# Patient Record
Sex: MALE | Race: WHITE | NOT HISPANIC OR LATINO | Employment: OTHER | URBAN - METROPOLITAN AREA
[De-identification: names, ages, dates, MRNs, and addresses within clinical notes are randomized per-mention and may not be internally consistent; named-entity substitution may affect disease eponyms.]

---

## 2017-02-13 ENCOUNTER — GENERIC CONVERSION - ENCOUNTER (OUTPATIENT)
Dept: OTHER | Facility: OTHER | Age: 62
End: 2017-02-13

## 2017-02-17 ENCOUNTER — ALLSCRIPTS OFFICE VISIT (OUTPATIENT)
Dept: OTHER | Facility: OTHER | Age: 62
End: 2017-02-17

## 2017-02-17 ENCOUNTER — HOSPITAL ENCOUNTER (OUTPATIENT)
Dept: RADIOLOGY | Facility: MEDICAL CENTER | Age: 62
Discharge: HOME | End: 2017-02-17
Payer: COMMERCIAL

## 2017-02-17 DIAGNOSIS — M70.71 OTHER BURSITIS OF HIP, RIGHT HIP: ICD-10-CM

## 2017-02-17 DIAGNOSIS — M25.551 PAIN IN RIGHT HIP: ICD-10-CM

## 2017-02-17 DIAGNOSIS — M70.72 OTHER BURSITIS OF HIP, LEFT HIP: ICD-10-CM

## 2017-02-17 DIAGNOSIS — M46.1 SACROILIITIS, NOT ELSEWHERE CLASSIFIED (HCC): ICD-10-CM

## 2017-02-17 PROCEDURE — 72170 X-RAY EXAM OF PELVIS: CPT

## 2017-02-27 ENCOUNTER — ALLSCRIPTS OFFICE VISIT (OUTPATIENT)
Dept: OTHER | Facility: OTHER | Age: 62
End: 2017-02-27

## 2017-03-07 ENCOUNTER — ALLSCRIPTS OFFICE VISIT (OUTPATIENT)
Dept: RADIOLOGY | Facility: CLINIC | Age: 62
End: 2017-03-07
Payer: COMMERCIAL

## 2017-03-13 ENCOUNTER — APPOINTMENT (OUTPATIENT)
Dept: PHYSICAL THERAPY | Age: 62
End: 2017-03-13
Payer: COMMERCIAL

## 2017-03-13 DIAGNOSIS — M70.71 OTHER BURSITIS OF HIP, RIGHT HIP: ICD-10-CM

## 2017-03-13 DIAGNOSIS — M70.72 OTHER BURSITIS OF HIP, LEFT HIP: ICD-10-CM

## 2017-03-13 DIAGNOSIS — M46.1 SACROILIITIS, NOT ELSEWHERE CLASSIFIED (HCC): ICD-10-CM

## 2017-03-13 PROCEDURE — 97162 PT EVAL MOD COMPLEX 30 MIN: CPT

## 2017-03-16 ENCOUNTER — GENERIC CONVERSION - ENCOUNTER (OUTPATIENT)
Dept: OTHER | Facility: OTHER | Age: 62
End: 2017-03-16

## 2017-03-16 ENCOUNTER — APPOINTMENT (OUTPATIENT)
Dept: PHYSICAL THERAPY | Age: 62
End: 2017-03-16
Payer: COMMERCIAL

## 2017-03-16 PROCEDURE — 97110 THERAPEUTIC EXERCISES: CPT

## 2017-03-18 ENCOUNTER — HOSPITAL ENCOUNTER (OUTPATIENT)
Dept: RADIOLOGY | Age: 62
Discharge: HOME | End: 2017-03-18
Payer: COMMERCIAL

## 2017-03-18 ENCOUNTER — TRANSCRIBE ORDERS (OUTPATIENT)
Dept: ADMINISTRATIVE | Age: 62
End: 2017-03-18

## 2017-03-18 DIAGNOSIS — N13.8 ENLARGED PROSTATE WITH URINARY OBSTRUCTION: ICD-10-CM

## 2017-03-18 DIAGNOSIS — N40.1 ENLARGED PROSTATE WITH URINARY OBSTRUCTION: ICD-10-CM

## 2017-03-18 DIAGNOSIS — N20.0 URIC ACID NEPHROLITHIASIS: ICD-10-CM

## 2017-03-18 DIAGNOSIS — N20.0 URIC ACID NEPHROLITHIASIS: Primary | ICD-10-CM

## 2017-03-18 PROCEDURE — 74000 HB X-RAY EXAM OF ABDOMEN (SINGLE ANTEROPOSTERIOR VIEW): CPT

## 2017-03-20 ENCOUNTER — APPOINTMENT (OUTPATIENT)
Dept: PHYSICAL THERAPY | Age: 62
End: 2017-03-20
Payer: COMMERCIAL

## 2017-03-20 ENCOUNTER — TRANSCRIBE ORDERS (OUTPATIENT)
Dept: ADMINISTRATIVE | Age: 62
End: 2017-03-20

## 2017-03-20 ENCOUNTER — APPOINTMENT (OUTPATIENT)
Dept: LAB | Age: 62
End: 2017-03-20
Payer: COMMERCIAL

## 2017-03-20 DIAGNOSIS — N13.8 ENLARGED PROSTATE WITH URINARY OBSTRUCTION: ICD-10-CM

## 2017-03-20 DIAGNOSIS — N40.1 ENLARGED PROSTATE WITH URINARY OBSTRUCTION: ICD-10-CM

## 2017-03-20 DIAGNOSIS — N20.0 URIC ACID NEPHROLITHIASIS: ICD-10-CM

## 2017-03-20 DIAGNOSIS — N40.1 ENLARGED PROSTATE WITH URINARY OBSTRUCTION: Primary | ICD-10-CM

## 2017-03-20 DIAGNOSIS — Z94.4 LIVER REPLACED BY TRANSPLANT (HCC): ICD-10-CM

## 2017-03-20 DIAGNOSIS — N13.8 ENLARGED PROSTATE WITH URINARY OBSTRUCTION: Primary | ICD-10-CM

## 2017-03-20 DIAGNOSIS — Z94.4 LIVER REPLACED BY TRANSPLANT (HCC): Primary | ICD-10-CM

## 2017-03-20 LAB
ALBUMIN SERPL BCP-MCNC: 3.9 G/DL (ref 3.5–5)
ALP SERPL-CCNC: 56 U/L (ref 46–116)
ALT SERPL W P-5'-P-CCNC: 21 U/L (ref 12–78)
ANION GAP SERPL CALCULATED.3IONS-SCNC: 7 MMOL/L (ref 4–13)
AST SERPL W P-5'-P-CCNC: 10 U/L (ref 5–45)
BASOPHILS # BLD AUTO: 0.03 THOUSANDS/ΜL (ref 0–0.1)
BASOPHILS NFR BLD AUTO: 0 % (ref 0–1)
BILIRUB SERPL-MCNC: 0.51 MG/DL (ref 0.2–1)
BUN SERPL-MCNC: 12 MG/DL (ref 5–25)
CALCIUM SERPL-MCNC: 9.5 MG/DL (ref 8.3–10.1)
CHLORIDE SERPL-SCNC: 104 MMOL/L (ref 100–108)
CO2 SERPL-SCNC: 30 MMOL/L (ref 21–32)
CREAT SERPL-MCNC: 0.98 MG/DL (ref 0.6–1.3)
EOSINOPHIL # BLD AUTO: 0.25 THOUSAND/ΜL (ref 0–0.61)
EOSINOPHIL NFR BLD AUTO: 2 % (ref 0–6)
ERYTHROCYTE [DISTWIDTH] IN BLOOD BY AUTOMATED COUNT: 13.4 % (ref 11.6–15.1)
GFR SERPL CREATININE-BSD FRML MDRD: >60 ML/MIN/1.73SQ M
GGT SERPL-CCNC: 19 U/L (ref 5–85)
GLUCOSE SERPL-MCNC: 97 MG/DL (ref 65–140)
HCT VFR BLD AUTO: 45.1 % (ref 36.5–49.3)
HGB BLD-MCNC: 15.6 G/DL (ref 12–17)
INR PPP: 1.24 (ref 0.86–1.16)
LYMPHOCYTES # BLD AUTO: 1.55 THOUSANDS/ΜL (ref 0.6–4.47)
LYMPHOCYTES NFR BLD AUTO: 12 % (ref 14–44)
MAGNESIUM SERPL-MCNC: 2 MG/DL (ref 1.6–2.6)
MCH RBC QN AUTO: 32.6 PG (ref 26.8–34.3)
MCHC RBC AUTO-ENTMCNC: 34.6 G/DL (ref 31.4–37.4)
MCV RBC AUTO: 94 FL (ref 82–98)
MONOCYTES # BLD AUTO: 0.57 THOUSAND/ΜL (ref 0.17–1.22)
MONOCYTES NFR BLD AUTO: 5 % (ref 4–12)
NEUTROPHILS # BLD AUTO: 10.25 THOUSANDS/ΜL (ref 1.85–7.62)
NEUTS SEG NFR BLD AUTO: 81 % (ref 43–75)
NRBC BLD AUTO-RTO: 0 /100 WBCS
PLATELET # BLD AUTO: 253 THOUSANDS/UL (ref 149–390)
PMV BLD AUTO: 12.3 FL (ref 8.9–12.7)
POTASSIUM SERPL-SCNC: 4.6 MMOL/L (ref 3.5–5.3)
PROT SERPL-MCNC: 7.2 G/DL (ref 6.4–8.2)
PROTHROMBIN TIME: 15.7 SECONDS (ref 12–14.3)
PSA SERPL-MCNC: 2.3 NG/ML (ref 0–4)
RBC # BLD AUTO: 4.79 MILLION/UL (ref 3.88–5.62)
SODIUM SERPL-SCNC: 141 MMOL/L (ref 136–145)
WBC # BLD AUTO: 12.69 THOUSAND/UL (ref 4.31–10.16)

## 2017-03-20 PROCEDURE — 82977 ASSAY OF GGT: CPT

## 2017-03-20 PROCEDURE — 36415 COLL VENOUS BLD VENIPUNCTURE: CPT

## 2017-03-20 PROCEDURE — 85610 PROTHROMBIN TIME: CPT

## 2017-03-20 PROCEDURE — 83735 ASSAY OF MAGNESIUM: CPT

## 2017-03-20 PROCEDURE — 97110 THERAPEUTIC EXERCISES: CPT

## 2017-03-20 PROCEDURE — 80197 ASSAY OF TACROLIMUS: CPT

## 2017-03-20 PROCEDURE — G0103 PSA SCREENING: HCPCS

## 2017-03-20 PROCEDURE — 80053 COMPREHEN METABOLIC PANEL: CPT

## 2017-03-20 PROCEDURE — 85025 COMPLETE CBC W/AUTO DIFF WBC: CPT

## 2017-03-21 ENCOUNTER — APPOINTMENT (OUTPATIENT)
Dept: PHYSICAL THERAPY | Age: 62
End: 2017-03-21
Payer: COMMERCIAL

## 2017-03-22 ENCOUNTER — APPOINTMENT (OUTPATIENT)
Dept: PHYSICAL THERAPY | Age: 62
End: 2017-03-22
Payer: COMMERCIAL

## 2017-03-22 LAB — TACROLIMUS BLD LC/MS/MS-MCNC: 4.6 NG/ML (ref 2–20)

## 2017-03-22 PROCEDURE — 97110 THERAPEUTIC EXERCISES: CPT

## 2017-04-17 ENCOUNTER — GENERIC CONVERSION - ENCOUNTER (OUTPATIENT)
Dept: OTHER | Facility: OTHER | Age: 62
End: 2017-04-17

## 2017-04-21 ENCOUNTER — TRANSCRIBE ORDERS (OUTPATIENT)
Dept: ADMINISTRATIVE | Age: 62
End: 2017-04-21

## 2017-04-21 ENCOUNTER — APPOINTMENT (OUTPATIENT)
Dept: LAB | Age: 62
End: 2017-04-21
Payer: COMMERCIAL

## 2017-04-21 DIAGNOSIS — C22.1 MALIGNANT NEOPLASM OF INTRAHEPATIC BILE DUCTS (HCC): ICD-10-CM

## 2017-04-21 DIAGNOSIS — D68.9 BLOOD CLOTTING DISORDER (HCC): ICD-10-CM

## 2017-04-21 DIAGNOSIS — R79.1 ABNORMAL COAGULATION PROFILE: ICD-10-CM

## 2017-04-21 DIAGNOSIS — Z94.4 LIVER REPLACED BY TRANSPLANT (HCC): ICD-10-CM

## 2017-04-21 DIAGNOSIS — Z94.4 LIVER REPLACED BY TRANSPLANT (HCC): Primary | ICD-10-CM

## 2017-04-21 LAB
ALBUMIN SERPL BCP-MCNC: 4 G/DL (ref 3.5–5)
ALP SERPL-CCNC: 49 U/L (ref 46–116)
ALT SERPL W P-5'-P-CCNC: 19 U/L (ref 12–78)
ANION GAP SERPL CALCULATED.3IONS-SCNC: 7 MMOL/L (ref 4–13)
AST SERPL W P-5'-P-CCNC: 9 U/L (ref 5–45)
BASOPHILS # BLD AUTO: 0.04 THOUSANDS/ΜL (ref 0–0.1)
BASOPHILS NFR BLD AUTO: 0 % (ref 0–1)
BILIRUB SERPL-MCNC: 0.47 MG/DL (ref 0.2–1)
BUN SERPL-MCNC: 10 MG/DL (ref 5–25)
CALCIUM SERPL-MCNC: 9.4 MG/DL (ref 8.3–10.1)
CHLORIDE SERPL-SCNC: 104 MMOL/L (ref 100–108)
CO2 SERPL-SCNC: 27 MMOL/L (ref 21–32)
CREAT SERPL-MCNC: 0.9 MG/DL (ref 0.6–1.3)
EOSINOPHIL # BLD AUTO: 0.15 THOUSAND/ΜL (ref 0–0.61)
EOSINOPHIL NFR BLD AUTO: 2 % (ref 0–6)
ERYTHROCYTE [DISTWIDTH] IN BLOOD BY AUTOMATED COUNT: 13.6 % (ref 11.6–15.1)
GFR SERPL CREATININE-BSD FRML MDRD: >60 ML/MIN/1.73SQ M
GGT SERPL-CCNC: 19 U/L (ref 5–85)
GLUCOSE SERPL-MCNC: 91 MG/DL (ref 65–140)
HCT VFR BLD AUTO: 44.1 % (ref 36.5–49.3)
HGB BLD-MCNC: 15.2 G/DL (ref 12–17)
INR PPP: 1.21 (ref 0.86–1.16)
LYMPHOCYTES # BLD AUTO: 1.53 THOUSANDS/ΜL (ref 0.6–4.47)
LYMPHOCYTES NFR BLD AUTO: 16 % (ref 14–44)
MAGNESIUM SERPL-MCNC: 1.8 MG/DL (ref 1.6–2.6)
MCH RBC QN AUTO: 33 PG (ref 26.8–34.3)
MCHC RBC AUTO-ENTMCNC: 34.5 G/DL (ref 31.4–37.4)
MCV RBC AUTO: 96 FL (ref 82–98)
MONOCYTES # BLD AUTO: 0.44 THOUSAND/ΜL (ref 0.17–1.22)
MONOCYTES NFR BLD AUTO: 5 % (ref 4–12)
NEUTROPHILS # BLD AUTO: 7.6 THOUSANDS/ΜL (ref 1.85–7.62)
NEUTS SEG NFR BLD AUTO: 77 % (ref 43–75)
NRBC BLD AUTO-RTO: 0 /100 WBCS
PLATELET # BLD AUTO: 242 THOUSANDS/UL (ref 149–390)
PMV BLD AUTO: 12.4 FL (ref 8.9–12.7)
POTASSIUM SERPL-SCNC: 4.5 MMOL/L (ref 3.5–5.3)
PROT SERPL-MCNC: 7.3 G/DL (ref 6.4–8.2)
PROTHROMBIN TIME: 15.4 SECONDS (ref 12–14.3)
RBC # BLD AUTO: 4.6 MILLION/UL (ref 3.88–5.62)
SODIUM SERPL-SCNC: 138 MMOL/L (ref 136–145)
WBC # BLD AUTO: 9.79 THOUSAND/UL (ref 4.31–10.16)

## 2017-04-21 PROCEDURE — 80053 COMPREHEN METABOLIC PANEL: CPT

## 2017-04-21 PROCEDURE — 36415 COLL VENOUS BLD VENIPUNCTURE: CPT

## 2017-04-21 PROCEDURE — 85610 PROTHROMBIN TIME: CPT

## 2017-04-21 PROCEDURE — 85025 COMPLETE CBC W/AUTO DIFF WBC: CPT

## 2017-04-21 PROCEDURE — 82977 ASSAY OF GGT: CPT

## 2017-04-21 PROCEDURE — 83735 ASSAY OF MAGNESIUM: CPT

## 2017-04-21 PROCEDURE — 80197 ASSAY OF TACROLIMUS: CPT

## 2017-04-24 ENCOUNTER — ALLSCRIPTS OFFICE VISIT (OUTPATIENT)
Dept: OTHER | Facility: OTHER | Age: 62
End: 2017-04-24

## 2017-04-24 DIAGNOSIS — M51.16 INTERVERTEBRAL DISC DISORDER WITH RADICULOPATHY OF LUMBAR REGION: ICD-10-CM

## 2017-04-24 LAB — TACROLIMUS BLD LC/MS/MS-MCNC: 2.7 NG/ML (ref 2–20)

## 2017-04-27 ENCOUNTER — GENERIC CONVERSION - ENCOUNTER (OUTPATIENT)
Dept: OTHER | Facility: OTHER | Age: 62
End: 2017-04-27

## 2017-05-08 ENCOUNTER — HOSPITAL ENCOUNTER (OUTPATIENT)
Dept: RADIOLOGY | Facility: HOSPITAL | Age: 62
Discharge: HOME | End: 2017-05-08
Attending: ANESTHESIOLOGY
Payer: COMMERCIAL

## 2017-05-08 DIAGNOSIS — M51.16 INTERVERTEBRAL DISC DISORDER WITH RADICULOPATHY OF LUMBAR REGION: ICD-10-CM

## 2017-05-08 PROCEDURE — 72148 MRI LUMBAR SPINE W/O DYE: CPT

## 2017-05-12 ENCOUNTER — GENERIC CONVERSION - ENCOUNTER (OUTPATIENT)
Dept: OTHER | Facility: OTHER | Age: 62
End: 2017-05-12

## 2017-05-30 ENCOUNTER — ALLSCRIPTS OFFICE VISIT (OUTPATIENT)
Dept: RADIOLOGY | Facility: CLINIC | Age: 62
End: 2017-05-30
Payer: COMMERCIAL

## 2017-06-15 ENCOUNTER — GENERIC CONVERSION - ENCOUNTER (OUTPATIENT)
Dept: OTHER | Facility: OTHER | Age: 62
End: 2017-06-15

## 2017-06-21 ENCOUNTER — GENERIC CONVERSION - ENCOUNTER (OUTPATIENT)
Dept: OTHER | Facility: OTHER | Age: 62
End: 2017-06-21

## 2017-07-07 ENCOUNTER — GENERIC CONVERSION - ENCOUNTER (OUTPATIENT)
Dept: OTHER | Facility: OTHER | Age: 62
End: 2017-07-07

## 2017-07-15 ENCOUNTER — APPOINTMENT (OUTPATIENT)
Dept: LAB | Age: 62
End: 2017-07-15
Payer: COMMERCIAL

## 2017-07-15 ENCOUNTER — TRANSCRIBE ORDERS (OUTPATIENT)
Dept: ADMINISTRATIVE | Age: 62
End: 2017-07-15

## 2017-07-15 DIAGNOSIS — R79.1 ABNORMAL COAGULATION PROFILE: ICD-10-CM

## 2017-07-15 DIAGNOSIS — D68.9 BLOOD CLOTTING DISORDER (HCC): ICD-10-CM

## 2017-07-15 DIAGNOSIS — C22.1 MALIGNANT NEOPLASM OF INTRAHEPATIC BILE DUCTS (HCC): ICD-10-CM

## 2017-07-15 DIAGNOSIS — Z94.4 LIVER REPLACED BY TRANSPLANT (HCC): ICD-10-CM

## 2017-07-15 DIAGNOSIS — Z94.4 LIVER REPLACED BY TRANSPLANT (HCC): Primary | ICD-10-CM

## 2017-07-15 LAB
ALBUMIN SERPL BCP-MCNC: 3.8 G/DL (ref 3.5–5)
ALP SERPL-CCNC: 57 U/L (ref 46–116)
ALT SERPL W P-5'-P-CCNC: 23 U/L (ref 12–78)
ANION GAP SERPL CALCULATED.3IONS-SCNC: 5 MMOL/L (ref 4–13)
AST SERPL W P-5'-P-CCNC: 15 U/L (ref 5–45)
BASOPHILS # BLD AUTO: 0.02 THOUSANDS/ΜL (ref 0–0.1)
BASOPHILS NFR BLD AUTO: 0 % (ref 0–1)
BILIRUB SERPL-MCNC: 0.24 MG/DL (ref 0.2–1)
BUN SERPL-MCNC: 9 MG/DL (ref 5–25)
CALCIUM SERPL-MCNC: 9.1 MG/DL (ref 8.3–10.1)
CHLORIDE SERPL-SCNC: 105 MMOL/L (ref 100–108)
CO2 SERPL-SCNC: 29 MMOL/L (ref 21–32)
CREAT SERPL-MCNC: 0.94 MG/DL (ref 0.6–1.3)
EOSINOPHIL # BLD AUTO: 0.18 THOUSAND/ΜL (ref 0–0.61)
EOSINOPHIL NFR BLD AUTO: 2 % (ref 0–6)
ERYTHROCYTE [DISTWIDTH] IN BLOOD BY AUTOMATED COUNT: 13 % (ref 11.6–15.1)
GFR SERPL CREATININE-BSD FRML MDRD: >60 ML/MIN/1.73SQ M
GGT SERPL-CCNC: 14 U/L (ref 5–85)
GLUCOSE SERPL-MCNC: 97 MG/DL (ref 65–140)
HCT VFR BLD AUTO: 41.9 % (ref 36.5–49.3)
HGB BLD-MCNC: 13.8 G/DL (ref 12–17)
INR PPP: 1.12 (ref 0.86–1.16)
LYMPHOCYTES # BLD AUTO: 1.04 THOUSANDS/ΜL (ref 0.6–4.47)
LYMPHOCYTES NFR BLD AUTO: 9 % (ref 14–44)
MAGNESIUM SERPL-MCNC: 1.9 MG/DL (ref 1.6–2.6)
MCH RBC QN AUTO: 31.6 PG (ref 26.8–34.3)
MCHC RBC AUTO-ENTMCNC: 32.9 G/DL (ref 31.4–37.4)
MCV RBC AUTO: 96 FL (ref 82–98)
MONOCYTES # BLD AUTO: 0.49 THOUSAND/ΜL (ref 0.17–1.22)
MONOCYTES NFR BLD AUTO: 4 % (ref 4–12)
NEUTROPHILS # BLD AUTO: 9.44 THOUSANDS/ΜL (ref 1.85–7.62)
NEUTS SEG NFR BLD AUTO: 85 % (ref 43–75)
NRBC BLD AUTO-RTO: 0 /100 WBCS
PLATELET # BLD AUTO: 269 THOUSANDS/UL (ref 149–390)
PMV BLD AUTO: 12.8 FL (ref 8.9–12.7)
POTASSIUM SERPL-SCNC: 4.6 MMOL/L (ref 3.5–5.3)
PROT SERPL-MCNC: 6.7 G/DL (ref 6.4–8.2)
PROTHROMBIN TIME: 14.4 SECONDS (ref 12.1–14.4)
RBC # BLD AUTO: 4.37 MILLION/UL (ref 3.88–5.62)
SODIUM SERPL-SCNC: 139 MMOL/L (ref 136–145)
WBC # BLD AUTO: 11.19 THOUSAND/UL (ref 4.31–10.16)

## 2017-07-15 PROCEDURE — 80053 COMPREHEN METABOLIC PANEL: CPT

## 2017-07-15 PROCEDURE — 85610 PROTHROMBIN TIME: CPT

## 2017-07-15 PROCEDURE — 83735 ASSAY OF MAGNESIUM: CPT

## 2017-07-15 PROCEDURE — 85025 COMPLETE CBC W/AUTO DIFF WBC: CPT

## 2017-07-15 PROCEDURE — 36415 COLL VENOUS BLD VENIPUNCTURE: CPT

## 2017-07-15 PROCEDURE — 82977 ASSAY OF GGT: CPT

## 2017-07-15 PROCEDURE — 80197 ASSAY OF TACROLIMUS: CPT

## 2017-07-18 LAB — TACROLIMUS BLD LC/MS/MS-MCNC: 3.6 NG/ML (ref 2–20)

## 2017-08-04 ENCOUNTER — TRANSCRIBE ORDERS (OUTPATIENT)
Dept: ADMINISTRATIVE | Age: 62
End: 2017-08-04

## 2017-08-04 ENCOUNTER — APPOINTMENT (OUTPATIENT)
Dept: LAB | Age: 62
End: 2017-08-04
Payer: COMMERCIAL

## 2017-08-04 DIAGNOSIS — Z00.8 HEALTH EXAMINATION IN POPULATION SURVEY: Primary | ICD-10-CM

## 2017-08-04 DIAGNOSIS — Z00.8 HEALTH EXAMINATION IN POPULATION SURVEY: ICD-10-CM

## 2017-08-04 LAB
CHOLEST SERPL-MCNC: 227 MG/DL (ref 50–200)
EST. AVERAGE GLUCOSE BLD GHB EST-MCNC: 123 MG/DL
HBA1C MFR BLD: 5.9 % (ref 4.2–6.3)
HDLC SERPL-MCNC: 57 MG/DL (ref 40–60)
LDLC SERPL CALC-MCNC: 150 MG/DL (ref 0–100)
TRIGL SERPL-MCNC: 100 MG/DL

## 2017-08-04 PROCEDURE — 83036 HEMOGLOBIN GLYCOSYLATED A1C: CPT

## 2017-08-04 PROCEDURE — 36415 COLL VENOUS BLD VENIPUNCTURE: CPT

## 2017-08-04 PROCEDURE — 80061 LIPID PANEL: CPT

## 2017-09-07 ENCOUNTER — GENERIC CONVERSION - ENCOUNTER (OUTPATIENT)
Dept: OTHER | Facility: OTHER | Age: 62
End: 2017-09-07

## 2017-09-07 PROCEDURE — 88305 TISSUE EXAM BY PATHOLOGIST: CPT | Performed by: DERMATOLOGY

## 2017-09-08 ENCOUNTER — LAB REQUISITION (OUTPATIENT)
Dept: LAB | Facility: HOSPITAL | Age: 62
End: 2017-09-08
Payer: COMMERCIAL

## 2017-09-08 DIAGNOSIS — D48.5 NEOPLASM OF UNCERTAIN BEHAVIOR OF SKIN: ICD-10-CM

## 2017-09-08 PROCEDURE — S9088 SERVICES PROVIDED IN URGENT: HCPCS | Performed by: FAMILY MEDICINE

## 2017-09-08 PROCEDURE — 99213 OFFICE O/P EST LOW 20 MIN: CPT | Performed by: FAMILY MEDICINE

## 2017-09-21 ENCOUNTER — ALLSCRIPTS OFFICE VISIT (OUTPATIENT)
Dept: OTHER | Facility: OTHER | Age: 62
End: 2017-09-21

## 2017-09-29 ENCOUNTER — GENERIC CONVERSION - ENCOUNTER (OUTPATIENT)
Dept: OTHER | Facility: OTHER | Age: 62
End: 2017-09-29

## 2017-10-06 ENCOUNTER — OFFICE VISIT (OUTPATIENT)
Dept: URGENT CARE | Age: 62
End: 2017-10-06
Payer: COMMERCIAL

## 2017-10-06 PROCEDURE — 90471 IMMUNIZATION ADMIN: CPT | Performed by: FAMILY MEDICINE

## 2017-10-06 PROCEDURE — 90715 TDAP VACCINE 7 YRS/> IM: CPT

## 2017-10-06 PROCEDURE — 12001 RPR S/N/AX/GEN/TRNK 2.5CM/<: CPT | Performed by: FAMILY MEDICINE

## 2017-10-06 PROCEDURE — 99213 OFFICE O/P EST LOW 20 MIN: CPT | Performed by: FAMILY MEDICINE

## 2017-10-06 PROCEDURE — S9088 SERVICES PROVIDED IN URGENT: HCPCS | Performed by: FAMILY MEDICINE

## 2017-10-10 NOTE — PROGRESS NOTES
Assessment  1  Laceration of left lower extremity, initial encounter (894 0) (A14 478C)    Plan  Laceration of left lower extremity, initial encounter    · Gently wash the area with soap and warm water  Dry completely ; Status:Complete;    Done: 73AZO5484   · Keep the affected body part above the level of your heart to avoid swelling ;  Status:Complete;   Done: 19PNZ4410   · Wound care as instructed ; Status:Complete;   Done: 78BFP8303   · You may slowly resume your normal level of activity once you feel better ;  Status:Complete;   Done: 40JWQ7920   · Seek Immediate Medical Attention if: There is redness, swelling, or pain in the area ;  Status:Complete;   Done: 68EPT4851    Discussion/Summary  Discussion Summary:   Suture removal in 12-14 days  Medication Side Effects Reviewed: Possible side effects of new medications were reviewed with the patient/guardian today  Understands and agrees with treatment plan: The treatment plan was reviewed with the patient/guardian  The patient/guardian understands and agrees with the treatment plan   Counseling Documentation With [facility]: The patient was counseled regarding instructions for management,-prognosis,-patient and family education,-impressions,-risks and benefits of treatment options,-importance of compliance with treatment  Follow Up Instructions: Follow Up with your Primary Care Provider in 12-14 days  If your symptoms worsen, go to the nearest [place] [address] Emergency Department  Chief Complaint  Chief Complaint Free Text Note Form: left leg laceration       History of Present Illness  HPI: Patient was coming up cardboard boxes at home and the reasonably was not cutting well, so he switched the blade and sliced into his left lower leg  He did have some tingling in his left little toe, but that is resolving   He does have some focal swelling around the laceration   Hospital Based Practices Required Assessment:   Pain Assessment   the patient states they have pain  (on a scale of 0 to 10, the patient rates the pain at 8 )   Abuse And Domestic Violence Screen    Yes, the patient is safe at home -The patient states no one is hurting them  Depression And Suicide Screen  No, the patient has not had thoughts of hurting themself  No, the patient has not felt depressed in the past 7 days  Prefered Language is  [place]  Primary Language is  English  Review of Systems  Focused-Male:   Constitutional: as noted in HPI  Musculoskeletal: as noted in HPI  Integumentary: as noted in HPI  Active Problems  1  Abdominal MRI Liver Mass Lesion (573 8)   2  Acute pain of right hip (719 45) (M25 551)   3  Anxiety (300 00) (F41 9)   4  Arthralgia Of The Right Pelvis/Hip/Femur (719 45)   5  Bursitis of both hips (726 5) (M70 71,M70 72)   6  Bursitis of right hip (726 5) (M70 71)   7  Cervical disc herniation (722 0) (M50 20)   8  Cervical radiculopathy (723 4) (M54 12)   9  Cervical spondylosis (721 0) (M47 812)   10  Chronic gastroesophageal reflux disease (530 81) (K21 9)   11  Chronic pain (338 29) (G89 29)   12  Cirrhosis (571 5) (K74 60)   13  History of Easy Bruising Tendency   14  Essential hypertension (401 9) (I10)   15  Facet arthropathy, cervical (721 0) (M12 88)   16  History of colon polyps (V12 72) (Z86 010)   17  Intervertebral disc disorder with radiculopathy of lumbar region (724 4) (M51 16)   18  Irritation of left eye (379 99) (H57 8)   19  Lung mass (786 6) (R91 8)   20  Neck pain (723 1) (M54 2)   21  Need for immunization against influenza (V04 81) (Z23)   22  Nonalcoholic liver disease, chronic (571 9) (K76 9)   23  Right arm pain (729 5) (M79 601)   24  Sacroiliitis (720 2) (M46 1)   25  Transplanted liver (V42 7) (Z94 4)   26  Trochanteric bursitis, unspecified laterality (726 5) (M70 60)   27  Upper respiratory infection, acute (465 9) (J06 9)    Past Medical History  1  History of Bacterial Peritonitis (876 29)   2   History of Depression (311) (F32 9)   3  History of Easy Bruising Tendency   4  History of alcoholism (V11 3) (F10 21)   5  History of hepatocellular carcinoma (V10 07) (Z85 05)   6  History of hypertension (V12 59) (Z86 79)   7  History of liver cancer (V10 07) (Z85 05)   8  History of Liver, Stomach, Or Bowel Disease (569 9)    Family History  Father    1  Family history of CABG   2  Family history of Carcinoma Of The Prostate Gland (V16 42)   3  Family history of cardiac disorder (V17 49) (Z82 49)   4  Family history of Hypertension, benign  Maternal Grandmother    5  Family history of diabetes mellitus (V18 0) (Z83 3)  Paternal Grandmother    10  Family history of epilepsy (V17 2) (Z82 0)  Maternal Grandfather    7  Family history of Embolism  Paternal Grandfather    6  Family history of Accident  Family History    9  Family history of Carcinoma Of Liver   10  Family history of cardiac disorder (V17 49) (Z82 49)   11  Family history of Hypertension, benign  Family History Reviewed: The family history was reviewed and updated today  Social History   · Denied: Alcohol Use (History)   · Current Every Day Smoker (305 1)   · Current some day smoker (305 1) (F17 200)   · Daily Coffee Consumption (2  Cups/Day)   · Daily Cola Consumption (1  Cans/Day)   · Disabled   · Drug Use (305 90)   ·    · No alcohol use   · Occupation  Social History Reviewed: The social history was reviewed and updated today  Surgical History  1  History of Abdominal Paracentesis (Therapeutic) With Imaging Guidance   2  History of Appendectomy   3  History of Exploratory Laparotomy   4  History of Eye Surgery   5  History of Liver Transplant   6  History of Rotator Cuff Repair  Surgical History Reviewed: The surgical history was reviewed and updated today  Current Meds   1  Acetaminophen 325 MG Oral Tablet; TAKE 2 TABLET Every 8 hours PRN; Therapy: (Recorded:77Qmf3625) to Recorded   2   ALPRAZolam 0 25 MG Oral Tablet; TAKE 1 TABLET 3 TIMES DAILY AS NEEDED; Therapy: 98NMC4661 to (Evaluate:08Jan2017); Last Rx:01Nov2016 Ordered   3  Azithromycin 250 MG Oral Tablet; TAKE 2 TABLETS ON DAY 1 THEN TAKE 1 TABLET A   DAY FOR 4 DAYS; Therapy: 05ODS2468 to (Last Coy Goon)  Requested for: 21Sep2017 Ordered   4  Calcium Plus Vitamin D3 CAPS; Therapy: (Recorded:06Mar2014) to Recorded   5  Colace 100 MG Oral Capsule; Therapy: (Recorded:06Mar2014) to Recorded   6  Daily Multiple Vitamins TABS; Therapy: (Recorded:06Mar2014) to Recorded   7  Gabapentin 300 MG Oral Capsule; TAKE 1 CAPSULE TWICE DAILY; Therapy: 24Apr2017 to (Evaluate:27Jan2018)  Requested for: 77GBE4711; Last   Rx:24Tdc9044 Ordered   8  Lisinopril 20 MG Oral Tablet; Therapy: (Shirley Elizondo) to Recorded   9  Magnesium Oxide 400 MG Oral Tablet; Therapy: (Recorded:06Mar2014) to Recorded   10  Metoprolol Succinate ER 50 MG Oral Tablet Extended Release 24 Hour; Take 1 tablet    daily; Therapy: 21Jun2017 to (Evaluate:16Jun2018)  Requested for: 21Jun2017; Last    TA:63RIN6019 Ordered   11  Multi-Vitamin TABS; TAKE 1 TABLET DAILY; Therapy: (Recorded:78Zkv8891) to Recorded   12  Omeprazole 20 MG Oral Capsule Delayed Release; TAKE 2 CAPSULES DAILY     Requested for: 52YOO0784; Last Rx:06Cez2137 Ordered   13  PriLOSEC 20 MG CPDR; Therapy: (Recorded:06Mar2014) to Recorded   14  Tacrolimus 1 MG Oral Capsule; TAKE 3 CAPSULE Twice daily  Requested for:    18Apr2017; Last Rx:18Apr2017 Ordered  Medication List Reviewed: The medication list was reviewed and updated today  Allergies  1  No Known Drug Allergies    Vitals  Signs   Recorded: 07IRR7184 10:22AM   Temperature: 98 F  Heart Rate: 71  Respiration: 18  Systolic: 664  Diastolic: 69  Height: 6 ft   Weight: 170 lb   BMI Calculated: 23 06  BSA Calculated: 1 99  O2 Saturation: 99    Physical Exam    Constitutional   General appearance: No acute distress, well appearing and well nourished  Skin 2 5 cm laceration of the left lower leg  Bleeding controlled with pressure  Neurologic   Sensation: No sensory loss  Psychiatric   Orientation to person, place and time: Normal     Mood and affect: Normal        Procedure    Procedure: wound repair  The wound was located on the and was 2 5 cm in length left shin  The wound was simple  The wound involved the subcutaneous tissue  The wound was linear-and-was clean, but-did not have a foreign body-and-did not have tissue loss  the neurovascular exam was normal  there was no tendon injury  The site was prepped with Betadine, Shur-Clens, cleansed and irrigated extensively  Anesthesia: Local anesthetic was administered  Lidocaine 4 ml, 1%, without epinephrine was injected  Closure: The cutaneous layer was closed with 7 sutures of 4-0 nylon-  simple interrupted sutures were used for the skin closure  -(4 simple interrupted 3 vertical mattress)  Dressing: a sterile dressing was placed-and-an antibiotic ointment was applied  Patient Status:  the patient tolerated the procedure well   There were no complications      Provider Comments  Provider Comments:   Tetanus vaccination updated      Future Appointments    Date/Time Provider Specialty Site   10/13/2017 01:15 PM Ambreen Schneider MD Pain Management [place] [address]   Electronically signed by : Shoaib Rocha, [facility]; Oct  6 2017 11:21AM EST                       (Author)    Electronically signed by : Patsy Gan DO; Oct  9 2017  8:43AM EST                       (Co-author)

## 2017-10-13 ENCOUNTER — ALLSCRIPTS OFFICE VISIT (OUTPATIENT)
Dept: OTHER | Facility: OTHER | Age: 62
End: 2017-10-13

## 2017-10-17 ENCOUNTER — GENERIC CONVERSION - ENCOUNTER (OUTPATIENT)
Dept: OTHER | Facility: OTHER | Age: 62
End: 2017-10-17

## 2017-10-19 ENCOUNTER — OFFICE VISIT (OUTPATIENT)
Dept: URGENT CARE | Age: 62
End: 2017-10-19
Payer: COMMERCIAL

## 2017-10-19 PROCEDURE — S9088 SERVICES PROVIDED IN URGENT: HCPCS | Performed by: FAMILY MEDICINE

## 2017-10-19 PROCEDURE — 99213 OFFICE O/P EST LOW 20 MIN: CPT | Performed by: FAMILY MEDICINE

## 2017-10-20 NOTE — PROGRESS NOTES
Assessment  1  Laceration of left lower extremity, subsequent encounter (V58 89,894 0) (W82 149E)   2  Encounter for removal of sutures (V58 32) (Z48 02)    Plan  Encounter for removal of sutures    · Gently wash the area with soap and warm water  Dry completely ; Status:Complete;    Done: 48WLC8934   · Resume activity to your tolerance ; Status:Complete;   Done: 50GJC5289   · Seek Immediate Medical Attention if: There is redness, swelling, or pain in the area ;  Status:Complete;   Done: 55YYM4668    Discussion/Summary  Understands and agrees with treatment plan: The treatment plan was reviewed with the patient/guardian  The patient/guardian understands and agrees with the treatment plan   Counseling Documentation With Imm: The patient was counseled regarding instructions for management,-- prognosis,-- patient and family education,-- impressions,-- risks and benefits of treatment options,-- importance of compliance with treatment  Follow Up Instructions: Follow Up with your Primary Care Provider in 3-4 days  If your symptoms worsen, go to the nearest [place] [address] Emergency Department  Chief Complaint  Chief Complaint Free Text Note Form: For suture removal - had 7 placed L shin on 10/7/17  Healing well - no drainage/redness  History of Present Illness  HPI: Patient is here for suture removal  He denies any redness, swelling, purulent discharge, pain, numbness, tingling   Hospital Based Practices Required Assessment:   Pain Assessment   the patient states they do not have pain  Abuse And Domestic Violence Screen    Yes, the patient is safe at home  -- The patient states no one is hurting them  Depression And Suicide Screen  No, the patient has not had thoughts of hurting themself  No, the patient has not felt depressed in the past 7 days  Prefered Language is  [place]  Primary Language is  English  Review of Systems  Focused-Male:   Constitutional: as noted in HPI     Integumentary: as noted in HPI  Neurological: as noted in HPI  Active Problems  1  Abdominal MRI Liver Mass Lesion (573 8)   2  Acute pain of right hip (719 45) (M25 551)   3  Anxiety (300 00) (F41 9)   4  Arthralgia Of The Right Pelvis/Hip/Femur (719 45)   5  Bursitis of both hips (726 5) (M70 71,M70 72)   6  Bursitis of right hip (726 5) (M70 71)   7  Cervical disc herniation (722 0) (M50 20)   8  Cervical radiculopathy (723 4) (M54 12)   9  Cervical spondylosis (721 0) (M47 812)   10  Chronic gastroesophageal reflux disease (530 81) (K21 9)   11  Chronic pain (338 29) (G89 29)   12  Cirrhosis (571 5) (K74 60)   13  History of Easy Bruising Tendency   14  Essential hypertension (401 9) (I10)   15  Facet arthropathy, cervical (721 0) (M12 88)   16  History of colon polyps (V12 72) (Z86 010)   17  Intervertebral disc disorder with radiculopathy of lumbar region (724 4) (M51 16)   18  Irritation of left eye (379 99) (H57 8)   19  Laceration of left lower extremity, initial encounter (894 0) (S81 812A)   20  Lung mass (786 6) (R91 8)   21  Neck pain (723 1) (M54 2)   22  Need for immunization against influenza (V04 81) (Z23)   23  Nonalcoholic liver disease, chronic (571 9) (K76 9)   24  Right arm pain (729 5) (M79 601)   25  Sacroiliitis (720 2) (M46 1)   26  Transplanted liver (V42 7) (Z94 4)   27  Trochanteric bursitis, unspecified laterality (726 5) (M70 60)   28  Upper respiratory infection, acute (465 9) (J06 9)    Past Medical History  1  History of Bacterial Peritonitis (567 29)   2  History of Depression (311) (F32 9)   3  History of Easy Bruising Tendency   4  History of alcoholism (V11 3) (F10 21)   5  History of hepatocellular carcinoma (V10 07) (Z85 05)   6  History of hypertension (V12 59) (Z86 79)   7  History of liver cancer (V10 07) (Z85 05)   8  History of Liver, Stomach, Or Bowel Disease (569 9)  Active Problems And Past Medical History Reviewed:    The active problems and past medical history were reviewed and updated today  Family History  Father    1  Family history of CABG   2  Family history of Carcinoma Of The Prostate Gland (V16 42)   3  Family history of cardiac disorder (V17 49) (Z82 49)   4  Family history of Hypertension, benign  Maternal Grandmother    5  Family history of diabetes mellitus (V18 0) (Z83 3)  Paternal Grandmother    10  Family history of epilepsy (V17 2) (Z82 0)  Maternal Grandfather    7  Family history of Embolism  Paternal Grandfather    6  Family history of Accident  Family History    9  Family history of Carcinoma Of Liver   10  Family history of cardiac disorder (V17 49) (Z82 49)   11  Family history of Hypertension, benign  Family History Reviewed: The family history was reviewed and updated today  Social History   · Denied: Alcohol Use (History)   · Current some day smoker (305 1) (F17 200)   · Daily Coffee Consumption (2  Cups/Day)   · Daily Cola Consumption (1  Cans/Day)   · Disabled   · Drug Use (305 90)   ·    · No alcohol use   · Occupation  Social History Reviewed: The social history was reviewed and updated today  Surgical History  1  History of Abdominal Paracentesis (Therapeutic) With Imaging Guidance   2  History of Appendectomy   3  History of Exploratory Laparotomy   4  History of Eye Surgery   5  History of Liver Transplant   6  History of Rotator Cuff Repair  Surgical History Reviewed: The surgical history was reviewed and updated today  Current Meds   1  Acetaminophen 325 MG Oral Tablet; TAKE 2 TABLET Every 8 hours PRN; Therapy: (Recorded:21Daw5570) to Recorded   2  ALPRAZolam 0 25 MG Oral Tablet; TAKE 1 TABLET 3 TIMES DAILY AS NEEDED; Therapy: 10HSO2635 to (Evaluate:08Jan2017); Last Rx:01Nov2016 Ordered   3  Calcium Plus Vitamin D3 CAPS; Therapy: (Recorded:06Mar2014) to Recorded   4  Daily Multiple Vitamins TABS; Therapy: (Recorded:06Mar2014) to Recorded   5  Gabapentin 600 MG Oral Tablet;    Therapy: (Recorded:19Oct2017) to Recorded   6  Lisinopril 20 MG Oral Tablet; Therapy: (Trinity Anguiano) to Recorded   7  Magnesium Oxide 400 MG Oral Tablet; Therapy: (Recorded:06Mar2014) to Recorded   8  Metoprolol Succinate ER 50 MG Oral Tablet Extended Release 24 Hour; Take 1 tablet   daily; Therapy: 21Jun2017 to (Evaluate:16Jun2018)  Requested for: 21Jun2017; Last   MY:35LNB2568 Ordered   9  Multi-Vitamin TABS; TAKE 1 TABLET DAILY; Therapy: (Recorded:03Hsm3043) to Recorded   10  Omeprazole 20 MG Oral Capsule Delayed Release; TAKE 2 CAPSULES DAILY     Requested for: 93BCO2993; Last Rx:78Qrf1978 Ordered   11  Tacrolimus 1 MG Oral Capsule; TAKE 3 CAPSULE Twice daily  Requested for:    18Apr2017; Last Rx:18Apr2017 Ordered  Medication List Reviewed: The medication list was reviewed and updated today  Allergies  1  No Known Drug Allergies    Vitals  Signs   Recorded: 05XRT9442 02:07PM   Temperature: 98 5 F, Oral  Heart Rate: 78  Pulse Quality: Regular  Respiration: 18  Blood Pressure: 130 mm Hg, RUE, Sitting  Blood Pressure: 68 mm Hg, RUE, Sitting  Height: 6 ft   Weight: 180 lb   BMI Calculated: 24 41 kg/m2  BSA Calculated: 2 04 m2  O2 Saturation: 98  Pain Scale: 0    Physical Exam    Constitutional   General appearance: No acute distress, well appearing and well nourished  Skin Wound is clean, dry and intact with no erythema no warmth, no swelling, no discharge  All 7 sutures removed without sequelae     Psychiatric   Orientation to person, place and time: Normal     Mood and affect: Normal        Signatures   Electronically signed by : KAVIN Shi; Oct 19 2017  2:40PM EST                       (Author)    Electronically signed by : Veronica Juarez DO; Oct 19 2017  4:27PM EST                       (Co-author)

## 2017-10-24 ENCOUNTER — APPOINTMENT (OUTPATIENT)
Dept: LAB | Age: 62
End: 2017-10-24
Payer: COMMERCIAL

## 2017-10-24 ENCOUNTER — TRANSCRIBE ORDERS (OUTPATIENT)
Dept: ADMINISTRATIVE | Age: 62
End: 2017-10-24

## 2017-10-24 DIAGNOSIS — D68.9 BLOOD CLOTTING DISORDER (HCC): ICD-10-CM

## 2017-10-24 DIAGNOSIS — C22.1 MALIGNANT NEOPLASM OF INTRAHEPATIC BILE DUCTS (HCC): ICD-10-CM

## 2017-10-24 DIAGNOSIS — Z94.4 LIVER REPLACED BY TRANSPLANT (HCC): Primary | ICD-10-CM

## 2017-10-24 DIAGNOSIS — Z94.4 LIVER REPLACED BY TRANSPLANT (HCC): ICD-10-CM

## 2017-10-24 DIAGNOSIS — R79.1 ABNORMAL COAGULATION PROFILE: ICD-10-CM

## 2017-10-24 LAB
BASOPHILS # BLD AUTO: 0.03 THOUSANDS/ΜL (ref 0–0.1)
BASOPHILS NFR BLD AUTO: 0 % (ref 0–1)
EOSINOPHIL # BLD AUTO: 0.3 THOUSAND/ΜL (ref 0–0.61)
EOSINOPHIL NFR BLD AUTO: 3 % (ref 0–6)
ERYTHROCYTE [DISTWIDTH] IN BLOOD BY AUTOMATED COUNT: 14.5 % (ref 11.6–15.1)
HCT VFR BLD AUTO: 42.7 % (ref 36.5–49.3)
HGB BLD-MCNC: 14.5 G/DL (ref 12–17)
INR PPP: 1.23 (ref 0.86–1.16)
LYMPHOCYTES # BLD AUTO: 1.33 THOUSANDS/ΜL (ref 0.6–4.47)
LYMPHOCYTES NFR BLD AUTO: 14 % (ref 14–44)
MCH RBC QN AUTO: 31.5 PG (ref 26.8–34.3)
MCHC RBC AUTO-ENTMCNC: 34 G/DL (ref 31.4–37.4)
MCV RBC AUTO: 93 FL (ref 82–98)
MONOCYTES # BLD AUTO: 0.47 THOUSAND/ΜL (ref 0.17–1.22)
MONOCYTES NFR BLD AUTO: 5 % (ref 4–12)
NEUTROPHILS # BLD AUTO: 7.49 THOUSANDS/ΜL (ref 1.85–7.62)
NEUTS SEG NFR BLD AUTO: 78 % (ref 43–75)
NRBC BLD AUTO-RTO: 0 /100 WBCS
PLATELET # BLD AUTO: 241 THOUSANDS/UL (ref 149–390)
PMV BLD AUTO: 11.5 FL (ref 8.9–12.7)
PROTHROMBIN TIME: 15.6 SECONDS (ref 12.1–14.4)
RBC # BLD AUTO: 4.61 MILLION/UL (ref 3.88–5.62)
WBC # BLD AUTO: 9.64 THOUSAND/UL (ref 4.31–10.16)

## 2017-10-24 PROCEDURE — 36415 COLL VENOUS BLD VENIPUNCTURE: CPT

## 2017-10-24 PROCEDURE — 85025 COMPLETE CBC W/AUTO DIFF WBC: CPT

## 2017-10-24 PROCEDURE — 83735 ASSAY OF MAGNESIUM: CPT

## 2017-10-24 PROCEDURE — 80197 ASSAY OF TACROLIMUS: CPT

## 2017-10-24 PROCEDURE — 85610 PROTHROMBIN TIME: CPT

## 2017-10-24 PROCEDURE — 80053 COMPREHEN METABOLIC PANEL: CPT

## 2017-10-24 PROCEDURE — 82977 ASSAY OF GGT: CPT

## 2017-10-25 LAB
ALBUMIN SERPL BCP-MCNC: 4.2 G/DL (ref 3.5–5)
ALP SERPL-CCNC: 57 U/L (ref 46–116)
ALT SERPL W P-5'-P-CCNC: 17 U/L (ref 12–78)
ANION GAP SERPL CALCULATED.3IONS-SCNC: 7 MMOL/L (ref 4–13)
AST SERPL W P-5'-P-CCNC: 14 U/L (ref 5–45)
BILIRUB SERPL-MCNC: 0.56 MG/DL (ref 0.2–1)
BUN SERPL-MCNC: 17 MG/DL (ref 5–25)
CALCIUM SERPL-MCNC: 9.2 MG/DL (ref 8.3–10.1)
CHLORIDE SERPL-SCNC: 105 MMOL/L (ref 100–108)
CO2 SERPL-SCNC: 28 MMOL/L (ref 21–32)
CREAT SERPL-MCNC: 0.89 MG/DL (ref 0.6–1.3)
GFR SERPL CREATININE-BSD FRML MDRD: 93 ML/MIN/1.73SQ M
GGT SERPL-CCNC: 12 U/L (ref 5–85)
GLUCOSE SERPL-MCNC: 87 MG/DL (ref 65–140)
MAGNESIUM SERPL-MCNC: 2.1 MG/DL (ref 1.6–2.6)
POTASSIUM SERPL-SCNC: 4.5 MMOL/L (ref 3.5–5.3)
PROT SERPL-MCNC: 7.3 G/DL (ref 6.4–8.2)
SODIUM SERPL-SCNC: 140 MMOL/L (ref 136–145)

## 2017-10-26 LAB — TACROLIMUS BLD LC/MS/MS-MCNC: 3.1 NG/ML (ref 2–20)

## 2017-10-27 ENCOUNTER — GENERIC CONVERSION - ENCOUNTER (OUTPATIENT)
Dept: OTHER | Facility: OTHER | Age: 62
End: 2017-10-27

## 2017-11-07 ENCOUNTER — GENERIC CONVERSION - ENCOUNTER (OUTPATIENT)
Dept: OTHER | Facility: OTHER | Age: 62
End: 2017-11-07

## 2017-11-09 ENCOUNTER — TRANSCRIBE ORDERS (OUTPATIENT)
Dept: ADMINISTRATIVE | Facility: HOSPITAL | Age: 62
End: 2017-11-09

## 2017-11-09 DIAGNOSIS — D75.1 ERYTHROCYTOSIS DUE TO HEPATOMA (HCC): Primary | ICD-10-CM

## 2017-11-09 DIAGNOSIS — C22.0 ERYTHROCYTOSIS DUE TO HEPATOMA (HCC): Primary | ICD-10-CM

## 2017-11-10 ENCOUNTER — HOSPITAL ENCOUNTER (OUTPATIENT)
Dept: RADIOLOGY | Facility: HOSPITAL | Age: 62
Discharge: HOME | End: 2017-11-10
Payer: COMMERCIAL

## 2017-11-10 DIAGNOSIS — C22.0 ERYTHROCYTOSIS DUE TO HEPATOMA (HCC): ICD-10-CM

## 2017-11-10 DIAGNOSIS — D75.1 ERYTHROCYTOSIS DUE TO HEPATOMA (HCC): ICD-10-CM

## 2017-11-10 PROCEDURE — 71250 CT THORAX DX C-: CPT

## 2017-11-10 PROCEDURE — A9585 GADOBUTROL INJECTION: HCPCS | Performed by: RADIOLOGY

## 2017-11-10 PROCEDURE — 74183 MRI ABD W/O CNTR FLWD CNTR: CPT

## 2017-11-10 RX ADMIN — GADOBUTROL 8 ML: 604.72 INJECTION INTRAVENOUS at 16:00

## 2017-12-12 ENCOUNTER — ALLSCRIPTS OFFICE VISIT (OUTPATIENT)
Dept: RADIOLOGY | Facility: CLINIC | Age: 62
End: 2017-12-12
Payer: COMMERCIAL

## 2017-12-26 ENCOUNTER — GENERIC CONVERSION - ENCOUNTER (OUTPATIENT)
Dept: OTHER | Facility: OTHER | Age: 62
End: 2017-12-26

## 2018-01-11 NOTE — MISCELLANEOUS
Message   Recorded as Task   Date: 06/06/2017 02:33 PM, Created By: William Lorenzo   Task Name: Follow Up   Assigned To: [facility] ,Team   Regarding Patient: Hans Torres, Status: Active   Comment:    Indy Mercado - 06 Jun 2017 2:33 PM     TASK CREATED  pt S/P B/L L4 TFESI with Dr Solorzano at [facility] on 5/30/17  no f/u scheduled   Joan Roman - 06 Jun 2017 3:11 PM     TASK EDITED  7 days post procedure said he has had about 70% improvement   now pain is in lt side of back   the pain is higher up than before    pain is a 6   Jordy Solorzano - 06 Jun 2017 3:13 PM     TASK REPLIED TO: Previously Assigned To Marty Haque md aware   give steroid more time and f/u next week please   Savanah Leal - 12 Jun 2017 3:58 PM     TASK EDITED  Please call on 6/13/17  Mike Mole - 13 Jun 2017 10:11 AM     TASK EDITED  lmtcb with if he got anymore improvement and if still having pain   Meg Gibson - 15 [address] 8917 89:77 AM     TASK EDITED  Unable to get a hold of patient  No f/u scheduled  Please send CNR letter  Thanks   Letter Sent      Active Problems    1  Abdominal MRI Liver Mass Lesion (573 8)   2  Acute pain of right hip (719 45) (M25 551)   3  Anxiety (300 00) (F41 9)   4  Arthralgia Of The Right Pelvis/Hip/Femur (719 45)   5  Bursitis of both hips (726 5) (M70 71,M70 72)   6  Bursitis of right hip (726 5) (M70 71)   7  Cervical disc herniation (722 0) (M50 20)   8  Cervical radiculopathy (723 4) (M54 12)   9  Cervical spondylosis (721 0) (M47 812)   10  Chronic gastroesophageal reflux disease (530 81) (K21 9)   11  Chronic pain (338 29) (G89 29)   12  Cirrhosis (571 5) (K74 60)   13  History of Easy Bruising Tendency   14  Essential hypertension (401 9) (I10)   15  Facet arthropathy, cervical (721 0) (M12 88)   16  History of colon polyps (V12 72) (Z86 010)   17  Intervertebral disc disorder with radiculopathy of lumbar region (724 4) (M51 16)   18   Irritation of left eye (379 99) (H57 8)   19  Lung mass (786 6) (R91 8)   20  Neck pain (723 1) (M54 2)   21  Need for immunization against influenza (V04 81) (Z23)   22  Nonalcoholic liver disease, chronic (571 9) (K76 9)   23  Right arm pain (729 5) (M79 601)   24  Sacroiliitis (720 2) (M46 1)   25  Transplanted liver (V42 7) (Z94 4)   26  Trochanteric bursitis, unspecified laterality (726 5) (M70 60)    Current Meds   1  Acetaminophen 325 MG Oral Tablet; TAKE 2 TABLET Every 8 hours PRN; Therapy: (Recorded:05Jul2016) to Recorded   2  ALPRAZolam 0 25 MG Oral Tablet; TAKE 1 TABLET 3 TIMES DAILY AS NEEDED; Therapy: 16SDJ3587 to (Evaluate:08Jan2017); Last Rx:01Nov2016 Ordered   3  Calcium Plus Vitamin D3 CAPS; Therapy: (Recorded:06Mar2014) to Recorded   4  Colace 100 MG Oral Capsule; Therapy: (Recorded:06Mar2014) to Recorded   5  Daily Multiple Vitamins TABS; Therapy: (Recorded:06Mar2014) to Recorded   6  Gabapentin 300 MG Oral Capsule; TAKE 1 CAPSULE AT BEDTIME NIGHTLY; Therapy: 24Apr2017 to (Evaluate:23Jun2017)  Requested for: 24Apr2017; Last   Rx:24Apr2017 Ordered   7  Lisinopril 20 MG Oral Tablet Recorded   8  Magnesium Oxide 400 MG Oral Tablet; Therapy: (Recorded:06Mar2014) to Recorded   9  Metoprolol Tartrate 50 MG Oral Tablet; Therapy: (Recorded:05Jul2016) to Recorded   10  Multi-Vitamin TABS; TAKE 1 TABLET DAILY; Therapy: (Recorded:83Tav2370) to Recorded   11  Omeprazole 20 MG Oral Capsule Delayed Release; TAKE 1 CAPSULE Daily  Requested    for: 12Jun2017; Last Rx:12Jun2017 Ordered   12  PriLOSEC 20 MG CPDR (Omeprazole); Therapy: (Recorded:06Mar2014) to Recorded   13  Tacrolimus 1 MG Oral Capsule (Prograf); TAKE 3 CAPSULE Twice daily  Requested for:    18Apr2017; Last Rx:18Apr2017 Ordered    Allergies    1   No Known Drug Allergies    Signatures   Electronically signed by : Matheus Bal, ; [place] 15 2017 11:36AM EST                       (Author)

## 2018-01-12 NOTE — MISCELLANEOUS
Message   Recorded as Task   Date: 10/17/2017 09:29 AM, Created By: Moiz Travis   Task Name: Miscellaneous   Assigned To: Adam Bowers clinical,Team   Regarding Patient: Bina Levy, Status: Active   Comment:    Aleida Guevara - 17 Oct 2017 9:29 AM     TASK CREATED  pt had a procedure done on friday and his right hip is in pain says that it feels weak,and also he lost his pain diary  please call pt back at [phone]   [facility] [place], [place] - 17 Oct 2017 11:52 AM     TASK EDITED    *********Tiera Russo********    S/W pt  Pt status post MBB  Pt stated that on Saturday his pain was 9/10 with pressure on the right side  Pt stated he has been taking Tylenol Extra Strength for arthritis 2 tablets in the morning and 2 tablets in the afternoon  Pt stated he has not been using ice or heat except on Friday after the procedure  Pt stated he lost his pain diary but he has been writing down on a sheet of paper his scores and will bring the paper into us tomorrow at the [facility]  Advised pt to continue to use ice (20 minutes on and 20 minutes off) and/or moist heat if that relieves his pain  Advised pt that since it is not our MBB pain diary form to make sure he has someone in the office put patients label on the form and give it to FQ for review  Pt verbalized understanding  Maeve Hurtado - 16 Oct 2017 12:07 PM     TASK REPLIED TO: Previously Assigned To Maeve Hurtado  pt is s/p troch bursa injection   give steroid more time   Patricia Dallas - 17 Oct 2017 12:16 PM     TASK EDITED  S/W pt  Pt is s/p troch bursa injection  Advised pt per FQ to give the steroid more time  Pt verbalized understanding  Pt very appreciative of c/b  Active Problems    1  Abdominal MRI Liver Mass Lesion (573 8)   2  Acute pain of right hip (719 45) (M25 551)   3  Anxiety (300 00) (F41 9)   4  Arthralgia Of The Right Pelvis/Hip/Femur (719 45)   5  Bursitis of both hips (726 5) (M70 71,M70 72)   6   Bursitis of right hip (726 5) (M70 71)   7  Cervical disc herniation (722 0) (M50 20)   8  Cervical radiculopathy (723 4) (M54 12)   9  Cervical spondylosis (721 0) (M47 812)   10  Chronic gastroesophageal reflux disease (530 81) (K21 9)   11  Chronic pain (338 29) (G89 29)   12  Cirrhosis (571 5) (K74 60)   13  History of Easy Bruising Tendency   14  Essential hypertension (401 9) (I10)   15  Facet arthropathy, cervical (721 0) (M12 88)   16  History of colon polyps (V12 72) (Z86 010)   17  Intervertebral disc disorder with radiculopathy of lumbar region (724 4) (M51 16)   18  Irritation of left eye (379 99) (H57 8)   19  Laceration of left lower extremity, initial encounter (894 0) (S81 812A)   20  Lung mass (786 6) (R91 8)   21  Neck pain (723 1) (M54 2)   22  Need for immunization against influenza (V04 81) (Z23)   23  Nonalcoholic liver disease, chronic (571 9) (K76 9)   24  Right arm pain (729 5) (M79 601)   25  Sacroiliitis (720 2) (M46 1)   26  Transplanted liver (V42 7) (Z94 4)   27  Trochanteric bursitis, unspecified laterality (726 5) (M70 60)   28  Upper respiratory infection, acute (465 9) (J06 9)    Current Meds   1  Acetaminophen 325 MG Oral Tablet; TAKE 2 TABLET Every 8 hours PRN; Therapy: (Recorded:61Fdy3034) to Recorded   2  ALPRAZolam 0 25 MG Oral Tablet; TAKE 1 TABLET 3 TIMES DAILY AS NEEDED; Therapy: 57DDY4070 to (Evaluate:08Jan2017); Last Rx:01Nov2016 Ordered   3  Azithromycin 250 MG Oral Tablet; TAKE 2 TABLETS ON DAY 1 THEN TAKE 1 TABLET A   DAY FOR 4 DAYS; Therapy: 20DFO1547 to (Last Bhaskar Jett)  Requested for: 21Sep2017 Ordered   4  Calcium Plus Vitamin D3 CAPS; Therapy: (Recorded:06Mar2014) to Recorded   5  Colace 100 MG Oral Capsule; Therapy: (Recorded:06Mar2014) to Recorded   6  Daily Multiple Vitamins TABS; Therapy: (Recorded:06Mar2014) to Recorded   7  Gabapentin 300 MG Oral Capsule; TAKE 1 CAPSULE TWICE DAILY;    Therapy: 24Apr2017 to 96 [id number])  Requested for: 98CZH5723; Last   EO:03XYT0530 Ordered   8  Lisinopril 20 MG Oral Tablet; Therapy: (Effie Valdovinos) to Recorded   9  Magnesium Oxide 400 MG Oral Tablet; Therapy: (Recorded:06Mar2014) to Recorded   10  Metoprolol Succinate ER 50 MG Oral Tablet Extended Release 24 Hour; Take 1 tablet    daily; Therapy: 21Jun2017 to (Evaluate:16Jun2018)  Requested for: 21Jun2017; Last    KX:60HMG1195 Ordered   11  Multi-Vitamin TABS; TAKE 1 TABLET DAILY; Therapy: (Recorded:05Jul2016) to Recorded   12  Omeprazole 20 MG Oral Capsule Delayed Release; TAKE 2 CAPSULES DAILY     Requested for: 97ECI6988; Last Rx:40Pfl2401 Ordered   13  PriLOSEC 20 MG CPDR (Omeprazole); Therapy: (Recorded:06Mar2014) to Recorded   14  Tacrolimus 1 MG Oral Capsule (Prograf); TAKE 3 CAPSULE Twice daily  Requested for:    18Apr2017; Last Rx:18Apr2017 Ordered    Allergies    1   No Known Drug Allergies    Signatures   Electronically signed by : Rachael Ross RN; Oct 17 2017 12:16PM EST                       (Author)

## 2018-01-13 VITALS
SYSTOLIC BLOOD PRESSURE: 104 MMHG | HEART RATE: 82 BPM | OXYGEN SATURATION: 92 % | RESPIRATION RATE: 16 BRPM | TEMPERATURE: 98.8 F | DIASTOLIC BLOOD PRESSURE: 70 MMHG | WEIGHT: 185 LBS | BODY MASS INDEX: 25.09 KG/M2

## 2018-01-13 VITALS
HEART RATE: 88 BPM | RESPIRATION RATE: 16 BRPM | SYSTOLIC BLOOD PRESSURE: 142 MMHG | WEIGHT: 208 LBS | HEIGHT: 72 IN | DIASTOLIC BLOOD PRESSURE: 76 MMHG | BODY MASS INDEX: 28.17 KG/M2

## 2018-01-13 NOTE — MISCELLANEOUS
Message   Recorded as Task   Date: 05/10/2017 04:11 PM, Created By: Michelle   Task Name: Follow Up   Assigned To: Tanika Goddard   Regarding Patient: Majo Walden, Status: In Progress   Comment:    Michelle - 10 May 2017 4:11 PM     TASK CREATED  left VM to call back about good time for me to call him back with MRI results   Arminda Walters - 10 May 2017 4:12 PM     TASK EDITED   Regis Rick - 12 May 2017 9:35 AM     TASK EDITED  Already addressed in another task  Active Problems    1  Abdominal MRI Liver Mass Lesion (573 8)   2  Acute pain of right hip (719 45) (M25 551)   3  Anxiety (300 00) (F41 9)   4  Arthralgia Of The Right Pelvis/Hip/Femur (719 45)   5  Bursitis of both hips (726 5) (M70 71,M70 72)   6  Bursitis of right hip (726 5) (M70 71)   7  Cervical disc herniation (722 0) (M50 20)   8  Cervical radiculopathy (723 4) (M54 12)   9  Cervical spondylosis (721 0) (M47 812)   10  Chronic gastroesophageal reflux disease (530 81) (K21 9)   11  Chronic pain (338 29) (G89 29)   12  Cirrhosis (571 5) (K74 60)   13  History of Easy Bruising Tendency   14  Essential hypertension (401 9) (I10)   15  Facet arthropathy, cervical (721 0) (M12 88)   16  History of colon polyps (V12 72) (Z86 010)   17  Intervertebral disc disorder with radiculopathy of lumbar region (724 4) (M51 16)   18  Irritation of left eye (379 99) (H57 8)   19  Lung mass (786 6) (R91 8)   20  Neck pain (723 1) (M54 2)   21  Need for immunization against influenza (V04 81) (Z23)   22  Nonalcoholic liver disease, chronic (571 9) (K76 9)   23  Right arm pain (729 5) (M79 601)   24  Sacroiliitis (720 2) (M46 1)   25  Transplanted liver (V42 7) (Z94 4)   26  Trochanteric bursitis, unspecified laterality (726 5) (M70 60)    Current Meds   1  Acetaminophen 325 MG Oral Tablet; TAKE 2 TABLET Every 8 hours PRN; Therapy: (Recorded:24Cno4938) to Recorded   2   ALPRAZolam 0 25 MG Oral Tablet; TAKE 1 TABLET 3 TIMES DAILY AS NEEDED; Therapy: 44BQV9775 to (Evaluate:08Jan2017); Last Rx:01Nov2016 Ordered   3  Calcium Plus Vitamin D3 CAPS; Therapy: (Recorded:06Mar2014) to Recorded   4  Colace 100 MG Oral Capsule; Therapy: (Recorded:06Mar2014) to Recorded   5  Daily Multiple Vitamins TABS; Therapy: (Recorded:06Mar2014) to Recorded   6  Gabapentin 300 MG Oral Capsule; TAKE 1 CAPSULE AT BEDTIME NIGHTLY; Therapy: 24Apr2017 to (Evaluate:23Jun2017)  Requested for: 24Apr2017; Last   Rx:24Apr2017 Ordered   7  Lisinopril 20 MG Oral Tablet Recorded   8  Magnesium Oxide 400 MG Oral Tablet; Therapy: (Recorded:06Mar2014) to Recorded   9  Metoprolol Tartrate 50 MG Oral Tablet; Therapy: (Recorded:05Jul2016) to Recorded   10  Multi-Vitamin TABS; TAKE 1 TABLET DAILY; Therapy: (Recorded:05Jul2016) to Recorded   11  Omeprazole 20 MG Oral Capsule Delayed Release; TAKE 1 CAPSULE Daily; Therapy: (Recorded:27Mar2014) to Recorded   12  PriLOSEC 20 MG CPDR (Omeprazole); Therapy: (Recorded:06Mar2014) to Recorded   13  Tacrolimus 1 MG Oral Capsule (Prograf); TAKE 3 CAPSULE Twice daily  Requested for:    18Apr2017; Last Rx:18Apr2017 Ordered    Allergies    1   No Known Drug Allergies    Signatures   Electronically signed by : La Nena Freitas, ; May 12 2017  9:35AM EST                       (Author)

## 2018-01-13 NOTE — MISCELLANEOUS
Message   Recorded as Task   Date: 02/07/2017 06:54 PM, Created By: System   Task Name: Schedule Appointment   Assigned To: SPINE AND PAIN,Team   Regarding Patient: Priya Koch, Status: In Progress   Comment:    System - 07 Feb 2017 6:54 PM     Preferred Communication: Mail  Ordering Site: [facility]    Referred To: [address]  To Be Done: 07 Feb 2017   Alyssa Hardy - 08 Feb 2017 7:15 AM     TASK IN PROGRESS   Arieana maría Patel - 08 Feb 2017 10:22 AM     TASK EDITED  Patient is established, pt was last seen with Dr Falocn on 8/25/2014  Central Scheduling Specialty only schedule's new pt consults  Please call pt to schedule this appointment  Thank you  Neirssa Brewer - 08 Feb 2017 1:44 PM     TASK IN PROGRESS   Nerissa Brewer - 08 Feb 2017 2:23 PM     TASK REASSIGNED: Previously Assigned To SPINE AND PAIN,Team  lmom for return call to schedule appt  Sofiya Arredondo - 13 Feb 2017 10:59 AM     TASK EDITED  Pt does not want to make an apt at this time  He will call back if he changes his mind        Active Problems    1  Abdominal MRI Liver Mass Lesion (573 8)   2  Acute pain of right hip (719 45) (M25 551)   3  Anxiety (300 00) (F41 9)   4  Arthralgia Of The Right Pelvis/Hip/Femur (719 45)   5  Bursitis of right hip (726 5) (M70 71)   6  Cervical disc herniation (722 0) (M50 20)   7  Cervical radiculopathy (723 4) (M54 12)   8  Cervical spondylosis (721 0) (M47 812)   9  Chronic gastroesophageal reflux disease (530 81) (K21 9)   10  Chronic pain (338 29) (G89 29)   11  Cirrhosis (571 5) (K74 60)   12  History of Easy Bruising Tendency   13  Essential hypertension (401 9) (I10)   14  Facet arthropathy, cervical (721 0) (M12 88)   15  History of colon polyps (V12 72) (Z86 010)   16  Irritation of left eye (379 99) (H57 8)   17  Lung mass (786 6) (R91 8)   18  Neck pain (723 1) (M54 2)   19  Need for immunization against influenza (V04 81) (Z23)   20   Nonalcoholic liver disease, chronic (571 9) (K76 9)   21  Right arm pain (729 5) (M79 601)   22  Transplanted liver (V42 7) (Z94 4)   23  Trochanteric bursitis, unspecified laterality (726 5) (M70 60)    Current Meds   1  Acetaminophen 325 MG Oral Tablet; TAKE 2 TABLET Every 8 hours PRN; Therapy: (Recorded:05Jul2016) to Recorded   2  ALPRAZolam 0 25 MG Oral Tablet; TAKE 1 TABLET 3 TIMES DAILY AS NEEDED; Therapy: 69PNZ8503 to (Evaluate:08Jan2017); Last Rx:01Nov2016 Ordered   3  Calcium Plus Vitamin D3 CAPS; Therapy: (Recorded:06Mar2014) to Recorded   4  Colace 100 MG Oral Capsule; Therapy: (Recorded:06Mar2014) to Recorded   5  Daily Multiple Vitamins TABS; Therapy: (Recorded:06Mar2014) to Recorded   6  Magnesium Oxide 400 MG Oral Tablet; Therapy: (Recorded:06Mar2014) to Recorded   7  Metoprolol Tartrate 50 MG Oral Tablet; Therapy: (Recorded:05Jul2016) to Recorded   8  Multi-Vitamin TABS; TAKE 1 TABLET DAILY; Therapy: (Recorded:05Jul2016) to Recorded   9  Omeprazole 20 MG Oral Capsule Delayed Release; TAKE 1 CAPSULE Daily; Therapy: (Recorded:27Mar2014) to Recorded   10  PriLOSEC 20 MG CPDR (Omeprazole); Therapy: (Recorded:06Mar2014) to Recorded   11  Tacrolimus 1 MG Oral Capsule (Prograf); TAKE 3 CAPSULE Twice daily; Last    Rx:08Eqq3039 Ordered    Allergies    1  No Known Drug Allergies    Signatures   Electronically signed by :  Cristina Herron, ; Feb 13 2017 10:59AM EST                       (Author)

## 2018-01-14 VITALS
WEIGHT: 208 LBS | BODY MASS INDEX: 28.17 KG/M2 | HEART RATE: 76 BPM | SYSTOLIC BLOOD PRESSURE: 116 MMHG | HEIGHT: 72 IN | DIASTOLIC BLOOD PRESSURE: 72 MMHG | RESPIRATION RATE: 16 BRPM

## 2018-01-15 VITALS
HEIGHT: 72 IN | WEIGHT: 205 LBS | BODY MASS INDEX: 27.77 KG/M2 | HEART RATE: 74 BPM | SYSTOLIC BLOOD PRESSURE: 118 MMHG | DIASTOLIC BLOOD PRESSURE: 78 MMHG

## 2018-01-15 NOTE — MISCELLANEOUS
Message   Recorded as Task   Date: 06/21/2017 10:02 AM, Created By: Daksha Prieto   Task Name: Med Renewal Request   Assigned To: SPA holden clinical,Team   Regarding Patient: Catrachita Shane, Status: Active   Comment:    Daksha Prieto - 21 Jun 2017 10:02 AM     TASK CREATED  SPA Call Center- patients wife Edilia Mazariegos [address] called stating a fax was sent from [facility] for a refill of patients Gabapentin  Not sure what day this request was sent to [address]  Would like a refill of Gabapentin sent to [facility] in [facility] 3 p) [phone] (fax)  Would like a 90 day supply if possible so she doesn't have to goo through this every month  Stated patient has enough for 2 days (today & tomorrow) please call patient for any questions  [phone]    Jim Bowden - 21 Jun 2017 10:49 AM     TASK EDITED  Wife requesting 90 day Rx for gabapentin to sent to their [facility]  Gabapentin 300mg 1 cap @ bedtime LP on 4/24/17 w/ 1 RF  Wai Chairez - 21 Jun 2017 11:42 AM     TASK REPLIED TO: Previously Assigned To Wai Chairez  e-rx sent for 90 day supply   Raquel Jara - 21 Jun 2017 12:05 PM     TASK EDITED  Informed Edilia Mazariegos [address] that a 90 day RX for her 's gabapentin was sent to [facility]  I told Edilia Avery that in the future they should only call the office when RF needed, we do not accept request from pharmacies asking for RF  Edilia Mazariegos [address] understood and agreed  Active Problems    1  Abdominal MRI Liver Mass Lesion (573 8)   2  Acute pain of right hip (719 45) (M25 551)   3  Anxiety (300 00) (F41 9)   4  Arthralgia Of The Right Pelvis/Hip/Femur (719 45)   5  Bursitis of both hips (726 5) (M70 71,M70 72)   6  Bursitis of right hip (726 5) (M70 71)   7  Cervical disc herniation (722 0) (M50 20)   8  Cervical radiculopathy (723 4) (M54 12)   9  Cervical spondylosis (721 0) (M47 812)   10  Chronic gastroesophageal reflux disease (530 81) (K21 9)   11  Chronic pain (338 29) (G89 29)   12   Cirrhosis (571 5) (K74 60) 13  History of Easy Bruising Tendency   14  Essential hypertension (401 9) (I10)   15  Facet arthropathy, cervical (721 0) (M12 88)   16  History of colon polyps (V12 72) (Z86 010)   17  Intervertebral disc disorder with radiculopathy of lumbar region (724 4) (M51 16)   18  Irritation of left eye (379 99) (H57 8)   19  Lung mass (786 6) (R91 8)   20  Neck pain (723 1) (M54 2)   21  Need for immunization against influenza (V04 81) (Z23)   22  Nonalcoholic liver disease, chronic (571 9) (K76 9)   23  Right arm pain (729 5) (M79 601)   24  Sacroiliitis (720 2) (M46 1)   25  Transplanted liver (V42 7) (Z94 4)   26  Trochanteric bursitis, unspecified laterality (726 5) (M70 60)    Current Meds   1  Acetaminophen 325 MG Oral Tablet; TAKE 2 TABLET Every 8 hours PRN; Therapy: (Recorded:05Jul2016) to Recorded   2  ALPRAZolam 0 25 MG Oral Tablet; TAKE 1 TABLET 3 TIMES DAILY AS NEEDED; Therapy: 87UHM4782 to (Evaluate:08Jan2017); Last Rx:01Nov2016 Ordered   3  Calcium Plus Vitamin D3 CAPS; Therapy: (Recorded:06Mar2014) to Recorded   4  Colace 100 MG Oral Capsule; Therapy: (Recorded:06Mar2014) to Recorded   5  Daily Multiple Vitamins TABS; Therapy: (Recorded:06Mar2014) to Recorded   6  Gabapentin 300 MG Oral Capsule; TAKE 1 CAPSULE AT BEDTIME NIGHTLY; Therapy: 24Apr2017 to (Evaluate:16Jun2018)  Requested for: 21Jun2017; Last   FR:14LVH4557 Ordered   7  Lisinopril 20 MG Oral Tablet Recorded   8  Magnesium Oxide 400 MG Oral Tablet; Therapy: (Recorded:06Mar2014) to Recorded   9  Metoprolol Succinate ER 50 MG Oral Tablet Extended Release 24 Hour; Take 1 tablet   daily; Therapy: 21Jun2017 to (Evaluate:16Jun2018)  Requested for: 21Jun2017; Last   YR:50MGA1285 Ordered   10  Multi-Vitamin TABS; TAKE 1 TABLET DAILY; Therapy: (Recorded:43Tmw0054) to Recorded   11  Omeprazole 20 MG Oral Capsule Delayed Release; TAKE 1 CAPSULE Daily  Requested    for: 12Jun2017; Last Rx:12Jun2017 Ordered   12   PriLOSEC 20 MG CPDR (Omeprazole); Therapy: (Recorded:06Mar2014) to Recorded   13  Tacrolimus 1 MG Oral Capsule (Prograf); TAKE 3 CAPSULE Twice daily  Requested for:    18Apr2017; Last Rx:18Apr2017 Ordered    Allergies    1   No Known Drug Allergies    Signatures   Electronically signed by : Lyssa Hebert, ; Jun 21 2017 12:05PM EST                       (Author)

## 2018-01-15 NOTE — RESULT NOTES
Message   Recorded as Task   Date: 03/15/2017 11:13 AM, Created By: Lori Daley   Task Name: Follow Up   Assigned To: Sunday garcia,Team   Regarding Patient: Aaron Trujillo, Status: Active   CommentGlenis ander - 15 Mar 2017 11:13 AM     TASK CREATED  spoke to pt he received relief from inj he has no pain as of now  will contact office if pain returns  pt is S/P B/L SIJ INJECTION 03/07/2017 by Dr Elizabeth Love  pain diary scanned   f/u 04/24/2017   Blake Mraibel - 15 Mar 2017 11:15 AM     TASK REPLIED TO: Previously Assigned To CHEYENNE garcia,Team  md aware   f/u at [facility] signed by : Tacho Knight, ; Mar 16 2017  8:25AM EST                       (Author)

## 2018-01-16 NOTE — MISCELLANEOUS
Message  Pt just seen for office visit 7/12/2016  Pt is ok for oral surgery and general anesthesia  Pt not on blood thinners  Pt's exam was normal, and he had no cardiopulmonary complaints        Signatures   Electronically signed by : ABRIL Hurtado ; Jul 25 2016  1:27PM EST                       (Author)

## 2018-01-18 ENCOUNTER — ALLSCRIPTS OFFICE VISIT (OUTPATIENT)
Dept: OTHER | Facility: OTHER | Age: 63
End: 2018-01-18

## 2018-01-18 NOTE — RESULT NOTES
Message   Recorded as Task   Date: 04/24/2017 05:58 PM, Created By: System   Task Name: Financial Auth   Assigned To: Rubina Rebollar   Regarding Patient: Robert Arellano, Status: In Progress   Comment:    System - 24 Apr 2017 5:58 PM     MRI LUMBAR SPINE WO CONTRAST requires Financial Auth   Sarah Flaherty - 27 Apr 2017 10:03 AM     TASK IN PROGRESS   Sarah Flaherty 27 Apr 2017 10:04 AM     TASK EDITED  Evgeny Luther submitted via [employer]; approved  Lowery Krabbe # [id number]  valid: 4/27/17 - 6/26/17  [facility]    Left message for patient of approval and to call if he schedules at location other than hospitals

## 2018-01-19 NOTE — PROGRESS NOTES
Assessment   1  Upper respiratory infection, acute (465 9) (J06 9)   2  Transplanted liver (V42 7) (Z94 4)    Plan   Upper respiratory infection, acute    · Amoxicillin 875 MG Oral Tablet; TAKE 1 TABLET EVERY 12 HOURS DAILY   · Benzonatate 100 MG Oral Capsule; take 1-2 capsules every 8 hours as needed    Discussion/Summary      Take antibiotics as prescribed perles for cough of fluids next week if not better or worsening symptoms  Possible side effects of new medications were reviewed with the patient/guardian today  The treatment plan was reviewed with the patient/guardian  The patient/guardian understands and agrees with the treatment plan      Chief Complaint   The patient has pain above his eye brows, yellow mucous headaches for 1 week  History of Present Illness   HPI: started 1 week ago with cough, yellow mucous, mild nasal congestion, mild sore throat fever fatigued worse at night, +PND nasal spray and tylenol with some relief and eating ok n/v/d liver transplant in 2013- list of medication to avoid and ok to give will be scanned into chart- no macrolides, no antifungals      Review of Systems        Constitutional: feeling tired, but-- no fever,-- not feeling poorly-- and-- no chills  ENT: earache,-- sore throat-- and-- nasal discharge  Cardiovascular: no chest pain-- and-- no palpitations  Respiratory: cough-- and-- shortness of breath during exertion, but-- no shortness of breath-- and-- no wheezing  Gastrointestinal: no abdominal pain,-- no nausea,-- no vomiting-- and-- no diarrhea  Musculoskeletal: no myalgias  Integumentary: no rashes  Neurological: no headache  Active Problems   1  Abdominal MRI Liver Mass Lesion (573 8)   2  Acute pain of right hip (719 45) (M25 551)   3  Anxiety (300 00) (F41 9)   4  Arthralgia Of The Right Pelvis/Hip/Femur (719 45)   5  Bursitis of both hips (726 5) (M70 71,M70 72)   6  Bursitis of right hip (726 5) (M70 71)   7  Cervical disc herniation (722 0) (M50 20)   8  Cervical radiculopathy (723 4) (M54 12)   9  Cervical spondylosis (721 0) (M47 812)   10  Chronic gastroesophageal reflux disease (530 81) (K21 9)   11  Chronic pain (338 29) (G89 29)   12  Cirrhosis (571 5) (K74 60)   13  History of Easy Bruising Tendency   14  Encounter for removal of sutures (V58 32) (Z48 02)   15  Essential hypertension (401 9) (I10)   16  Facet arthropathy, cervical (721 0) (M46 92)   17  Hearing loss (389 9) (H91 90)   18  History of colon polyps (V12 72) (Z86 010)   19  Intervertebral disc disorder with radiculopathy of lumbar region (724 4) (M51 16)   20  Irritation of left eye (379 99) (H57 8)   21  Laceration of left lower extremity, initial encounter (894 0) (S81 812A)   22  Laceration of left lower extremity, subsequent encounter (V58 89,894 0) (S81 812D)   23  Lung mass (786 6) (R91 8)   24  Neck pain (723 1) (M54 2)   25  Need for immunization against influenza (V04 81) (Z23)   26  Nonalcoholic liver disease, chronic (571 9) (K76 9)   27  Right arm pain (729 5) (M79 601)   28  Ringing in ears, bilateral (388 30) (H93 13)   29  Sacroiliitis (720 2) (M46 1)   30  Seasonal allergies (477 9) (J30 2)   31  Short-term memory loss (780 93) (R41 3)   32  Transplanted liver (V42 7) (Z94 4)   33  Trochanteric bursitis, unspecified laterality (726 5) (M70 60)   34  Upper respiratory infection, acute (465 9) (J06 9)   35  Wears glasses (V49 89) (Z97 3)   36  Wears hearing aid (V45 89) (Z97 4)    Past Medical History   Active Problems And Past Medical History Reviewed: The active problems and past medical history were reviewed and updated today  Surgical History   Surgical History Reviewed: The surgical history was reviewed and updated today         Social History    · Denied: Alcohol Use (History)   · Completed college, bachelors degree   · Daily Coffee Consumption (2  Cups/Day)   · Daily Cola Consumption (1  Cans/Day)   · Disabled   · Drug Use (305 90)   · Way back in college over 40 yrs ago   · Has 3 children   · Lives with spouse   ·    · No alcohol use   · Occupation  The social history was reviewed and updated today  The social history was reviewed and is unchanged  Family History   Family History Reviewed: The family history was reviewed and updated today  Current Meds    1  Acetaminophen 325 MG Oral Tablet; TAKE 2 TABLET Every 8 hours PRN; Therapy: (Recorded:27Oct2017) to Recorded   2  ALPRAZolam 0 25 MG Oral Tablet; TAKE 1 TABLET Twice daily PRN; Last Rx:07Nov2017     Ordered   3  Calcium-Magnesium 500-250 MG Oral Tablet; TAKE 1 TABLET DAILY; Therapy: (CTREHOOT:[id number]) to Recorded   4  Colace 100 MG Oral Capsule; TAKE 1 CAPSULE Every twelve hours PRN; Therapy: (TMGXIGSU:87ZBQ6422) to Recorded   5  Gabapentin 300 MG TABS; Take 1 tablet twice daily; Therapy: (MCWNUQCW:95LZY0954) to Recorded   6  Lisinopril 20 MG Oral Tablet; Take 1 tablet daily; Therapy: (UKUVBPLX:12HEF6916) to Recorded   7  Multivitamins Oral Capsule; take 1 capsule daily; Therapy: (QFWGSSZA:72OXL3155) to Recorded   8  PriLOSEC 20 MG CPDR; TAKE 1 CAPSULE TWICE DAILY; Therapy: (WZMCRCEH:39UOT8303) to Recorded   9  Sertraline HCl - 50 MG Oral Tablet; Take 1 tablet daily; Therapy: (NOVQSZAU:94VZL5601) to Recorded   10  Tacrolimus 1 MG Oral Capsule; TAKE 3 CAPSULE Every twelve hours  Requested for:      [id number]; Last Rx:[id number] Ordered     The medication list was reviewed and updated today  Vitals    Recorded: 64QIM5822 01:01PM   Temperature 97 7 F   Heart Rate 84   Respiration 20   Systolic 028, LUE, Sitting   Diastolic 82, LUE, Sitting   BP CUFF SIZE Large   Height 6 ft 1 in   Weight 196 lb 0 2 oz   BMI Calculated 25 86   BSA Calculated 2 13   O2 Saturation 98     Physical Exam        Constitutional      General appearance: No acute distress, well appearing and well nourished         Eyes      Pupils and irises: Equal, round and reactive to light  Ears, Nose, Mouth, and Throat      External inspection of ears and nose: Normal        Otoscopic examination: Tympanic membrance translucent with normal light reflex  Canals patent without erythema  Nasal mucosa, septum, and turbinates: Abnormal   There was a mucoid discharge from both nares  The bilateral nasal mucosa was red  Oropharynx: Abnormal   The posterior pharynx was erythematous  Pulmonary      Respiratory effort: No increased work of breathing or signs of respiratory distress  Auscultation of lungs: Clear to auscultation, equal breath sounds bilaterally, no wheezes, no rales, no rhonci  Cardiovascular      Auscultation of heart: Normal rate and rhythm, normal S1 and S2, without murmurs  Abdomen      Abdomen: Non-tender, no masses  Lymphatic      Palpation of lymph nodes in neck: No lymphadenopathy         Musculoskeletal      Gait and station: Normal        Psychiatric      Orientation to person, place and time: Normal        Mood and affect: Normal           Future Appointments      Date/Time Provider Specialty Site   01/23/2018 01:30 PM Abiodun Galeas MD Pain Management [facility] OUTPATIENT     Signatures    Electronically signed by : Joe Marin [address] [place] [address]; Jan 18 2018  1:43PM EST                       (Author)     Electronically signed by : ABRIL Goins ; Jan 18 2018  5:15PM EST                       (Review)

## 2018-01-22 VITALS
OXYGEN SATURATION: 98 % | RESPIRATION RATE: 20 BRPM | DIASTOLIC BLOOD PRESSURE: 77 MMHG | BODY MASS INDEX: 25.89 KG/M2 | SYSTOLIC BLOOD PRESSURE: 138 MMHG | HEIGHT: 72 IN | TEMPERATURE: 97.8 F | HEART RATE: 73 BPM | WEIGHT: 191.13 LBS

## 2018-01-22 VITALS
SYSTOLIC BLOOD PRESSURE: 116 MMHG | BODY MASS INDEX: 24.52 KG/M2 | WEIGHT: 181 LBS | DIASTOLIC BLOOD PRESSURE: 82 MMHG | HEIGHT: 72 IN | HEART RATE: 76 BPM | TEMPERATURE: 98.8 F

## 2018-01-23 ENCOUNTER — HOSPITAL ENCOUNTER (OUTPATIENT)
Dept: RADIOLOGY | Facility: CLINIC | Age: 63
Discharge: HOME | End: 2018-01-23
Attending: ANESTHESIOLOGY

## 2018-01-23 VITALS
OXYGEN SATURATION: 98 % | HEIGHT: 73 IN | SYSTOLIC BLOOD PRESSURE: 132 MMHG | BODY MASS INDEX: 25.98 KG/M2 | WEIGHT: 196.01 LBS | DIASTOLIC BLOOD PRESSURE: 82 MMHG | RESPIRATION RATE: 20 BRPM | HEART RATE: 84 BPM | TEMPERATURE: 97.7 F

## 2018-01-23 DIAGNOSIS — M47.816 SPONDYLOSIS WITHOUT MYELOPATHY OR RADICULOPATHY, LUMBAR REGION: ICD-10-CM

## 2018-01-24 VITALS
RESPIRATION RATE: 20 BRPM | DIASTOLIC BLOOD PRESSURE: 76 MMHG | WEIGHT: 194.13 LBS | OXYGEN SATURATION: 99 % | TEMPERATURE: 96 F | BODY MASS INDEX: 26.3 KG/M2 | HEIGHT: 72 IN | SYSTOLIC BLOOD PRESSURE: 138 MMHG | HEART RATE: 75 BPM

## 2018-01-30 ENCOUNTER — HOSPITAL ENCOUNTER (OUTPATIENT)
Dept: RADIOLOGY | Facility: CLINIC | Age: 63
Discharge: HOME | End: 2018-01-30
Attending: ANESTHESIOLOGY
Payer: COMMERCIAL

## 2018-01-30 VITALS
OXYGEN SATURATION: 99 % | DIASTOLIC BLOOD PRESSURE: 72 MMHG | SYSTOLIC BLOOD PRESSURE: 108 MMHG | TEMPERATURE: 98.7 F | RESPIRATION RATE: 18 BRPM | HEART RATE: 76 BPM

## 2018-01-30 DIAGNOSIS — M54.16 LUMBAR RADICULOPATHY: Primary | ICD-10-CM

## 2018-01-30 PROBLEM — M46.1 SACROILIITIS (HCC): Status: ACTIVE | Noted: 2017-02-17

## 2018-01-30 PROBLEM — M70.72 BURSITIS OF BOTH HIPS: Status: ACTIVE | Noted: 2017-02-17

## 2018-01-30 PROBLEM — M70.71 BURSITIS OF BOTH HIPS: Status: ACTIVE | Noted: 2017-02-17

## 2018-01-30 PROBLEM — M51.16 INTERVERTEBRAL DISC DISORDER WITH RADICULOPATHY OF LUMBAR REGION: Status: ACTIVE | Noted: 2017-04-24

## 2018-01-30 PROBLEM — J30.2 SEASONAL ALLERGIES: Status: ACTIVE | Noted: 2017-10-27

## 2018-01-30 PROCEDURE — 64493 INJ PARAVERT F JNT L/S 1 LEV: CPT | Performed by: ANESTHESIOLOGY

## 2018-01-30 PROCEDURE — 64494 INJ PARAVERT F JNT L/S 2 LEV: CPT | Performed by: ANESTHESIOLOGY

## 2018-01-30 RX ORDER — LIDOCAINE HYDROCHLORIDE 10 MG/ML
5 INJECTION, SOLUTION EPIDURAL; INFILTRATION; INTRACAUDAL; PERINEURAL ONCE
Status: COMPLETED | OUTPATIENT
Start: 2018-01-30 | End: 2018-01-30

## 2018-01-30 RX ORDER — LISINOPRIL 20 MG/1
20 TABLET ORAL DAILY
COMMUNITY
End: 2018-12-04 | Stop reason: SDUPTHER

## 2018-01-30 RX ORDER — GABAPENTIN 300 MG/1
300 CAPSULE ORAL
COMMUNITY
Start: 2017-09-27 | End: 2018-01-30 | Stop reason: SDUPTHER

## 2018-01-30 RX ORDER — GABAPENTIN 300 MG/1
300 CAPSULE ORAL 2 TIMES DAILY
Qty: 180 CAPSULE | Refills: 0 | Status: SHIPPED | OUTPATIENT
Start: 2018-01-30 | End: 2018-01-30 | Stop reason: SDUPTHER

## 2018-01-30 RX ORDER — GABAPENTIN 300 MG/1
300 CAPSULE ORAL 2 TIMES DAILY
Qty: 180 CAPSULE | Refills: 0 | Status: SHIPPED | OUTPATIENT
Start: 2018-01-30 | End: 2018-05-16 | Stop reason: SDUPTHER

## 2018-01-30 RX ORDER — BUPIVACAINE HYDROCHLORIDE 2.5 MG/ML
10 INJECTION, SOLUTION EPIDURAL; INFILTRATION; INTRACAUDAL ONCE
Status: COMPLETED | OUTPATIENT
Start: 2018-01-30 | End: 2018-01-30

## 2018-01-30 RX ADMIN — LIDOCAINE HYDROCHLORIDE 4 ML: 10 INJECTION, SOLUTION EPIDURAL; INFILTRATION; INTRACAUDAL; PERINEURAL at 09:02

## 2018-01-30 RX ADMIN — BUPIVACAINE HYDROCHLORIDE 1.5 ML: 2.5 INJECTION, SOLUTION EPIDURAL; INFILTRATION; INTRACAUDAL at 09:01

## 2018-01-30 NOTE — DISCHARGE INSTRUCTIONS

## 2018-01-30 NOTE — H&P
History of Present Illness: The patient is a 63 y o  male who presents with complaints of right lower back pain secondary to lumbar spondylosis and is here today for right L3-5 medial branch blocks  Patient Active Problem List   Diagnosis    [facility] dependence in remission ([facility] )    Bursitis of both hips    Chronic gastroesophageal reflux disease    Essential hypertension    Intervertebral disc disorder with radiculopathy of lumbar region    [facility]    [facility]    Seasonal allergies       Past Medical History:   Diagnosis Date    Cancer ([facility] )     liver    Depression     Gastroesophageal reflux     History of colon polyps     Hypertension        Past Surgical History:   Procedure Laterality Date    APPENDECTOMY      EXPLORATORY LAPAROTOMY      EYE SURGERY      LIVER TRANSPLANTATION      UT COLONOSCOPY FLX DX W/COLLJ SPEC WHEN PFRMD N/A 9/12/2016    Procedure: COLONOSCOPY;  Surgeon: Ines Houser MD;  Location: [facility]; Service: General    ROTATOR CUFF REPAIR           Current Outpatient Prescriptions:     acetaminophen (TYLENOL) 325 mg tablet, Take 650 mg by mouth every 6 (six) hours as needed for mild pain , Disp: , Rfl:     ALPRAZolam (XANAX) 0 25 mg tablet, Take 0 25 mg by mouth as needed for anxiety  , Disp: , Rfl:     calcium-vitamin D 250-100 MG-UNIT per tablet, Take 1 tablet by mouth 2 (two) times a day , Disp: , Rfl:     docusate sodium (COLACE) 100 mg capsule, Take 100 mg by mouth 2 (two) times a day , Disp: , Rfl:     gabapentin (NEURONTIN) 300 mg capsule, Take 1 capsule (300 mg total) by mouth 2 (two) times a day for 90 days, Disp: 180 capsule, Rfl: 0    magnesium oxide (MAG-OX) 400 mg, Take 400 mg by mouth 2 (two) times a day , Disp: , Rfl:     metoprolol tartrate (LOPRESSOR) 50 mg tablet, Take 50 mg by mouth 2 (two) times a day , Disp: , Rfl:     omeprazole (PriLOSEC) 20 mg delayed release capsule, Take 20 mg by mouth daily  , Disp: , Rfl:   ONE DAILY MULTIPLE VITAMIN PO, Take by mouth , Disp: , Rfl:     sertraline (ZOLOFT) 50 mg tablet, Take 50 mg by mouth daily  , Disp: , Rfl:     tacrolimus (PROGRAF) 1 mg capsule, Take 1 mg by mouth 2 (two) times a day , Disp: , Rfl:     Allergies   Allergen Reactions    Macrolides And Ketolides      Annotation - [id number]: The patient is on Antirejection medications and they will reduce the effective ness of the medications         Physical Exam:   Vitals:    01/30/18 0832   BP: 107/72   Pulse: 86   Resp: 18   Temp: 98 7 °F (37 1 °C)     General: Awake, Alert, Oriented x 3, Mood and affect appropriate  Respiratory: Respirations even and unlabored  Cardiovascular: Peripheral pulses intact; no edema  Musculoskeletal Exam:  Right lower back tenderness    ASA Score:  2    Assessment:  Lumbar spondylosis    Plan: R L3-L5 MBB

## 2018-01-31 ENCOUNTER — APPOINTMENT (OUTPATIENT)
Dept: LAB | Age: 63
End: 2018-01-31
Payer: COMMERCIAL

## 2018-01-31 ENCOUNTER — TRANSCRIBE ORDERS (OUTPATIENT)
Dept: ADMINISTRATIVE | Age: 63
End: 2018-01-31

## 2018-01-31 DIAGNOSIS — Z94.4 LIVER REPLACED BY TRANSPLANT (HCC): Primary | ICD-10-CM

## 2018-01-31 DIAGNOSIS — R79.1 ABNORMAL COAGULATION PROFILE: ICD-10-CM

## 2018-01-31 DIAGNOSIS — C22.1 MALIGNANT NEOPLASM OF INTRAHEPATIC BILE DUCTS (HCC): ICD-10-CM

## 2018-01-31 DIAGNOSIS — D68.9 BLOOD CLOTTING DISORDER (HCC): ICD-10-CM

## 2018-01-31 DIAGNOSIS — Z94.4 LIVER REPLACED BY TRANSPLANT (HCC): ICD-10-CM

## 2018-01-31 LAB
ALBUMIN SERPL BCP-MCNC: 4 G/DL (ref 3.5–5)
ALP SERPL-CCNC: 64 U/L (ref 46–116)
ALT SERPL W P-5'-P-CCNC: 20 U/L (ref 12–78)
ANION GAP SERPL CALCULATED.3IONS-SCNC: 5 MMOL/L (ref 4–13)
AST SERPL W P-5'-P-CCNC: 16 U/L (ref 5–45)
BASOPHILS # BLD AUTO: 0.03 THOUSANDS/ΜL (ref 0–0.1)
BASOPHILS NFR BLD AUTO: 0 % (ref 0–1)
BILIRUB SERPL-MCNC: 0.53 MG/DL (ref 0.2–1)
BUN SERPL-MCNC: 18 MG/DL (ref 5–25)
CALCIUM SERPL-MCNC: 9 MG/DL (ref 8.3–10.1)
CHLORIDE SERPL-SCNC: 102 MMOL/L (ref 100–108)
CO2 SERPL-SCNC: 31 MMOL/L (ref 21–32)
CREAT SERPL-MCNC: 1.16 MG/DL (ref 0.6–1.3)
EOSINOPHIL # BLD AUTO: 0.25 THOUSAND/ΜL (ref 0–0.61)
EOSINOPHIL NFR BLD AUTO: 2 % (ref 0–6)
ERYTHROCYTE [DISTWIDTH] IN BLOOD BY AUTOMATED COUNT: 13.4 % (ref 11.6–15.1)
GFR SERPL CREATININE-BSD FRML MDRD: 68 ML/MIN/1.73SQ M
GGT SERPL-CCNC: 18 U/L (ref 5–85)
GLUCOSE SERPL-MCNC: 102 MG/DL (ref 65–140)
HCT VFR BLD AUTO: 43.9 % (ref 36.5–49.3)
HGB BLD-MCNC: 14.5 G/DL (ref 12–17)
INR PPP: 1.16 (ref 0.86–1.16)
LYMPHOCYTES # BLD AUTO: 1.36 THOUSANDS/ΜL (ref 0.6–4.47)
LYMPHOCYTES NFR BLD AUTO: 13 % (ref 14–44)
MAGNESIUM SERPL-MCNC: 1.8 MG/DL (ref 1.6–2.6)
MCH RBC QN AUTO: 30.9 PG (ref 26.8–34.3)
MCHC RBC AUTO-ENTMCNC: 33 G/DL (ref 31.4–37.4)
MCV RBC AUTO: 94 FL (ref 82–98)
MONOCYTES # BLD AUTO: 0.77 THOUSAND/ΜL (ref 0.17–1.22)
MONOCYTES NFR BLD AUTO: 7 % (ref 4–12)
NEUTROPHILS # BLD AUTO: 8.35 THOUSANDS/ΜL (ref 1.85–7.62)
NEUTS SEG NFR BLD AUTO: 78 % (ref 43–75)
NRBC BLD AUTO-RTO: 0 /100 WBCS
PLATELET # BLD AUTO: 276 THOUSANDS/UL (ref 149–390)
PMV BLD AUTO: 11.5 FL (ref 8.9–12.7)
POTASSIUM SERPL-SCNC: 4.4 MMOL/L (ref 3.5–5.3)
PROT SERPL-MCNC: 7.5 G/DL (ref 6.4–8.2)
PROTHROMBIN TIME: 14.9 SECONDS (ref 12.1–14.4)
RBC # BLD AUTO: 4.69 MILLION/UL (ref 3.88–5.62)
SODIUM SERPL-SCNC: 138 MMOL/L (ref 136–145)
WBC # BLD AUTO: 10.78 THOUSAND/UL (ref 4.31–10.16)

## 2018-01-31 PROCEDURE — 82977 ASSAY OF GGT: CPT

## 2018-01-31 PROCEDURE — 80053 COMPREHEN METABOLIC PANEL: CPT

## 2018-01-31 PROCEDURE — 85610 PROTHROMBIN TIME: CPT

## 2018-01-31 PROCEDURE — 36415 COLL VENOUS BLD VENIPUNCTURE: CPT

## 2018-01-31 PROCEDURE — 85025 COMPLETE CBC W/AUTO DIFF WBC: CPT

## 2018-01-31 PROCEDURE — 83735 ASSAY OF MAGNESIUM: CPT

## 2018-01-31 PROCEDURE — 80197 ASSAY OF TACROLIMUS: CPT

## 2018-02-01 LAB — TACROLIMUS BLD-MCNC: 7.5 NG/ML (ref 2–20)

## 2018-03-07 NOTE — PROCEDURES
Procedure      Pre-procedure Diagnosis: Sacroiliitis  Post-procedure Diagnosis: Sacroiliitis  Operation Title(s):  1  Bilateral Sacroiliac joint injection      2  Intraoperative fluoroscopy  Attending Surgeon:   India Harrington MD  Anesthesia:   Local    Indications: The patient is a 63year-old male with a diagnosis of sacroiliitis  The patient's history and physical exam were reviewed  The risks, benefits and alternatives to the procedure were discussed, and all questions were answered to the patient's satisfaction  The patient agreed to proceed, and written informed consent was obtained  Procedure in Detail: The patient was brought into the procedure room and placed in the prone position on the fluoroscopy table  The low back and upper buttock were prepped with chloraprep and draped in a sterile manner  AP fluoroscopy was used to visualize the left sacroiliac joint  The fluoroscopic beam was then obliqued until the anterior and posterior margins of the joint were aligned  The inferior margin of the joint was identified and marked  The skin and subcutaneous tissues in the area were anesthetized with 1% lidocaine  A 22-gauge, 3Â½-inch spinal needle was advanced toward the identified point under fluoroscopic guidance  Once the targeted point was reached and the joint space was entered, negative aspiration was confirmed, and 1ml of contrast was injected  The joint space was appropriately outlined  Then, after negative aspiration, a solution consisting of 3 5 mL 0 25% bupivacaine and 0 5 mL Depo-Medrol (80mg/ml) were easily injected  The needle was removed with a 1% lidocaine flush  This same procedure was repeated on the opposite side  The patient's back was cleaned and a bandage was placed over the sites of needle insertion  Disposition: The patient tolerated the procedure well and there were no apparent complications  Vital signs remained stable throughout the procedure   The patient was taken to the recovery area where written discharge instructions for the procedure were given          Signatures   Electronically signed by : Taylor Pedro MD; Dec 12 2017  3:26PM EST                       (Author)

## 2018-03-07 NOTE — PROCEDURES
Procedure      Pre-procedure Diagnosis: Bilateral Trochanteric Bursitis  Post-procedure Diagnosis: Bilateral Trochanteric Bursitis  Operation Title(s):  Bilateral trochanteric bursa injection under ultrasound guidance  Attending Surgeon:   Kimberly Taylor MD  Anesthesia:   Local    Indications: The patient is a 63year-old male with a diagnosis of trochanteric bursitis  The patient's history and physical exam were reviewed  The risks, benefits and alternatives to the procedure were discussed, and all questions were answered to the patient's satisfaction  The patient agreed to proceed, and written informed consent was obtained  Procedure in Detail: The patient was brought into the exam room and placed in the left lateral position on the fluoroscopy table  The right hip(s) was prepped with chloraprep and draped in a sterile manner  AP fluoroscopy was used to identify and ruiz the right greater trochanter  The skin and subcutaneous tissues in the area was anesthetized with 1% lidocaine  A 22-gauge, 3Â½-inch spinal needle was advanced toward the identified point under fluoroscopic guidance until bone was contacted  The needle was removed with a 1% lidocaine flush  A sterile ultrasound probe cover and gel was placed over a 3 75 MHz curvilinear transducer probe  The probe was placed over the lateral thigh to visualize the peritrochanteric bursal region  Optimal needle path was determined and the skin and subcutaneous tissues in the area was anesthetized with 1% lidocaine  Then, under ultrasound guidance, a 2 inch ultrasound needle was advanced until the needle entered the peritrochanteric bursa region  After visualization of the tip in the target area and negative aspiration for blood, Then, after negative aspiration, a solution consisting of 3 mL 0 25% bupivacaine and 1 mL Depo-Medrol (40mg/ml) was easily injected    The needle was removed with a 1% lidocaine flush       The same procedure was repeated on the left side  The patient's hips were cleansed and a bandage was placed over the sites of needle insertion  Disposition: The patient tolerated the procedure well and there were no apparent complications  The patient was taken to the recovery area where written discharge instructions for the procedure were given          Signatures   Electronically signed by : Willard Carroll MD; Oct 13 2017  1:53PM EST                       (Author)

## 2018-03-19 ENCOUNTER — TELEPHONE (OUTPATIENT)
Dept: PAIN MEDICINE | Facility: MEDICAL CENTER | Age: 63
End: 2018-03-19

## 2018-03-19 DIAGNOSIS — F41.9 ANXIETY: Primary | ICD-10-CM

## 2018-03-19 RX ORDER — ALPRAZOLAM 0.25 MG/1
0.25 TABLET ORAL AS NEEDED
Qty: 30 TABLET | Refills: 1 | OUTPATIENT
Start: 2018-03-19 | End: 2018-08-30 | Stop reason: SDUPTHER

## 2018-03-19 NOTE — TELEPHONE ENCOUNTER
[facility] left a message in [facility] voicemail, stating that the patient needs a refill on Gabapentin  Stated that he has new insurance and needs to have all of his prescriptions filled at that pharmacy from now on  Called patient and confirmed that he is now using [facility] pharmacy for his prescriptions  He is requesting that all other pharmacies be removed from his chart  Patient states that he does not need a refill on Gabapentin at this time and will call when he does

## 2018-03-20 NOTE — TELEPHONE ENCOUNTER
Attempted to reach pt  Left a detailed voicemail for pt to call back and give us a phone number and address to  [address] [place] [address]

## 2018-03-21 NOTE — TELEPHONE ENCOUNTER
S/W pt and confirmed he uses [facility] on [address] in [address]  Pt's pharmacy was updated in [address]

## 2018-03-22 ENCOUNTER — TRANSCRIBE ORDERS (OUTPATIENT)
Dept: ADMINISTRATIVE | Age: 63
End: 2018-03-22

## 2018-03-22 ENCOUNTER — APPOINTMENT (OUTPATIENT)
Dept: LAB | Age: 63
End: 2018-03-22
Payer: MEDICARE

## 2018-03-22 DIAGNOSIS — N40.1 ENLARGED PROSTATE WITH URINARY OBSTRUCTION: ICD-10-CM

## 2018-03-22 DIAGNOSIS — N13.8 ENLARGED PROSTATE WITH URINARY OBSTRUCTION: ICD-10-CM

## 2018-03-22 DIAGNOSIS — R79.1 ABNORMAL COAGULATION PROFILE: ICD-10-CM

## 2018-03-22 DIAGNOSIS — Z94.4 LIVER REPLACED BY TRANSPLANT (HCC): ICD-10-CM

## 2018-03-22 DIAGNOSIS — C22.1 MALIGNANT NEOPLASM OF INTRAHEPATIC BILE DUCTS (HCC): ICD-10-CM

## 2018-03-22 DIAGNOSIS — N20.0 URIC ACID NEPHROLITHIASIS: Primary | ICD-10-CM

## 2018-03-22 DIAGNOSIS — D68.9 BLOOD CLOTTING DISORDER (HCC): ICD-10-CM

## 2018-03-22 DIAGNOSIS — Z94.4 LIVER REPLACED BY TRANSPLANT (HCC): Primary | ICD-10-CM

## 2018-03-22 LAB
ALBUMIN SERPL BCP-MCNC: 3.7 G/DL (ref 3.5–5)
ALP SERPL-CCNC: 50 U/L (ref 46–116)
ALT SERPL W P-5'-P-CCNC: 18 U/L (ref 12–78)
ANION GAP SERPL CALCULATED.3IONS-SCNC: 9 MMOL/L (ref 4–13)
AST SERPL W P-5'-P-CCNC: 15 U/L (ref 5–45)
BASOPHILS # BLD AUTO: 0.03 THOUSANDS/ΜL (ref 0–0.1)
BASOPHILS NFR BLD AUTO: 0 % (ref 0–1)
BILIRUB SERPL-MCNC: 0.46 MG/DL (ref 0.2–1)
BUN SERPL-MCNC: 12 MG/DL (ref 5–25)
CALCIUM SERPL-MCNC: 8.7 MG/DL (ref 8.3–10.1)
CHLORIDE SERPL-SCNC: 105 MMOL/L (ref 100–108)
CO2 SERPL-SCNC: 25 MMOL/L (ref 21–32)
CREAT SERPL-MCNC: 1.04 MG/DL (ref 0.6–1.3)
EOSINOPHIL # BLD AUTO: 0.18 THOUSAND/ΜL (ref 0–0.61)
EOSINOPHIL NFR BLD AUTO: 2 % (ref 0–6)
ERYTHROCYTE [DISTWIDTH] IN BLOOD BY AUTOMATED COUNT: 13.8 % (ref 11.6–15.1)
GFR SERPL CREATININE-BSD FRML MDRD: 78 ML/MIN/1.73SQ M
GGT SERPL-CCNC: 14 U/L (ref 5–85)
GLUCOSE SERPL-MCNC: 66 MG/DL (ref 65–140)
HCT VFR BLD AUTO: 42.2 % (ref 36.5–49.3)
HGB BLD-MCNC: 14.4 G/DL (ref 12–17)
INR PPP: 1.12 (ref 0.86–1.16)
LYMPHOCYTES # BLD AUTO: 1.37 THOUSANDS/ΜL (ref 0.6–4.47)
LYMPHOCYTES NFR BLD AUTO: 16 % (ref 14–44)
MAGNESIUM SERPL-MCNC: 1.7 MG/DL (ref 1.6–2.6)
MCH RBC QN AUTO: 32.1 PG (ref 26.8–34.3)
MCHC RBC AUTO-ENTMCNC: 34.1 G/DL (ref 31.4–37.4)
MCV RBC AUTO: 94 FL (ref 82–98)
MONOCYTES # BLD AUTO: 0.5 THOUSAND/ΜL (ref 0.17–1.22)
MONOCYTES NFR BLD AUTO: 6 % (ref 4–12)
NEUTROPHILS # BLD AUTO: 6.24 THOUSANDS/ΜL (ref 1.85–7.62)
NEUTS SEG NFR BLD AUTO: 76 % (ref 43–75)
NRBC BLD AUTO-RTO: 0 /100 WBCS
PLATELET # BLD AUTO: 236 THOUSANDS/UL (ref 149–390)
PMV BLD AUTO: 11.8 FL (ref 8.9–12.7)
POTASSIUM SERPL-SCNC: 4.1 MMOL/L (ref 3.5–5.3)
PROT SERPL-MCNC: 6.8 G/DL (ref 6.4–8.2)
PROTHROMBIN TIME: 14.4 SECONDS (ref 12.1–14.4)
PSA SERPL-MCNC: 1.5 NG/ML (ref 0–4)
RBC # BLD AUTO: 4.48 MILLION/UL (ref 3.88–5.62)
SODIUM SERPL-SCNC: 139 MMOL/L (ref 136–145)
TACROLIMUS BLD-MCNC: 4.8 NG/ML (ref 2–20)
WBC # BLD AUTO: 8.34 THOUSAND/UL (ref 4.31–10.16)

## 2018-03-22 PROCEDURE — 80197 ASSAY OF TACROLIMUS: CPT

## 2018-03-22 PROCEDURE — 83735 ASSAY OF MAGNESIUM: CPT

## 2018-03-22 PROCEDURE — 85025 COMPLETE CBC W/AUTO DIFF WBC: CPT

## 2018-03-22 PROCEDURE — 85610 PROTHROMBIN TIME: CPT

## 2018-03-22 PROCEDURE — 36415 COLL VENOUS BLD VENIPUNCTURE: CPT

## 2018-03-22 PROCEDURE — G0103 PSA SCREENING: HCPCS

## 2018-03-22 PROCEDURE — 82977 ASSAY OF GGT: CPT

## 2018-03-22 PROCEDURE — 80053 COMPREHEN METABOLIC PANEL: CPT

## 2018-04-16 ENCOUNTER — APPOINTMENT (OUTPATIENT)
Dept: RADIOLOGY | Age: 63
End: 2018-04-16
Payer: MEDICARE

## 2018-04-16 DIAGNOSIS — N20.0 URIC ACID NEPHROLITHIASIS: ICD-10-CM

## 2018-04-16 PROCEDURE — 74018 RADEX ABDOMEN 1 VIEW: CPT

## 2018-04-23 ENCOUNTER — TELEPHONE (OUTPATIENT)
Dept: INTERNAL MEDICINE CLINIC | Facility: CLINIC | Age: 63
End: 2018-04-23

## 2018-05-16 ENCOUNTER — TELEPHONE (OUTPATIENT)
Dept: PAIN MEDICINE | Facility: MEDICAL CENTER | Age: 63
End: 2018-05-16

## 2018-05-16 DIAGNOSIS — M54.16 LUMBAR RADICULOPATHY: ICD-10-CM

## 2018-05-16 RX ORDER — GABAPENTIN 300 MG/1
300 CAPSULE ORAL 2 TIMES DAILY
Qty: 60 CAPSULE | Refills: 0 | Status: SHIPPED | OUTPATIENT
Start: 2018-05-16 | End: 2018-06-19 | Stop reason: SDUPTHER

## 2018-05-16 NOTE — TELEPHONE ENCOUNTER
Please let the patient know that I sent a prescription to his pharmacy for gabapentin 300 mg 1 capsule 2 times a day, 30 day supply, 60 tablets, no refills  Patient should make a follow-up appointment, if he truly was last seen in September of 2017

## 2018-05-16 NOTE — TELEPHONE ENCOUNTER
RANJIT    S/w pt and advised of above  Pt verbalized understanding and requested to book a f/u appt w/ Dr Catrina Brar  Pt scheduled for OV w/ Dr Catrina Brar on 6/6/18, arrival 915 am  Pt appreciative

## 2018-05-16 NOTE — TELEPHONE ENCOUNTER
Pt called for a refill on Gabapentin 300 mg  2x daily  Pt has enough for 2 more days  Pt uses [facility] (on file)  Pt can be reached at [phone] with any questions

## 2018-05-16 NOTE — TELEPHONE ENCOUNTER
Looks like the gabapentin was last prescribed on 1/30/18 for 300 mg BID for 90 days, #180 with no refills  Pt has no pending ov, he had an ov on 2/28/18 that is listed as cx'd, so his last ov was 9/2017 with Mohan Landis    Pls advise

## 2018-06-04 ENCOUNTER — OFFICE VISIT (OUTPATIENT)
Dept: PAIN MEDICINE | Facility: CLINIC | Age: 63
End: 2018-06-04
Payer: MEDICARE

## 2018-06-04 VITALS
HEART RATE: 70 BPM | TEMPERATURE: 98.5 F | WEIGHT: 200 LBS | SYSTOLIC BLOOD PRESSURE: 118 MMHG | BODY MASS INDEX: 27.09 KG/M2 | HEIGHT: 72 IN | DIASTOLIC BLOOD PRESSURE: 76 MMHG

## 2018-06-04 DIAGNOSIS — M70.62 GREATER TROCHANTERIC BURSITIS OF BOTH HIPS: ICD-10-CM

## 2018-06-04 DIAGNOSIS — M70.61 GREATER TROCHANTERIC BURSITIS OF BOTH HIPS: ICD-10-CM

## 2018-06-04 DIAGNOSIS — G89.4 CHRONIC PAIN SYNDROME: Primary | ICD-10-CM

## 2018-06-04 DIAGNOSIS — M48.061 SPINAL STENOSIS OF LUMBAR REGION, UNSPECIFIED WHETHER NEUROGENIC CLAUDICATION PRESENT: ICD-10-CM

## 2018-06-04 DIAGNOSIS — M47.816 LUMBAR SPONDYLOSIS: ICD-10-CM

## 2018-06-04 PROBLEM — R41.3 SHORT-TERM MEMORY LOSS: Status: ACTIVE | Noted: 2017-10-27

## 2018-06-04 PROBLEM — H93.13 RINGING IN EARS, BILATERAL: Status: ACTIVE | Noted: 2017-10-27

## 2018-06-04 PROCEDURE — 99214 OFFICE O/P EST MOD 30 MIN: CPT | Performed by: ANESTHESIOLOGY

## 2018-06-04 NOTE — PROGRESS NOTES
Assessment:  1  Chronic pain syndrome    2  Greater trochanteric bursitis of both hips    3  Lumbar spondylosis    4  Spinal stenosis of lumbar region, unspecified whether neurogenic claudication present        Plan:  Nadeem Burrows is a 63 y o  male with a history of chronic pain secondary to bilateral hip bursitis, lumbar spondylosis, lumbar stenosis  Patient presents today with bilateral hip pain, and low back pain  The patient's exam and symptoms are consistent with bilateral greater trochanteric bursitis, and lumbar radiculopathy most likely stemming from moderate lumbar stenosis at L4-L5  The patient reports that his bilateral hip pain is currently the most severe pain he is experiencing  Therefore, at this time I discussed with the patient about moving forward with bilateral greater trochanteric bursa injections under ultrasound guidance to decrease inflammation, to reduce pain  The patient was educated on the procedures most common risks  The patient verbalized understanding, and would like to proceed with the procedure  Therefore the patient be scheduled for upcoming Friday  I also discussed with the patient the possibility of returning to neurosurgery for surgical consultation  However, the patient reports that he seen Dr Ajit Vázquez in the past who referred him to our office  The patient would like to hold off on surgical consultation, and continue with conservative methods to treat his pain, at this time  The patient will follow up after his bilateral greater trochanteric bursa injections, or sooner with the worsening of  symptoms  Complete risks and benefits including bleeding, infection, tissue reaction, nerve injury and allergic reaction were discussed  The approach was demonstrated using models and literature was provided  Verbal and written consent was obtained      My impressions and treatment recommendations were discussed in detail with the patient who verbalized understanding and had no further questions  Discharge instructions were provided  I personally saw and examined the patient and I agree with the above discussed plan of care  Orders Placed This Encounter   Procedures    US guidance     Standing Status:   Future     Standing Expiration Date:   6/4/2022     Scheduling Instructions:      No prep required  Please bring your insurance cards, a form of photo ID and a list of your medications with you  Arrive 15 minutes prior to your appointment time in order to register  To schedule this appointment, please contact Central Scheduling at [phone]  Order Specific Question:   Reason for Exam:     Answer:   Bilateral greater trochanteric bursa injections     Order Specific Question:   What is the patient's sedation requirement? Answer:   No Sedation     No orders of the defined types were placed in this encounter  History of Present Illness:  Karen Saavedra is a 63 y o  male with a history of chronic pain secondary to bilateral hip bursitis, lumbar spondylosis, lumbar stenosis  The patient was last seen office on 1/30/2018 where he underwent a right L3-L5 medial branch blocks  The patient reports that after his right L3-L5 medial branch blocks that he no longer has sharp stabbing and now only has a dull aching pain, that is tolerable  The patient presents for a follow up office visit in regards to Hip Pain (bilateral)  The patients current symptoms include bilateral; hip pain that is primary located on the outer aspects of her bilateral hips that he reports is the same symptoms prior to undergoing bilateral greater trochanteric bursa injections  He reports that his most severe pain is located in his bilateral hips and he has increased pain when trying to sleep on his sides at nighttime  The patient also reports bilateral calf pain  He denies bilateral leg weakness or bowel or bladder issues    He describes his pain as dull aching constant pain that occurs mostly during the evening nighttime hours  Patient reports that his pain is symptoms have greatly improved since last visit  Patient contributes improvement of his symptoms due to undergoing a right L3-L5 medial branch block on 1/30/2018  Patient currently rates his pain a 5 to 6/10 numeric pain scale  I have personally reviewed and/or updated the patient's past medical history, past surgical history, family history, social history, current medications, allergies, and vital signs today  Review of Systems   Respiratory: Negative for shortness of breath  Cardiovascular: Negative for chest pain  Gastrointestinal: Negative for constipation, diarrhea, nausea and vomiting  Musculoskeletal: Positive for gait problem  Negative for arthralgias, joint swelling and myalgias  Skin: Negative for rash  Neurological: Positive for weakness  Negative for dizziness and seizures  All other systems reviewed and are negative        Patient Active Problem List   Diagnosis    Alcohol dependence in remission ([email or web address]    Bursitis of both hips    Chronic gastroesophageal reflux disease    Essential hypertension    Intervertebral disc disorder with radiculopathy of lumbar region    Liver replaced by transplant ([email or web address]    [facility]roiliitis ([facility] )    Seasonal allergies    Lung mass    Neck pain    Nonalcoholic liver disease, chronic    Abnormal electrocardiogram    Osteopenia    PPD negative    Right arm pain    Ringing in ears, bilateral    Short-term memory loss    Tobacco abuse       Past Medical History:   Diagnosis Date    Cancer ([email or web address]     liver    Depression     Gastroesophageal reflux     History of colon polyps     Hypertension        Past Surgical History:   Procedure Laterality Date    APPENDECTOMY      EXPLORATORY LAPAROTOMY      EYE SURGERY      LIVER TRANSPLANTATION      [place] COLONOSCOPY FLX DX W/COLLJ SPEC WHEN PFRMD N/A 9/12/2016    Procedure: COLONOSCOPY;  Surgeon: Jessica Watkins MD;  Location: [facility]; Service: General    ROTATOR CUFF REPAIR         No family history on file  Social History     Occupational History    Not on file  Social History Main Topics    Smoking status: Current Every Day Smoker    Smokeless tobacco: Not on file    Alcohol use No    Drug use: No    Sexual activity: Not on file       Current Outpatient Prescriptions on File Prior to Visit   Medication Sig    acetaminophen (TYLENOL) 325 mg tablet Take 650 mg by mouth every 6 (six) hours as needed for mild pain   ALPRAZolam (XANAX) 0 25 mg tablet Take 1 tablet (0 25 mg total) by mouth as needed for anxiety    calcium-vitamin D 250-100 MG-UNIT per tablet Take 1 tablet by mouth 2 (two) times a day   docusate sodium (COLACE) 100 mg capsule Take 100 mg by mouth 2 (two) times a day   gabapentin (NEURONTIN) 300 mg capsule Take 1 capsule (300 mg total) by mouth 2 (two) times a day for 30 days    lisinopril (ZESTRIL) 20 mg tablet Take 20 mg by mouth daily    magnesium oxide (MAG-OX) 400 mg Take 400 mg by mouth 2 (two) times a day   omeprazole (PriLOSEC) 20 mg delayed release capsule Take 20 mg by mouth daily   ONE DAILY MULTIPLE VITAMIN PO Take by mouth   tacrolimus (PROGRAF) 1 mg capsule Take 1 mg by mouth 2 (two) times a day   [DISCONTINUED] sertraline (ZOLOFT) 50 mg tablet Take 50 mg by mouth daily  No current facility-administered medications on file prior to visit  Allergies   Allergen Reactions    Macrolides And Ketolides      Annotation - [id number]: The patient is on Antirejection medications and they will reduce the effective ness of the medications  Physical Exam:    /76   Pulse 70   Temp 98 5 °F (36 9 °C) (Oral)   Ht 6' (1 829 m)   Wt 90 7 kg (200 lb)   BMI 27 12 kg/m²     Constitutional:normal, well developed, well nourished, alert, in no distress and non-toxic and no overt pain behavior   and overweight  Eyes:anicteric  HEENT:grossly intact  Neck:supple, symmetric, trachea midline and no masses   Pulmonary:even and unlabored  Cardiovascular:No edema or pitting edema present  Skin:Normal without rashes or lesions and well hydrated  Psychiatric:Mood and affect appropriate  Neurologic:Cranial Nerves II-XII grossly intact  Musculoskeletal:normal    Lumbar Spine Exam    Appearance:  Normal lordosis  Palpation/Tenderness:  left lumbar paraspinal tenderness  right lumbar paraspinal tenderness  Pain noted over bilateral greater trochanteric bursa  Sensory:  no sensory deficits noted  Range of Motion:  Flexion:  No limitation  without pain  Extension:  No limitation  with pain  Lateral Flexion - Left:  No limitation  with pain  Lateral Flexion - Right:  No limitation  with pain  Rotation - Left:  No limitation  with pain  Rotation - Right:  No limitation  with pain  Motor Strength:  Left hip flexion:  5/5  Right hip flexion:  5/5  Left knee extension:  5/5  Right knee extension:  5/5  Left foot dorsiflexion:  5/5  Left foot plantar flexion:  5/5  Right foot dorsiflexion:  5/5  Right foot plantar flexion:  5/5      Imaging    MRI LUMBAR SPINE WITHOUT CONTRAST     INDICATION:  Right-sided sciatica      COMPARISON:  12/17/2010     TECHNIQUE:  Sagittal T1, sagittal T2, sagittal inversion recovery, axial T1 and axial T2, coronal T2        IMAGE QUALITY:  Diagnostic     FINDINGS:     ALIGNMENT:  Normal alignment of the lumbar spine  No compression fracture  No spondylolysis or spondylolisthesis  No scoliosis  Chronic Schmorl's nodes identified within the lower thoracic and lumbar vertebral bodies, unchanged      MARROW SIGNAL:  Normal marrow signal is identified within the visualized bony structures  No discrete marrow lesion      DISTAL CORD AND CONUS:  Normal size and signal within the distal cord and conus  The conus ends at the L1 level      PARASPINAL SOFT TISSUES:  Paraspinal soft tissues are unremarkable      SACRUM:  Normal signal within the sacrum   No evidence of insufficiency or stress fracture      LOWER THORACIC DISC SPACES:  Normal disc height and signal   No disc herniation, canal stenosis or foraminal narrowing      LUMBAR DISC SPACES:          L1-L2:  Slight annular bulging without disc herniation, canal stenosis or foraminal narrowing      L2-L3:  Slight annular bulging without disc herniation, canal stenosis or foraminal narrowing      L3-L4:  Moderate annular bulging  Mild endplate hypertrophic degenerative change with moderate canal stenosis and bilateral foraminal narrowing, right greater than left  This is grossly unchanged      L4-L5:  Annular bulging and facet arthropathy with moderate canal stenosis, slightly more pronounced than the prior examination  Small broad-based right foraminal disc protrusion  Moderate left and severe right foraminal narrowing  Right foraminal   narrowing slightly more pronounced      L5-S1:  Mild annular bulging  Moderate left and mild right facet arthropathy  No disc herniation or canal stenosis  Moderately severe left and moderate right foraminal narrowing    Left foraminal narrowing is slightly more pronounced      IMPRESSION:     Progression of degenerative change compared to the prior examination with worsening canal stenosis at L4-5      Worsening severe right foraminal narrowing at L4-5 and moderately severe left foraminal narrowing at L5-S1

## 2018-06-15 ENCOUNTER — TELEPHONE (OUTPATIENT)
Dept: PAIN MEDICINE | Facility: CLINIC | Age: 63
End: 2018-06-15

## 2018-06-15 NOTE — TELEPHONE ENCOUNTER
Left vm on number provided that pt must c/b and provide name and dosage of medication that he needs a refill for  C/B # and OH provided

## 2018-06-19 DIAGNOSIS — M54.16 LUMBAR RADICULOPATHY: ICD-10-CM

## 2018-06-19 RX ORDER — GABAPENTIN 300 MG/1
300 CAPSULE ORAL 2 TIMES DAILY
Qty: 60 CAPSULE | Refills: 1 | Status: SHIPPED | OUTPATIENT
Start: 2018-06-19 | End: 2018-07-24 | Stop reason: SDUPTHER

## 2018-06-19 NOTE — TELEPHONE ENCOUNTER
Please let the patient know that I sent gabapentin 300 1 tablet PO 2 times a day, 30 day supply, 60 tablets with 1 refill  Please let the patient know

## 2018-06-19 NOTE — TELEPHONE ENCOUNTER
I s/w pt who said he told CHUN and HA at his 6/4 ov that he needed refill for his gabapentin but his [facility] never received any refills  I confirmed he takes 300 mg BID  He said he ran out 3 days ago but his wife takes the 100 mg strength so he has been taking his equivalent dosage but with her 100 mg pills  He knows not to do that but had to until his gets his RF  Pt said current dosing is working and he denies adverse s/e  Pls send to [facility] on file  LP #[id number] with no refills on 5/16/18 by SARAH  **Per pt his [facility] will contact him once refill ready for p/u, no need to c/b once script sent  **

## 2018-06-20 NOTE — TELEPHONE ENCOUNTER
Per phone call with pt yest his [facility] will contact once script is ready for p/u, no need to call pt once script sent

## 2018-06-27 DIAGNOSIS — Z94.4 LIVER TRANSPLANT RECIPIENT (HCC): Primary | ICD-10-CM

## 2018-06-27 RX ORDER — TACROLIMUS 1 MG/1
3 CAPSULE ORAL 2 TIMES DAILY
Qty: 540 CAPSULE | Refills: 0 | Status: SHIPPED | OUTPATIENT
Start: 2018-06-27 | End: 2019-01-07 | Stop reason: SDUPTHER

## 2018-07-06 ENCOUNTER — PROCEDURE VISIT (OUTPATIENT)
Dept: PAIN MEDICINE | Facility: CLINIC | Age: 63
End: 2018-07-06
Payer: MEDICARE

## 2018-07-06 DIAGNOSIS — M70.62 TROCHANTERIC BURSITIS OF BOTH HIPS: Primary | ICD-10-CM

## 2018-07-06 DIAGNOSIS — M70.61 TROCHANTERIC BURSITIS OF BOTH HIPS: Primary | ICD-10-CM

## 2018-07-06 PROCEDURE — 20611 DRAIN/INJ JOINT/BURSA W/US: CPT | Performed by: ANESTHESIOLOGY

## 2018-07-06 NOTE — PROGRESS NOTES
Pre-procedure Diagnosis:   1  Trochanteric bursitis of both hips      Post-procedure Diagnosis:   1  Trochanteric bursitis of both hips      Operation Title(s):  Bilateral trochanteric bursa injection under ultrasound guidance  Attending Surgeon:   Manod Barros MD  Anesthesia:   Local    Indications: The patient is a 66y o  year-old male with a diagnosis of trochanteric bursitis  The patient's history and physical exam were reviewed  The risks, benefits and alternatives to the procedure were discussed, and all questions were answered to the patient's satisfaction  The patient agreed to proceed, and written informed consent was obtained  Procedure in Detail: The patient was brought into the exam room and placed in the right lateral decubitus position on the exam room table  The left hip was prepped with chloraprep and draped in a sterile manner  A sterile ultrasound probe cover and gel was placed over a 3 75 MHz curvilinear transducer probe  The probe was placed over the lateral thigh to visualize the peritrochanteric bursal region  Optimal needle path was determined and the skin and subcutaneous tissues in the area was anesthetized with 1% lidocaine  Then, under ultrasound guidance, a 2 inch ultrasound needle was advanced until the needle entered the peritrochanteric bursa region  After visualization of the tip in the target area and negative aspiration for blood, a solution consisting of 3 mL 0 25% bupivacaine and 1 mL Depo-Medrol (40mg/ml) was easily injected  The patient's hip was cleaned and a bandage was placed over the sites of needle insertion  Next, the patient was placed in the left lateral decubitus position on the exam room table  The right hip was prepped with chloraprep and draped in a sterile manner  A sterile ultrasound probe cover and gel was placed over a 3 75 MHz curvilinear transducer probe    The probe was placed over the lateral thigh to visualize the peritrochanteric bursal region  Optimal needle path was determined and the skin and subcutaneous tissues in the area was anesthetized with 1% lidocaine  Then, under ultrasound guidance, a 2 inch ultrasound needle was advanced until the needle entered the peritrochanteric bursa region  After visualization of the tip in the target area and negative aspiration for blood, a solution consisting of 3 mL 0 25% bupivacaine and 1 mL Depo-Medrol (40mg/ml) was easily injected  The patient's hip was cleaned and a bandage was placed over the sites of needle insertion  Disposition: The patient tolerated the procedure well and there were no apparent complications  The patient was given written discharge instructions for the procedure

## 2018-07-10 RX ORDER — METHYLPREDNISOLONE ACETATE 40 MG/ML
40 INJECTION, SUSPENSION INTRA-ARTICULAR; INTRALESIONAL; INTRAMUSCULAR; SOFT TISSUE ONCE
Status: DISCONTINUED | OUTPATIENT
Start: 2018-07-10 | End: 2019-04-23 | Stop reason: ALTCHOICE

## 2018-07-10 RX ORDER — BUPIVACAINE HYDROCHLORIDE 2.5 MG/ML
5 INJECTION, SOLUTION INFILTRATION; PERINEURAL ONCE
Status: DISCONTINUED | OUTPATIENT
Start: 2018-07-10 | End: 2019-04-23 | Stop reason: ALTCHOICE

## 2018-07-13 ENCOUNTER — TELEPHONE (OUTPATIENT)
Dept: PAIN MEDICINE | Facility: CLINIC | Age: 63
End: 2018-07-13

## 2018-07-17 NOTE — TELEPHONE ENCOUNTER
Patient called back stating that his hips are doing better  He has gotten a 75% relief  He is asking what the next step is  He states that he was told he can have an injection for his back next   Please advise

## 2018-07-19 NOTE — TELEPHONE ENCOUNTER
Pt reports the majority of his pain is on the right side  The pain is in the right buttock and travels down the outside of the entire leg, sometime as far as to the toe  Right sided pain 8-9/10  The left sided pain is only on the buttock, no leg pain  Left sided pain 4/10    Told pt that this info will be forwarded to FQ so he can decide what type of inj he needs, his  call him with making appt for inj

## 2018-07-20 NOTE — TELEPHONE ENCOUNTER
Pain is the same as stated in task from yest, R>L  Pt asking for something for the pain until he gets his inj  I confirmed he is taking gabapentin 300 mg BID  Told pt I will check with FQ and c/b with his rec  **Per pt ok to leave vm with FQ's rec **    **Told pt I would get message to our  about scheduling the inj and that would like it  ASAP  See details in earlier task for inj to be scheduled  **

## 2018-07-20 NOTE — TELEPHONE ENCOUNTER
Patient called stating he is having a lot pain and feels like he wont make it through the day (at work) patient wants to know if he could possibly get pain meds until he is scheduled for his next procedure  Patient also wants to schedule procedure asap   cancer/b [phone]

## 2018-07-20 NOTE — TELEPHONE ENCOUNTER
Pt informed of the same as stated in FQ's rec in the previous task  Pt given an ov for Tues 7/24 at 8 am w/ HA to discuss opioid script

## 2018-07-20 NOTE — TELEPHONE ENCOUNTER
He needs an office visit for any opiate type medication so please schedule him with [facility] Nurse or ORL[facility] for early next week

## 2018-07-24 ENCOUNTER — OFFICE VISIT (OUTPATIENT)
Dept: PAIN MEDICINE | Facility: CLINIC | Age: 63
End: 2018-07-24
Payer: MEDICARE

## 2018-07-24 VITALS
TEMPERATURE: 98.6 F | WEIGHT: 195 LBS | SYSTOLIC BLOOD PRESSURE: 118 MMHG | BODY MASS INDEX: 26.41 KG/M2 | DIASTOLIC BLOOD PRESSURE: 76 MMHG | HEART RATE: 74 BPM | HEIGHT: 72 IN

## 2018-07-24 DIAGNOSIS — M51.16 INTERVERTEBRAL DISC DISORDERS WITH RADICULOPATHY, LUMBAR REGION: Primary | ICD-10-CM

## 2018-07-24 DIAGNOSIS — M54.16 LUMBAR RADICULOPATHY: ICD-10-CM

## 2018-07-24 DIAGNOSIS — G89.4 CHRONIC PAIN SYNDROME: ICD-10-CM

## 2018-07-24 PROCEDURE — 99214 OFFICE O/P EST MOD 30 MIN: CPT | Performed by: NURSE PRACTITIONER

## 2018-07-24 RX ORDER — GABAPENTIN 300 MG/1
300 CAPSULE ORAL 2 TIMES DAILY
Qty: 180 CAPSULE | Refills: 0 | Status: SHIPPED | OUTPATIENT
Start: 2018-07-24 | End: 2018-11-21 | Stop reason: SDUPTHER

## 2018-07-24 NOTE — PROGRESS NOTES
Assessment:  1  Intervertebral disc disorders with radiculopathy, lumbar region    2  Lumbar radiculopathy    3  Chronic pain syndrome        Plan:  Nadeem Burrows is a 66 y o  male  with a history of chronic pain secondary to bilateral hip bursitis, lumbar spondylosis, lumbar stenosis  The patient continues with ongoing low back pain, which is consistent with lumbar radiculopathy most likely stemming from a disc herniation at L4-L5  Therefore at this time I discussed with the patient about moving forward with a right L4-L5 transforaminal epidural steroid injection to help decrease inflammation within his lumbar spine, and reduce pain and radicular symptoms  The patient was educated on the procedures most common risks, and was given a brochure on the procedure  The patient verbalized understanding and would like to proceed with the procedure  Therefore the patient be scheduled for upcoming Tuesday or Thursday  The patient will also be continued on gabapentin 300 mg 1 tablet 2 times daily  A prescription for this medication was sent to his pharmacy with 1 refill  Complete risks and benefits including bleeding, infection, tissue reaction, nerve injury and allergic reaction were discussed  The approach was demonstrated using models and literature was provided  Verbal and written consent was obtained  The patient will follow up after his right L4-L5 transforaminal epidural steroid injection, or sooner with the worsening of  symptoms  My impressions and treatment recommendations were discussed in detail with the patient who verbalized understanding and had no further questions  Discharge instructions were provided  I personally saw and examined the patient and I agree with the above discussed plan of care      Orders Placed This Encounter   Procedures    FL spine and pain procedure     Standing Status:   Future     Standing Expiration Date:   7/24/2022     Order Specific Question:   Reason for Exam: Answer:   Right L4-L5 TFESI     Order Specific Question:   Anticoagulant hold needed? Answer:   No     New Medications Ordered This Visit   Medications    gabapentin (NEURONTIN) 300 mg capsule     Sig: Take 1 capsule (300 mg total) by mouth 2 (two) times a day     Dispense:  180 capsule     Refill:  0       History of Present Illness:  Mary Russell is a 66 y o  male  with a history of chronic pain secondary to bilateral hip bursitis, lumbar spondylosis, lumbar stenosis  Patient presents today with bilateral hip pain, and low back pain  The patient was last seen office on 7/6/2018 where he underwent bilateral greater trochanteric bursa injections  The reports a 100% relief of his left hipHe presents for a follow up office visit in regards to Back Pain (lower back/ pain medication discussion)  The patients current symptoms include low back pain that radiates down the lateral aspect of his right leg to his foot  He describes his pain as dull aching, shooting pain that is constant nature occurring mostly during the nighttime hours  The patient currently rates his pain 8 out 10 numeric pain scale  Current pain medications includes:  Gabapentin 300 mg 2 times daily  The patient reports that this regimen is providing  moderate pain relief  The patient is reporting no side effects from this pain medication regimen  I have personally reviewed and/or updated the patient's past medical history, past surgical history, family history, social history, current medications, allergies, and vital signs today  Review of Systems   Respiratory: Negative for shortness of breath  Cardiovascular: Negative for chest pain  Gastrointestinal: Negative for constipation, diarrhea, nausea and vomiting  Musculoskeletal: Positive for gait problem and joint swelling  Negative for arthralgias and myalgias  Skin: Negative for rash  Neurological: Negative for dizziness, seizures and weakness     All other systems reviewed and are negative  Patient Active Problem List   Diagnosis    [facility] )    Bursitis of both hips    Chronic gastroesophageal reflux disease    Essential hypertension    Intervertebral disc disorder with radiculopathy of lumbar region    [facility] transplant ([facility] )    [place]roiliitis ([place] [address] )    Seasonal allergies    Lung mass    Neck pain    Nonalcoholic liver disease, chronic    Abnormal electrocardiogram    Osteopenia    PPD negative    Right arm pain    Ringing in ears, bilateral    Short-term memory loss    Tobacco abuse       Past Medical History:   Diagnosis Date    Cancer ([place] [address] )     liver    Depression     Gastroesophageal reflux     History of colon polyps     Hypertension        Past Surgical History:   Procedure Laterality Date    APPENDECTOMY      EXPLORATORY LAPAROTOMY      EYE SURGERY      LIVER TRANSPLANTATION      VA COLONOSCOPY FLX DX W/COLLJ SPEC WHEN PFRMD N/A 9/12/2016    Procedure: COLONOSCOPY;  Surgeon: Jelani Blanco MD;  [facility]; Service: General    ROTATOR CUFF REPAIR         No family history on file  Social History     Occupational History    Not on file  Social History Main Topics    Smoking status: Current Every Day Smoker    Smokeless tobacco: Not on file    Alcohol use No    Drug use: No    Sexual activity: Not on file       Current Outpatient Prescriptions on File Prior to Visit   Medication Sig    acetaminophen (TYLENOL) 325 mg tablet Take 650 mg by mouth every 6 (six) hours as needed for mild pain   ALPRAZolam (XANAX) 0 25 mg tablet Take 1 tablet (0 25 mg total) by mouth as needed for anxiety    calcium-vitamin D 250-100 MG-UNIT per tablet Take 1 tablet by mouth 2 (two) times a day   docusate sodium (COLACE) 100 mg capsule Take 100 mg by mouth 2 (two) times a day      lisinopril (ZESTRIL) 20 mg tablet Take 20 mg by mouth daily    magnesium oxide (MAG-OX) 400 mg Take 400 mg by mouth 2 (two) times a day   omeprazole (PriLOSEC) 20 mg delayed release capsule Take 20 mg by mouth daily   ONE DAILY MULTIPLE VITAMIN PO Take by mouth   tacrolimus (PROGRAF) 1 mg capsule Take 3 capsules (3 mg total) by mouth 2 (two) times a day    [DISCONTINUED] gabapentin (NEURONTIN) 300 mg capsule Take 1 capsule (300 mg total) by mouth 2 (two) times a day for 30 days     Current Facility-Administered Medications on File Prior to Visit   Medication    bupivacaine (MARCAINE) 0 25 % injection 5 mL    methylPREDNISolone acetate (DEPO-MEDROL) injection 40 mg    methylPREDNISolone acetate (DEPO-MEDROL) injection 40 mg       Allergies   Allergen Reactions    Macrolides And Ketolides      Annotation - [id number]: The patient is on Antirejection medications and they will reduce the effective ness of the medications  Physical Exam:    /76   Pulse 74   Temp 98 6 °F (37 °C) (Oral)   Ht 6' (1 829 m)   Wt 88 5 kg (195 lb)   BMI 26 45 kg/m²     Constitutional:normal, well developed, well nourished, alert, in no distress and non-toxic and no overt pain behavior   and overweight  Eyes:anicteric  HEENT:grossly intact  Neck:supple, symmetric, trachea midline and no masses   Pulmonary:even and unlabored  Cardiovascular:No edema or pitting edema present  Skin:Normal without rashes or lesions and well hydrated  Psychiatric:Mood and affect appropriate  Neurologic:Cranial Nerves II-XII grossly intact  Musculoskeletal:antalgic    Lumbar Spine Exam    Appearance:  Normal lordosis  Palpation/Tenderness:  no tenderness or spasm  right lumbar paraspinal tenderness  Sensory:  no sensory deficits noted  Range of Motion:  Flexion:  No limitation  with pain  Extension:  No limitation  with pain  Lateral Flexion - Left:  No limitation  with pain  Lateral Flexion - Right:  No limitation  with pain  Rotation - Left:  No limitation  without pain  Rotation - Right:  No limitation  without pain  Motor Strength:  Left hip flexion:  5/5  Right hip flexion:  5/5  Left knee extension:  5/5  Right knee extension:  5/5  Left foot dorsiflexion:  5/5  Left foot plantar flexion:  5/5  Right foot dorsiflexion:  5/5  Right foot plantar flexion:  5/5    Imaging  MRI LUMBAR SPINE WITHOUT CONTRAST     INDICATION:  Right-sided sciatica      COMPARISON:  12/17/2010     TECHNIQUE:  Sagittal T1, sagittal T2, sagittal inversion recovery, axial T1 and axial T2, coronal T2        IMAGE QUALITY:  Diagnostic     FINDINGS:     ALIGNMENT:  Normal alignment of the lumbar spine  No compression fracture  No spondylolysis or spondylolisthesis  No scoliosis  Chronic Schmorl's nodes identified within the lower thoracic and lumbar vertebral bodies, unchanged      MARROW SIGNAL:  Normal marrow signal is identified within the visualized bony structures  No discrete marrow lesion      DISTAL CORD AND CONUS:  Normal size and signal within the distal cord and conus  The conus ends at the L1 level      PARASPINAL SOFT TISSUES:  Paraspinal soft tissues are unremarkable      SACRUM:  Normal signal within the sacrum  No evidence of insufficiency or stress fracture      LOWER THORACIC DISC SPACES:  Normal disc height and signal   No disc herniation, canal stenosis or foraminal narrowing      LUMBAR DISC SPACES:          L1-L2:  Slight annular bulging without disc herniation, canal stenosis or foraminal narrowing      L2-L3:  Slight annular bulging without disc herniation, canal stenosis or foraminal narrowing      L3-L4:  Moderate annular bulging  Mild endplate hypertrophic degenerative change with moderate canal stenosis and bilateral foraminal narrowing, right greater than left  This is grossly unchanged      L4-L5:  Annular bulging and facet arthropathy with moderate canal stenosis, slightly more pronounced than the prior examination  Small broad-based right foraminal disc protrusion  Moderate left and severe right foraminal narrowing    Right foraminal narrowing slightly more pronounced      L5-S1:  Mild annular bulging  Moderate left and mild right facet arthropathy  No disc herniation or canal stenosis  Moderately severe left and moderate right foraminal narrowing    Left foraminal narrowing is slightly more pronounced      IMPRESSION:     Progression of degenerative change compared to the prior examination with worsening canal stenosis at L4-5      Worsening severe right foraminal narrowing at L4-5 and moderately severe left foraminal narrowing at L5-S1

## 2018-07-25 DIAGNOSIS — K21.9 CHRONIC GASTROESOPHAGEAL REFLUX DISEASE: Primary | ICD-10-CM

## 2018-07-26 RX ORDER — OMEPRAZOLE 20 MG/1
CAPSULE, DELAYED RELEASE ORAL
Qty: 180 CAPSULE | Refills: 1 | Status: SHIPPED | OUTPATIENT
Start: 2018-07-26 | End: 2019-01-12 | Stop reason: SDUPTHER

## 2018-08-07 ENCOUNTER — HOSPITAL ENCOUNTER (OUTPATIENT)
Dept: RADIOLOGY | Facility: CLINIC | Age: 63
Discharge: HOME | End: 2018-08-07
Attending: ANESTHESIOLOGY
Payer: MEDICARE

## 2018-08-07 VITALS
RESPIRATION RATE: 20 BRPM | SYSTOLIC BLOOD PRESSURE: 101 MMHG | DIASTOLIC BLOOD PRESSURE: 67 MMHG | TEMPERATURE: 98 F | HEART RATE: 70 BPM | OXYGEN SATURATION: 99 %

## 2018-08-07 DIAGNOSIS — M51.16 INTERVERTEBRAL DISC DISORDERS WITH RADICULOPATHY, LUMBAR REGION: ICD-10-CM

## 2018-08-07 PROCEDURE — 64483 NJX AA&/STRD TFRM EPI L/S 1: CPT | Performed by: ANESTHESIOLOGY

## 2018-08-07 PROCEDURE — 64484 NJX AA&/STRD TFRM EPI L/S EA: CPT | Performed by: ANESTHESIOLOGY

## 2018-08-07 RX ORDER — BUPIVACAINE HCL/PF 2.5 MG/ML
10 VIAL (ML) INJECTION ONCE
Status: COMPLETED | OUTPATIENT
Start: 2018-08-07 | End: 2018-08-07

## 2018-08-07 RX ORDER — METHYLPREDNISOLONE ACETATE 80 MG/ML
80 INJECTION, SUSPENSION INTRA-ARTICULAR; INTRALESIONAL; INTRAMUSCULAR; PARENTERAL; SOFT TISSUE ONCE
Status: COMPLETED | OUTPATIENT
Start: 2018-08-07 | End: 2018-08-07

## 2018-08-07 RX ORDER — LIDOCAINE HYDROCHLORIDE 10 MG/ML
10 INJECTION, SOLUTION EPIDURAL; INFILTRATION; INTRACAUDAL; PERINEURAL ONCE
Status: COMPLETED | OUTPATIENT
Start: 2018-08-07 | End: 2018-08-07

## 2018-08-07 RX ADMIN — LIDOCAINE HYDROCHLORIDE 10 ML: 10 INJECTION, SOLUTION EPIDURAL; INFILTRATION; INTRACAUDAL; PERINEURAL at 09:11

## 2018-08-07 RX ADMIN — Medication 2 ML: at 09:14

## 2018-08-07 RX ADMIN — METHYLPREDNISOLONE ACETATE 80 MG: 80 INJECTION, SUSPENSION INTRA-ARTICULAR; INTRALESIONAL; INTRAMUSCULAR; PARENTERAL; SOFT TISSUE at 09:14

## 2018-08-07 RX ADMIN — IOHEXOL 1 ML: 300 INJECTION, SOLUTION INTRAVENOUS at 09:14

## 2018-08-07 NOTE — DISCHARGE INSTR - LAB
Epidural Steroid Injection   WHAT YOU NEED TO KNOW:   An epidural steroid injection (LAUREN) is a procedure to inject steroid medicine into the epidural space  The epidural space is between your spinal cord and vertebrae  Steroids reduce inflammation and fluid buildup in your spine that may be causing pain  You may be given pain medicine along with the steroids  ACTIVITY  · Do not drive or operate machinery today  · No strenuous activity today - bending, lifting, etc   · You may resume normal activites starting tomorrow - start slowly and as tolerated  · You may shower today, but no tub baths or hot tubs  · You may have numbness for several hours from the local anesthetic  Please use caution and common sense, especially with weight-bearing activities  CARE OF THE INJECTION SITE  · If you have soreness or pain, apply ice to the area today (20 minutes on/20 minutes off)  · Starting tomorrow, you may use warm, moist heat or ice if needed  · You may have an increase or change in your discomfort for 36-48 hours after your treatment  · Apply ice and continue with any pain medication you have been prescribed  · Notify [facility] if you have any of the following: redness, drainage, swelling, headache, stiff neck or fever above 100°F     SPECIAL INSTRUCTIONS  · Our office will contact you in approximately 7 days for a progress report  MEDICATIONS  · Continue to take all routine medications  · Our office may have instructed you to hold some medications  If you have a problem specifically related to your procedure, please call our office at [phone]  Problems not related to your procedure should be directed to your primary care physician

## 2018-08-07 NOTE — H&P
History of Present Illness: The patient is a 66 y o  male who presents with complaints of right lower back and leg pain secondary to lumbar disc herniation is here today for right L4-5 transforaminal epidural steroid injection  Patient Active Problem List   Diagnosis    [facility] dependence [facility]    Bursitis of both hips    Chronic gastroesophageal reflux disease    Essential hypertension    Intervertebral disc disorder with radiculopathy of lumbar region    [facility] replaced by transplant [facility]    [facility]roili[facility]    Seasonal allergies    Lung mass    Neck pain    Nonalcoholic liver disease, chronic    Abnormal electrocardiogram    Osteopenia    PPD negative    Right arm pain    Ringing in ears, bilateral    Short-term memory loss    Tobacco abuse       Past Medical History:   Diagnosis Date    Cancer ([facility]     liver    Depression     Gastroesophageal reflux     History of colon polyps     Hypertension        Past Surgical History:   Procedure Laterality Date    APPENDECTOMY      EXPLORATORY LAPAROTOMY      EYE SURGERY      LIVER TRANSPLANTATION      AL COLONOSCOPY FLX DX W/COLLJ SPEC WHEN PFRMD N/A 9/12/2016    Procedure: COLONOSCOPY;  Surgeon: Ailyn Estrada MD;  [facility];   Service: General    ROTATOR CUFF REPAIR           Current Outpatient Prescriptions:     acetaminophen (TYLENOL) 325 mg tablet, Take 650 mg by mouth every 6 (six) hours as needed for mild pain , Disp: , Rfl:     ALPRAZolam (XANAX) 0 25 mg tablet, Take 1 tablet (0 25 mg total) by mouth as needed for anxiety, Disp: 30 tablet, Rfl: 1    calcium-vitamin D 250-100 MG-UNIT per tablet, Take 1 tablet by mouth 2 (two) times a day , Disp: , Rfl:     docusate sodium (COLACE) 100 mg capsule, Take 100 mg by mouth 2 (two) times a day , Disp: , Rfl:     gabapentin (NEURONTIN) 300 mg capsule, Take 1 capsule (300 mg total) by mouth 2 (two) times a day, Disp: 180 capsule, Rfl: 0    lisinopril (ZESTRIL) 20 mg tablet, Take 20 mg by mouth daily, Disp: , Rfl:     magnesium oxide (MAG-OX) 400 mg, Take 400 mg by mouth 2 (two) times a day , Disp: , Rfl:     omeprazole (PriLOSEC) 20 mg delayed release capsule, Take one capsule twice per day , Disp: 180 capsule, Rfl: 1    ONE DAILY MULTIPLE VITAMIN PO, Take by mouth , Disp: , Rfl:     tacrolimus (PROGRAF) 1 mg capsule, Take 3 capsules (3 mg total) by mouth 2 (two) times a day, Disp: 540 capsule, Rfl: 0    Current Facility-Administered Medications:     bupivacaine (MARCAINE) 0 25 % injection 5 mL, 5 mL, Injection, Once, Ciara Rogers MD    methylPREDNISolone acetate (DEPO-MEDROL) injection 40 mg, 40 mg, Intramuscular, Once, Ciara Rogers MD    methylPREDNISolone acetate (DEPO-MEDROL) injection 40 mg, 40 mg, Intramuscular, Once, Ciara Rogers MD    Allergies   Allergen Reactions    Macrolides And Ketolides      Annotation - [id number]: The patient is on Antirejection medications and they will reduce the effective ness of the medications  Physical Exam:   Vitals:    08/07/18 0858   BP: 103/69   Pulse: 74   Resp: 20   Temp: 98 °F (36 7 °C)   SpO2: 99%     General: Awake, Alert, Oriented x 3, Mood and affect appropriate  Respiratory: Respirations even and unlabored  Cardiovascular: Peripheral pulses intact; no edema  Musculoskeletal Exam:  Right lower back tenderness    ASA Score: 2    Patient/Chart Verification  Patient ID Verified: Verbal  Consents Confirmed: Procedural, To be obtained in the Pre-Procedure area  H&P( within 30 days) Verified: To be obtained in the Pre-Procedure area  Allergies Reviewed: Yes  Anticoag/NSAID held?: No  Currently on antibiotics?: No    Assessment:   1   Intervertebral disc disorders with radiculopathy, lumbar region        Plan: Right L4-L5 TFESI

## 2018-08-14 ENCOUNTER — TELEPHONE (OUTPATIENT)
Dept: RADIOLOGY | Facility: CLINIC | Age: 63
End: 2018-08-14

## 2018-08-14 ENCOUNTER — TELEPHONE (OUTPATIENT)
Dept: INTERNAL MEDICINE CLINIC | Facility: CLINIC | Age: 63
End: 2018-08-14

## 2018-08-14 NOTE — TELEPHONE ENCOUNTER
1st attempt  Left voicemail message  Asked patient to call back with update on his pain  Call back number was left

## 2018-08-30 DIAGNOSIS — F41.9 ANXIETY: ICD-10-CM

## 2018-08-30 RX ORDER — ALPRAZOLAM 0.25 MG/1
0.25 TABLET ORAL AS NEEDED
Qty: 30 TABLET | Refills: 1 | Status: SHIPPED | OUTPATIENT
Start: 2018-08-30 | End: 2018-11-10 | Stop reason: SDUPTHER

## 2018-09-19 ENCOUNTER — OFFICE VISIT (OUTPATIENT)
Dept: INTERNAL MEDICINE CLINIC | Facility: CLINIC | Age: 63
End: 2018-09-19
Payer: MEDICARE

## 2018-09-19 VITALS
SYSTOLIC BLOOD PRESSURE: 112 MMHG | HEIGHT: 72 IN | OXYGEN SATURATION: 99 % | TEMPERATURE: 98.2 F | HEART RATE: 84 BPM | BODY MASS INDEX: 27.14 KG/M2 | WEIGHT: 200.4 LBS | DIASTOLIC BLOOD PRESSURE: 78 MMHG

## 2018-09-19 DIAGNOSIS — Z23 NEEDS FLU SHOT: ICD-10-CM

## 2018-09-19 DIAGNOSIS — Z23 NEED FOR SHINGLES VACCINE: ICD-10-CM

## 2018-09-19 DIAGNOSIS — Z00.00 ENCOUNTER FOR MEDICARE ANNUAL WELLNESS EXAM: Primary | ICD-10-CM

## 2018-09-19 DIAGNOSIS — Z23 NEED FOR 23-POLYVALENT PNEUMOCOCCAL POLYSACCHARIDE VACCINE: ICD-10-CM

## 2018-09-19 DIAGNOSIS — E78.2 MIXED HYPERLIPIDEMIA: ICD-10-CM

## 2018-09-19 PROCEDURE — G0009 ADMIN PNEUMOCOCCAL VACCINE: HCPCS

## 2018-09-19 PROCEDURE — G0008 ADMIN INFLUENZA VIRUS VAC: HCPCS

## 2018-09-19 PROCEDURE — G0402 INITIAL PREVENTIVE EXAM: HCPCS | Performed by: INTERNAL MEDICINE

## 2018-09-19 PROCEDURE — 90682 RIV4 VACC RECOMBINANT DNA IM: CPT

## 2018-09-19 PROCEDURE — 90732 PPSV23 VACC 2 YRS+ SUBQ/IM: CPT

## 2018-09-19 NOTE — PROGRESS NOTES
Assessment and Plan:    Problem List Items Addressed This Visit     Mixed hyperlipidemia     Continue to watch diet for this, will check lipid panel         Relevant Orders    Lipid Panel with Direct LDL reflex    TSH, 3rd generation with Free T4 reflex      Other Visit Diagnoses     Encounter for Medicare annual wellness exam    -  Primary     discussed preventative health, cancer screening, immunizations, and safety issues  Needs flu shot        Relevant Orders    influenza vaccine, [id number], quadrivalent, 0 5 mL, preservative-free (SYRINGE, SINGLE-DOSE VIAL), for adult and pediatric patients 3 yr+ (AFLURIA, FLUARIX, FLULAVAL, FLUZONE)    Need for 23-polyvalent pneumococcal polysaccharide vaccine        Relevant Orders    PNEUMOCOCCAL POLYSACCHARIDE VACCINE 23-VALENT =>3YO SQ IM    Need for shingles vaccine        Relevant Medications    Zoster Vac Recomb Adjuvanted ([address]) [facility] Maintenance Due   Topic Date Due    Pneumococcal PPSV23 Highest Risk Adult (2 of 3 - PCV13) 04/29/2014    INFLUENZA VACCINE  09/01/2018         HPI:  Pilar Moore is a 66 y o  male here for his Initial Wellness Visit      Patient Active Problem List   Diagnosis    Alcohol dependence in remission ([facility] [address] [facility]    Bursitis of both hips    Chronic gastroesophageal reflux disease    Essential hypertension    Intervertebral disc disorder with radiculopathy of lumbar region    Liver replaced by transplant ([facility] [address] )    Sacroiliitis ([facility] [address] [facility]    Seasonal allergies    Lung mass    Neck pain    Nonalcoholic liver disease, chronic    Abnormal electrocardiogram    Osteopenia    PPD negative    Right arm pain    Ringing in ears, bilateral    Short-term memory loss    Tobacco abuse    Mixed hyperlipidemia     Past Medical History:   Diagnosis Date    Alcoholism ([address]     Bacterial peritonitis ([address] )     Bowel disease     Cancer ([address]     liver    Cataract     Depression     Fracture     Gastroesophageal reflux  Hepatic disease     Hepatocellular carcinoma (HCC)     History of colon polyps     Hypertension     Liver cancer ([address]     Liver disease     Stomach disease      Past Surgical History:   Procedure Laterality Date    APPENDECTOMY      CATARACT EXTRACTION      EXPLORATORY LAPAROTOMY  09/2011    EYE SURGERY      LIVER TRANSPLANTATION  08/20/2013    PARACENTESIS      Abdominal Paracentesis(Therapeutic) with Imaging Guidance    NM COLONOSCOPY FLX DX W/COLLJ SPEC WHEN PFRMD N/A 9/12/2016    Procedure: COLONOSCOPY;  Surgeon: Jelani Blanco MD;  Location: [facility]; Service: General    ROTATOR CUFF REPAIR      x2     Family History   Problem Relation Age of Onset    Coronary artery disease Mother         CABG    Prostate cancer Mother         Prostate Gland    Heart disease Mother         Cardiac Disorder    Vascular Disease Mother     Hypertension Mother         Benign    Diabetes Maternal Grandmother         Mellitus    Clotting disorder Maternal Grandfather         Embolism    Seizures Paternal Grandmother         Epilepsy    Other Paternal Grandfather         Accident    Liver cancer Family     Heart disease Family         Cardiac Disorder    Hypertension Family         Benign     History   Smoking Status    Current Every Day Smoker   Smokeless Tobacco    Not on file     History   Alcohol Use No      History   Drug Use No     Comment: Per [employer] use way back in college over 40 yrs ago       Current Outpatient Prescriptions   Medication Sig Dispense Refill    acetaminophen (TYLENOL) 325 mg tablet Take 650 mg by mouth every 6 (six) hours as needed for mild pain   ALPRAZolam (XANAX) 0 25 mg tablet Take 1 tablet (0 25 mg total) by mouth as needed for anxiety 30 tablet 1    docusate sodium (COLACE) 100 mg capsule Take 100 mg by mouth 2 (two) times a day        gabapentin (NEURONTIN) 300 mg capsule Take 1 capsule (300 mg total) by mouth 2 (two) times a day 180 capsule 0    lisinopril (ZESTRIL) 20 mg tablet Take 20 mg by mouth daily      magnesium oxide (MAG-OX) 400 mg Take 400 mg by mouth 2 (two) times a day   omeprazole (PriLOSEC) 20 mg delayed release capsule Take one capsule twice per day  180 capsule 1    ONE DAILY MULTIPLE VITAMIN PO Take by mouth   tacrolimus (PROGRAF) 1 mg capsule Take 3 capsules (3 mg total) by mouth 2 (two) times a day 540 capsule 0    Zoster Vac Recomb Adjuvanted (SHINGRIX) 50 MCG SUSR Inject 50 mcg into a muscle once for 1 dose Repeat one dose in 2 to 6 months 1 each 0     Current Facility-Administered Medications   Medication Dose Route Frequency Provider Last Rate Last Dose    bupivacaine (MARCAINE) 0 25 % injection 5 mL  5 mL Injection Once Dario Hamman, MD        methylPREDNISolone acetate (DEPO-MEDROL) injection 40 mg  40 mg Intramuscular Once Dario Hamman, MD        methylPREDNISolone acetate (DEPO-MEDROL) injection 40 mg  40 mg Intramuscular Once Dario Hamman, MD         Allergies   Allergen Reactions    Macrolides And Ketolides      [facility] - [id number]: The patient is on Antirejection medications and they will reduce the effective ness of the medications  Immunization History   Administered Date(s) Administered    Influenza 10/14/2014, 11/01/2016, 11/20/2017    Influenza Quadrivalent, 6-35 Months IM 11/01/2016    Influenza TIV (IM) 10/06/2010, 10/26/2011, 11/02/2012, 10/14/2014, 10/30/2015, 11/04/2016    Meningococcal MCV4P 11/20/2017    Pneumococcal Polysaccharide PPV23 04/29/2013    Tdap 10/08/2017       Patient Care Team:  Leny Ramirez MD as PCP - Alisa Mulders, MD Wendee Rattan, MD Dario Hamman, MD Gregor Alexandria, MD    Medicare Screening Tests and Risk Assessments:  Deb Barajas is here for his Initial Wellness and Subsequent Wellness visit  Health Risk Assessment:  Patient rates overall health as very good  Patient feels that their physical health rating is Same  Eyesight was rated as Same   Hearing was rated as Same  Patient feels that their emotional and mental health rating is Same  Pain experienced by patient in the last 7 days has been None  Patient states that he has experienced no weight loss or gain in last 6 months  Emotional/Mental Health:  Patient has been feeling nervous/anxious  PHQ-9 Depression Screening:    Frequency of the following problems over the past two weeks:      1  Little interest or pleasure in doing things: 0 - not at all      2  Feeling down, depressed, or hopeless: 0 - not at all  PHQ-2 Score: 0          Broken Bones/Falls: Fall Risk Assessment:    In the past year, patient has experienced: No history of falling in past year          Bladder/Bowel:  Patient has not leaked urine accidently in the last six months  Patient reports no loss of bowel control  Immunizations:  Patient has not had a flu vaccination within the last year  Patient has received a pneumonia shot  Patient has not received a shingles shot  Patient has received tetanus/diphtheria shot  Date of tetanus/diphtheria shot: 10/8/2017    Home Safety:  Patient does not have trouble with stairs inside or outside of their home  Patient currently reports that there are no safety hazards present in home, working smoke alarms, no working carbon monoxide detectors  Preventative Screenings:   prostate cancer screen performed, colon cancer screen completed, 9/12/2016  cholesterol screen completed, glaucoma eye exam completed,     Nutrition:  Current diet: Regular with servings of the following:    Medications:  Patient is currently taking over-the-counter supplements  List of OTC medications includes: vitamins  Patient is able to manage medications  Lifestyle Choices:  Patient reports current tobacco use  Patient reports no alcohol use  Patient drives a vehicle  Patient wears seat belt  Current level of exercise of physical activity described by patient as: walking          Activities of Daily Living:  Can get out of bed by his or her self, able to dress self, able to make own meals, able to do own shopping, able to bathe self, can do own laundry/housekeeping, can manage own money, pay bills and track expenses    Previous Hospitalizations:  No hospitalization or ED visit in past 12 months        Advanced Directives:  Patient has decided on a power of   Patient has spoken to designated power of   Patient has completed advanced directive  Preventative Screening/Counseling:      Cardiovascular:      General: Risks and Benefits Discussed and Screening Current          Diabetes:      General: Risks and Benefits Discussed and Screening Current          Colorectal Cancer:      General: Risks and Benefits Discussed and Screening Current          Prostate Cancer:      General: Screening Current and Risks and Benefits Discussed          Immunizations:      Influenza: Risks & Benefits Discussed, Influenza Recommended Annually and Influenza Due Today      Pneumococcal: Risks & Benefits Discussed      Shingrix: Risks & Benefits Discussed      TDAP: Risks & Benefits Discussed and Tdap Vaccine UTD        Physical Exam   Constitutional: He is oriented to person, place, and time  He appears well-developed and well-nourished  No distress  HENT:   Head: Normocephalic and atraumatic  Right Ear: External ear normal    Left Ear: External ear normal    Nose: Nose normal    Eyes: Conjunctivae are normal  No scleral icterus  Neck: Normal range of motion  Neck supple  No tracheal deviation present  Cardiovascular: Normal rate, regular rhythm and normal heart sounds  No murmur heard  Pulmonary/Chest: Effort normal and breath sounds normal  No respiratory distress  He has no wheezes  He has no rales  Abdominal: Soft  Bowel sounds are normal  He exhibits no mass  There is no tenderness  There is no guarding  Musculoskeletal: He exhibits no edema     Lymphadenopathy:     He has no cervical adenopathy  Neurological: He is alert and oriented to person, place, and time  Psychiatric: He has a normal mood and affect  His behavior is normal  Judgment and thought content normal    Vitals reviewed

## 2018-09-19 NOTE — PATIENT INSTRUCTIONS
Problem List Items Addressed This Visit     Mixed hyperlipidemia     Continue to watch diet for this, will check lipid panel         Relevant Orders    Lipid Panel with Direct LDL reflex    TSH, 3rd generation with Free T4 reflex      Other Visit Diagnoses     Encounter for Medicare annual wellness exam    -  Primary     discussed preventative health, cancer screening, immunizations, and safety issues      Needs flu shot        Relevant Orders    influenza vaccine, 9680-4380, quadrivalent, 0 5 mL, preservative-free (SYRINGE, SINGLE-DOSE VIAL), for adult and pediatric patients 3 yr+ (AFLURIA, FLUARIX, FLULAVAL, FLUZONE)    Need for 23-polyvalent pneumococcal polysaccharide vaccine        Relevant Orders    PNEUMOCOCCAL POLYSACCHARIDE VACCINE 23-VALENT =>1YO SQ IM    Need for shingles vaccine        Relevant Medications    Zoster Vac Recomb Adjuvanted ([address]) 50 MCG SUSR

## 2018-11-10 DIAGNOSIS — F41.9 ANXIETY: ICD-10-CM

## 2018-11-10 RX ORDER — ALPRAZOLAM 0.25 MG/1
TABLET ORAL
Qty: 30 TABLET | Refills: 1 | Status: SHIPPED | OUTPATIENT
Start: 2018-11-10 | End: 2019-01-26 | Stop reason: SDUPTHER

## 2018-11-16 DIAGNOSIS — M54.16 LUMBAR RADICULOPATHY: ICD-10-CM

## 2018-11-17 ENCOUNTER — TRANSCRIBE ORDERS (OUTPATIENT)
Dept: ADMINISTRATIVE | Age: 63
End: 2018-11-17

## 2018-11-17 ENCOUNTER — APPOINTMENT (OUTPATIENT)
Dept: LAB | Age: 63
End: 2018-11-17
Payer: MEDICARE

## 2018-11-17 DIAGNOSIS — E78.2 MIXED HYPERLIPIDEMIA: ICD-10-CM

## 2018-11-17 DIAGNOSIS — C22.1 MALIGNANT NEOPLASM OF INTRAHEPATIC BILE DUCTS (HCC): ICD-10-CM

## 2018-11-17 DIAGNOSIS — D68.9 BLOOD CLOTTING DISORDER (HCC): ICD-10-CM

## 2018-11-17 DIAGNOSIS — Z94.4 LIVER REPLACED BY TRANSPLANT (HCC): ICD-10-CM

## 2018-11-17 DIAGNOSIS — Z94.4 LIVER REPLACED BY TRANSPLANT (HCC): Primary | ICD-10-CM

## 2018-11-17 LAB
ALBUMIN SERPL BCP-MCNC: 3.8 G/DL (ref 3.5–5)
ALP SERPL-CCNC: 50 U/L (ref 46–116)
ALT SERPL W P-5'-P-CCNC: 19 U/L (ref 12–78)
ANION GAP SERPL CALCULATED.3IONS-SCNC: 3 MMOL/L (ref 4–13)
AST SERPL W P-5'-P-CCNC: 15 U/L (ref 5–45)
BASOPHILS # BLD AUTO: 0.04 THOUSANDS/ΜL (ref 0–0.1)
BASOPHILS NFR BLD AUTO: 0 % (ref 0–1)
BILIRUB SERPL-MCNC: 0.48 MG/DL (ref 0.2–1)
BUN SERPL-MCNC: 14 MG/DL (ref 5–25)
CALCIUM SERPL-MCNC: 9.2 MG/DL (ref 8.3–10.1)
CHLORIDE SERPL-SCNC: 105 MMOL/L (ref 100–108)
CHOLEST SERPL-MCNC: 249 MG/DL (ref 50–200)
CO2 SERPL-SCNC: 30 MMOL/L (ref 21–32)
CREAT SERPL-MCNC: 1.11 MG/DL (ref 0.6–1.3)
EOSINOPHIL # BLD AUTO: 0.27 THOUSAND/ΜL (ref 0–0.61)
EOSINOPHIL NFR BLD AUTO: 3 % (ref 0–6)
ERYTHROCYTE [DISTWIDTH] IN BLOOD BY AUTOMATED COUNT: 12.1 % (ref 11.6–15.1)
GFR SERPL CREATININE-BSD FRML MDRD: 71 ML/MIN/1.73SQ M
GGT SERPL-CCNC: 18 U/L (ref 5–85)
GLUCOSE P FAST SERPL-MCNC: 100 MG/DL (ref 65–99)
HCT VFR BLD AUTO: 46 % (ref 36.5–49.3)
HDLC SERPL-MCNC: 61 MG/DL (ref 40–60)
HGB BLD-MCNC: 15.4 G/DL (ref 12–17)
IMM GRANULOCYTES # BLD AUTO: 0.02 THOUSAND/UL (ref 0–0.2)
IMM GRANULOCYTES NFR BLD AUTO: 0 % (ref 0–2)
INR PPP: 1.14 (ref 0.86–1.17)
LDLC SERPL CALC-MCNC: 155 MG/DL (ref 0–100)
LYMPHOCYTES # BLD AUTO: 1.49 THOUSANDS/ΜL (ref 0.6–4.47)
LYMPHOCYTES NFR BLD AUTO: 14 % (ref 14–44)
MAGNESIUM SERPL-MCNC: 1.8 MG/DL (ref 1.6–2.6)
MCH RBC QN AUTO: 33.1 PG (ref 26.8–34.3)
MCHC RBC AUTO-ENTMCNC: 33.5 G/DL (ref 31.4–37.4)
MCV RBC AUTO: 99 FL (ref 82–98)
MONOCYTES # BLD AUTO: 0.53 THOUSAND/ΜL (ref 0.17–1.22)
MONOCYTES NFR BLD AUTO: 5 % (ref 4–12)
NEUTROPHILS # BLD AUTO: 8.48 THOUSANDS/ΜL (ref 1.85–7.62)
NEUTS SEG NFR BLD AUTO: 78 % (ref 43–75)
NRBC BLD AUTO-RTO: 0 /100 WBCS
PLATELET # BLD AUTO: 249 THOUSANDS/UL (ref 149–390)
PMV BLD AUTO: 11.7 FL (ref 8.9–12.7)
POTASSIUM SERPL-SCNC: 4.8 MMOL/L (ref 3.5–5.3)
PROT SERPL-MCNC: 7.1 G/DL (ref 6.4–8.2)
PROTHROMBIN TIME: 14.7 SECONDS (ref 11.8–14.2)
RBC # BLD AUTO: 4.65 MILLION/UL (ref 3.88–5.62)
SODIUM SERPL-SCNC: 138 MMOL/L (ref 136–145)
TACROLIMUS BLD-MCNC: 5.3 NG/ML (ref 2–20)
TRIGL SERPL-MCNC: 164 MG/DL
TSH SERPL DL<=0.05 MIU/L-ACNC: 1 UIU/ML (ref 0.36–3.74)
WBC # BLD AUTO: 10.83 THOUSAND/UL (ref 4.31–10.16)

## 2018-11-17 PROCEDURE — 85610 PROTHROMBIN TIME: CPT

## 2018-11-17 PROCEDURE — 84443 ASSAY THYROID STIM HORMONE: CPT

## 2018-11-17 PROCEDURE — 83735 ASSAY OF MAGNESIUM: CPT

## 2018-11-17 PROCEDURE — 82977 ASSAY OF GGT: CPT

## 2018-11-17 PROCEDURE — 80197 ASSAY OF TACROLIMUS: CPT

## 2018-11-17 PROCEDURE — 80053 COMPREHEN METABOLIC PANEL: CPT

## 2018-11-17 PROCEDURE — 80061 LIPID PANEL: CPT

## 2018-11-17 PROCEDURE — 36415 COLL VENOUS BLD VENIPUNCTURE: CPT

## 2018-11-17 PROCEDURE — 85025 COMPLETE CBC W/AUTO DIFF WBC: CPT

## 2018-11-18 RX ORDER — GABAPENTIN 300 MG/1
CAPSULE ORAL
Qty: 180 CAPSULE | Refills: 0 | OUTPATIENT
Start: 2018-11-18

## 2018-11-19 NOTE — TELEPHONE ENCOUNTER
Please inform the patient that we do not accept refill requests from pharmacies and the patient must contact the office for refills  Please ask him if he is still taking this medication and if he needs any refills?

## 2018-11-20 ENCOUNTER — TRANSCRIBE ORDERS (OUTPATIENT)
Dept: ADMINISTRATIVE | Facility: HOSPITAL | Age: 63
End: 2018-11-20

## 2018-11-20 DIAGNOSIS — Z94.4 HX OF LIVER TRANSPLANT (HCC): Primary | ICD-10-CM

## 2018-11-20 NOTE — TELEPHONE ENCOUNTER
Pt returned call stating that he is still taking the Gabapentin 300 mg  and he does need a refill  Pt takes 1 capsule, 2x daily  He has enough left for about a week  Pt uses [facility] pharmacy on [address] in [place]  Pt made aware that he will need to contact the office for all future refill requests  Pt can be reached at [phone] with any questions

## 2018-11-21 DIAGNOSIS — M54.16 LUMBAR RADICULOPATHY: ICD-10-CM

## 2018-11-21 RX ORDER — GABAPENTIN 300 MG/1
300 CAPSULE ORAL 2 TIMES DAILY
Qty: 180 CAPSULE | Refills: 0 | Status: SHIPPED | OUTPATIENT
Start: 2018-11-21 | End: 2019-02-19 | Stop reason: SDUPTHER

## 2018-11-22 NOTE — TELEPHONE ENCOUNTER
Refill for gabapentin 300 mg 1 capsule 2 times daily was sent to the patient's pharmacy, 90 day supply, 180 tablets

## 2018-12-04 DIAGNOSIS — I10 ESSENTIAL HYPERTENSION: Primary | ICD-10-CM

## 2018-12-04 RX ORDER — LISINOPRIL 20 MG/1
TABLET ORAL
Qty: 90 TABLET | Refills: 0 | Status: SHIPPED | OUTPATIENT
Start: 2018-12-04 | End: 2019-02-28 | Stop reason: SDUPTHER

## 2018-12-11 ENCOUNTER — HOSPITAL ENCOUNTER (OUTPATIENT)
Dept: RADIOLOGY | Facility: HOSPITAL | Age: 63
Discharge: HOME | End: 2018-12-11
Payer: MEDICARE

## 2018-12-11 DIAGNOSIS — Z94.4 HX OF LIVER TRANSPLANT (HCC): ICD-10-CM

## 2018-12-11 PROCEDURE — A9585 GADOBUTROL INJECTION: HCPCS | Performed by: RADIOLOGY

## 2018-12-11 PROCEDURE — 71250 CT THORAX DX C-: CPT

## 2018-12-11 PROCEDURE — 74183 MRI ABD W/O CNTR FLWD CNTR: CPT

## 2018-12-11 RX ADMIN — GADOBUTROL 9 ML: 604.72 INJECTION INTRAVENOUS at 14:42

## 2019-01-07 DIAGNOSIS — Z94.4 LIVER TRANSPLANT RECIPIENT (HCC): ICD-10-CM

## 2019-01-07 RX ORDER — TACROLIMUS 1 MG/1
3 CAPSULE ORAL 2 TIMES DAILY
Qty: 540 CAPSULE | Refills: 3 | Status: SHIPPED | OUTPATIENT
Start: 2019-01-07 | End: 2020-01-15

## 2019-01-12 DIAGNOSIS — K21.9 CHRONIC GASTROESOPHAGEAL REFLUX DISEASE: ICD-10-CM

## 2019-01-13 RX ORDER — OMEPRAZOLE 20 MG/1
CAPSULE, DELAYED RELEASE ORAL
Qty: 180 CAPSULE | Refills: 3 | Status: SHIPPED | OUTPATIENT
Start: 2019-01-13 | End: 2019-12-31

## 2019-01-26 DIAGNOSIS — F41.9 ANXIETY: ICD-10-CM

## 2019-01-28 RX ORDER — ALPRAZOLAM 0.25 MG/1
TABLET ORAL
Qty: 30 TABLET | Refills: 1 | Status: SHIPPED | OUTPATIENT
Start: 2019-01-28 | End: 2019-03-25 | Stop reason: SDUPTHER

## 2019-02-19 DIAGNOSIS — M54.16 LUMBAR RADICULOPATHY: ICD-10-CM

## 2019-02-19 RX ORDER — GABAPENTIN 300 MG/1
300 CAPSULE ORAL 2 TIMES DAILY
Qty: 60 CAPSULE | Refills: 0 | Status: SHIPPED | OUTPATIENT
Start: 2019-02-19 | End: 2019-03-26 | Stop reason: SDUPTHER

## 2019-02-19 RX ORDER — GABAPENTIN 300 MG/1
CAPSULE ORAL
Qty: 180 CAPSULE | Refills: 0 | OUTPATIENT
Start: 2019-02-19

## 2019-02-19 NOTE — TELEPHONE ENCOUNTER
S/w pt  States he does need a refill at this time to [facility] on file  Pt last seen 8/7 for procedure

## 2019-02-19 NOTE — TELEPHONE ENCOUNTER
I will send in one additional one month prescription  However, he will have to make an office visit for any further refills  Please have him make an appointment

## 2019-02-19 NOTE — TELEPHONE ENCOUNTER
Please let the patient know that we do not accept refill requests from pharmacies  Please confirm that he is still taking this medication and if he needs refills

## 2019-02-20 NOTE — TELEPHONE ENCOUNTER
Pt informed a 1 month script for the gabapentin was sent to his pharmacy  Pt said he has 1 wk worth left still  Pt given ov for 3/26/19 at 9:45 w/ HA for further RF

## 2019-02-28 DIAGNOSIS — I10 ESSENTIAL HYPERTENSION: ICD-10-CM

## 2019-02-28 RX ORDER — LISINOPRIL 20 MG/1
TABLET ORAL
Qty: 90 TABLET | Refills: 0 | Status: SHIPPED | OUTPATIENT
Start: 2019-02-28 | End: 2019-05-27 | Stop reason: SDUPTHER

## 2019-03-25 ENCOUNTER — OFFICE VISIT (OUTPATIENT)
Dept: INTERNAL MEDICINE CLINIC | Facility: CLINIC | Age: 64
End: 2019-03-25
Payer: MEDICARE

## 2019-03-25 VITALS
DIASTOLIC BLOOD PRESSURE: 72 MMHG | HEART RATE: 80 BPM | SYSTOLIC BLOOD PRESSURE: 128 MMHG | WEIGHT: 213 LBS | BODY MASS INDEX: 28.85 KG/M2 | HEIGHT: 72 IN | OXYGEN SATURATION: 97 %

## 2019-03-25 DIAGNOSIS — Z94.4 LIVER REPLACED BY TRANSPLANT (HCC): ICD-10-CM

## 2019-03-25 DIAGNOSIS — K21.9 CHRONIC GASTROESOPHAGEAL REFLUX DISEASE: ICD-10-CM

## 2019-03-25 DIAGNOSIS — F51.01 PRIMARY INSOMNIA: ICD-10-CM

## 2019-03-25 DIAGNOSIS — E78.2 MIXED HYPERLIPIDEMIA: ICD-10-CM

## 2019-03-25 DIAGNOSIS — I10 ESSENTIAL HYPERTENSION: Primary | ICD-10-CM

## 2019-03-25 DIAGNOSIS — Z72.0 TOBACCO ABUSE: ICD-10-CM

## 2019-03-25 DIAGNOSIS — F41.9 ANXIETY: ICD-10-CM

## 2019-03-25 PROCEDURE — 99214 OFFICE O/P EST MOD 30 MIN: CPT | Performed by: INTERNAL MEDICINE

## 2019-03-25 RX ORDER — VARENICLINE TARTRATE 25 MG
KIT ORAL
Qty: 53 TABLET | Refills: 0 | Status: SHIPPED | OUTPATIENT
Start: 2019-03-25 | End: 2019-07-26

## 2019-03-25 RX ORDER — ALPRAZOLAM 0.25 MG/1
0.25 TABLET ORAL DAILY PRN
Qty: 30 TABLET | Refills: 1 | Status: SHIPPED | OUTPATIENT
Start: 2019-03-25 | End: 2019-06-16 | Stop reason: SDUPTHER

## 2019-03-25 NOTE — PATIENT INSTRUCTIONS
Problem List Items Addressed This Visit        Digestive    Chronic gastroesophageal reflux disease     Continue PPI twice daily, pt had symptoms on once daily dosing  Liver replaced by transplant [facility])     Follow up with transplant team             Cardiovascular and Mediastinum    Essential hypertension - Primary     Controlled, continue current med            Other    Tobacco abuse     Discussed with patient use of Chantix, cautioned about possible adverse side effect of depression, and if this were to occur, then discontinue medication           Relevant Medications    varenicline (CHANTIX JOSH) 0 5 MG X 11 & 1 MG X 42 tablet    Mixed hyperlipidemia     Continue healthy diet and exercise         Primary insomnia      Other Visit Diagnoses     Anxiety        Relevant Medications    ALPRAZolam (XANAX) 0 25 mg tablet

## 2019-03-25 NOTE — ASSESSMENT & PLAN NOTE
Discussed with patient use of Chantix, cautioned about possible adverse side effect of depression, and if this were to occur, then discontinue medication

## 2019-03-25 NOTE — PROGRESS NOTES
Assessment/Plan:    Tobacco abuse  Discussed with patient use of Chantix, cautioned about possible adverse side effect of depression, and if this were to occur, then discontinue medication  Chronic gastroesophageal reflux disease  Continue PPI twice daily, pt had symptoms on once daily dosing  Essential hypertension  Controlled, continue current med    Mixed hyperlipidemia  Continue healthy diet and exercise    Liver replaced by transplant [facility])  Follow up with transplant team        Diagnoses and all orders for this visit:    Essential hypertension    Tobacco abuse  -     varenicline (CHANTIX JOSH) 0 5 MG X 11 & 1 MG X 42 tablet; Take one 0 5mg tab by mouth 1x daily for 3 days, then increase to one 0 5mg tab 2x daily for 3 days, then increase to one 1mg tab 2x daily    Chronic gastroesophageal reflux disease    Mixed hyperlipidemia    Liver replaced by transplant ([facility] [address] )    Anxiety  -     ALPRAZolam (XANAX) 0 25 mg tablet; Take 1 tablet (0 25 mg total) by mouth daily as needed for anxiety    Primary insomnia    Other orders  -     Cancel: Hepatitis C antibody; Future  -     Cancel: HEPATITIS B VACCINE ADULT IM          Subjective:      Patient ID: Shoshana Amato is a 66 y o  male  Tobacco abuse: pt wants to quit smoking  Pt wants to try Chantix  HTN: pt tolerating lisinopril, no cardiopulm complaints  GERD: no GERD problems, no problems with food getting stuck  Pt takes PPI twice daily  History of liver transplant: pt follows with transplant team       The following portions of the patient's history were reviewed and updated as appropriate: allergies, current medications, past family history, past medical history, past social history, past surgical history and problem list     Review of Systems   Constitutional: Negative for chills, fatigue and fever  HENT: Negative for congestion, nosebleeds, postnasal drip, sore throat and trouble swallowing  Eyes: Negative for pain     Respiratory: Negative for cough, chest tightness, shortness of breath and wheezing  Cardiovascular: Negative for chest pain, palpitations and leg swelling  Gastrointestinal: Positive for constipation (mild)  Negative for abdominal pain, diarrhea, nausea and vomiting  Endocrine: Negative for polydipsia and polyuria  Genitourinary: Negative for dysuria, flank pain and hematuria  Musculoskeletal: Positive for arthralgias and back pain  Skin: Negative for rash  Neurological: Negative for dizziness, tremors and headaches  Hematological: Does not bruise/bleed easily  Psychiatric/Behavioral: Negative for confusion and dysphoric mood  The patient is not nervous/anxious  Objective:      /72   Pulse 80   Ht 5' 11 9" (1 826 m)   Wt 96 6 kg (213 lb)   SpO2 97%   BMI 28 97 kg/m²          Physical Exam   Constitutional: He is oriented to person, place, and time  He appears well-developed and well-nourished  No distress  HENT:   Head: Normocephalic and atraumatic  Right Ear: External ear normal    Left Ear: External ear normal    Eyes: Conjunctivae are normal  No scleral icterus  Neck: Normal range of motion  Neck supple  No tracheal deviation present  No thyromegaly present  Cardiovascular: Normal rate, regular rhythm and normal heart sounds  No murmur heard  Pulmonary/Chest: Effort normal and breath sounds normal  No respiratory distress  He has no wheezes  He has no rales  Abdominal: Soft  Bowel sounds are normal  There is no tenderness  There is no rebound and no guarding  Musculoskeletal: He exhibits no edema  Lymphadenopathy:     He has no cervical adenopathy  Neurological: He is alert and oriented to person, place, and time  Psychiatric: He has a normal mood and affect  His behavior is normal  Judgment and thought content normal    Vitals reviewed

## 2019-03-26 ENCOUNTER — OFFICE VISIT (OUTPATIENT)
Dept: PAIN MEDICINE | Facility: CLINIC | Age: 64
End: 2019-03-26
Payer: MEDICARE

## 2019-03-26 VITALS
WEIGHT: 215 LBS | RESPIRATION RATE: 18 BRPM | HEART RATE: 76 BPM | BODY MASS INDEX: 29.12 KG/M2 | DIASTOLIC BLOOD PRESSURE: 86 MMHG | HEIGHT: 72 IN | SYSTOLIC BLOOD PRESSURE: 124 MMHG

## 2019-03-26 DIAGNOSIS — M70.61 GREATER TROCHANTERIC BURSITIS OF BOTH HIPS: ICD-10-CM

## 2019-03-26 DIAGNOSIS — G89.4 CHRONIC PAIN SYNDROME: Primary | ICD-10-CM

## 2019-03-26 DIAGNOSIS — M51.16 INTERVERTEBRAL DISC DISORDERS WITH RADICULOPATHY, LUMBAR REGION: ICD-10-CM

## 2019-03-26 DIAGNOSIS — M70.62 GREATER TROCHANTERIC BURSITIS OF BOTH HIPS: ICD-10-CM

## 2019-03-26 DIAGNOSIS — M54.16 LUMBAR RADICULOPATHY: ICD-10-CM

## 2019-03-26 PROCEDURE — 99214 OFFICE O/P EST MOD 30 MIN: CPT | Performed by: NURSE PRACTITIONER

## 2019-03-26 RX ORDER — GABAPENTIN 300 MG/1
CAPSULE ORAL
Qty: 60 CAPSULE | Refills: 0 | OUTPATIENT
Start: 2019-03-26

## 2019-03-26 RX ORDER — GABAPENTIN 300 MG/1
300 CAPSULE ORAL 2 TIMES DAILY
Qty: 60 CAPSULE | Refills: 5 | Status: SHIPPED | OUTPATIENT
Start: 2019-03-26 | End: 2019-10-15 | Stop reason: SDUPTHER

## 2019-03-26 NOTE — PROGRESS NOTES
Pt c/o bilateral hip pain    Assessment:  1  Chronic pain syndrome    2  Greater trochanteric bursitis of both hips    3  Lumbar radiculopathy    4  Intervertebral disc disorders with radiculopathy, lumbar region        Plan:  Shruthi Jackson is a 66 y o  male with a history of lumbar intervertebral disc disorder radiculopathy, lumbar radiculopathy and chronic pain syndrome  The patient presents today with bilateral hip pain  He was noted to have bilateral greater trochanteric bursa tenderness  He reports that his pain is symptoms are the same pain and symptoms that he experienced prior to undergoing bilateral greater trochanteric bursa injections in the past   He is requesting to proceed with bilateral greater trochanteric bursa injections at this time  He was educated on the procedures most common risks  He verbalized understanding, would like to proceed with the procedure  Therefore the patient be scheduled for an upcoming Tuesday, Thursday or Friday  The patient will continue on gabapentin 300 mg 1 tablet 2 times daily a prescription for this medication with 5 refills were sent to the patient's pharmacy on file  Complete risks and benefits including bleeding, infection, tissue reaction, nerve injury and allergic reaction were discussed  The approach was demonstrated using models and literature was provided  The patient will follow up after the completion of his bilateral greater trochanteric bursa injections, or sooner with worsening symptoms  My impressions and treatment recommendations were discussed in detail with the patient who verbalized understanding and had no further questions  Discharge instructions were provided  I personally saw and examined the patient and I agree with the above discussed plan of care      Orders Placed This Encounter   Procedures    FL spine and pain procedure     Standing Status:   Future     Standing Expiration Date:   3/26/2023     Order Specific Question: Reason for Exam:     Answer:   Bilateral greater trochanteric bursa injections     Order Specific Question:   Anticoagulant hold needed? Answer:   No     New Medications Ordered This Visit   Medications    gabapentin (NEURONTIN) 300 mg capsule     Sig: Take 1 capsule (300 mg total) by mouth 2 (two) times a day     Dispense:  60 capsule     Refill:  5       History of Present Illness:  Leena Gentile is a 66 y o  male with a history of lumbar intervertebral disc disorder radiculopathy, lumbar radiculopathy and chronic pain syndrome  The patient was last seen office on 8/7/2018 where he underwent a right L4-L5 transforaminal epidural steroid injection  The patient reports that he continues with moderate ongoing relief since this injection  He presents for a follow up office visit in regards to Back Pain and Hip Pain (bilateral)  The patients current symptoms include bilateral hip pain that radiates into his buttocks  He denies bilateral leg weakness, or bowel or bladder issues  He describes his pain as dull aching constant pain that is worsened during the evening hours  The patient reports that his pain is symptoms are unchanged since last office visit  He currently rates his pain as 7 out 10 numeric pain scale  I have personally reviewed and/or updated the patient's past medical history, past surgical history, family history, social history, current medications, allergies, and vital signs today  Review of Systems   Respiratory: Negative for shortness of breath  Cardiovascular: Negative for chest pain  Gastrointestinal: Positive for constipation  Negative for diarrhea, nausea and vomiting  Musculoskeletal: Positive for gait problem  Negative for arthralgias, joint swelling and myalgias  Joint stiffness   Skin: Negative for rash  Neurological: Negative for dizziness, seizures and weakness  All other systems reviewed and are negative        Patient Active Problem List   Diagnosis  Alcohol dependence in remission ([place] [address] )    Bursitis of both hips    Chronic gastroesophageal reflux disease    Essential hypertension    Intervertebral disc disorder with radiculopathy of lumbar region    [facility]    Sacroiliitis ([place] [address] )    Seasonal allergies    Lung mass    Neck pain    Nonalcoholic liver disease, chronic    Abnormal electrocardiogram    Osteopenia    PPD negative    Right arm pain    Ringing in ears, bilateral    Short-term memory loss    Tobacco abuse    Mixed hyperlipidemia    Primary insomnia       Past Medical History:   Diagnosis Date    Alcoholism ([place] [address] )     Bacterial peritonitis ([place] [address] )     Bowel disease     Cancer ([place] [address] )     liver    Cataract     Depression     Fracture     Gastroesophageal reflux     Hepatic disease     Hepatocellular carcinoma ([place] [address] )     History of colon polyps     Hypertension     Liver cancer ([place] [address] )     Liver disease     Stomach disease        Past Surgical History:   Procedure Laterality Date    APPENDECTOMY      CATARACT EXTRACTION      EXPLORATORY LAPAROTOMY  09/2011    EYE SURGERY      LIVER TRANSPLANTATION  08/20/2013    PARACENTESIS      Abdominal Paracentesis(Therapeutic) with Imaging Guidance    VT COLONOSCOPY FLX DX W/COLLJ SPEC WHEN PFRMD N/A 9/12/2016    Procedure: COLONOSCOPY;  Surgeon: Maria Guadalupe Burr MD;  Location: [facility];   Service: General    ROTATOR CUFF REPAIR      x2       Family History   Problem Relation Age of Onset    Coronary artery disease Mother         CABG    Prostate cancer Mother         Prostate Gland    Heart disease Mother         Cardiac Disorder    Vascular Disease Mother     Hypertension Mother         Benign    Diabetes Maternal Grandmother         Mellitus    Clotting disorder Maternal Grandfather         Embolism    Seizures Paternal Grandmother         Epilepsy    Other Paternal Grandfather         Accident    Liver cancer Family     Heart disease Family Cardiac Disorder    Hypertension Family         Benign       Social History     Occupational History    Occupation:    Tobacco Use    Smoking status: Current Every Day Smoker    Smokeless tobacco: Never Used   Substance and Sexual Activity    Alcohol use: No    Drug use: No     Comment: Per [employer] Drug use way back in college over 40 yrs ago    Sexual activity: Not on file       Current Outpatient Medications on File Prior to Visit   Medication Sig    acetaminophen (TYLENOL) 325 mg tablet Take 650 mg by mouth every 6 (six) hours as needed for mild pain   ALPRAZolam (XANAX) 0 25 mg tablet Take 1 tablet (0 25 mg total) by mouth daily as needed for anxiety    docusate sodium (COLACE) 100 mg capsule Take 100 mg by mouth 2 (two) times a day   lisinopril (ZESTRIL) 20 mg tablet TAKE 1 TABLET BY MOUTH DAILY    magnesium oxide (MAG-OX) 400 mg Take 400 mg by mouth 2 (two) times a day   omeprazole (PriLOSEC) 20 mg delayed release capsule TAKE 1 CAPSULE BY MOUTH TWICE DAILY    ONE DAILY MULTIPLE VITAMIN PO Take by mouth   tacrolimus (PROGRAF) 1 mg capsule Take 3 capsules (3 mg total) by mouth 2 (two) times a day    varenicline (CHANTIX JOSH) 0 5 MG X 11 & 1 MG X 42 tablet Take one 0 5mg tab by mouth 1x daily for 3 days, then increase to one 0 5mg tab 2x daily for 3 days, then increase to one 1mg tab 2x daily    [DISCONTINUED] gabapentin (NEURONTIN) 300 mg capsule Take 1 capsule (300 mg total) by mouth 2 (two) times a day     Current Facility-Administered Medications on File Prior to Visit   Medication    bupivacaine (MARCAINE) 0 25 % injection 5 mL    methylPREDNISolone acetate (DEPO-MEDROL) injection 40 mg    methylPREDNISolone acetate (DEPO-MEDROL) injection 40 mg       Allergies   Allergen Reactions    Macrolides And Ketolides      Annotation - [id number]: The patient is on Antirejection medications and they will reduce the effective ness of the medications         Physical Exam:    /86 (BP Location: Right arm, Patient Position: Sitting, Cuff Size: Standard)   Pulse 76   Resp 18   Ht 5' 11 9" (1 826 m)   Wt 97 5 kg (215 lb)   BMI 29 24 kg/m²     Constitutional:overweight  Eyes:anicteric  HEENT:grossly intact  Neck:supple, symmetric, trachea midline and no masses   Pulmonary:even and unlabored  Cardiovascular:No edema or pitting edema present  Skin:Normal without rashes or lesions and well hydrated  Psychiatric:Mood and affect appropriate  Neurologic:Cranial Nerves II-XII grossly intact  Musculoskeletal:normal     Lumbar Spine Exam    Appearance:  Normal lordosis  Palpation/Tenderness:  Bilateral greater trochanteric bursa tenderness  Sensory:  no sensory deficits noted  Range of Motion:  Flexion:  Minimally limited  with pain  Extension:  Minimally limited  with pain  Lateral Flexion - Left:  No limitation  without pain  Lateral Flexion - Right:  No limitation  without pain  Rotation - Left:  No limitation  without pain  Rotation - Right:  No limitation  without pain  Motor Strength:  Left hip flexion:  5/5  Right hip flexion:  5/5  Left knee extension:  5/5  Right knee extension:  5/5  Left foot dorsiflexion:  5/5  Left foot plantar flexion:  5/5  Right foot dorsiflexion:  5/5  Right foot plantar flexion:  5/5    Imaging    MRI LUMBAR SPINE WITHOUT CONTRAST     INDICATION:  Right-sided sciatica      COMPARISON:  12/17/2010     TECHNIQUE:  Sagittal T1, sagittal T2, sagittal inversion recovery, axial T1 and axial T2, coronal T2        IMAGE QUALITY:  Diagnostic     FINDINGS:     ALIGNMENT:  Normal alignment of the lumbar spine  No compression fracture  No spondylolysis or spondylolisthesis  No scoliosis  Chronic Schmorl's nodes identified within the lower thoracic and lumbar vertebral bodies, unchanged      MARROW SIGNAL:  Normal marrow signal is identified within the visualized bony structures    No discrete marrow lesion      DISTAL CORD AND CONUS:  Normal size and signal within the distal cord and conus  The conus ends at the L1 level      PARASPINAL SOFT TISSUES:  Paraspinal soft tissues are unremarkable      SACRUM:  Normal signal within the sacrum  No evidence of insufficiency or stress fracture      LOWER THORACIC DISC SPACES:  Normal disc height and signal   No disc herniation, canal stenosis or foraminal narrowing      LUMBAR DISC SPACES:          L1-L2:  Slight annular bulging without disc herniation, canal stenosis or foraminal narrowing      L2-L3:  Slight annular bulging without disc herniation, canal stenosis or foraminal narrowing      L3-L4:  Moderate annular bulging  Mild endplate hypertrophic degenerative change with moderate canal stenosis and bilateral foraminal narrowing, right greater than left  This is grossly unchanged      L4-L5:  Annular bulging and facet arthropathy with moderate canal stenosis, slightly more pronounced than the prior examination  Small broad-based right foraminal disc protrusion  Moderate left and severe right foraminal narrowing  Right foraminal   narrowing slightly more pronounced      L5-S1:  Mild annular bulging  Moderate left and mild right facet arthropathy  No disc herniation or canal stenosis  Moderately severe left and moderate right foraminal narrowing    Left foraminal narrowing is slightly more pronounced      IMPRESSION:     Progression of degenerative change compared to the prior examination with worsening canal stenosis at L4-5      Worsening severe right foraminal narrowing at L4-5 and moderately severe left foraminal narrowing at L5-S1

## 2019-04-17 DIAGNOSIS — F41.9 ANXIETY: ICD-10-CM

## 2019-04-17 RX ORDER — ALPRAZOLAM 0.25 MG/1
TABLET ORAL
Qty: 30 TABLET | Refills: 1 | Status: SHIPPED | OUTPATIENT
Start: 2019-04-17 | End: 2019-08-28 | Stop reason: SDUPTHER

## 2019-04-23 ENCOUNTER — HOSPITAL ENCOUNTER (OUTPATIENT)
Dept: RADIOLOGY | Facility: CLINIC | Age: 64
Discharge: HOME | End: 2019-04-23
Attending: ANESTHESIOLOGY | Admitting: ANESTHESIOLOGY
Payer: MEDICARE

## 2019-04-23 VITALS
RESPIRATION RATE: 20 BRPM | TEMPERATURE: 98.9 F | SYSTOLIC BLOOD PRESSURE: 112 MMHG | DIASTOLIC BLOOD PRESSURE: 76 MMHG | OXYGEN SATURATION: 99 % | HEART RATE: 73 BPM

## 2019-04-23 DIAGNOSIS — M70.61 GREATER TROCHANTERIC BURSITIS OF BOTH HIPS: ICD-10-CM

## 2019-04-23 DIAGNOSIS — M70.62 GREATER TROCHANTERIC BURSITIS OF BOTH HIPS: ICD-10-CM

## 2019-04-23 PROCEDURE — 20610 DRAIN/INJ JOINT/BURSA W/O US: CPT | Performed by: ANESTHESIOLOGY

## 2019-04-23 PROCEDURE — 77002 NEEDLE LOCALIZATION BY XRAY: CPT

## 2019-04-23 PROCEDURE — 77002 NEEDLE LOCALIZATION BY XRAY: CPT | Performed by: ANESTHESIOLOGY

## 2019-04-23 RX ORDER — BUPIVACAINE HCL/PF 2.5 MG/ML
10 VIAL (ML) INJECTION ONCE
Status: COMPLETED | OUTPATIENT
Start: 2019-04-23 | End: 2019-04-23

## 2019-04-23 RX ORDER — METHYLPREDNISOLONE ACETATE 80 MG/ML
80 INJECTION, SUSPENSION INTRA-ARTICULAR; INTRALESIONAL; INTRAMUSCULAR; PARENTERAL; SOFT TISSUE ONCE
Status: COMPLETED | OUTPATIENT
Start: 2019-04-23 | End: 2019-04-23

## 2019-04-23 RX ORDER — 0.9 % SODIUM CHLORIDE 0.9 %
10 VIAL (ML) INJECTION ONCE
Status: COMPLETED | OUTPATIENT
Start: 2019-04-23 | End: 2019-04-23

## 2019-04-23 RX ADMIN — SODIUM CHLORIDE 5 ML: 9 INJECTION, SOLUTION INTRAMUSCULAR; INTRAVENOUS; SUBCUTANEOUS at 10:36

## 2019-04-23 RX ADMIN — METHYLPREDNISOLONE ACETATE 80 MG: 80 INJECTION, SUSPENSION INTRA-ARTICULAR; INTRALESIONAL; INTRAMUSCULAR; PARENTERAL; SOFT TISSUE at 10:37

## 2019-04-23 RX ADMIN — BUPIVACAINE HYDROCHLORIDE 7 ML: 2.5 INJECTION, SOLUTION EPIDURAL; INFILTRATION; INTRACAUDAL at 10:37

## 2019-04-23 RX ADMIN — Medication 5 ML: at 10:36

## 2019-04-24 ENCOUNTER — TRANSCRIBE ORDERS (OUTPATIENT)
Dept: ADMINISTRATIVE | Age: 64
End: 2019-04-24

## 2019-04-24 ENCOUNTER — APPOINTMENT (OUTPATIENT)
Dept: RADIOLOGY | Age: 64
End: 2019-04-24
Payer: MEDICARE

## 2019-04-24 DIAGNOSIS — N52.9 IMPOTENCE OF ORGANIC ORIGIN: ICD-10-CM

## 2019-04-24 DIAGNOSIS — N21.8 CALCULUS OF OTHER LOWER URINARY TRACT LOCATION: ICD-10-CM

## 2019-04-24 DIAGNOSIS — N13.8 ENLARGED PROSTATE WITH URINARY OBSTRUCTION: ICD-10-CM

## 2019-04-24 DIAGNOSIS — N40.1 ENLARGED PROSTATE WITH URINARY OBSTRUCTION: ICD-10-CM

## 2019-04-24 PROCEDURE — 74018 RADEX ABDOMEN 1 VIEW: CPT

## 2019-04-26 ENCOUNTER — APPOINTMENT (OUTPATIENT)
Dept: LAB | Age: 64
End: 2019-04-26
Payer: MEDICARE

## 2019-04-26 ENCOUNTER — TRANSCRIBE ORDERS (OUTPATIENT)
Dept: ADMINISTRATIVE | Age: 64
End: 2019-04-26

## 2019-04-26 DIAGNOSIS — Z94.4 STATUS POST LIVER TRANSPLANT (HCC): Primary | ICD-10-CM

## 2019-04-26 DIAGNOSIS — N52.9 IMPOTENCE OF ORGANIC ORIGIN: ICD-10-CM

## 2019-04-26 DIAGNOSIS — Z94.4 STATUS POST LIVER TRANSPLANT (HCC): ICD-10-CM

## 2019-04-26 DIAGNOSIS — N40.1 ENLARGED PROSTATE WITH URINARY OBSTRUCTION: ICD-10-CM

## 2019-04-26 DIAGNOSIS — N13.8 ENLARGED PROSTATE WITH URINARY OBSTRUCTION: ICD-10-CM

## 2019-04-26 DIAGNOSIS — N21.8 CALCULUS OF OTHER LOWER URINARY TRACT LOCATION: ICD-10-CM

## 2019-04-26 LAB
ALBUMIN SERPL BCP-MCNC: 4.6 G/DL (ref 3.5–5)
ALP SERPL-CCNC: 59 U/L (ref 46–116)
ALT SERPL W P-5'-P-CCNC: 18 U/L (ref 12–78)
ANION GAP SERPL CALCULATED.3IONS-SCNC: 8 MMOL/L (ref 4–13)
AST SERPL W P-5'-P-CCNC: 12 U/L (ref 5–45)
BASOPHILS # BLD AUTO: 0.06 THOUSANDS/ΜL (ref 0–0.1)
BASOPHILS NFR BLD AUTO: 1 % (ref 0–1)
BILIRUB SERPL-MCNC: 0.76 MG/DL (ref 0.2–1)
BUN SERPL-MCNC: 18 MG/DL (ref 5–25)
CALCIUM SERPL-MCNC: 9.8 MG/DL (ref 8.3–10.1)
CHLORIDE SERPL-SCNC: 104 MMOL/L (ref 100–108)
CO2 SERPL-SCNC: 29 MMOL/L (ref 21–32)
CREAT SERPL-MCNC: 1.11 MG/DL (ref 0.6–1.3)
EOSINOPHIL # BLD AUTO: 0.17 THOUSAND/ΜL (ref 0–0.61)
EOSINOPHIL NFR BLD AUTO: 2 % (ref 0–6)
ERYTHROCYTE [DISTWIDTH] IN BLOOD BY AUTOMATED COUNT: 12.1 % (ref 11.6–15.1)
GFR SERPL CREATININE-BSD FRML MDRD: 71 ML/MIN/1.73SQ M
GGT SERPL-CCNC: 27 U/L (ref 5–85)
GLUCOSE SERPL-MCNC: 97 MG/DL (ref 65–140)
HCT VFR BLD AUTO: 49.6 % (ref 36.5–49.3)
HGB BLD-MCNC: 16.7 G/DL (ref 12–17)
IMM GRANULOCYTES # BLD AUTO: 0.03 THOUSAND/UL (ref 0–0.2)
IMM GRANULOCYTES NFR BLD AUTO: 0 % (ref 0–2)
INR PPP: 1.12 (ref 0.86–1.17)
LYMPHOCYTES # BLD AUTO: 1.66 THOUSANDS/ΜL (ref 0.6–4.47)
LYMPHOCYTES NFR BLD AUTO: 17 % (ref 14–44)
MAGNESIUM SERPL-MCNC: 2 MG/DL (ref 1.6–2.6)
MCH RBC QN AUTO: 33.2 PG (ref 26.8–34.3)
MCHC RBC AUTO-ENTMCNC: 33.7 G/DL (ref 31.4–37.4)
MCV RBC AUTO: 99 FL (ref 82–98)
MONOCYTES # BLD AUTO: 0.6 THOUSAND/ΜL (ref 0.17–1.22)
MONOCYTES NFR BLD AUTO: 6 % (ref 4–12)
NEUTROPHILS # BLD AUTO: 7.34 THOUSANDS/ΜL (ref 1.85–7.62)
NEUTS SEG NFR BLD AUTO: 74 % (ref 43–75)
NRBC BLD AUTO-RTO: 0 /100 WBCS
PLATELET # BLD AUTO: 286 THOUSANDS/UL (ref 149–390)
PMV BLD AUTO: 11.9 FL (ref 8.9–12.7)
POTASSIUM SERPL-SCNC: 4.4 MMOL/L (ref 3.5–5.3)
PROT SERPL-MCNC: 7.8 G/DL (ref 6.4–8.2)
PROTHROMBIN TIME: 14.5 SECONDS (ref 11.8–14.2)
PSA SERPL-MCNC: 2.7 NG/ML (ref 0–4)
RBC # BLD AUTO: 5.03 MILLION/UL (ref 3.88–5.62)
SODIUM SERPL-SCNC: 141 MMOL/L (ref 136–145)
WBC # BLD AUTO: 9.86 THOUSAND/UL (ref 4.31–10.16)

## 2019-04-26 PROCEDURE — 82977 ASSAY OF GGT: CPT

## 2019-04-26 PROCEDURE — 83735 ASSAY OF MAGNESIUM: CPT

## 2019-04-26 PROCEDURE — 80053 COMPREHEN METABOLIC PANEL: CPT

## 2019-04-26 PROCEDURE — 36415 COLL VENOUS BLD VENIPUNCTURE: CPT

## 2019-04-26 PROCEDURE — 85025 COMPLETE CBC W/AUTO DIFF WBC: CPT

## 2019-04-26 PROCEDURE — 80197 ASSAY OF TACROLIMUS: CPT

## 2019-04-26 PROCEDURE — 85610 PROTHROMBIN TIME: CPT

## 2019-04-26 PROCEDURE — G0103 PSA SCREENING: HCPCS

## 2019-04-27 LAB — TACROLIMUS BLD-MCNC: 5.7 NG/ML (ref 2–20)

## 2019-04-30 ENCOUNTER — TELEPHONE (OUTPATIENT)
Dept: PAIN MEDICINE | Facility: CLINIC | Age: 64
End: 2019-04-30

## 2019-05-27 DIAGNOSIS — I10 ESSENTIAL HYPERTENSION: ICD-10-CM

## 2019-05-28 DIAGNOSIS — I10 ESSENTIAL HYPERTENSION: ICD-10-CM

## 2019-05-28 RX ORDER — LISINOPRIL 20 MG/1
TABLET ORAL
Qty: 90 TABLET | Refills: 0 | Status: SHIPPED | OUTPATIENT
Start: 2019-05-28 | End: 2019-07-26

## 2019-05-28 RX ORDER — LISINOPRIL 20 MG/1
TABLET ORAL
Qty: 90 TABLET | Refills: 0 | Status: SHIPPED | OUTPATIENT
Start: 2019-05-28 | End: 2019-08-25 | Stop reason: SDUPTHER

## 2019-06-16 DIAGNOSIS — F41.9 ANXIETY: ICD-10-CM

## 2019-06-17 RX ORDER — ALPRAZOLAM 0.25 MG/1
TABLET ORAL
Qty: 30 TABLET | Refills: 1 | Status: SHIPPED | OUTPATIENT
Start: 2019-06-17 | End: 2019-07-26

## 2019-07-25 DIAGNOSIS — Z11.59 NEED FOR HEPATITIS C SCREENING TEST: Primary | ICD-10-CM

## 2019-07-26 ENCOUNTER — OFFICE VISIT (OUTPATIENT)
Dept: INTERNAL MEDICINE CLINIC | Facility: CLINIC | Age: 64
End: 2019-07-26
Payer: MEDICARE

## 2019-07-26 VITALS
HEIGHT: 71 IN | HEART RATE: 85 BPM | RESPIRATION RATE: 16 BRPM | TEMPERATURE: 98.6 F | DIASTOLIC BLOOD PRESSURE: 78 MMHG | BODY MASS INDEX: 28.22 KG/M2 | OXYGEN SATURATION: 98 % | SYSTOLIC BLOOD PRESSURE: 116 MMHG | WEIGHT: 201.6 LBS

## 2019-07-26 DIAGNOSIS — I10 ESSENTIAL HYPERTENSION: Primary | ICD-10-CM

## 2019-07-26 DIAGNOSIS — K21.9 CHRONIC GASTROESOPHAGEAL REFLUX DISEASE: ICD-10-CM

## 2019-07-26 DIAGNOSIS — Z94.4 LIVER REPLACED BY TRANSPLANT (HCC): ICD-10-CM

## 2019-07-26 DIAGNOSIS — Z72.0 TOBACCO ABUSE: ICD-10-CM

## 2019-07-26 PROCEDURE — 99214 OFFICE O/P EST MOD 30 MIN: CPT | Performed by: INTERNAL MEDICINE

## 2019-07-26 NOTE — PATIENT INSTRUCTIONS
Problem List Items Addressed This Visit        Digestive    Chronic gastroesophageal reflux disease     Continue twice a day dosing, patient had problems when he tried titrating down the proton pump         Liver replaced by transplant [facility])     Follow-up transplant team            Cardiovascular and Mediastinum    Essential hypertension - Primary     Controlled, continue current meds along with healthy diet            Other    Tobacco abuse     Again recommended smoking cessation

## 2019-07-26 NOTE — PROGRESS NOTES
Assessment/Plan:    Liver replaced by transplant [facility])  Follow-up transplant team    Chronic gastroesophageal reflux disease  Continue twice a day dosing, patient had problems when he tried titrating down the proton pump    Essential hypertension  Controlled, continue current meds along with healthy diet    Tobacco abuse  Again recommended smoking cessation       Diagnoses and all orders for this visit:    Essential hypertension    Liver replaced by transplant ([facility] )    Tobacco abuse    Chronic gastroesophageal reflux disease    Other orders  -     CALCIUM-MAGNESIUM-VITAMIN D ER PO; Take 1 tablet by mouth daily          Subjective:      Patient ID: Araceli Lei is a 60 y o  male  Tobacco abuse:  Patient did get Chantix feel due to cost, he did quit smoking, he is currently smoking about half pack per day  GERD:  Patient is needed the twice a day dosing of the proton pump inhibitor, he had return of his symptoms when he decreased the dose    Hypertension patient reports compliance with blood pressure med, no dry cough for lightheadedness  Hyperlipidemia:  Patient watches his diet for this  Liver transplant:  Patient follows with transplant team     Pt is building an addition for his in-laws to move into  The following portions of the patient's history were reviewed and updated as appropriate: allergies, current medications, past family history, past medical history, past social history, past surgical history and problem list     Review of Systems   Constitutional: Negative for chills, fatigue and fever  HENT: Negative for congestion, nosebleeds, postnasal drip, sore throat and trouble swallowing  Eyes: Negative for pain  Respiratory: Negative for cough, chest tightness, shortness of breath and wheezing  Cardiovascular: Negative for chest pain, palpitations and leg swelling  Gastrointestinal: Negative for abdominal pain, constipation, diarrhea, nausea and vomiting     Endocrine: Negative for polydipsia and polyuria  Genitourinary: Negative for dysuria, flank pain and hematuria  Musculoskeletal: Positive for arthralgias  Negative for myalgias  Skin: Negative for rash  Neurological: Negative for dizziness, tremors and headaches  Hematological: Does not bruise/bleed easily  Psychiatric/Behavioral: Negative for confusion and dysphoric mood  The patient is not nervous/anxious  Objective:      /78   Pulse 85   Temp 98 6 °F (37 °C) (Oral)   Resp 16   Ht 5' 11 26" (1 81 m)   Wt 91 4 kg (201 lb 9 6 oz)   SpO2 98%   BMI 27 91 kg/m²          Physical Exam   Constitutional: He is oriented to person, place, and time  He appears well-developed and well-nourished  No distress  HENT:   Head: Normocephalic and atraumatic  Right Ear: External ear normal    Left Ear: External ear normal    Eyes: Conjunctivae are normal  No scleral icterus  Neck: Normal range of motion  Neck supple  No tracheal deviation present  No thyromegaly present  Cardiovascular: Normal rate, regular rhythm and normal heart sounds  No murmur heard  Pulmonary/Chest: Effort normal and breath sounds normal  No respiratory distress  He has no wheezes  He has no rales  Abdominal: Soft  Bowel sounds are normal  There is no tenderness  There is no rebound and no guarding  Musculoskeletal: He exhibits no edema  Lymphadenopathy:     He has no cervical adenopathy  Neurological: He is alert and oriented to person, place, and time  Psychiatric: He has a normal mood and affect  His behavior is normal  Judgment and thought content normal    Vitals reviewed  BMI Counseling: Body mass index is 27 91 kg/m²  Discussed the patient's BMI with him  The BMI is above average  BMI counseling and education was provided to the patient   Nutrition recommendations include reducing portion sizes, decreasing overall calorie intake, 3-5 servings of fruits/vegetables daily, reducing fast food intake, consuming healthier snacks, decreasing soda and/or juice intake, moderation in carbohydrate intake and increasing intake of lean protein  Exercise recommendations include exercising 3-5 times per week

## 2019-08-25 DIAGNOSIS — I10 ESSENTIAL HYPERTENSION: ICD-10-CM

## 2019-08-26 RX ORDER — LISINOPRIL 20 MG/1
TABLET ORAL
Qty: 90 TABLET | Refills: 0 | Status: SHIPPED | OUTPATIENT
Start: 2019-08-26 | End: 2020-02-17

## 2019-08-28 DIAGNOSIS — F41.9 ANXIETY: ICD-10-CM

## 2019-08-28 RX ORDER — ALPRAZOLAM 0.25 MG/1
TABLET ORAL
Qty: 30 TABLET | Refills: 1 | Status: SHIPPED | OUTPATIENT
Start: 2019-08-28 | End: 2019-11-05 | Stop reason: SDUPTHER

## 2019-09-29 DIAGNOSIS — M54.16 LUMBAR RADICULOPATHY: ICD-10-CM

## 2019-09-29 RX ORDER — GABAPENTIN 300 MG/1
CAPSULE ORAL
Qty: 60 CAPSULE | Refills: 0 | OUTPATIENT
Start: 2019-09-29

## 2019-10-07 DIAGNOSIS — M54.16 LUMBAR RADICULOPATHY: ICD-10-CM

## 2019-10-07 RX ORDER — GABAPENTIN 300 MG/1
CAPSULE ORAL
Qty: 60 CAPSULE | Refills: 0 | OUTPATIENT
Start: 2019-10-07

## 2019-10-12 ENCOUNTER — IMMUNIZATIONS (OUTPATIENT)
Dept: INTERNAL MEDICINE CLINIC | Facility: CLINIC | Age: 64
End: 2019-10-12
Payer: MEDICARE

## 2019-10-12 DIAGNOSIS — Z23 ENCOUNTER FOR IMMUNIZATION: ICD-10-CM

## 2019-10-12 PROCEDURE — 90682 RIV4 VACC RECOMBINANT DNA IM: CPT

## 2019-10-12 PROCEDURE — 90471 IMMUNIZATION ADMIN: CPT

## 2019-10-15 ENCOUNTER — OFFICE VISIT (OUTPATIENT)
Dept: PAIN MEDICINE | Facility: CLINIC | Age: 64
End: 2019-10-15
Payer: MEDICARE

## 2019-10-15 VITALS
DIASTOLIC BLOOD PRESSURE: 88 MMHG | WEIGHT: 201 LBS | BODY MASS INDEX: 27.83 KG/M2 | HEART RATE: 84 BPM | SYSTOLIC BLOOD PRESSURE: 129 MMHG | TEMPERATURE: 98.5 F

## 2019-10-15 DIAGNOSIS — M51.16 INTERVERTEBRAL DISC DISORDERS WITH RADICULOPATHY, LUMBAR REGION: Primary | ICD-10-CM

## 2019-10-15 DIAGNOSIS — M54.16 LUMBAR RADICULOPATHY: ICD-10-CM

## 2019-10-15 DIAGNOSIS — G89.4 CHRONIC PAIN SYNDROME: ICD-10-CM

## 2019-10-15 PROCEDURE — 99214 OFFICE O/P EST MOD 30 MIN: CPT | Performed by: NURSE PRACTITIONER

## 2019-10-15 RX ORDER — GABAPENTIN 300 MG/1
300 CAPSULE ORAL 2 TIMES DAILY
Qty: 60 CAPSULE | Refills: 5 | Status: SHIPPED | OUTPATIENT
Start: 2019-10-15 | End: 2020-04-13 | Stop reason: SDUPTHER

## 2019-10-15 NOTE — PROGRESS NOTES
Assessment:  1  Intervertebral disc disorders with radiculopathy, lumbar region    2  Lumbar radiculopathy    3  Chronic pain syndrome        Plan:  Marry Little is a 60 y o  male with a history of chronic pain syndrome secondary to bilateral greater trochanteric bursitis, lumbar radiculopathy and lumbar intervertebral disc disorder radiculopathy  The patient presents today with low back pain  He reports that his pain and symptoms are the same symptoms that he experienced prior to undergoing a right L4-L5 transformational epidural steroid injection  He reported that this injection provided him 80-85% pain relief for 5 months  He is requesting repeat this injection at this time  He was educated on the procedures most common risks  He verbalized understanding and would like to proceed with the procedure  Therefore the patient be scheduled for an upcoming Tuesday, Thursday or Friday  The patient will continue on gabapentin as prescribed and refills for this medication was sent electronically to the patient's pharmacy on file  Complete risks and benefits including bleeding, infection, tissue reaction, nerve injury and allergic reaction were discussed  The approach was demonstrated using models and literature was provided  The patient will follow up after the completion of his right L4-L5 transforaminal steroid injection, or sooner with the worsening of symptoms  My impressions and treatment recommendations were discussed in detail with the patient who verbalized understanding and had no further questions  Discharge instructions were provided  I personally saw and examined the patient and I agree with the above discussed plan of care      Orders Placed This Encounter   Procedures    FL spine and pain procedure     Standing Status:   Future     Standing Expiration Date:   10/15/2023     Order Specific Question:   Reason for Exam:     Answer:   Right L4-L5 TFESI     Order Specific Question: Anticoagulant hold needed? Answer:   No     New Medications Ordered This Visit   Medications    gabapentin (NEURONTIN) 300 mg capsule     Sig: Take 1 capsule (300 mg total) by mouth 2 (two) times a day     Dispense:  60 capsule     Refill:  5       History of Present Illness:  Mera Padilla is a 60 y o  male with a history of chronic pain syndrome secondary to bilateral greater trochanteric bursitis, lumbar radiculopathy and lumbar intervertebral disc disorder radiculopathy  The patient was last seen office on 4/23/2019 where he underwent bilateral greater trochanteric bursa injections  He reports ongoing pain relief after these injections  He presents for a follow up office visit in regards to Back Pain (radiates into the right hip and leg); Leg Pain; and Hip Pain  The patients current symptoms include low back pain pain that starts in his low back and radiates down the lateral aspect of his right leg to his foot  He describes his pain as dull aching, sharp, throbbing pain that is constant nature with symptoms occurring mostly during the evening and nighttime hours  He reports that his pain and symptoms are unchanged since last office visit  He currently rates his pain 8 out 10 numeric pain scale  Current pain medications includes:  gabapentin 300 mg 2 times daily   The patient reports that this regimen is providing 60-70% pain relief  The patient is reporting no side effects from this pain medication regimen  I have personally reviewed and/or updated the patient's past medical history, past surgical history, family history, social history, current medications, allergies, and vital signs today  Review of Systems   Respiratory: Negative for shortness of breath  Cardiovascular: Negative for chest pain  Gastrointestinal: Negative for constipation, diarrhea, nausea and vomiting  Musculoskeletal: Positive for gait problem  Negative for arthralgias, joint swelling and myalgias     Skin: Negative for rash  Neurological: Negative for dizziness, seizures and weakness  All other systems reviewed and are negative  Patient Active Problem List   Diagnosis    Alcohol dependence in remission ([place] [address] )    Bursitis of both hips    Chronic gastroesophageal reflux disease    Essential hypertension    Intervertebral disc disorder with radiculopathy of lumbar region    Liver replaced by transplant ([place] [address] )    Sacroiliitis ([place] [address] )    Seasonal allergies    Lung mass    Neck pain    Nonalcoholic liver disease, chronic    Abnormal electrocardiogram    Osteopenia    PPD negative    Right arm pain    Ringing in ears, bilateral    Short-term memory loss    Tobacco abuse    Mixed hyperlipidemia    Primary insomnia       Past Medical History:   Diagnosis Date    Alcoholism ([place] [address] )     Bacterial peritonitis ([place] [address] )     Bowel disease     Cancer ([place] [address] )     liver    Cataract     Depression     Fracture     Gastroesophageal reflux     Hepatic disease     Hepatocellular carcinoma ([place] [address] )     History of colon polyps     Hypertension     Liver cancer ([place] [address] )     Liver disease     Stomach disease        Past Surgical History:   Procedure Laterality Date    APPENDECTOMY      CATARACT EXTRACTION      EXPLORATORY LAPAROTOMY  09/2011    EYE SURGERY      LIVER TRANSPLANTATION  08/20/2013    PARACENTESIS      Abdominal Paracentesis(Therapeutic) with Imaging Guidance    [place] COLONOSCOPY FLX DX W/COLLJ SPEC WHEN PFRMD N/A 9/12/2016    Procedure: COLONOSCOPY;  Surgeon: Wellington Altman MD;  [facility]: [facility];   Service: General    ROTATOR CUFF REPAIR      x2       Family History   Problem Relation Age of Onset    Coronary artery disease Mother         CABG    Prostate cancer Mother         Prostate Gland    Heart disease Mother         Cardiac Disorder    Vascular Disease Mother     Hypertension Mother         Benign    Diabetes Maternal Grandmother         Mellitus    Clotting disorder Maternal Grandfather         Embolism    Seizures Paternal Grandmother         Epilepsy    Other Paternal Grandfather         Accident    Liver cancer Family     Heart disease Family         Cardiac Disorder    Hypertension Family         Benign       Social History     Occupational History    Occupation:    Tobacco Use    Smoking status: Current Every Day Smoker     Packs/day: 0 50     Years: 35 00     Pack years: 17 50     Types: Cigarettes    Smokeless tobacco: Never Used   Substance and Sexual Activity    Alcohol use: No    Drug use: No     Comment: Per [employer] Drug use way back in college over 40 yrs ago    Sexual activity: Not on file       Current Outpatient Medications on File Prior to Visit   Medication Sig    acetaminophen (TYLENOL) 325 mg tablet Take 650 mg by mouth every 6 (six) hours as needed for mild pain   ALPRAZolam (XANAX) 0 25 mg tablet TAKE 1 TABLET BY MOUTH EVERY DAY AS NEEDED FOR ANXIETY    CALCIUM-MAGNESIUM-VITAMIN D ER PO Take 1 tablet by mouth daily    docusate sodium (COLACE) 100 mg capsule Take 100 mg by mouth 2 (two) times a day   lisinopril (ZESTRIL) 20 mg tablet TAKE 1 TABLET BY MOUTH DAILY    omeprazole (PriLOSEC) 20 mg delayed release capsule TAKE 1 CAPSULE BY MOUTH TWICE DAILY    ONE DAILY MULTIPLE VITAMIN PO Take by mouth   tacrolimus (PROGRAF) 1 mg capsule Take 3 capsules (3 mg total) by mouth 2 (two) times a day     No current facility-administered medications on file prior to visit  Allergies   Allergen Reactions    Macrolides And Ketolides      Annotation - [id number]: The patient is on Antirejection medications and they will reduce the effective ness of the medications  Physical Exam:    /88   Pulse 84   Temp 98 5 °F (36 9 °C) (Oral)   Wt 91 2 kg (201 lb)   BMI 27 83 kg/m²     Constitutional:normal, well developed, well nourished, alert, in no distress and non-toxic and no overt pain behavior   and overweight  Eyes:anicteric  HEENT:grossly intact  Neck:supple, symmetric, trachea midline and no masses   Pulmonary:even and unlabored  Cardiovascular:No edema or pitting edema present  Skin:Normal without rashes or lesions and well hydrated  Psychiatric:Mood and affect appropriate  Neurologic:Cranial Nerves II-XII grossly intact  Musculoskeletal:normal     Lumbar Spine Exam    Appearance:  Normal lordosis  Palpation/Tenderness:  right lumbar paraspinal tenderness  Sensory:  no sensory deficits noted  Range of Motion:  Flexion:  Minimally limited  with pain  Extension:  Minimally limited  with pain  Lateral Flexion - Left:  Minimally limited  with pain  Lateral Flexion - Right:  Minimally limited  with pain  Rotation - Left:  Minimally limited  with pain  Rotation - Right:  Minimally limited  with pain  Motor Strength:  Left hip flexion:  5/5  Right hip flexion:  5/5  Left knee extension:  5/5  Right knee extension:  5/5  Left foot dorsiflexion:  5/5  Left foot plantar flexion:  5/5  Right foot dorsiflexion:  5/5  Right foot plantar flexion:  5/5    Imaging  MRI LUMBAR SPINE WITHOUT CONTRAST     INDICATION:  Right-sided sciatica      COMPARISON:  12/17/2010     TECHNIQUE:  Sagittal T1, sagittal T2, sagittal inversion recovery, axial T1 and axial T2, coronal T2        IMAGE QUALITY:  Diagnostic     FINDINGS:     ALIGNMENT:  Normal alignment of the lumbar spine  No compression fracture  No spondylolysis or spondylolisthesis  No scoliosis  Chronic Schmorl's nodes identified within the lower thoracic and lumbar vertebral bodies, unchanged      MARROW SIGNAL:  Normal marrow signal is identified within the visualized bony structures  No discrete marrow lesion      DISTAL CORD AND CONUS:  Normal size and signal within the distal cord and conus  The conus ends at the L1 level      PARASPINAL SOFT TISSUES:  Paraspinal soft tissues are unremarkable      SACRUM:  Normal signal within the sacrum   No evidence of insufficiency or stress fracture      LOWER THORACIC DISC SPACES:  Normal disc height and signal   No disc herniation, canal stenosis or foraminal narrowing      LUMBAR DISC SPACES:          L1-L2:  Slight annular bulging without disc herniation, canal stenosis or foraminal narrowing      L2-L3:  Slight annular bulging without disc herniation, canal stenosis or foraminal narrowing      L3-L4:  Moderate annular bulging  Mild endplate hypertrophic degenerative change with moderate canal stenosis and bilateral foraminal narrowing, right greater than left  This is grossly unchanged      L4-L5:  Annular bulging and facet arthropathy with moderate canal stenosis, slightly more pronounced than the prior examination  Small broad-based right foraminal disc protrusion  Moderate left and severe right foraminal narrowing  Right foraminal   narrowing slightly more pronounced      L5-S1:  Mild annular bulging  Moderate left and mild right facet arthropathy  No disc herniation or canal stenosis  Moderately severe left and moderate right foraminal narrowing    Left foraminal narrowing is slightly more pronounced      IMPRESSION:     Progression of degenerative change compared to the prior examination with worsening canal stenosis at L4-5      Worsening severe right foraminal narrowing at L4-5 and moderately severe left foraminal narrowing at L5-S1

## 2019-11-05 ENCOUNTER — HOSPITAL ENCOUNTER (OUTPATIENT)
Dept: RADIOLOGY | Facility: CLINIC | Age: 64
Discharge: HOME | End: 2019-11-05
Attending: ANESTHESIOLOGY
Payer: MEDICARE

## 2019-11-05 VITALS
TEMPERATURE: 98.3 F | RESPIRATION RATE: 20 BRPM | OXYGEN SATURATION: 98 % | HEART RATE: 84 BPM | SYSTOLIC BLOOD PRESSURE: 142 MMHG | DIASTOLIC BLOOD PRESSURE: 76 MMHG

## 2019-11-05 DIAGNOSIS — F41.9 ANXIETY: ICD-10-CM

## 2019-11-05 DIAGNOSIS — M51.16 INTERVERTEBRAL DISC DISORDERS WITH RADICULOPATHY, LUMBAR REGION: ICD-10-CM

## 2019-11-05 PROCEDURE — 64483 NJX AA&/STRD TFRM EPI L/S 1: CPT | Performed by: ANESTHESIOLOGY

## 2019-11-05 PROCEDURE — 64484 NJX AA&/STRD TFRM EPI L/S EA: CPT | Performed by: ANESTHESIOLOGY

## 2019-11-05 RX ORDER — METHYLPREDNISOLONE ACETATE 80 MG/ML
80 INJECTION, SUSPENSION INTRA-ARTICULAR; INTRALESIONAL; INTRAMUSCULAR; PARENTERAL; SOFT TISSUE ONCE
Status: COMPLETED | OUTPATIENT
Start: 2019-11-05 | End: 2019-11-05

## 2019-11-05 RX ORDER — ALPRAZOLAM 0.25 MG/1
TABLET ORAL
Qty: 30 TABLET | Refills: 1 | Status: SHIPPED | OUTPATIENT
Start: 2019-11-05 | End: 2020-01-07

## 2019-11-05 RX ORDER — 0.9 % SODIUM CHLORIDE 0.9 %
10 VIAL (ML) INJECTION ONCE
Status: COMPLETED | OUTPATIENT
Start: 2019-11-05 | End: 2019-11-05

## 2019-11-05 RX ORDER — BUPIVACAINE HCL/PF 2.5 MG/ML
10 VIAL (ML) INJECTION ONCE
Status: COMPLETED | OUTPATIENT
Start: 2019-11-05 | End: 2019-11-05

## 2019-11-05 RX ADMIN — IOHEXOL 1 ML: 300 INJECTION, SOLUTION INTRAVENOUS at 14:25

## 2019-11-05 RX ADMIN — Medication 5 ML: at 14:22

## 2019-11-05 RX ADMIN — METHYLPREDNISOLONE ACETATE 80 MG: 80 INJECTION, SUSPENSION INTRA-ARTICULAR; INTRALESIONAL; INTRAMUSCULAR; PARENTERAL; SOFT TISSUE at 14:25

## 2019-11-05 RX ADMIN — SODIUM CHLORIDE 5 ML: 9 INJECTION, SOLUTION INTRAMUSCULAR; INTRAVENOUS; SUBCUTANEOUS at 14:22

## 2019-11-05 RX ADMIN — BUPIVACAINE HYDROCHLORIDE 2 ML: 2.5 INJECTION, SOLUTION EPIDURAL; INFILTRATION; INTRACAUDAL at 14:25

## 2019-11-05 NOTE — H&P
History of Present Illness: The patient is a 60 y o  male who presents with complaints of right lower back and leg pain secondary to lumbar degenerative disc disease and is here today for right L4 and right L5 transforaminal epidural steroid injection  Patient Active Problem List   Diagnosis    [facility] dependence [facility]    Bursitis of both hips    Chronic gastroesophageal reflux disease    Essential hypertension    Intervertebral disc disorder with radiculopathy of lumbar region    Liver replaced by transplant [facility]    Sacroiliitis ([facility]    Seasonal allergies    Lung mass    Neck pain    Nonalcoholic liver disease, chronic    Abnormal electrocardiogram    Osteopenia    PPD negative    Right arm pain    Ringing in ears, bilateral    Short-term memory loss    Tobacco abuse    Mixed hyperlipidemia    Primary insomnia       Past Medical History:   Diagnosis Date    [facility]ism ([facility]     [facility]     Bowel disease     [facility]     liver    Cataract     Depression     Fracture     Gastroesophageal reflux     Hepatic disease     Hepatocellular carcinoma ([facility]     History of colon polyps     Hypertension     Liver cancer ([facility]     Liver disease     Stomach disease        Past Surgical History:   Procedure Laterality Date    APPENDECTOMY      CATARACT EXTRACTION      EXPLORATORY LAPAROTOMY  09/2011    EYE SURGERY      LIVER TRANSPLANTATION  08/20/2013    PARACENTESIS      Abdominal Paracentesis(Therapeutic) with Imaging Guidance    [place] COLONOSCOPY FLX DX W/COLLJ SPEC WHEN PFRMD N/A 9/12/2016    Procedure: COLONOSCOPY;  Surgeon: Eric Koyanagi, MD;  [facility]: [facility];   Service: General    ROTATOR CUFF REPAIR      x2         Current Outpatient Medications:     acetaminophen (TYLENOL) 325 mg tablet, Take 650 mg by mouth every 6 (six) hours as needed for mild pain , Disp: , Rfl:     ALPRAZolam (XANAX) 0 25 mg tablet, TAKE 1 TABLET BY MOUTH EVERY DAY AS NEEDED FOR ANXIETY, Disp: 30 tablet, Rfl: 1    CALCIUM-MAGNESIUM-VITAMIN D ER PO, Take 1 tablet by mouth daily, Disp: , Rfl:     docusate sodium (COLACE) 100 mg capsule, Take 100 mg by mouth 2 (two) times a day , Disp: , Rfl:     gabapentin (NEURONTIN) 300 mg capsule, Take 1 capsule (300 mg total) by mouth 2 (two) times a day, Disp: 60 capsule, Rfl: 5    lisinopril (ZESTRIL) 20 mg tablet, TAKE 1 TABLET BY MOUTH DAILY, Disp: 90 tablet, Rfl: 0    omeprazole (PriLOSEC) 20 mg delayed release capsule, TAKE 1 CAPSULE BY MOUTH TWICE DAILY, Disp: 180 capsule, Rfl: 3    ONE DAILY MULTIPLE VITAMIN PO, Take by mouth , Disp: , Rfl:     tacrolimus (PROGRAF) 1 mg capsule, Take 3 capsules (3 mg total) by mouth 2 (two) times a day, Disp: 540 capsule, Rfl: 3    Current Facility-Administered Medications:     bupivacaine (PF) (MARCAINE) 0 25 % injection 10 mL, 10 mL, Epidural, Once, Maira Minaya MD    iohexol (OMNIPAQUE) 300 mg/mL injection 50 mL, 50 mL, Epidural, Once, Maira Minaya MD    lidocaine (PF) (XYLOCAINE-MPF) 2 % injection 5 mL, 5 mL, Infiltration, Once, Maira Minaya MD    methylPREDNISolone acetate (DEPO-MEDROL) injection 80 mg, 80 mg, Epidural, Once, Maira Minaya MD    sodium chloride (PF) 0 9 % injection 10 mL, 10 mL, Infiltration, Once, Maira Minaya MD    Allergies   Allergen Reactions    Macrolides And Ketolides      Annotation - [id number]: The patient is on Antirejection medications and they will reduce the effective ness of the medications  Physical Exam:   Vitals:    11/05/19 1403   BP: 152/92   Pulse: 85   Resp: 20   Temp: 98 3 °F (36 8 °C)   SpO2: 97%     General: Awake, Alert, Oriented x 3, Mood and affect appropriate  Respiratory: Respirations even and unlabored  Cardiovascular: Peripheral pulses intact; no edema  Musculoskeletal Exam:   Right lower back tenderness    ASA Score: 3    Patient/Chart Verification  Patient ID Verified: Verbal  Consents Confirmed:  To be obtained in the Pre-Procedure area  H&P( within 30 days) Verified: To be obtained in the Pre-Procedure area  Allergies Reviewed: Yes  Anticoag/NSAID held?: NA  Currently on antibiotics?: No    Assessment:   1   Intervertebral disc disorders with radiculopathy, lumbar region        Plan: Right L4-L5 TFESI

## 2019-11-05 NOTE — DISCHARGE INSTR - LAB
Epidural Steroid Injection   WHAT YOU NEED TO KNOW:   An epidural steroid injection (LAUREN) is a procedure to inject steroid medicine into the epidural space  The epidural space is between your spinal cord and vertebrae  Steroids reduce inflammation and fluid buildup in your spine that may be causing pain  You may be given pain medicine along with the steroids  ACTIVITY  · Do not drive or operate machinery today  · No strenuous activity today - bending, lifting, etc   · You may resume normal activites starting tomorrow - start slowly and as tolerated  · You may shower today, but no tub baths or hot tubs  · You may have numbness for several hours from the local anesthetic  Please use caution and common sense, especially with weight-bearing activities  CARE OF THE INJECTION SITE  · If you have soreness or pain, apply ice to the area today (20 minutes on/20 minutes off)  · Starting tomorrow, you may use warm, moist heat or ice if needed  · You may have an increase or change in your discomfort for 36-48 hours after your treatment  · Apply ice and continue with any pain medication you have been prescribed  · Notify [facility] if you have any of the following: redness, drainage, swelling, headache, stiff neck or fever above 100°F     SPECIAL INSTRUCTIONS  · Our office will contact you in approximately 7 days for a progress report  MEDICATIONS  · Continue to take all routine medications  · Our office may have instructed you to hold some medications  If you have a problem specifically related to your procedure, please call our office at [phone]  Problems not related to your procedure should be directed to your primary care physician

## 2019-11-12 ENCOUNTER — TELEPHONE (OUTPATIENT)
Dept: PAIN MEDICINE | Facility: CLINIC | Age: 64
End: 2019-11-12

## 2019-11-12 NOTE — TELEPHONE ENCOUNTER
Patient says that his back is feeling good  However the hip is not feeling good  The back is a 2-3/10 and the hips are a 6/10  the injection helped about 80-85%

## 2019-11-12 NOTE — TELEPHONE ENCOUNTER
MD Aware  Give steroid more time and please f/u with him next week   He may need bursa injection in the hips

## 2019-12-09 ENCOUNTER — OFFICE VISIT (OUTPATIENT)
Dept: INTERNAL MEDICINE CLINIC | Facility: CLINIC | Age: 64
End: 2019-12-09
Payer: MEDICARE

## 2019-12-09 VITALS
DIASTOLIC BLOOD PRESSURE: 80 MMHG | TEMPERATURE: 98.9 F | BODY MASS INDEX: 28.31 KG/M2 | HEART RATE: 78 BPM | OXYGEN SATURATION: 98 % | HEIGHT: 71 IN | SYSTOLIC BLOOD PRESSURE: 124 MMHG | WEIGHT: 202.2 LBS

## 2019-12-09 DIAGNOSIS — Z94.4 LIVER REPLACED BY TRANSPLANT (HCC): ICD-10-CM

## 2019-12-09 DIAGNOSIS — Z00.00 MEDICARE ANNUAL WELLNESS VISIT, SUBSEQUENT: ICD-10-CM

## 2019-12-09 DIAGNOSIS — I10 ESSENTIAL HYPERTENSION: Primary | ICD-10-CM

## 2019-12-09 DIAGNOSIS — J06.9 UPPER RESPIRATORY TRACT INFECTION, UNSPECIFIED TYPE: ICD-10-CM

## 2019-12-09 PROCEDURE — G0439 PPPS, SUBSEQ VISIT: HCPCS | Performed by: INTERNAL MEDICINE

## 2019-12-09 PROCEDURE — 99214 OFFICE O/P EST MOD 30 MIN: CPT | Performed by: INTERNAL MEDICINE

## 2019-12-09 RX ORDER — AZITHROMYCIN 250 MG/1
250 TABLET, FILM COATED ORAL EVERY 24 HOURS
Qty: 6 TABLET | Refills: 0 | Status: SHIPPED | OUTPATIENT
Start: 2019-12-09 | End: 2019-12-14

## 2019-12-09 NOTE — PROGRESS NOTES
Assessment and Plan:     Problem List Items Addressed This Visit        Other    Medicare annual wellness visit, subsequent - Primary     Discussed preventative health, cancer screening, immunizations, and safety issues  Preventive health issues were discussed with patient, and age appropriate screening tests were ordered as noted in patient's After Visit Summary  Personalized health advice and appropriate referrals for health education or preventive services given if needed, as noted in patient's After Visit Summary       History of Present Illness:     Patient presents for Medicare Annual Wellness visit    Patient Care Team:  Kathleen Pearce MD as PCP - MD Kathleen Meredith MD Murl Lambing, MD Nona Phenes, MD Lorette Ket, MD as Endoscopist     Problem List:     Patient Active Problem List   Diagnosis    Alcohol dependence in remission [facility])    Bursitis of both hips    Chronic gastroesophageal reflux disease    Essential hypertension    Intervertebral disc disorder with radiculopathy of lumbar region    [facility]    [facility]roiliitis [facility]    Seasonal allergies    Lung mass    Neck pain    Nonalcoholic liver disease, chronic    Abnormal electrocardiogram    Osteopenia    PPD negative    Right arm pain    Ringing in ears, bilateral    Short-term memory loss    Tobacco abuse    Mixed hyperlipidemia    Primary insomnia    Medicare annual wellness visit, subsequent      Past Medical and Surgical History:     Past Medical History:   Diagnosis Date    Alcoholism ([facility]     Bacterial peritonitis ([facility]     Bowel disease     Cancer ([facility]     liver    Cataract     Depression     Fracture     Gastroesophageal reflux     Hepatic disease     Hepatocellular carcinoma ([facility]     History of colon polyps     Hypertension     Liver cancer ([facility]     Liver disease     Stomach disease      Past Surgical History:   Procedure Laterality Date    APPENDECTOMY      CATARACT EXTRACTION      EXPLORATORY LAPAROTOMY  09/2011    EYE SURGERY      LIVER TRANSPLANTATION  08/20/2013    PARACENTESIS      Abdominal Paracentesis(Therapeutic) with Imaging Guidance    NC COLONOSCOPY FLX DX W/COLLJ SPEC WHEN PFRMD N/A 9/12/2016    Procedure: COLONOSCOPY;  Surgeon: Kelli Ahn MD;  Location: [facility];   Service: General    ROTATOR CUFF REPAIR      x2      Family History:     Family History   Problem Relation Age of Onset    Coronary artery disease Mother         CABG    Prostate cancer Mother         Prostate Gland    Heart disease Mother         Cardiac Disorder    Vascular Disease Mother     Hypertension Mother         Benign    Diabetes Maternal Grandmother         Mellitus    Clotting disorder Maternal Grandfather         Embolism    Seizures Paternal Grandmother         Epilepsy    Other Paternal Grandfather         Accident    Liver cancer Family     Heart disease Family         Cardiac Disorder    Hypertension Family         Benign      Social History:     Social History     Socioeconomic History    Marital status: /Civil Union     Spouse name: Not on file    Number of children: 3    Years of education: College, bachelors degree    Highest education level: Not on file   Occupational History    Occupation:    Social Needs    Financial resource strain: Not on file    Food insecurity:     Worry: Not on file     Inability: Not on file    Transportation needs:     Medical: Not on file     Non-medical: Not on file   Tobacco Use    Smoking status: Current Every Day Smoker     Packs/day: 0 50     Years: 35 00     Pack years: 17 50     Types: Cigarettes    Smokeless tobacco: Never Used   Substance and Sexual Activity    Alcohol use: No    Drug use: No     Comment: Per [employer] Drug use way back in college over 40 yrs ago    Sexual activity: Not on file   Lifestyle    Physical activity:     Days per week: Not on file Minutes per session: Not on file    Stress: Not on file   Relationships    Social connections:     Talks on phone: Not on file     Gets together: Not on file     Attends Jain service: Not on file     Active member of club or organization: Not on file     Attends meetings of clubs or organizations: Not on file     Relationship status: Not on file    Intimate partner violence:     Fear of current or ex partner: Not on file     Emotionally abused: Not on file     Physically abused: Not on file     Forced sexual activity: Not on file   Other Topics Concern    Not on file   Social History Narrative    Daily coffee Consumption (2 Cups/Day)    Daily Cola Consumption (1 Cans/day)    Disabled    Lives with spouse       Medications and Allergies:     Current Outpatient Medications   Medication Sig Dispense Refill    acetaminophen (TYLENOL) 325 mg tablet Take 650 mg by mouth every 6 (six) hours as needed for mild pain   ALPRAZolam (XANAX) 0 25 mg tablet TAKE 1 TABLET BY MOUTH EVERY DAY AS NEEDED FOR ANXIETY 30 tablet 1    CALCIUM-MAGNESIUM-VITAMIN D ER PO Take 1 tablet by mouth daily      docusate sodium (COLACE) 100 mg capsule Take 100 mg by mouth 2 (two) times a day   gabapentin (NEURONTIN) 300 mg capsule Take 1 capsule (300 mg total) by mouth 2 (two) times a day 60 capsule 5    lisinopril (ZESTRIL) 20 mg tablet TAKE 1 TABLET BY MOUTH DAILY 90 tablet 0    omeprazole (PriLOSEC) 20 mg delayed release capsule TAKE 1 CAPSULE BY MOUTH TWICE DAILY 180 capsule 3    ONE DAILY MULTIPLE VITAMIN PO Take by mouth   tacrolimus (PROGRAF) 1 mg capsule Take 3 capsules (3 mg total) by mouth 2 (two) times a day 540 capsule 3     No current facility-administered medications for this visit  Allergies   Allergen Reactions    Macrolides And Ketolides      [facility] [id number]: The patient is on Antirejection medications and they will reduce the effective ness of the medications        Immunizations: Immunization History   Administered Date(s) Administered    INFLUENZA 10/14/2014, 11/01/2016, 11/20/2017    Influenza Quadrivalent, 6-35 Months IM 11/01/2016    Influenza TIV (IM) 10/06/2010, 10/26/2011, 11/02/2012, 10/14/2014, 10/30/2015, 11/04/2016    Influenza, recombinant, quadrivalent,injectable, preservative free 09/19/2018, 10/12/2019    Meningococcal MCV4P 11/20/2017    Pneumococcal Polysaccharide PPV23 04/29/2013, 09/19/2018    Tdap 10/08/2017    Zoster Vaccine Recombinant 10/04/2019, 12/06/2019      Health Maintenance:         Topic Date Due    Hepatitis C Screening  1955    CRC Screening: Colonoscopy  09/12/2026         Topic Date Due    Hepatitis A Vaccine (1 of 2 - Risk 2-dose series) 06[birth year]28/1958    Hepatitis B Vaccine (1 of 3 - Risk 3-dose series) 06/28/1976    Pneumococcal Vaccine: Pediatrics (0 to 5 Years) and At-Risk Patients (6 to 59 Years) (3 of 3 - PCV13) 09/19/2019      Medicare Health Risk Assessment:     /80   Pulse 78   Temp 98 9 °F (37 2 °C)   Ht 5' 11" (1 803 m)   Wt 91 7 kg (202 lb 3 2 oz)   SpO2 98%   BMI 28 20 kg/m²      Boogie Watkins is here for his Subsequent Wellness visit  Health Risk Assessment:   Patient rates overall health as very good  Patient feels that their physical health rating is same  Eyesight was rated as same  Hearing was rated as same  Patient feels that their emotional and mental health rating is same  Pain experienced in the last 7 days has been some  Patient's pain rating has been 6/10  Patient states that he has experienced no weight loss or gain in last 6 months  Hip pain    Depression Screening:   PHQ-2 Score: 0      Fall Risk Screening: In the past year, patient has experienced: no history of falling in past year      Home Safety:  Patient does not have trouble with stairs inside or outside of their home  Patient has working smoke alarms and has no working carbon monoxide detector  Home safety hazards include: none  Nutrition:   Current diet is Regular  Medications:   Patient is currently taking over-the-counter supplements  OTC medications include: see medication list  Patient is able to manage medications  Activities of Daily Living (ADLs)/Instrumental Activities of Daily Living (IADLs):   Walk and transfer into and out of bed and chair?: Yes  Dress and groom yourself?: Yes    Bathe or shower yourself?: Yes    Feed yourself? Yes  Do your laundry/housekeeping?: Yes  Manage your money, pay your bills and track your expenses?: Yes  Make your own meals?: Yes    Do your own shopping?: Yes    Durable Medical Equipment Suppliers  none    Previous Hospitalizations:   Any hospitalizations or ED visits within the last 12 months?: No      Advance Care Planning:   Living will: Yes    Durable POA for healthcare: Yes    Advanced directive: Yes      Cognitive Screening:   Provider or family/friend/caregiver concerned regarding cognition?: No    PREVENTIVE SCREENINGS      Cardiovascular Screening:    General: Screening Not Indicated and History Lipid Disorder      Diabetes Screening:     General: Screening Current      Colorectal Cancer Screening:     General: Screening Current      Prostate Cancer Screening:    General: Screening Current      Osteoporosis Screening:    General: Screening Not Indicated      Abdominal Aortic Aneurysm (AAA) Screening:    Risk factors include: tobacco use        General: Risks and Benefits Discussed and Screening Current      Lung Cancer Screening:     General: Screening Current      Hepatitis C Screening:    General: Risks and Benefits Discussed and Screening Current    Other Counseling Topics:   Car/seat belt/driving safety, skin self-exam and sunscreen         Perico Jasso MD

## 2019-12-09 NOTE — PROGRESS NOTES
Assessment/Plan:    Medicare annual wellness visit, subsequent  Discussed preventative health, cancer screening, immunizations, and safety issues  Essential hypertension  Controlled, continue current med along with healthy diet and exercise    Mixed hyperlipidemia  Continue with healthy diet and exercise    Liver replaced by transplant [facility])  Follow-up with GI and transplant team    URI (upper respiratory infection)  Will give Z-Ham, patient reports he has had this in the past without any allergic problems, follow up if not improving  If not improving will get chest x-ray       Diagnoses and all orders for this visit:    Essential hypertension    Medicare annual wellness visit, subsequent    Upper respiratory tract infection, unspecified type  -     azithromycin (ZITHROMAX) 250 mg tablet; Take 1 tablet (250 mg total) by mouth every 24 hours for 5 days 2 tabs first day, then one tab daily    [facility]    Other orders  -     Cancel: Hepatitis C antibody; Future  -     Cancel: PNEUMOCOCCAL CONJUGATE VACCINE 13-VALENT GREATER THAN 6 MONTHS          Subjective:      Patient ID: Melody Kwan is a 60 y o  male  Liver transplant: pt doing well with this    Nausea: pt was on Zofran in the past and asking for refill for this intermittent problem  URI: pt started with URI symptoms the day before Thanksgiving, has sinus congestion, facial pain, chest congestion  HTN:  Patient tolerating lisinopril, no lightheadedness      The following portions of the patient's history were reviewed and updated as appropriate: allergies, current medications, past family history, past medical history, past social history, past surgical history and problem list     Review of Systems   Constitutional: Negative for chills, fatigue and fever  HENT: Positive for congestion, postnasal drip and sinus pressure  Negative for nosebleeds, sore throat and trouble swallowing  Eyes: Negative for pain  Respiratory: Positive for cough  Negative for chest tightness, shortness of breath and wheezing  Cardiovascular: Negative for chest pain, palpitations and leg swelling  Gastrointestinal: Negative for abdominal pain, constipation, diarrhea, nausea and vomiting  Endocrine: Negative for polydipsia and polyuria  Genitourinary: Negative for dysuria, flank pain and hematuria  Musculoskeletal: Positive for back pain  Negative for arthralgias  Skin: Negative for rash  Neurological: Negative for dizziness, tremors and headaches  Hematological: Does not bruise/bleed easily  Psychiatric/Behavioral: Negative for confusion and dysphoric mood  The patient is not nervous/anxious  Objective:      /80   Pulse 78   Temp 98 9 °F (37 2 °C)   Ht 5' 11" (1 803 m)   Wt 91 7 kg (202 lb 3 2 oz)   SpO2 98%   BMI 28 20 kg/m²          Physical Exam   Constitutional: He is oriented to person, place, and time  He appears well-developed and well-nourished  No distress  HENT:   Head: Normocephalic and atraumatic  Right Ear: External ear normal    Left Ear: External ear normal    Eyes: Conjunctivae are normal  No scleral icterus  Neck: Normal range of motion  Neck supple  No tracheal deviation present  No thyromegaly present  Cardiovascular: Normal rate, regular rhythm and normal heart sounds  No murmur heard  Pulmonary/Chest: Effort normal and breath sounds normal  No respiratory distress  He has no wheezes  He has no rales  But faint rhonchi at the right base when coughing   Abdominal: Soft  Bowel sounds are normal  There is no tenderness  There is no rebound and no guarding  Musculoskeletal: He exhibits no edema  Lymphadenopathy:     He has no cervical adenopathy  Neurological: He is alert and oriented to person, place, and time  Psychiatric: He has a normal mood and affect  His behavior is normal  Judgment and thought content normal    Vitals reviewed

## 2019-12-09 NOTE — ASSESSMENT & PLAN NOTE
Will give Z-Ham, patient reports he has had this in the past without any allergic problems, follow up if not improving    If not improving will get chest x-ray

## 2019-12-09 NOTE — PATIENT INSTRUCTIONS
Problem List Items Addressed This Visit        Digestive    Liver replaced by transplant [facility])     Follow-up with GI and transplant team            Respiratory    URI (upper respiratory infection)     Will give Z-Ham, patient reports he has had this in the past without any allergic problems, follow up if not improving  If not improving will get chest x-ray         Relevant Medications    azithromycin (ZITHROMAX) 250 mg tablet       Cardiovascular and Mediastinum    Essential hypertension - Primary     Controlled, continue current med along with healthy diet and exercise            Other    Medicare annual wellness visit, subsequent     Discussed preventative health, cancer screening, immunizations, and safety issues

## 2019-12-31 DIAGNOSIS — K21.9 CHRONIC GASTROESOPHAGEAL REFLUX DISEASE: ICD-10-CM

## 2019-12-31 RX ORDER — OMEPRAZOLE 20 MG/1
CAPSULE, DELAYED RELEASE ORAL
Qty: 180 CAPSULE | Refills: 3 | Status: SHIPPED | OUTPATIENT
Start: 2019-12-31 | End: 2020-12-28

## 2020-01-06 ENCOUNTER — APPOINTMENT (OUTPATIENT)
Dept: RADIOLOGY | Age: 65
End: 2020-01-06
Payer: MEDICARE

## 2020-01-06 ENCOUNTER — TELEPHONE (OUTPATIENT)
Dept: INTERNAL MEDICINE CLINIC | Facility: CLINIC | Age: 65
End: 2020-01-06

## 2020-01-06 DIAGNOSIS — J06.9 UPPER RESPIRATORY TRACT INFECTION, UNSPECIFIED TYPE: ICD-10-CM

## 2020-01-06 DIAGNOSIS — J06.9 UPPER RESPIRATORY TRACT INFECTION, UNSPECIFIED TYPE: Primary | ICD-10-CM

## 2020-01-06 DIAGNOSIS — F41.9 ANXIETY: ICD-10-CM

## 2020-01-06 PROCEDURE — 71046 X-RAY EXAM CHEST 2 VIEWS: CPT

## 2020-01-06 NOTE — TELEPHONE ENCOUNTER
The patient was into see you about a month ago  You had put him on a Virl Sida and told him if his cough persisted he should call and you would order a chest xray for him  He would like to have the chest xray done  Thank you      Please call the patient once the order has been placed

## 2020-01-07 DIAGNOSIS — R05.9 COUGH: Primary | ICD-10-CM

## 2020-01-07 RX ORDER — ALPRAZOLAM 0.25 MG/1
TABLET ORAL
Qty: 30 TABLET | Refills: 1 | Status: SHIPPED | OUTPATIENT
Start: 2020-01-07 | End: 2020-03-04

## 2020-01-07 RX ORDER — BENZONATATE 100 MG/1
100 CAPSULE ORAL 3 TIMES DAILY PRN
Qty: 30 CAPSULE | Refills: 1 | Status: SHIPPED | OUTPATIENT
Start: 2020-01-07 | End: 2020-04-14 | Stop reason: ALTCHOICE

## 2020-01-15 DIAGNOSIS — Z94.4 LIVER TRANSPLANT RECIPIENT (HCC): ICD-10-CM

## 2020-01-15 RX ORDER — TACROLIMUS 1 MG/1
CAPSULE ORAL
Qty: 540 CAPSULE | Refills: 0 | Status: SHIPPED | OUTPATIENT
Start: 2020-01-15 | End: 2020-04-14

## 2020-02-15 DIAGNOSIS — I10 ESSENTIAL HYPERTENSION: ICD-10-CM

## 2020-02-17 RX ORDER — LISINOPRIL 20 MG/1
TABLET ORAL
Qty: 90 TABLET | Refills: 0 | Status: SHIPPED | OUTPATIENT
Start: 2020-02-17 | End: 2020-05-13

## 2020-03-04 DIAGNOSIS — F41.9 ANXIETY: ICD-10-CM

## 2020-03-04 RX ORDER — ALPRAZOLAM 0.25 MG/1
TABLET ORAL
Qty: 30 TABLET | Refills: 1 | Status: SHIPPED | OUTPATIENT
Start: 2020-03-04 | End: 2020-05-04

## 2020-03-28 DIAGNOSIS — M54.16 LUMBAR RADICULOPATHY: ICD-10-CM

## 2020-03-30 RX ORDER — GABAPENTIN 300 MG/1
CAPSULE ORAL
Qty: 60 CAPSULE | Refills: 5 | OUTPATIENT
Start: 2020-03-30

## 2020-04-10 ENCOUNTER — TELEMEDICINE (OUTPATIENT)
Dept: PAIN MEDICINE | Facility: CLINIC | Age: 65
End: 2020-04-10
Payer: MEDICARE

## 2020-04-10 DIAGNOSIS — M51.16 INTERVERTEBRAL DISC DISORDER WITH RADICULOPATHY OF LUMBAR REGION: ICD-10-CM

## 2020-04-10 DIAGNOSIS — M70.61 TROCHANTERIC BURSITIS OF BOTH HIPS: ICD-10-CM

## 2020-04-10 DIAGNOSIS — G89.4 CHRONIC PAIN SYNDROME: Primary | ICD-10-CM

## 2020-04-10 DIAGNOSIS — M46.1 SACROILIITIS (HCC): ICD-10-CM

## 2020-04-10 DIAGNOSIS — M70.62 TROCHANTERIC BURSITIS OF BOTH HIPS: ICD-10-CM

## 2020-04-10 PROCEDURE — 99443 PR PHYS/QHP TELEPHONE EVALUATION 21-30 MIN: CPT | Performed by: NURSE PRACTITIONER

## 2020-04-13 ENCOUNTER — TELEPHONE (OUTPATIENT)
Dept: PAIN MEDICINE | Facility: CLINIC | Age: 65
End: 2020-04-13

## 2020-04-13 DIAGNOSIS — M54.16 LUMBAR RADICULOPATHY: ICD-10-CM

## 2020-04-13 RX ORDER — GABAPENTIN 300 MG/1
300 CAPSULE ORAL 2 TIMES DAILY
Qty: 60 CAPSULE | Refills: 5 | Status: SHIPPED | OUTPATIENT
Start: 2020-04-13 | End: 2020-10-12 | Stop reason: SDUPTHER

## 2020-04-14 ENCOUNTER — TELEMEDICINE (OUTPATIENT)
Dept: INTERNAL MEDICINE CLINIC | Facility: CLINIC | Age: 65
End: 2020-04-14
Payer: MEDICARE

## 2020-04-14 VITALS — HEIGHT: 72 IN | BODY MASS INDEX: 28.85 KG/M2 | TEMPERATURE: 97.7 F | WEIGHT: 213 LBS | RESPIRATION RATE: 14 BRPM

## 2020-04-14 DIAGNOSIS — Z94.4 LIVER TRANSPLANT RECIPIENT (HCC): ICD-10-CM

## 2020-04-14 DIAGNOSIS — I10 ESSENTIAL HYPERTENSION: Primary | ICD-10-CM

## 2020-04-14 DIAGNOSIS — K21.9 CHRONIC GASTROESOPHAGEAL REFLUX DISEASE: ICD-10-CM

## 2020-04-14 DIAGNOSIS — Z94.4 LIVER REPLACED BY TRANSPLANT (HCC): ICD-10-CM

## 2020-04-14 PROBLEM — J06.9 URI (UPPER RESPIRATORY INFECTION): Status: RESOLVED | Noted: 2019-12-09 | Resolved: 2020-04-14

## 2020-04-14 PROCEDURE — 99443 PR PHYS/QHP TELEPHONE EVALUATION 21-30 MIN: CPT | Performed by: INTERNAL MEDICINE

## 2020-04-14 RX ORDER — TACROLIMUS 1 MG/1
CAPSULE ORAL
Qty: 540 CAPSULE | Refills: 3
Start: 2020-04-14 | End: 2020-05-04

## 2020-04-27 ENCOUNTER — TRANSCRIBE ORDERS (OUTPATIENT)
Dept: ADMINISTRATIVE | Age: 65
End: 2020-04-27

## 2020-04-27 ENCOUNTER — APPOINTMENT (OUTPATIENT)
Dept: RADIOLOGY | Age: 65
End: 2020-04-27
Payer: MEDICARE

## 2020-04-27 ENCOUNTER — APPOINTMENT (OUTPATIENT)
Dept: LAB | Age: 65
End: 2020-04-27
Payer: MEDICARE

## 2020-04-27 DIAGNOSIS — N13.8 ENLARGED PROSTATE WITH URINARY OBSTRUCTION: ICD-10-CM

## 2020-04-27 DIAGNOSIS — N21.8 CALCULUS OF OTHER LOWER URINARY TRACT LOCATION: Primary | ICD-10-CM

## 2020-04-27 DIAGNOSIS — N21.8 CALCULUS OF OTHER LOWER URINARY TRACT LOCATION: ICD-10-CM

## 2020-04-27 DIAGNOSIS — N40.1 ENLARGED PROSTATE WITH URINARY OBSTRUCTION: ICD-10-CM

## 2020-04-27 LAB
BUN SERPL-MCNC: 15 MG/DL (ref 5–25)
CREAT SERPL-MCNC: 1.33 MG/DL (ref 0.6–1.3)
GFR SERPL CREATININE-BSD FRML MDRD: 57 ML/MIN/1.73SQ M
PSA SERPL-MCNC: 3.7 NG/ML (ref 0–4)

## 2020-04-27 PROCEDURE — 74018 RADEX ABDOMEN 1 VIEW: CPT

## 2020-04-27 PROCEDURE — G0103 PSA SCREENING: HCPCS

## 2020-04-27 PROCEDURE — 82565 ASSAY OF CREATININE: CPT

## 2020-04-27 PROCEDURE — 84520 ASSAY OF UREA NITROGEN: CPT

## 2020-04-27 PROCEDURE — 36415 COLL VENOUS BLD VENIPUNCTURE: CPT

## 2020-05-02 DIAGNOSIS — F41.9 ANXIETY: ICD-10-CM

## 2020-05-02 DIAGNOSIS — Z94.4 LIVER TRANSPLANT RECIPIENT (HCC): ICD-10-CM

## 2020-05-04 RX ORDER — ALPRAZOLAM 0.25 MG/1
TABLET ORAL
Qty: 30 TABLET | Refills: 1 | Status: SHIPPED | OUTPATIENT
Start: 2020-05-04 | End: 2020-06-30

## 2020-05-04 RX ORDER — TACROLIMUS 1 MG/1
CAPSULE ORAL
Qty: 540 CAPSULE | Refills: 3 | Status: SHIPPED | OUTPATIENT
Start: 2020-05-04 | End: 2021-05-26

## 2020-05-05 ENCOUNTER — TELEPHONE (OUTPATIENT)
Dept: INTERNAL MEDICINE CLINIC | Facility: CLINIC | Age: 65
End: 2020-05-05

## 2020-05-13 DIAGNOSIS — I10 ESSENTIAL HYPERTENSION: ICD-10-CM

## 2020-05-13 RX ORDER — LISINOPRIL 20 MG/1
TABLET ORAL
Qty: 90 TABLET | Refills: 0 | Status: SHIPPED | OUTPATIENT
Start: 2020-05-13 | End: 2020-08-08

## 2020-06-30 DIAGNOSIS — F41.9 ANXIETY: ICD-10-CM

## 2020-06-30 RX ORDER — ALPRAZOLAM 0.25 MG/1
TABLET ORAL
Qty: 30 TABLET | Refills: 1 | Status: SHIPPED | OUTPATIENT
Start: 2020-06-30 | End: 2020-08-17

## 2020-07-15 ENCOUNTER — OFFICE VISIT (OUTPATIENT)
Dept: INTERNAL MEDICINE CLINIC | Facility: CLINIC | Age: 65
End: 2020-07-15
Payer: MEDICARE

## 2020-07-15 VITALS
WEIGHT: 206.2 LBS | DIASTOLIC BLOOD PRESSURE: 70 MMHG | HEART RATE: 79 BPM | TEMPERATURE: 98 F | BODY MASS INDEX: 27.97 KG/M2 | OXYGEN SATURATION: 97 % | SYSTOLIC BLOOD PRESSURE: 130 MMHG

## 2020-07-15 DIAGNOSIS — E78.2 MIXED HYPERLIPIDEMIA: ICD-10-CM

## 2020-07-15 DIAGNOSIS — I10 ESSENTIAL HYPERTENSION: Primary | ICD-10-CM

## 2020-07-15 DIAGNOSIS — Z94.4 LIVER REPLACED BY TRANSPLANT (HCC): ICD-10-CM

## 2020-07-15 DIAGNOSIS — R25.2 LEG CRAMPS: ICD-10-CM

## 2020-07-15 PROCEDURE — 99214 OFFICE O/P EST MOD 30 MIN: CPT | Performed by: INTERNAL MEDICINE

## 2020-07-15 PROCEDURE — 3078F DIAST BP <80 MM HG: CPT | Performed by: INTERNAL MEDICINE

## 2020-07-15 PROCEDURE — 3075F SYST BP GE 130 - 139MM HG: CPT | Performed by: INTERNAL MEDICINE

## 2020-07-15 NOTE — PROGRESS NOTES
Assessment/Plan:    Liver replaced by transplant [facility])  Follow-up transplant team     Essential hypertension  Controlled, continue lisinopril 20 mg daily  Leg cramps  Will check lower extremity arterial Dopplers    Mixed hyperlipidemia  Continue with healthy diet and exercise       Diagnoses and all orders for this visit:    Essential hypertension    Liver replaced by transplant ([facility] [address] )    Leg cramps  -     VAS lower limb arterial duplex, complete bilateral; Future    Mixed hyperlipidemia    Other orders  -     Cancel: Hepatitis C antibody; Future          Subjective:      Patient ID: Edson Hendrix is a 63 y o  male  HTN: pt reports compliance with med, no lightheadedness    Liver transplant:  No nausea, no vomiting, abdominal pain, no jaundice    Pt reports having cramps in calves when he walks, cramps improve when he rests  Anxiety: pt has some racing thoughts and difficulty falling asleep  Patient uses alprazolam in the evening      The following portions of the patient's history were reviewed and updated as appropriate: allergies, current medications, past family history, past medical history, past social history, past surgical history and problem list     Review of Systems   Constitutional: Negative for chills, fatigue and fever  HENT: Negative for congestion, nosebleeds, postnasal drip, sore throat and trouble swallowing  Eyes: Negative for pain  Respiratory: Negative for cough, chest tightness, shortness of breath and wheezing  Cardiovascular: Negative for chest pain, palpitations and leg swelling  Gastrointestinal: Negative for abdominal pain, constipation, diarrhea, nausea and vomiting  Endocrine: Negative for polydipsia and polyuria  Genitourinary: Negative for dysuria, flank pain and hematuria  Musculoskeletal: Negative for arthralgias  Skin: Negative for rash  Neurological: Negative for dizziness, tremors and headaches  Hematological: Does not bruise/bleed easily  Psychiatric/Behavioral: Positive for sleep disturbance  Negative for confusion and dysphoric mood  The patient is nervous/anxious  Objective:      /70   Pulse 79   Temp 98 °F (36 7 °C)   Wt 93 5 kg (206 lb 3 2 oz)   SpO2 97%   BMI 27 97 kg/m²          Physical Exam   Constitutional: He is oriented to person, place, and time  He appears well-developed and well-nourished  No distress  HENT:   Head: Normocephalic and atraumatic  Right Ear: External ear normal    Left Ear: External ear normal    Eyes: Conjunctivae are normal  No scleral icterus  Neck: Normal range of motion  Neck supple  No tracheal deviation present  No thyromegaly present  Cardiovascular: Normal rate, regular rhythm, normal heart sounds and intact distal pulses  Pulmonary/Chest: Effort normal and breath sounds normal  No respiratory distress  He has no wheezes  He has no rales  Abdominal: Soft  Bowel sounds are normal  There is no tenderness  There is no rebound and no guarding  Musculoskeletal: He exhibits no edema  Lymphadenopathy:     He has no cervical adenopathy  Neurological: He is alert and oriented to person, place, and time  Psychiatric: He has a normal mood and affect  His behavior is normal  Judgment and thought content normal    Vitals reviewed

## 2020-07-15 NOTE — PATIENT INSTRUCTIONS
Problem List Items Addressed This Visit        Digestive    Liver replaced by transplant [facility])     Follow-up transplant team             Cardiovascular and Mediastinum    Essential hypertension - Primary     Controlled, continue lisinopril 20 mg daily              Other    Mixed hyperlipidemia     Continue with healthy diet and exercise         Leg cramps     Will check lower extremity arterial Dopplers         Relevant Orders    VAS lower limb arterial duplex, complete bilateral

## 2020-08-03 ENCOUNTER — HOSPITAL ENCOUNTER (OUTPATIENT)
Dept: NON INVASIVE DIAGNOSTICS | Facility: CLINIC | Age: 65
Discharge: HOME | End: 2020-08-03
Payer: MEDICARE

## 2020-08-03 DIAGNOSIS — R25.2 LEG CRAMPS: ICD-10-CM

## 2020-08-03 PROCEDURE — 93922 UPR/L XTREMITY ART 2 LEVELS: CPT | Performed by: SURGERY

## 2020-08-03 PROCEDURE — 93925 LOWER EXTREMITY STUDY: CPT

## 2020-08-03 PROCEDURE — 93925 LOWER EXTREMITY STUDY: CPT | Performed by: SURGERY

## 2020-08-03 PROCEDURE — 93923 UPR/LXTR ART STDY 3+ LVLS: CPT

## 2020-08-05 DIAGNOSIS — I73.9 PAD (PERIPHERAL ARTERY DISEASE) (HCC): Primary | ICD-10-CM

## 2020-08-06 ENCOUNTER — TELEPHONE (OUTPATIENT)
Dept: INTERNAL MEDICINE CLINIC | Facility: CLINIC | Age: 65
End: 2020-08-06

## 2020-08-06 NOTE — TELEPHONE ENCOUNTER
Pt calling in asking for you to please call him regarding his VAS study - pt is a little upset    [phone]

## 2020-08-07 DIAGNOSIS — I10 ESSENTIAL HYPERTENSION: ICD-10-CM

## 2020-08-07 NOTE — TELEPHONE ENCOUNTER
I called patient this morning and left a detailed message about his results and the reason for evaluation with vascular surgeon, the reason the study was ordered, and potential interventions for the abnormalities    I encouraged him to reach out to me if he has further questions about the study or other concerns, left message for patient

## 2020-08-08 RX ORDER — LISINOPRIL 20 MG/1
TABLET ORAL
Qty: 90 TABLET | Refills: 0 | Status: SHIPPED | OUTPATIENT
Start: 2020-08-08 | End: 2020-11-03

## 2020-08-17 DIAGNOSIS — F41.9 ANXIETY: ICD-10-CM

## 2020-08-17 RX ORDER — ALPRAZOLAM 0.25 MG/1
TABLET ORAL
Qty: 30 TABLET | Refills: 1 | Status: SHIPPED | OUTPATIENT
Start: 2020-08-17 | End: 2020-10-19

## 2020-08-25 ENCOUNTER — TELEPHONE (OUTPATIENT)
Dept: VASCULAR SURGERY | Facility: CLINIC | Age: 65
End: 2020-08-25

## 2020-08-26 ENCOUNTER — CONSULT (OUTPATIENT)
Dept: VASCULAR SURGERY | Facility: CLINIC | Age: 65
End: 2020-08-26
Payer: MEDICARE

## 2020-08-26 VITALS
TEMPERATURE: 97.8 F | RESPIRATION RATE: 18 BRPM | DIASTOLIC BLOOD PRESSURE: 84 MMHG | HEART RATE: 73 BPM | HEIGHT: 72 IN | BODY MASS INDEX: 28.71 KG/M2 | SYSTOLIC BLOOD PRESSURE: 146 MMHG | WEIGHT: 212 LBS

## 2020-08-26 DIAGNOSIS — Z94.4 LIVER REPLACED BY TRANSPLANT (HCC): ICD-10-CM

## 2020-08-26 DIAGNOSIS — F10.21 ALCOHOL DEPENDENCE IN REMISSION (HCC): ICD-10-CM

## 2020-08-26 DIAGNOSIS — R25.2 LEG CRAMPS: ICD-10-CM

## 2020-08-26 DIAGNOSIS — I73.9 PAD (PERIPHERAL ARTERY DISEASE) (HCC): ICD-10-CM

## 2020-08-26 DIAGNOSIS — K76.9 NONALCOHOLIC LIVER DISEASE, CHRONIC: ICD-10-CM

## 2020-08-26 DIAGNOSIS — Z72.0 TOBACCO ABUSE: ICD-10-CM

## 2020-08-26 DIAGNOSIS — E78.2 MIXED HYPERLIPIDEMIA: ICD-10-CM

## 2020-08-26 DIAGNOSIS — M51.16 INTERVERTEBRAL DISC DISORDER WITH RADICULOPATHY OF LUMBAR REGION: ICD-10-CM

## 2020-08-26 DIAGNOSIS — I10 ESSENTIAL HYPERTENSION: ICD-10-CM

## 2020-08-26 DIAGNOSIS — M70.61 TROCHANTERIC BURSITIS OF BOTH HIPS: ICD-10-CM

## 2020-08-26 DIAGNOSIS — M70.62 TROCHANTERIC BURSITIS OF BOTH HIPS: ICD-10-CM

## 2020-08-26 DIAGNOSIS — R09.89 CAROTID BRUIT, UNSPECIFIED LATERALITY: Primary | ICD-10-CM

## 2020-08-26 DIAGNOSIS — M46.1 SACROILIITIS (HCC): ICD-10-CM

## 2020-08-26 PROCEDURE — 99204 OFFICE O/P NEW MOD 45 MIN: CPT | Performed by: SURGERY

## 2020-08-26 RX ORDER — ASPIRIN 81 MG/1
81 TABLET ORAL DAILY
COMMUNITY

## 2020-08-26 NOTE — PROGRESS NOTES
Assessment/Plan:    Claudication in peripheral vascular disease ([facility] )  Disabling claudication of the bilateral lower extremities secondary to SFA occlusive disease left greater than right  Discussed the importance of initiating anti-platelet and statin therapy  Have asked patient to discuss with his primary care physician initiating statin therapy  Patient states he will call his primary care physician today  Discussed the importance of smoking cessation  Discussed different methodology for smoking cessation which patient will continue conversation with his PCP  Discussed the importance of initiating a dedicated and focused walking exercise program   The interview and examination was performed in the presence of patient's wife  Both the patient and patient swipe of expressed understanding of my recommendations and willingness to comply  Patient will return in 6 weeks with reassessment  A carotid duplex will also be performed as there was a questionable cervical bruit present    Tobacco abuse  Discussed the importance of smoking cessation in the presence of peripheral arterial occlusive disease  Discussed different methods for cessation  Patient will continue discussion with his primary care physician  Diagnoses and all orders for this visit:    Carotid bruit, unspecified laterality  -     VAS carotid complete study; Future    PAD (peripheral artery disease) ([place] [address] )  -     Ambulatory referral to Vascular Surgery  -     VAS carotid complete study; Future    Liver replaced by transplant [facility])    Nonalcoholic liver disease, chronic    Essential hypertension    Intervertebral disc disorder with radiculopathy of lumbar region    Trochanteric bursitis of both hips    Sacroiliitis (HCC)    Alcohol dependence in remission ([place] [address] )    Tobacco abuse    Leg cramps    Mixed hyperlipidemia    Other orders  -     aspirin (ECOTRIN LOW STRENGTH) 81 mg EC tablet;  Take 81 mg by mouth daily          Subjective:      Patient ID: Luis Morocho is a 63 y o  male  Patient is new to our practice and was referred by Dr Ariel Quinn for bilateral lower extremity claudication  Patient states he is able to walk approximately 40-50 feet before the onset of bilateral calf cramping  Patient describes the pain as severe and disabling  Pain subsides with rest   Patient denies rest pain or tissue loss  Pt had a KRISTEN on 8/3/20 which reveals an KI on the right of 0 95 and 0 78 on the left  Duplex portion of the study shows significant SFA occlusive disease on the right with likely occlusion of the SFA on the left  Pt states the claudication this has been getting progressively worse over the past couple of years  Patient is a previous liver transplant recipient about 6 years ago at [facility]  Pt is on ASA 81 mg  Pt smokes 1/2 ppd for the past 40 years  The following portions of the patient's history were reviewed and updated as appropriate: allergies, current medications, past family history, past medical history, past social history, past surgical history and problem list     Review of Systems   Constitutional: Negative  HENT: Negative  Eyes: Negative  Respiratory: Negative  Cardiovascular: Negative  Gastrointestinal: Negative  Endocrine: Negative  Genitourinary: Negative  Musculoskeletal: Positive for back pain and gait problem  Leg pain  Leg cramping when walking   Skin: Negative  Allergic/Immunologic: Negative  Neurological: Negative for dizziness, weakness and numbness  Hematological: Negative  Psychiatric/Behavioral: Negative  Objective:      /84 (BP Location: Left arm, Patient Position: Sitting)   Pulse 73   Temp 97 8 °F (36 6 °C) (Tympanic)   Resp 18   Ht 6' (1 829 m)   Wt 96 2 kg (212 lb)   BMI 28 75 kg/m²          Physical Exam  Vitals signs and nursing note reviewed  Constitutional:       Appearance: He is well-developed     HENT:      Head: Normocephalic and atraumatic  Eyes:      Conjunctiva/sclera: Conjunctivae normal       Pupils: Pupils are equal, round, and reactive to light  Neck:      Musculoskeletal: Normal range of motion and neck supple  Vascular: No JVD  Comments: Questionable cervical bruit  Cardiovascular:      Rate and Rhythm: Normal rate and regular rhythm  Heart sounds: Normal heart sounds  Comments: Palpable femoral pulses bilaterally  Left femoral pulse less prominent Doppler signals present at the DP/PT bilaterally  Pulmonary:      Effort: Pulmonary effort is normal  No respiratory distress  Breath sounds: Normal breath sounds  No stridor  No wheezing or rales  Chest:      Chest wall: No tenderness  Abdominal:      General: There is no distension  Palpations: Abdomen is soft  There is no mass  Tenderness: There is no abdominal tenderness  There is no guarding or rebound  Musculoskeletal: Normal range of motion  General: No tenderness or deformity  Skin:     General: Skin is warm and dry  Findings: No erythema or rash  Neurological:      Mental Status: He is alert and oriented to person, place, and time  Psychiatric:         Behavior: Behavior normal          Thought Content:  Thought content normal

## 2020-08-26 NOTE — ASSESSMENT & PLAN NOTE
Disabling claudication of the bilateral lower extremities secondary to SFA occlusive disease left greater than right  Discussed the importance of initiating anti-platelet and statin therapy  Have asked patient to discuss with his primary care physician initiating statin therapy  Patient states he will call his primary care physician today  Discussed the importance of smoking cessation  Discussed different methodology for smoking cessation which patient will continue conversation with his PCP  Discussed the importance of initiating a dedicated and focused walking exercise program   The interview and examination was performed in the presence of patient's wife  Both the patient and patient swipe of expressed understanding of my recommendations and willingness to comply  Patient will return in 6 weeks with reassessment    A carotid duplex will also be performed as there was a questionable cervical bruit present

## 2020-08-26 NOTE — LETTER
August 26, 2020     Saurav Doran, [address] [facility]  [address] [place] [address]    Patient: Wyatt Dates   YOB: 1955   Date of Visit: 8/26/2020       Dear Dr Vicki Raman: Thank you for referring Yuniel So to me for evaluation  Below are the relevant portions of my assessment and plan of care  Patient is new to our practice and was referred by Dr Elvin Goodman for bilateral lower extremity claudication  Patient states he is able to walk approximately 40-50 feet before the onset of bilateral calf cramping  Patient describes the pain as severe and disabling  Pain subsides with rest   Patient denies rest pain or tissue loss  Pt had a KRISTEN on 8/3/20 which reveals an KI on the right of 0 95 and 0 78 on the left  Duplex portion of the study shows significant SFA occlusive disease on the right with likely occlusion of the SFA on the left  Pt states the claudication this has been getting progressively worse over the past couple of years  Patient is a previous liver transplant recipient about 6 years ago at [facility]  Pt is on ASA 81 mg  Pt smokes 1/2 ppd for the past 40 years  Assessment/Plan:    Claudication in peripheral vascular disease ([address] )  Disabling claudication of the bilateral lower extremities secondary to SFA occlusive disease left greater than right  Discussed the importance of initiating anti-platelet and statin therapy  Have asked patient to discuss with his primary care physician initiating statin therapy  Patient states he will call his primary care physician today  Discussed the importance of smoking cessation  Discussed different methodology for smoking cessation which patient will continue conversation with his PCP  Discussed the importance of initiating a dedicated and focused walking exercise program   The interview and examination was performed in the presence of patient's wife    Both the patient and patient swipe of expressed understanding of my recommendations and willingness to comply  Patient will return in 6 weeks with reassessment  A carotid duplex will also be performed as there was a questionable cervical bruit present    If you have questions, please do not hesitate to call me  I look forward to following Earnest De Jesus along with you           Sincerely,        Med Webb MD        CC: No Recipients

## 2020-08-26 NOTE — ASSESSMENT & PLAN NOTE
Discussed the importance of smoking cessation in the presence of peripheral arterial occlusive disease  Discussed different methods for cessation  Patient will continue discussion with his primary care physician

## 2020-08-28 ENCOUNTER — PREPPED CHART (OUTPATIENT)
Dept: URBAN - METROPOLITAN AREA CLINIC 6 | Facility: CLINIC | Age: 65
End: 2020-08-28

## 2020-08-31 ENCOUNTER — TRANSCRIBE ORDERS (OUTPATIENT)
Dept: ADMINISTRATIVE | Age: 65
End: 2020-08-31

## 2020-08-31 ENCOUNTER — APPOINTMENT (OUTPATIENT)
Dept: LAB | Age: 65
End: 2020-08-31
Payer: MEDICARE

## 2020-08-31 DIAGNOSIS — N13.8 ENLARGED PROSTATE WITH URINARY OBSTRUCTION: ICD-10-CM

## 2020-08-31 DIAGNOSIS — N40.1 ENLARGED PROSTATE WITH URINARY OBSTRUCTION: ICD-10-CM

## 2020-08-31 DIAGNOSIS — N40.1 ENLARGED PROSTATE WITH URINARY OBSTRUCTION: Primary | ICD-10-CM

## 2020-08-31 DIAGNOSIS — N13.8 ENLARGED PROSTATE WITH URINARY OBSTRUCTION: Primary | ICD-10-CM

## 2020-08-31 LAB — PSA SERPL-MCNC: 3 NG/ML (ref 0–4)

## 2020-08-31 PROCEDURE — 84153 ASSAY OF PSA TOTAL: CPT

## 2020-08-31 PROCEDURE — 36415 COLL VENOUS BLD VENIPUNCTURE: CPT

## 2020-09-15 DIAGNOSIS — Z94.4 LIVER REPLACED BY TRANSPLANT (HCC): Primary | ICD-10-CM

## 2020-09-15 DIAGNOSIS — E78.2 MIXED HYPERLIPIDEMIA: Primary | ICD-10-CM

## 2020-09-15 RX ORDER — ATORVASTATIN CALCIUM 40 MG/1
40 TABLET, FILM COATED ORAL DAILY
Qty: 90 TABLET | Refills: 3 | Status: SHIPPED | OUTPATIENT
Start: 2020-09-15 | End: 2021-08-30

## 2020-10-01 ENCOUNTER — TELEPHONE (OUTPATIENT)
Dept: INTERNAL MEDICINE CLINIC | Facility: CLINIC | Age: 65
End: 2020-10-01

## 2020-10-01 DIAGNOSIS — Z20.822 EXPOSURE TO 2019 NOVEL CORONAVIRUS: Primary | ICD-10-CM

## 2020-10-02 DIAGNOSIS — Z20.822 EXPOSURE TO 2019 NOVEL CORONAVIRUS: ICD-10-CM

## 2020-10-02 PROCEDURE — U0003 INFECTIOUS AGENT DETECTION BY NUCLEIC ACID (DNA OR RNA); SEVERE ACUTE RESPIRATORY SYNDROME CORONAVIRUS 2 (SARS-COV-2) (CORONAVIRUS DISEASE [COVID-19]), AMPLIFIED PROBE TECHNIQUE, MAKING USE OF HIGH THROUGHPUT TECHNOLOGIES AS DESCRIBED BY CMS-2020-01-R: HCPCS | Performed by: INTERNAL MEDICINE

## 2020-10-03 LAB — SARS-COV-2 RNA SPEC QL NAA+PROBE: NOT DETECTED

## 2020-10-07 ENCOUNTER — HOSPITAL ENCOUNTER (OUTPATIENT)
Dept: NON INVASIVE DIAGNOSTICS | Facility: CLINIC | Age: 65
Discharge: HOME | End: 2020-10-07
Payer: MEDICARE

## 2020-10-07 DIAGNOSIS — I73.9 PAD (PERIPHERAL ARTERY DISEASE) (HCC): ICD-10-CM

## 2020-10-07 DIAGNOSIS — R09.89 CAROTID BRUIT, UNSPECIFIED LATERALITY: ICD-10-CM

## 2020-10-07 PROCEDURE — 93880 EXTRACRANIAL BILAT STUDY: CPT

## 2020-10-08 PROCEDURE — 93880 EXTRACRANIAL BILAT STUDY: CPT | Performed by: SURGERY

## 2020-10-12 ENCOUNTER — OFFICE VISIT (OUTPATIENT)
Dept: PAIN MEDICINE | Facility: CLINIC | Age: 65
End: 2020-10-12
Payer: MEDICARE

## 2020-10-12 ENCOUNTER — TRANSCRIBE ORDERS (OUTPATIENT)
Dept: PAIN MEDICINE | Facility: CLINIC | Age: 65
End: 2020-10-12

## 2020-10-12 VITALS
SYSTOLIC BLOOD PRESSURE: 127 MMHG | DIASTOLIC BLOOD PRESSURE: 85 MMHG | HEART RATE: 80 BPM | WEIGHT: 215 LBS | TEMPERATURE: 97.6 F | BODY MASS INDEX: 29.16 KG/M2

## 2020-10-12 DIAGNOSIS — M70.61 TROCHANTERIC BURSITIS OF BOTH HIPS: ICD-10-CM

## 2020-10-12 DIAGNOSIS — M51.16 INTERVERTEBRAL DISC DISORDER WITH RADICULOPATHY OF LUMBAR REGION: ICD-10-CM

## 2020-10-12 DIAGNOSIS — G89.29 CHRONIC BILATERAL LOW BACK PAIN WITH RIGHT-SIDED SCIATICA: ICD-10-CM

## 2020-10-12 DIAGNOSIS — M54.41 CHRONIC BILATERAL LOW BACK PAIN WITH RIGHT-SIDED SCIATICA: ICD-10-CM

## 2020-10-12 DIAGNOSIS — M70.62 TROCHANTERIC BURSITIS OF BOTH HIPS: ICD-10-CM

## 2020-10-12 DIAGNOSIS — M54.16 LUMBAR RADICULOPATHY: ICD-10-CM

## 2020-10-12 DIAGNOSIS — G89.4 CHRONIC PAIN SYNDROME: Primary | ICD-10-CM

## 2020-10-12 PROCEDURE — 99214 OFFICE O/P EST MOD 30 MIN: CPT | Performed by: NURSE PRACTITIONER

## 2020-10-12 RX ORDER — GABAPENTIN 300 MG/1
300 CAPSULE ORAL 2 TIMES DAILY
Qty: 60 CAPSULE | Refills: 5 | Status: SHIPPED | OUTPATIENT
Start: 2020-10-12 | End: 2021-05-03 | Stop reason: SDUPTHER

## 2020-10-13 ENCOUNTER — TELEPHONE (OUTPATIENT)
Dept: ADMINISTRATIVE | Facility: HOSPITAL | Age: 65
End: 2020-10-13

## 2020-10-14 ENCOUNTER — OFFICE VISIT (OUTPATIENT)
Dept: VASCULAR SURGERY | Facility: CLINIC | Age: 65
End: 2020-10-14
Payer: MEDICARE

## 2020-10-14 VITALS
TEMPERATURE: 98.2 F | WEIGHT: 218 LBS | BODY MASS INDEX: 29.53 KG/M2 | SYSTOLIC BLOOD PRESSURE: 130 MMHG | HEART RATE: 75 BPM | RESPIRATION RATE: 18 BRPM | HEIGHT: 72 IN | DIASTOLIC BLOOD PRESSURE: 78 MMHG

## 2020-10-14 DIAGNOSIS — I73.9 CLAUDICATION IN PERIPHERAL VASCULAR DISEASE (HCC): ICD-10-CM

## 2020-10-14 DIAGNOSIS — I65.23 CAROTID STENOSIS, ASYMPTOMATIC, BILATERAL: Primary | ICD-10-CM

## 2020-10-14 PROCEDURE — 99214 OFFICE O/P EST MOD 30 MIN: CPT | Performed by: SURGERY

## 2020-10-19 ENCOUNTER — TELEPHONE (OUTPATIENT)
Dept: INTERNAL MEDICINE CLINIC | Facility: CLINIC | Age: 65
End: 2020-10-19

## 2020-10-19 DIAGNOSIS — F41.9 ANXIETY: ICD-10-CM

## 2020-10-19 RX ORDER — ALPRAZOLAM 0.25 MG/1
TABLET ORAL
Qty: 30 TABLET | Refills: 1 | Status: SHIPPED | OUTPATIENT
Start: 2020-10-19 | End: 2020-12-18

## 2020-10-20 ENCOUNTER — OFFICE VISIT (OUTPATIENT)
Dept: INTERNAL MEDICINE CLINIC | Facility: CLINIC | Age: 65
End: 2020-10-20
Payer: MEDICARE

## 2020-10-20 ENCOUNTER — TELEPHONE (OUTPATIENT)
Dept: PAIN MEDICINE | Facility: CLINIC | Age: 65
End: 2020-10-20

## 2020-10-20 VITALS
HEART RATE: 84 BPM | DIASTOLIC BLOOD PRESSURE: 88 MMHG | HEIGHT: 72 IN | OXYGEN SATURATION: 94 % | WEIGHT: 215.6 LBS | TEMPERATURE: 98.3 F | SYSTOLIC BLOOD PRESSURE: 144 MMHG | RESPIRATION RATE: 16 BRPM | BODY MASS INDEX: 29.2 KG/M2

## 2020-10-20 DIAGNOSIS — Z94.4 LIVER REPLACED BY TRANSPLANT (HCC): ICD-10-CM

## 2020-10-20 DIAGNOSIS — I73.9 CLAUDICATION IN PERIPHERAL VASCULAR DISEASE (HCC): ICD-10-CM

## 2020-10-20 DIAGNOSIS — I10 ESSENTIAL HYPERTENSION: Primary | ICD-10-CM

## 2020-10-20 DIAGNOSIS — I65.23 CAROTID STENOSIS, ASYMPTOMATIC, BILATERAL: ICD-10-CM

## 2020-10-20 DIAGNOSIS — E78.2 MIXED HYPERLIPIDEMIA: ICD-10-CM

## 2020-10-20 DIAGNOSIS — Z23 NEEDS FLU SHOT: ICD-10-CM

## 2020-10-20 PROCEDURE — G0008 ADMIN INFLUENZA VIRUS VAC: HCPCS

## 2020-10-20 PROCEDURE — 90682 RIV4 VACC RECOMBINANT DNA IM: CPT

## 2020-10-20 PROCEDURE — 99214 OFFICE O/P EST MOD 30 MIN: CPT | Performed by: INTERNAL MEDICINE

## 2020-10-26 ENCOUNTER — LAB (OUTPATIENT)
Dept: LAB | Facility: AMBULARY SURGERY CENTER | Age: 65
End: 2020-10-26
Payer: MEDICARE

## 2020-10-26 DIAGNOSIS — Z94.4 LIVER REPLACED BY TRANSPLANT (HCC): ICD-10-CM

## 2020-10-26 DIAGNOSIS — E78.2 MIXED HYPERLIPIDEMIA: ICD-10-CM

## 2020-10-26 DIAGNOSIS — I65.23 CAROTID STENOSIS, ASYMPTOMATIC, BILATERAL: ICD-10-CM

## 2020-10-26 LAB
ALBUMIN SERPL BCP-MCNC: 4.3 G/DL (ref 3.5–5)
ALP SERPL-CCNC: 67 U/L (ref 46–116)
ALT SERPL W P-5'-P-CCNC: 27 U/L (ref 12–78)
ANION GAP SERPL CALCULATED.3IONS-SCNC: 4 MMOL/L (ref 4–13)
AST SERPL W P-5'-P-CCNC: 21 U/L (ref 5–45)
BILIRUB SERPL-MCNC: 0.67 MG/DL (ref 0.2–1)
BUN SERPL-MCNC: 20 MG/DL (ref 5–25)
CALCIUM SERPL-MCNC: 9.8 MG/DL (ref 8.3–10.1)
CHLORIDE SERPL-SCNC: 110 MMOL/L (ref 100–108)
CHOLEST SERPL-MCNC: 159 MG/DL (ref 50–200)
CO2 SERPL-SCNC: 27 MMOL/L (ref 21–32)
CREAT SERPL-MCNC: 1.18 MG/DL (ref 0.6–1.3)
GFR SERPL CREATININE-BSD FRML MDRD: 65 ML/MIN/1.73SQ M
GLUCOSE P FAST SERPL-MCNC: 92 MG/DL (ref 65–99)
HDLC SERPL-MCNC: 66 MG/DL
LDLC SERPL CALC-MCNC: 64 MG/DL (ref 0–100)
POTASSIUM SERPL-SCNC: 3.9 MMOL/L (ref 3.5–5.3)
PROT SERPL-MCNC: 7.4 G/DL (ref 6.4–8.2)
SODIUM SERPL-SCNC: 141 MMOL/L (ref 136–145)
TACROLIMUS BLD-MCNC: 6.9 NG/ML (ref 2–20)
TRIGL SERPL-MCNC: 146 MG/DL

## 2020-10-26 PROCEDURE — 36415 COLL VENOUS BLD VENIPUNCTURE: CPT

## 2020-10-26 PROCEDURE — 80061 LIPID PANEL: CPT

## 2020-10-26 PROCEDURE — 80197 ASSAY OF TACROLIMUS: CPT

## 2020-10-26 PROCEDURE — 80053 COMPREHEN METABOLIC PANEL: CPT

## 2020-10-27 ENCOUNTER — HOSPITAL ENCOUNTER (OUTPATIENT)
Dept: RADIOLOGY | Facility: HOSPITAL | Age: 65
Discharge: HOME | End: 2020-10-27
Payer: MEDICARE

## 2020-10-27 DIAGNOSIS — I65.23 CAROTID STENOSIS, ASYMPTOMATIC, BILATERAL: ICD-10-CM

## 2020-10-27 PROCEDURE — G1004 CDSM NDSC: HCPCS

## 2020-10-27 PROCEDURE — 70496 CT ANGIOGRAPHY HEAD: CPT

## 2020-10-27 PROCEDURE — 70498 CT ANGIOGRAPHY NECK: CPT

## 2020-10-27 RX ADMIN — IOHEXOL 85 ML: 350 INJECTION, SOLUTION INTRAVENOUS at 07:50

## 2020-11-03 ENCOUNTER — TELEPHONE (OUTPATIENT)
Dept: VASCULAR SURGERY | Facility: CLINIC | Age: 65
End: 2020-11-03

## 2020-11-03 DIAGNOSIS — I10 ESSENTIAL HYPERTENSION: ICD-10-CM

## 2020-11-03 RX ORDER — LISINOPRIL 20 MG/1
TABLET ORAL
Qty: 90 TABLET | Refills: 0 | Status: SHIPPED | OUTPATIENT
Start: 2020-11-03 | End: 2021-01-27

## 2020-11-04 ENCOUNTER — TRANSCRIBE ORDERS (OUTPATIENT)
Dept: PAIN MEDICINE | Facility: CLINIC | Age: 65
End: 2020-11-04

## 2020-11-04 ENCOUNTER — OFFICE VISIT (OUTPATIENT)
Dept: VASCULAR SURGERY | Facility: CLINIC | Age: 65
End: 2020-11-04
Payer: MEDICARE

## 2020-11-04 ENCOUNTER — PROCEDURE VISIT (OUTPATIENT)
Dept: PAIN MEDICINE | Facility: CLINIC | Age: 65
End: 2020-11-04
Payer: MEDICARE

## 2020-11-04 VITALS
DIASTOLIC BLOOD PRESSURE: 82 MMHG | TEMPERATURE: 98.4 F | SYSTOLIC BLOOD PRESSURE: 130 MMHG | RESPIRATION RATE: 20 BRPM | BODY MASS INDEX: 28.99 KG/M2 | WEIGHT: 214 LBS | HEART RATE: 68 BPM | HEIGHT: 72 IN

## 2020-11-04 DIAGNOSIS — M70.62 TROCHANTERIC BURSITIS OF BOTH HIPS: Primary | ICD-10-CM

## 2020-11-04 DIAGNOSIS — M54.2 NECK PAIN: ICD-10-CM

## 2020-11-04 DIAGNOSIS — M85.80 OSTEOPENIA, UNSPECIFIED LOCATION: ICD-10-CM

## 2020-11-04 DIAGNOSIS — I65.23 CAROTID STENOSIS, ASYMPTOMATIC, BILATERAL: Primary | ICD-10-CM

## 2020-11-04 DIAGNOSIS — M51.16 INTERVERTEBRAL DISC DISORDER WITH RADICULOPATHY OF LUMBAR REGION: ICD-10-CM

## 2020-11-04 DIAGNOSIS — H93.13 RINGING IN EARS, BILATERAL: ICD-10-CM

## 2020-11-04 DIAGNOSIS — F51.01 PRIMARY INSOMNIA: ICD-10-CM

## 2020-11-04 DIAGNOSIS — I10 ESSENTIAL HYPERTENSION: ICD-10-CM

## 2020-11-04 DIAGNOSIS — M70.61 TROCHANTERIC BURSITIS OF BOTH HIPS: ICD-10-CM

## 2020-11-04 DIAGNOSIS — M70.62 TROCHANTERIC BURSITIS OF BOTH HIPS: ICD-10-CM

## 2020-11-04 DIAGNOSIS — E78.2 MIXED HYPERLIPIDEMIA: ICD-10-CM

## 2020-11-04 DIAGNOSIS — Z72.0 TOBACCO ABUSE: ICD-10-CM

## 2020-11-04 DIAGNOSIS — F10.21 ALCOHOL DEPENDENCE IN REMISSION (HCC): ICD-10-CM

## 2020-11-04 DIAGNOSIS — M70.61 TROCHANTERIC BURSITIS OF BOTH HIPS: Primary | ICD-10-CM

## 2020-11-04 DIAGNOSIS — I73.9 CLAUDICATION IN PERIPHERAL VASCULAR DISEASE (HCC): ICD-10-CM

## 2020-11-04 DIAGNOSIS — K21.9 CHRONIC GASTROESOPHAGEAL REFLUX DISEASE: ICD-10-CM

## 2020-11-04 DIAGNOSIS — K76.9 NONALCOHOLIC LIVER DISEASE, CHRONIC: ICD-10-CM

## 2020-11-04 DIAGNOSIS — Z94.4 LIVER REPLACED BY TRANSPLANT (HCC): ICD-10-CM

## 2020-11-04 DIAGNOSIS — M46.1 SACROILIITIS (HCC): ICD-10-CM

## 2020-11-04 DIAGNOSIS — R91.8 LUNG MASS: ICD-10-CM

## 2020-11-04 PROCEDURE — 99214 OFFICE O/P EST MOD 30 MIN: CPT | Performed by: SURGERY

## 2020-11-04 PROCEDURE — 20611 DRAIN/INJ JOINT/BURSA W/US: CPT | Performed by: ANESTHESIOLOGY

## 2020-11-06 ENCOUNTER — TRANSCRIBE ORDERS (OUTPATIENT)
Dept: PAIN MEDICINE | Facility: CLINIC | Age: 65
End: 2020-11-06

## 2020-11-09 RX ORDER — METHYLPREDNISOLONE ACETATE 40 MG/ML
80 INJECTION, SUSPENSION INTRA-ARTICULAR; INTRALESIONAL; INTRAMUSCULAR; SOFT TISSUE ONCE
Status: DISCONTINUED | OUTPATIENT
Start: 2020-11-09 | End: 2021-05-20 | Stop reason: HOSPADM

## 2020-11-09 RX ORDER — BUPIVACAINE HYDROCHLORIDE 2.5 MG/ML
20 INJECTION, SOLUTION EPIDURAL; INFILTRATION; INTRACAUDAL ONCE
Status: DISCONTINUED | OUTPATIENT
Start: 2020-11-09 | End: 2021-05-20 | Stop reason: HOSPADM

## 2020-11-11 ENCOUNTER — TELEPHONE (OUTPATIENT)
Dept: PAIN MEDICINE | Facility: CLINIC | Age: 65
End: 2020-11-11

## 2020-12-09 ENCOUNTER — OFFICE VISIT (OUTPATIENT)
Dept: INTERNAL MEDICINE CLINIC | Facility: CLINIC | Age: 65
End: 2020-12-09
Payer: MEDICARE

## 2020-12-09 VITALS
BODY MASS INDEX: 28.85 KG/M2 | SYSTOLIC BLOOD PRESSURE: 134 MMHG | WEIGHT: 213 LBS | HEART RATE: 84 BPM | TEMPERATURE: 97.1 F | DIASTOLIC BLOOD PRESSURE: 70 MMHG | HEIGHT: 72 IN | OXYGEN SATURATION: 98 %

## 2020-12-09 DIAGNOSIS — I73.9 CLAUDICATION IN PERIPHERAL VASCULAR DISEASE (HCC): ICD-10-CM

## 2020-12-09 DIAGNOSIS — Z00.00 MEDICARE ANNUAL WELLNESS VISIT, SUBSEQUENT: ICD-10-CM

## 2020-12-09 DIAGNOSIS — I10 ESSENTIAL HYPERTENSION: Primary | ICD-10-CM

## 2020-12-09 DIAGNOSIS — E78.2 MIXED HYPERLIPIDEMIA: ICD-10-CM

## 2020-12-09 DIAGNOSIS — I65.23 CAROTID STENOSIS, ASYMPTOMATIC, BILATERAL: ICD-10-CM

## 2020-12-09 PROCEDURE — 99214 OFFICE O/P EST MOD 30 MIN: CPT | Performed by: INTERNAL MEDICINE

## 2020-12-09 PROCEDURE — G0439 PPPS, SUBSEQ VISIT: HCPCS | Performed by: INTERNAL MEDICINE

## 2020-12-18 DIAGNOSIS — F41.9 ANXIETY: ICD-10-CM

## 2020-12-18 RX ORDER — ALPRAZOLAM 0.25 MG/1
TABLET ORAL
Qty: 30 TABLET | Refills: 1 | Status: SHIPPED | OUTPATIENT
Start: 2020-12-18 | End: 2021-02-15

## 2020-12-28 DIAGNOSIS — K21.9 CHRONIC GASTROESOPHAGEAL REFLUX DISEASE: ICD-10-CM

## 2020-12-28 RX ORDER — OMEPRAZOLE 20 MG/1
CAPSULE, DELAYED RELEASE ORAL
Qty: 180 CAPSULE | Refills: 3 | Status: SHIPPED | OUTPATIENT
Start: 2020-12-28 | End: 2021-11-30

## 2021-01-27 DIAGNOSIS — I10 ESSENTIAL HYPERTENSION: ICD-10-CM

## 2021-01-27 RX ORDER — LISINOPRIL 20 MG/1
TABLET ORAL
Qty: 90 TABLET | Refills: 0 | Status: SHIPPED | OUTPATIENT
Start: 2021-01-27 | End: 2021-04-27 | Stop reason: SDUPTHER

## 2021-02-14 DIAGNOSIS — F41.9 ANXIETY: ICD-10-CM

## 2021-02-15 RX ORDER — ALPRAZOLAM 0.25 MG/1
TABLET ORAL
Qty: 30 TABLET | Refills: 1 | Status: SHIPPED | OUTPATIENT
Start: 2021-02-15 | End: 2021-04-14

## 2021-03-10 DIAGNOSIS — Z23 ENCOUNTER FOR IMMUNIZATION: ICD-10-CM

## 2021-03-12 ENCOUNTER — OFFICE VISIT (OUTPATIENT)
Dept: INTERNAL MEDICINE CLINIC | Facility: CLINIC | Age: 66
End: 2021-03-12
Payer: MEDICARE

## 2021-03-12 VITALS
BODY MASS INDEX: 28.88 KG/M2 | DIASTOLIC BLOOD PRESSURE: 82 MMHG | HEIGHT: 72 IN | OXYGEN SATURATION: 100 % | WEIGHT: 213.2 LBS | RESPIRATION RATE: 16 BRPM | HEART RATE: 82 BPM | SYSTOLIC BLOOD PRESSURE: 130 MMHG

## 2021-03-12 DIAGNOSIS — Z94.4 LIVER REPLACED BY TRANSPLANT (HCC): ICD-10-CM

## 2021-03-12 DIAGNOSIS — K21.9 CHRONIC GASTROESOPHAGEAL REFLUX DISEASE: ICD-10-CM

## 2021-03-12 DIAGNOSIS — Z11.59 NEED FOR HEPATITIS C SCREENING TEST: ICD-10-CM

## 2021-03-12 DIAGNOSIS — E78.2 MIXED HYPERLIPIDEMIA: ICD-10-CM

## 2021-03-12 DIAGNOSIS — I10 ESSENTIAL HYPERTENSION: Primary | ICD-10-CM

## 2021-03-12 DIAGNOSIS — R11.0 NAUSEA: ICD-10-CM

## 2021-03-12 DIAGNOSIS — I65.23 CAROTID STENOSIS, ASYMPTOMATIC, BILATERAL: ICD-10-CM

## 2021-03-12 DIAGNOSIS — I73.9 CLAUDICATION IN PERIPHERAL VASCULAR DISEASE (HCC): ICD-10-CM

## 2021-03-12 DIAGNOSIS — Z11.4 SCREENING FOR HIV (HUMAN IMMUNODEFICIENCY VIRUS): ICD-10-CM

## 2021-03-12 PROCEDURE — 99214 OFFICE O/P EST MOD 30 MIN: CPT | Performed by: INTERNAL MEDICINE

## 2021-03-12 RX ORDER — ONDANSETRON 4 MG/1
4 TABLET, ORALLY DISINTEGRATING ORAL EVERY 6 HOURS PRN
Qty: 20 TABLET | Refills: 5 | Status: SHIPPED | OUTPATIENT
Start: 2021-03-12 | End: 2021-08-24

## 2021-03-12 NOTE — PATIENT INSTRUCTIONS
Problem List Items Addressed This Visit        Digestive    Chronic gastroesophageal reflux disease      Continue daily PPI  As patient had problems when he tried titrating down         Liver replaced by transplant [facility])     Follow up transplant team            Cardiovascular and Mediastinum    Essential hypertension - Primary     Controlled, continue med along with healthy diet and exercise  Carotid stenosis, asymptomatic, bilateral     Follow up vascular,  Continue aspirin and statin, no TIA symptoms            Other    Mixed hyperlipidemia     Continue statin along with healthy diet and exercise         Claudication in peripheral vascular disease (HCC)      Continue statin, aspirin, and smoking cessation    Stay active with walking, follow up vascular           Other Visit Diagnoses     Need for hepatitis C screening test        Screening for HIV (human immunodeficiency virus)        Nausea        Relevant Medications    ondansetron (ZOFRAN-ODT) 4 mg disintegrating tablet

## 2021-03-12 NOTE — PROGRESS NOTES
Assessment/Plan:    Chronic gastroesophageal reflux disease   Continue daily PPI  As patient had problems when he tried titrating down    Essential hypertension  Controlled, continue med along with healthy diet and exercise  Mixed hyperlipidemia  Continue statin along with healthy diet and exercise    Claudication in peripheral vascular disease (HCC)   Continue statin, aspirin, and smoking cessation  Stay active with walking, follow up vascular    Carotid stenosis, asymptomatic, bilateral  Follow up vascular,  Continue aspirin and statin, no TIA symptoms    Liver replaced by transplant ([place] [address] )  Follow up transplant team       Diagnoses and all orders for this visit:    Essential hypertension    Need for hepatitis C screening test    Screening for HIV (human immunodeficiency virus)    Chronic gastroesophageal reflux disease    Mixed hyperlipidemia    Claudication in peripheral vascular disease ([place] [address] )    Carotid stenosis, asymptomatic, bilateral    Liver replaced by transplant ([place] [address] )    Nausea  -     ondansetron (ZOFRAN-ODT) 4 mg disintegrating tablet; Take 1 tablet (4 mg total) by mouth every 6 (six) hours as needed for nausea or vomiting    Other orders  -     Cancel: Hepatitis C Antibody ([facility]); Future  -     Cancel: HIV 1/2 Antigen/Antibody (4th Generation) w Reflex [facility]; Future        BMI Counseling: Body mass index is 28 92 kg/m²  The BMI is above normal  Nutrition recommendations include encouraging healthy choices of fruits and vegetables and moderation in carbohydrate intake  Exercise recommendations include exercising 3-5 times per week  Subjective:      Patient ID: Kai Weiner is a 63 y o  male  GERD: Patient has rare GERD symptoms, no black tarry stool, no problems with food getting stuck  Hypertension:  Patient reports compliance with med, no lightheadedness    Hypercholesterolemia: pt reports compliance with statin, no significant muscle aches        The following portions of the patient's history were reviewed and updated as appropriate: allergies, current medications, past family history, past medical history, past social history, past surgical history and problem list     Review of Systems   Constitutional: Negative for chills, fatigue and fever  HENT: Negative for congestion, nosebleeds, postnasal drip, sore throat and trouble swallowing  Eyes: Negative for pain  Respiratory: Negative for cough, chest tightness, shortness of breath and wheezing  Cardiovascular: Negative for chest pain, palpitations and leg swelling  Gastrointestinal: Negative for abdominal pain, constipation, diarrhea, nausea and vomiting  Endocrine: Negative for polydipsia and polyuria  Genitourinary: Negative for dysuria, flank pain and hematuria  Musculoskeletal: Positive for arthralgias and back pain  Skin: Negative for rash  Neurological: Negative for dizziness, tremors, light-headedness and headaches  Hematological: Does not bruise/bleed easily  Psychiatric/Behavioral: Negative for confusion and dysphoric mood  The patient is not nervous/anxious  Objective:      /82   Pulse 82   Resp 16   Ht 6' (1 829 m)   Wt 96 7 kg (213 lb 3 2 oz)   SpO2 100%   BMI 28 92 kg/m²          Physical Exam  Vitals signs reviewed  Constitutional:       General: He is not in acute distress  Appearance: Normal appearance  He is well-developed  HENT:      Head: Normocephalic and atraumatic  Right Ear: External ear normal       Left Ear: External ear normal    Eyes:      General: No scleral icterus  Conjunctiva/sclera: Conjunctivae normal    Neck:      Musculoskeletal: Normal range of motion and neck supple  Thyroid: No thyromegaly  Trachea: No tracheal deviation  Cardiovascular:      Rate and Rhythm: Normal rate and regular rhythm  Heart sounds: Normal heart sounds  No murmur     Pulmonary:      Effort: Pulmonary effort is normal  No respiratory distress  Breath sounds: Normal breath sounds  No wheezing or rales  Abdominal:      General: Bowel sounds are normal       Palpations: Abdomen is soft  Tenderness: There is no abdominal tenderness  There is no guarding or rebound  Musculoskeletal:      Right lower leg: No edema  Left lower leg: No edema  Lymphadenopathy:      Cervical: No cervical adenopathy  Neurological:      General: No focal deficit present  Mental Status: He is alert and oriented to person, place, and time  Psychiatric:         Mood and Affect: Mood normal          Behavior: Behavior normal          Thought Content:  Thought content normal          Judgment: Judgment normal

## 2021-03-17 ENCOUNTER — TELEPHONE (OUTPATIENT)
Dept: OTHER | Facility: OTHER | Age: 66
End: 2021-03-17

## 2021-03-17 NOTE — TELEPHONE ENCOUNTER
Patient states he was called by office to schedule a COVID vaccine and he wants to be scheduled  Please call to discuss

## 2021-03-21 ENCOUNTER — IMMUNIZATIONS (OUTPATIENT)
Dept: FAMILY MEDICINE CLINIC | Facility: HOSPITAL | Age: 66
End: 2021-03-21

## 2021-03-21 DIAGNOSIS — Z23 ENCOUNTER FOR IMMUNIZATION: Primary | ICD-10-CM

## 2021-03-21 PROCEDURE — 0001A SARS-COV-2 / COVID-19 MRNA VACCINE (PFIZER-BIONTECH) 30 MCG: CPT

## 2021-03-21 PROCEDURE — 91300 SARS-COV-2 / COVID-19 MRNA VACCINE (PFIZER-BIONTECH) 30 MCG: CPT

## 2021-04-11 ENCOUNTER — IMMUNIZATIONS (OUTPATIENT)
Dept: FAMILY MEDICINE CLINIC | Facility: HOSPITAL | Age: 66
End: 2021-04-11

## 2021-04-11 DIAGNOSIS — Z23 ENCOUNTER FOR IMMUNIZATION: Primary | ICD-10-CM

## 2021-04-11 PROCEDURE — 91300 SARS-COV-2 / COVID-19 MRNA VACCINE (PFIZER-BIONTECH) 30 MCG: CPT

## 2021-04-11 PROCEDURE — 0002A SARS-COV-2 / COVID-19 MRNA VACCINE (PFIZER-BIONTECH) 30 MCG: CPT

## 2021-04-14 DIAGNOSIS — F41.9 ANXIETY: ICD-10-CM

## 2021-04-14 RX ORDER — ALPRAZOLAM 0.25 MG/1
TABLET ORAL
Qty: 30 TABLET | Refills: 1 | Status: SHIPPED | OUTPATIENT
Start: 2021-04-14 | End: 2021-06-24

## 2021-04-27 DIAGNOSIS — I10 ESSENTIAL HYPERTENSION: ICD-10-CM

## 2021-04-27 RX ORDER — LISINOPRIL 20 MG/1
20 TABLET ORAL DAILY
Qty: 90 TABLET | Refills: 0 | Status: SHIPPED | OUTPATIENT
Start: 2021-04-27 | End: 2021-07-23

## 2021-04-27 NOTE — TELEPHONE ENCOUNTER
----- Message from Abdias Bess sent at 4/27/2021 11:21 AM EDT -----  [facility] refill the patient has no clinical notes

## 2021-05-03 ENCOUNTER — TRANSCRIBE ORDERS (OUTPATIENT)
Dept: PAIN MEDICINE | Facility: CLINIC | Age: 66
End: 2021-05-03

## 2021-05-03 ENCOUNTER — OFFICE VISIT (OUTPATIENT)
Dept: PAIN MEDICINE | Facility: CLINIC | Age: 66
End: 2021-05-03
Payer: MEDICARE

## 2021-05-03 VITALS
SYSTOLIC BLOOD PRESSURE: 126 MMHG | HEART RATE: 84 BPM | RESPIRATION RATE: 18 BRPM | DIASTOLIC BLOOD PRESSURE: 72 MMHG | WEIGHT: 219 LBS | HEIGHT: 72 IN | BODY MASS INDEX: 29.66 KG/M2

## 2021-05-03 DIAGNOSIS — G89.29 CHRONIC BILATERAL LOW BACK PAIN, UNSPECIFIED WHETHER SCIATICA PRESENT: ICD-10-CM

## 2021-05-03 DIAGNOSIS — M51.16 INTERVERTEBRAL DISC DISORDER WITH RADICULOPATHY OF LUMBAR REGION: ICD-10-CM

## 2021-05-03 DIAGNOSIS — M54.16 LUMBAR RADICULOPATHY: ICD-10-CM

## 2021-05-03 DIAGNOSIS — M54.50 CHRONIC BILATERAL LOW BACK PAIN, UNSPECIFIED WHETHER SCIATICA PRESENT: ICD-10-CM

## 2021-05-03 DIAGNOSIS — M48.061 SPINAL STENOSIS OF LUMBAR REGION, UNSPECIFIED WHETHER NEUROGENIC CLAUDICATION PRESENT: ICD-10-CM

## 2021-05-03 DIAGNOSIS — G89.4 CHRONIC PAIN SYNDROME: ICD-10-CM

## 2021-05-03 DIAGNOSIS — M47.816 LUMBAR SPONDYLOSIS: ICD-10-CM

## 2021-05-03 PROCEDURE — 99214 OFFICE O/P EST MOD 30 MIN: CPT | Performed by: NURSE PRACTITIONER

## 2021-05-03 RX ORDER — GABAPENTIN 300 MG/1
300 CAPSULE ORAL 2 TIMES DAILY
Qty: 60 CAPSULE | Refills: 5 | Status: SHIPPED | OUTPATIENT
Start: 2021-05-03 | End: 2021-10-25 | Stop reason: SDUPTHER

## 2021-05-03 NOTE — PROGRESS NOTES
Assessment:  1  Chronic pain syndrome    2  Chronic bilateral low back pain, unspecified whether sciatica present    3  Intervertebral disc disorder with radiculopathy of lumbar region    4  Spinal stenosis of lumbar region, unspecified whether neurogenic claudication present    5  Lumbar spondylosis        Plan:  Adilene Barclay is a 63 y o  male who presents for a follow up office visit in regards to Back Pain  The patient has a history of chronic pain syndrome secondary to low back pain, bilateral hip pain, lumbar intervertebral disc disorder with radiculopathy, and trochanteric bursitis  Patient presents today with ongoing low back and left hip pain  He has been noticing increased cramping pains in his buttocks, and difficulty sleeping on his left side at night  Upon examination his pain is consistent with spinal stenosis at L4-L5  To help decrease inflammation nerve irritation a bilateral L4 transforaminal epidural steroid injection can be performed  Complete risks and benefits including bleeding, infection, tissue reaction, nerve injury and allergic reaction were discussed  The approach was demonstrated using models and literature was provided  Verbal and written consent was obtained  He will continue on the gabapentin as prescribed, and refills were sent to the pharmacy  We did is discussed weaning off the medication in the future to see if it is helping with his pain symptoms  When he wants to wean off, he was instructed to take 1 tablet daily for 1 week then stop    My impressions and treatment recommendations were discussed in detail with the patient who verbalized understanding and had no further questions  Discharge instructions were provided  I personally saw and examined the patient and I agree with the above discussed plan of care  No orders of the defined types were placed in this encounter  No orders of the defined types were placed in this encounter        History of Present Illness:  Sharri Lakhani is a 63 y o  male who presents for a follow up office visit in regards to Back Pain  The patient has a history of chronic pain syndrome secondary to low back pain, bilateral hip pain, lumbar intervertebral disc disorder with radiculopathy, and trochanteric bursitis  His last office visit was November 4, 2020 in which he had bilateral trochanteric bursa injections  He presents today with ongoing hip and low back pain  The pain radiates across the low back with occasional paresthesias in his lower extremities which occurs mainly when driving for long periods of time  He also has cramping pain in the buttock  The left hip pain he has is worse at night when trying to lay on his left side  The pain is constant but worse in the evening at night  He describes it as burning, dull aching, cramping, and shooting  He is rating his pain a 6/10 on numeric rating scale  The patient trochanteric bursa injections on November 4, 2020, which had provided significant pain relief, stating %, for about 5-6 months  He is taking gabapentin 300 mg 2 tablets at bedtime and is unsure if it provides pain relief  I have personally reviewed and/or updated the patient's past medical history, past surgical history, family history, social history, current medications, allergies, and vital signs today  Review of Systems   Respiratory: Negative for shortness of breath  Cardiovascular: Negative for chest pain  Gastrointestinal: Negative for constipation, diarrhea, nausea and vomiting  Musculoskeletal: Positive for back pain, gait problem and joint swelling  Negative for arthralgias and myalgias  Skin: Negative for rash  Neurological: Negative for dizziness, seizures and weakness  All other systems reviewed and are negative        Patient Active Problem List   Diagnosis    [facility] dependence in remission ([facility] )    Bursitis of both hips    Chronic gastroesophageal reflux disease    Essential hypertension    Intervertebral disc disorder with radiculopathy of lumbar region    Liver replaced [facility] ([facility] )    Sacroiliitis ([place] [address] )    Seasonal allergies    Lung mass    Neck pain    Nonalcoholic liver disease, chronic    Abnormal electrocardiogram    Osteopenia    PPD negative    Right arm pain    Ringing in ears, bilateral    Short-term memory loss    Tobacco abuse    Mixed hyperlipidemia    Primary insomnia    Medicare annual wellness visit, subsequent    Leg cramps    Claudication in peripheral vascular disease ([place] [address] )    Carotid stenosis, asymptomatic, bilateral       Past Medical History:   Diagnosis Date    Alcoholism ([place] [address] )     Bacterial peritonitis ([place] [address] )     Bowel disease     Cancer ([place] [address] )     liver    Cataract     Depression     Fracture     Gastroesophageal reflux     Hepatic disease     Hepatocellular carcinoma (HCC)     History of colon polyps     Hypertension     Liver cancer ([place] [address] )     Liver disease     Stomach disease        Past Surgical History:   Procedure Laterality Date    APPENDECTOMY      CATARACT EXTRACTION      EXPLORATORY LAPAROTOMY  09/2011    EYE SURGERY      LIVER TRANSPLANTATION  08/20/2013    PARACENTESIS      Abdominal Paracentesis(Therapeutic) with Imaging Guidance    VA COLONOSCOPY FLX DX W/COLLJ SPEC WHEN PFRMD N/A 9/12/2016    Procedure: COLONOSCOPY;  Surgeon: Clau Yuen MD;  [facility];   Service: General    ROTATOR CUFF REPAIR      x2       Family History   Problem Relation Age of Onset    Coronary artery disease Mother         CABG    Prostate cancer Mother         Prostate Gland    Heart disease Mother         Cardiac Disorder    Vascular Disease Mother     Hypertension Mother         Benign    Diabetes Maternal Grandmother         Mellitus    Clotting disorder Maternal Grandfather         Embolism    Seizures Paternal Grandmother         Epilepsy    Other Paternal Grandfather         Accident    Liver cancer Family     Heart disease Family         Cardiac Disorder    Hypertension Family         Benign       Social History     Occupational History    Occupation:    Tobacco Use    Smoking status: Current Every Day Smoker     Packs/day: 0 25     Years: 35 00     Pack years: 8 75     Types: Cigarettes    Smokeless tobacco: Never Used   Substance and Sexual Activity    Alcohol use: No    Drug use: No     Comment: Per [employer] Drug use way back in college over 40 yrs ago    Sexual activity: Not on file       Current Outpatient Medications on File Prior to Visit   Medication Sig    acetaminophen (TYLENOL) 325 mg tablet Take 650 mg by mouth every 6 (six) hours as needed for mild pain   ALPRAZolam (XANAX) 0 25 mg tablet TAKE 1 TABLET BY MOUTH EVERY DAY AS NEEDED FOR ANXIETY    aspirin (ECOTRIN LOW STRENGTH) 81 mg EC tablet Take 81 mg by mouth daily    atorvastatin (LIPITOR) 40 mg tablet Take 1 tablet (40 mg total) by mouth daily    CALCIUM-MAGNESIUM-VITAMIN D ER PO Take 1 tablet by mouth daily    docusate sodium (COLACE) 100 mg capsule Take 100 mg by mouth 2 (two) times a day   gabapentin (NEURONTIN) 300 mg capsule Take 1 capsule (300 mg total) by mouth 2 (two) times a day    lisinopril (ZESTRIL) 20 mg tablet Take 1 tablet (20 mg total) by mouth daily The patient should contact his family doctor for further orders or set up an appointment before next  Refill   omeprazole (PriLOSEC) 20 mg delayed release capsule TAKE 1 CAPSULE BY MOUTH TWICE DAILY    ondansetron (ZOFRAN-ODT) 4 mg disintegrating tablet Take 1 tablet (4 mg total) by mouth every 6 (six) hours as needed for nausea or vomiting    ONE DAILY MULTIPLE VITAMIN PO Take by mouth      tacrolimus (PROGRAF) 1 mg capsule TAKE 3 CAPSULES(3 MG TOTAL) BY MOUTH TWICE DAILY     Current Facility-Administered Medications on File Prior to Visit   Medication    bupivacaine (PF) (MARCAINE) 0 25 % injection 20 mL    methylPREDNISolone acetate (DEPO-MEDROL) injection 80 mg       Allergies   Allergen Reactions    Macrolides And Ketolides      Annotation - [id number]: The patient is on Antirejection medications and they will reduce the effective ness of the medications  Physical Exam:    /72   Pulse 84   Resp 18   Ht 6' (1 829 m)   Wt 99 3 kg (219 lb)   BMI 29 70 kg/m²     Constitutional:normal, well developed, well nourished, alert, in no distress and non-toxic and no overt pain behavior    Eyes:anicteric  HEENT:grossly intact  Neck:supple, symmetric, trachea midline and no masses   Pulmonary:even and unlabored  Cardiovascular:No edema or pitting edema present  Skin:Normal without rashes or lesions and well hydrated  Psychiatric:Mood and affect appropriate  Neurologic:Cranial Nerves II-XII grossly intact  Musculoskeletal:normal     Lumbar Spine Exam    Appearance:  Normal lordosis  Palpation/Tenderness:  left lumbar paraspinal tenderness  Range of Motion:  Flexion:  Minimally limited  with pain  Extension:  Minimally limited  with pain  Motor Strength:  Left hip flexion:  5/5  Right hip flexion:  5/5  Left knee flexion:  5/5  Left knee extension:  5/5  Right knee flexion:  5/5  Right knee extension:  5/5  Left foot dorsiflexion:  5/5  Left foot plantar flexion:  5/5  Right foot dorsiflexion:  5/5  Right foot plantar flexion:  5/5  Special Tests:  Left Straight Leg Test:  negative  Right Straight Leg Test:  negative    Imaging  MRI LUMBAR SPINE WITHOUT CONTRAST     INDICATION:  Right-sided sciatica      COMPARISON:  12/17/2010     TECHNIQUE:  Sagittal T1, sagittal T2, sagittal inversion recovery, axial T1 and axial T2, coronal T2        IMAGE QUALITY:  Diagnostic     FINDINGS:     ALIGNMENT:  Normal alignment of the lumbar spine   No compression fracture   No spondylolysis or spondylolisthesis   No scoliosis   Chronic Schmorl's nodes identified within the lower thoracic and lumbar vertebral bodies, unchanged      MARROW SIGNAL:  Normal marrow signal is identified within the visualized bony structures   No discrete marrow lesion      DISTAL CORD AND CONUS:  Normal size and signal within the distal cord and conus   The conus ends at the L1 level      PARASPINAL SOFT TISSUES:  Paraspinal soft tissues are unremarkable      SACRUM:  Normal signal within the sacrum   No evidence of insufficiency or stress fracture      LOWER THORACIC DISC SPACES:  Normal disc height and signal   No disc herniation, canal stenosis or foraminal narrowing      LUMBAR DISC SPACES:          L1-L2:  Slight annular bulging without disc herniation, canal stenosis or foraminal narrowing      L2-L3:  Slight annular bulging without disc herniation, canal stenosis or foraminal narrowing      L3-L4:  Moderate annular bulging   Mild endplate hypertrophic degenerative change with moderate canal stenosis and bilateral foraminal narrowing, right greater than left   This is grossly unchanged      L4-L5:  Annular bulging and facet arthropathy with moderate canal stenosis, slightly more pronounced than the prior examination   Small broad-based right foraminal disc protrusion   Moderate left and severe right foraminal narrowing   Right foraminal   narrowing slightly more pronounced      L5-S1:  Mild annular bulging   Moderate left and mild right facet arthropathy   No disc herniation or canal stenosis   Moderately severe left and moderate right foraminal narrowing   Left foraminal narrowing is slightly more pronounced      IMPRESSION:     Progression of degenerative change compared to the prior examination with worsening canal stenosis at L4-5      Worsening severe right foraminal narrowing at L4-5 and moderately severe left foraminal narrowing at L5-S1

## 2021-05-04 ENCOUNTER — TELEPHONE (OUTPATIENT)
Dept: PAIN MEDICINE | Facility: CLINIC | Age: 66
End: 2021-05-04

## 2021-05-06 NOTE — TELEPHONE ENCOUNTER
Scheduled pt for B/L L4 Tfesi for 5/27/2021  Pt denies rx blood thinners  Went over pre-procedure instructions below:  Nothing to eat or drink 1 hr prior to procedure  Need to arrange transportation  Proper clothing for procedure  If ill or placed on antibiotics please call to reschedule  Pt had covid booster on 4/11/21  Covid/travel/ and vaccine instructions

## 2021-05-09 ENCOUNTER — APPOINTMENT (EMERGENCY)
Dept: RADIOLOGY | Facility: HOSPITAL | Age: 66
DRG: 534 | End: 2021-05-09
Payer: MEDICARE

## 2021-05-09 ENCOUNTER — APPOINTMENT (EMERGENCY)
Dept: CT IMAGING | Facility: HOSPITAL | Age: 66
DRG: 534 | End: 2021-05-09
Payer: MEDICARE

## 2021-05-09 ENCOUNTER — HOSPITAL ENCOUNTER (INPATIENT)
Facility: HOSPITAL | Age: 66
LOS: 1 days | DRG: 534 | End: 2021-05-11
Attending: EMERGENCY MEDICINE | Admitting: SURGERY
Payer: MEDICARE

## 2021-05-09 DIAGNOSIS — S72.409A: ICD-10-CM
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
## 2021-05-09 DIAGNOSIS — M25.562 LEFT KNEE PAIN: ICD-10-CM

## 2021-05-09 DIAGNOSIS — S72.413A FEMORAL CONDYLE FRACTURE (HCC): Primary | ICD-10-CM

## 2021-05-09 PROBLEM — G89.29 CHRONIC LOW BACK PAIN: Status: ACTIVE | Noted: 2021-05-09

## 2021-05-09 PROBLEM — S72.412A FRACTURE OF FEMORAL CONDYLE, LEFT, CLOSED (HCC): Status: ACTIVE | Noted: 2021-05-09

## 2021-05-09 PROBLEM — M54.50 CHRONIC LOW BACK PAIN: Status: ACTIVE | Noted: 2021-05-09

## 2021-05-09 PROBLEM — G89.11 ACUTE PAIN DUE TO TRAUMA: Status: ACTIVE | Noted: 2021-05-09

## 2021-05-09 LAB
ALBUMIN SERPL BCP-MCNC: 4 G/DL (ref 3.5–5)
ALP SERPL-CCNC: 74 U/L (ref 46–116)
ALT SERPL W P-5'-P-CCNC: 28 U/L (ref 12–78)
ANION GAP SERPL CALCULATED.3IONS-SCNC: 8 MMOL/L (ref 4–13)
AST SERPL W P-5'-P-CCNC: 24 U/L (ref 5–45)
BASOPHILS # BLD AUTO: 0.04 THOUSANDS/ΜL (ref 0–0.1)
BASOPHILS NFR BLD AUTO: 0 % (ref 0–1)
BILIRUB SERPL-MCNC: 0.46 MG/DL (ref 0.2–1)
BUN SERPL-MCNC: 15 MG/DL (ref 5–25)
CALCIUM SERPL-MCNC: 9 MG/DL (ref 8.3–10.1)
CHLORIDE SERPL-SCNC: 102 MMOL/L (ref 100–108)
CO2 SERPL-SCNC: 28 MMOL/L (ref 21–32)
CREAT SERPL-MCNC: 1.3 MG/DL (ref 0.6–1.3)
EOSINOPHIL # BLD AUTO: 0.13 THOUSAND/ΜL (ref 0–0.61)
EOSINOPHIL NFR BLD AUTO: 1 % (ref 0–6)
ERYTHROCYTE [DISTWIDTH] IN BLOOD BY AUTOMATED COUNT: 12 % (ref 11.6–15.1)
GFR SERPL CREATININE-BSD FRML MDRD: 58 ML/MIN/1.73SQ M
GLUCOSE SERPL-MCNC: 103 MG/DL (ref 65–140)
HCT VFR BLD AUTO: 42.2 % (ref 36.5–49.3)
HGB BLD-MCNC: 14.5 G/DL (ref 12–17)
IMM GRANULOCYTES # BLD AUTO: 0.05 THOUSAND/UL (ref 0–0.2)
IMM GRANULOCYTES NFR BLD AUTO: 0 % (ref 0–2)
LYMPHOCYTES # BLD AUTO: 1.66 THOUSANDS/ΜL (ref 0.6–4.47)
LYMPHOCYTES NFR BLD AUTO: 15 % (ref 14–44)
MCH RBC QN AUTO: 34.1 PG (ref 26.8–34.3)
MCHC RBC AUTO-ENTMCNC: 34.4 G/DL (ref 31.4–37.4)
MCV RBC AUTO: 99 FL (ref 82–98)
MONOCYTES # BLD AUTO: 0.6 THOUSAND/ΜL (ref 0.17–1.22)
MONOCYTES NFR BLD AUTO: 5 % (ref 4–12)
NEUTROPHILS # BLD AUTO: 8.73 THOUSANDS/ΜL (ref 1.85–7.62)
NEUTS SEG NFR BLD AUTO: 79 % (ref 43–75)
NRBC BLD AUTO-RTO: 0 /100 WBCS
PLATELET # BLD AUTO: 219 THOUSANDS/UL (ref 149–390)
PMV BLD AUTO: 12.2 FL (ref 8.9–12.7)
POTASSIUM SERPL-SCNC: 4.4 MMOL/L (ref 3.5–5.3)
PROT SERPL-MCNC: 6.9 G/DL (ref 6.4–8.2)
RBC # BLD AUTO: 4.25 MILLION/UL (ref 3.88–5.62)
SODIUM SERPL-SCNC: 138 MMOL/L (ref 136–145)
WBC # BLD AUTO: 11.21 THOUSAND/UL (ref 4.31–10.16)

## 2021-05-09 PROCEDURE — 36415 COLL VENOUS BLD VENIPUNCTURE: CPT | Performed by: PHYSICIAN ASSISTANT

## 2021-05-09 PROCEDURE — 73700 CT LOWER EXTREMITY W/O DYE: CPT

## 2021-05-09 PROCEDURE — 85025 COMPLETE CBC W/AUTO DIFF WBC: CPT | Performed by: PHYSICIAN ASSISTANT

## 2021-05-09 PROCEDURE — 99285 EMERGENCY DEPT VISIT HI MDM: CPT

## 2021-05-09 PROCEDURE — 99285 EMERGENCY DEPT VISIT HI MDM: CPT | Performed by: EMERGENCY MEDICINE

## 2021-05-09 PROCEDURE — 80053 COMPREHEN METABOLIC PANEL: CPT | Performed by: PHYSICIAN ASSISTANT

## 2021-05-09 PROCEDURE — 73564 X-RAY EXAM KNEE 4 OR MORE: CPT

## 2021-05-09 PROCEDURE — 99219 PR INITIAL OBSERVATION CARE/DAY 50 MINUTES: CPT | Performed by: SURGERY

## 2021-05-09 RX ORDER — LISINOPRIL 20 MG/1
20 TABLET ORAL DAILY
Status: DISCONTINUED | OUTPATIENT
Start: 2021-05-10 | End: 2021-05-11 | Stop reason: HOSPADM

## 2021-05-09 RX ORDER — DEXAMETHASONE SODIUM PHOSPHATE 4 MG/ML
10 INJECTION, SOLUTION INTRA-ARTICULAR; INTRALESIONAL; INTRAMUSCULAR; INTRAVENOUS; SOFT TISSUE ONCE
Status: DISCONTINUED | OUTPATIENT
Start: 2021-05-09 | End: 2021-05-09

## 2021-05-09 RX ORDER — HYDROMORPHONE HCL/PF 1 MG/ML
0.5 SYRINGE (ML) INJECTION
Status: DISCONTINUED | OUTPATIENT
Start: 2021-05-09 | End: 2021-05-10

## 2021-05-09 RX ORDER — ONDANSETRON 2 MG/ML
4 INJECTION INTRAMUSCULAR; INTRAVENOUS EVERY 4 HOURS PRN
Status: DISCONTINUED | OUTPATIENT
Start: 2021-05-09 | End: 2021-05-11 | Stop reason: HOSPADM

## 2021-05-09 RX ORDER — ASPIRIN 81 MG/1
81 TABLET ORAL DAILY
Status: DISCONTINUED | OUTPATIENT
Start: 2021-05-10 | End: 2021-05-11 | Stop reason: HOSPADM

## 2021-05-09 RX ORDER — CALCIUM CARBONATE 500(1250)
2 TABLET ORAL
Status: DISCONTINUED | OUTPATIENT
Start: 2021-05-10 | End: 2021-05-11 | Stop reason: HOSPADM

## 2021-05-09 RX ORDER — DEXAMETHASONE SODIUM PHOSPHATE 4 MG/ML
10 INJECTION, SOLUTION INTRA-ARTICULAR; INTRALESIONAL; INTRAMUSCULAR; INTRAVENOUS; SOFT TISSUE EVERY 6 HOURS SCHEDULED
Status: DISCONTINUED | OUTPATIENT
Start: 2021-05-09 | End: 2021-05-09

## 2021-05-09 RX ORDER — PANTOPRAZOLE SODIUM 20 MG/1
20 TABLET, DELAYED RELEASE ORAL
Status: DISCONTINUED | OUTPATIENT
Start: 2021-05-10 | End: 2021-05-11 | Stop reason: HOSPADM

## 2021-05-09 RX ORDER — METHOCARBAMOL 500 MG/1
500 TABLET, FILM COATED ORAL EVERY 6 HOURS SCHEDULED
Status: DISCONTINUED | OUTPATIENT
Start: 2021-05-09 | End: 2021-05-11 | Stop reason: HOSPADM

## 2021-05-09 RX ORDER — ATORVASTATIN CALCIUM 40 MG/1
40 TABLET, FILM COATED ORAL DAILY
Status: DISCONTINUED | OUTPATIENT
Start: 2021-05-10 | End: 2021-05-11 | Stop reason: HOSPADM

## 2021-05-09 RX ORDER — ACETAMINOPHEN 325 MG/1
650 TABLET ORAL EVERY 4 HOURS PRN
Status: DISCONTINUED | OUTPATIENT
Start: 2021-05-09 | End: 2021-05-10

## 2021-05-09 RX ORDER — NICOTINE 21 MG/24HR
1 PATCH, TRANSDERMAL 24 HOURS TRANSDERMAL DAILY
Status: DISCONTINUED | OUTPATIENT
Start: 2021-05-09 | End: 2021-05-11 | Stop reason: HOSPADM

## 2021-05-09 RX ORDER — TACROLIMUS 1 MG/1
3 CAPSULE ORAL EVERY 12 HOURS SCHEDULED
Status: DISCONTINUED | OUTPATIENT
Start: 2021-05-09 | End: 2021-05-11 | Stop reason: HOSPADM

## 2021-05-09 RX ORDER — OXYCODONE HYDROCHLORIDE 10 MG/1
10 TABLET ORAL EVERY 4 HOURS PRN
Status: DISCONTINUED | OUTPATIENT
Start: 2021-05-09 | End: 2021-05-11 | Stop reason: HOSPADM

## 2021-05-09 RX ORDER — ALPRAZOLAM 0.25 MG/1
0.25 TABLET ORAL DAILY PRN
Status: DISCONTINUED | OUTPATIENT
Start: 2021-05-09 | End: 2021-05-11 | Stop reason: HOSPADM

## 2021-05-09 RX ORDER — HYDROCODONE BITARTRATE AND ACETAMINOPHEN 5; 325 MG/1; MG/1
1 TABLET ORAL ONCE
Status: COMPLETED | OUTPATIENT
Start: 2021-05-09 | End: 2021-05-09

## 2021-05-09 RX ORDER — DOCUSATE SODIUM 100 MG/1
100 CAPSULE, LIQUID FILLED ORAL 2 TIMES DAILY
Status: DISCONTINUED | OUTPATIENT
Start: 2021-05-09 | End: 2021-05-11

## 2021-05-09 RX ORDER — OXYCODONE HYDROCHLORIDE 5 MG/1
5 TABLET ORAL EVERY 4 HOURS PRN
Status: DISCONTINUED | OUTPATIENT
Start: 2021-05-09 | End: 2021-05-11 | Stop reason: HOSPADM

## 2021-05-09 RX ORDER — GABAPENTIN 300 MG/1
300 CAPSULE ORAL 2 TIMES DAILY
Status: DISCONTINUED | OUTPATIENT
Start: 2021-05-09 | End: 2021-05-10

## 2021-05-09 RX ADMIN — OXYCODONE HYDROCHLORIDE 10 MG: 10 TABLET ORAL at 20:58

## 2021-05-09 RX ADMIN — HYDROMORPHONE HYDROCHLORIDE 0.5 MG: 1 INJECTION, SOLUTION INTRAMUSCULAR; INTRAVENOUS; SUBCUTANEOUS at 19:21

## 2021-05-09 RX ADMIN — HYDROMORPHONE HYDROCHLORIDE 0.5 MG: 1 INJECTION, SOLUTION INTRAMUSCULAR; INTRAVENOUS; SUBCUTANEOUS at 16:46

## 2021-05-09 RX ADMIN — HYDROCODONE BITARTRATE AND ACETAMINOPHEN 1 TABLET: 5; 325 TABLET ORAL at 13:01

## 2021-05-09 RX ADMIN — GABAPENTIN 300 MG: 300 CAPSULE ORAL at 20:58

## 2021-05-09 RX ADMIN — METHOCARBAMOL 500 MG: 500 TABLET ORAL at 19:21

## 2021-05-09 RX ADMIN — TACROLIMUS 3 MG: 1 CAPSULE ORAL at 20:59

## 2021-05-09 RX ADMIN — HYDROMORPHONE HYDROCHLORIDE 0.5 MG: 1 INJECTION, SOLUTION INTRAMUSCULAR; INTRAVENOUS; SUBCUTANEOUS at 23:02

## 2021-05-09 RX ADMIN — NICOTINE 1 PATCH: 21 PATCH, EXTENDED RELEASE TRANSDERMAL at 20:58

## 2021-05-09 NOTE — ASSESSMENT & PLAN NOTE
- Acute pain secondary to traumatic injuries  - Placed on multi modal analgesic regimen  - Bowel regimen as long as using opioids   - Continue to monitor pain and adjust regimen as indicated

## 2021-05-09 NOTE — ASSESSMENT & PLAN NOTE
- mechanical fall directly onto left knee  - sustaining the below stated injury  -PT and OT evaluations pending for 5/10  -case management consulted

## 2021-05-09 NOTE — ED NOTES
Pt and pt's wife making demands, being unkind to staff  Security was called  Pt threatening to leave AMA due to being in hallway bed, after pt was made aware multiple times that the ER and the MS floors were full and as fast as safely possible  Wife stating "I work for [employer] and know how it works here "  Trauma provider made aware of pt's request to leave AMA       Jorje Fung RN  05/09/21 [id number]

## 2021-05-09 NOTE — H&P
[facility]  H&P- Ruben Ebbs 1955, 63 y o  male MRN: [id number]  Unit/Bed#: RAJPUT  Encounter: [id number]  Primary Care Provider: Shaheen Alfonso MD   Date and time admitted to hospital: 5/9/2021 12:17 PM    Fall  Assessment & Plan  - mechanical fall directly onto left knee  - sustaining the below stated injury  -PT and OT evaluations pending for 5/10  -case management consulted     * Fracture of femoral condyle, left, closed ([facility] [address] )  Assessment & Plan  - nondisplaced left medial femoral condyle fracture, present on admission   - knee immobilizer to the left lower extremity  - Appreciate Orthopedic surgery evaluation, recommendations and interventions as noted  Orthopedics will see patient in the morning on 05/10   - Maintain non weightbearing status on the left lower extremity in a knee immobilizer  - Monitor left lower extremity neurovascular exam   - Continue multimodal analgesic regimen   - Continue DVT prophylaxis  - PT and OT evaluation and treatment as indicated  - Outpatient follow up with Orthopedic surgery for re-evaluation  Acute pain due to trauma  Assessment & Plan  - Acute pain secondary to traumatic injuries  - Placed on multi modal analgesic regimen  - Bowel regimen as long as using opioids   - Continue to monitor pain and adjust regimen as indicated        Chronic low back pain  Assessment & Plan  - receives injections in his low back for DJD and sciatica, follows with the pain center  - takes gabapentin 300 mg BID chronically as well    History of liver transplant [facility])  Assessment & Plan  -patient has a history of hepatocellular carcinoma status post liver transplant 2018  -continue home medications, including tacrolimus    Assessment/Plan   Trauma Alert: Level B  Model of Arrival: Self  Trauma Team: Attending Marcos Andrade and SHELDON Grossman  Consultants: Dr Tiffanie Weber was contacted by the ED around 2:00 pm when they recommended admission for pain control and PT      Chief Complaint: "Left knee pain"    History of Present Illness   HPI:  Luis Morocho is a 63 y o  male who presents status post fall as he was walking out of his house onto his back patio  He states he was in a rush and tripped, catching his toe on the door jam and falling down 2 steps, landing directly on her left knee on the  patio  He did not strike his head  He has a superficial abrasion on his left elbow however has no pain there he does not feel that he hit his body elsewhere during the fall  His only complaint at this time is left knee pain  He was initially evaluated by the ED who completed an x-ray and ultimately a CT scan of the left knee which revealed an intra-articular medial femoral condyle fracture  He does take aspirin daily  He takes no other blood thinning agents  He does have a history of hepatocellular carcinoma status post liver transplant  His wife is at bedside and was not home when it occurred however came home shortly thereafter  Mechanism:Fall    Review of Systems   Constitutional: Negative  HENT: Negative  Respiratory: Negative  Cardiovascular: Negative  Gastrointestinal: Negative  Genitourinary: Negative  Musculoskeletal:        +left knee pain   Skin: Positive for wound  +superficial abrasion to the left elbow, dime-sized   Neurological: Negative  Hematological: Negative  Psychiatric/Behavioral: Negative  12-point, complete review of systems was reviewed and negative except as stated above         Historical Information     Past Medical History:   Diagnosis Date    [facility]     [facility] per[facility]itis [facility] )     Bowel disease     Cancer ([facility] )     liver    Cataract     Depression     Fracture     Gastroesophageal reflux     Hepatic disease     Hepatocellular carcinoma (HCC)     History of colon polyps     Hypertension     Liver cancer [facility] )     Liver disease     Stomach disease      Past Surgical History:   Procedure Laterality Date    APPENDECTOMY      CATARACT EXTRACTION      EXPLORATORY LAPAROTOMY  09/2011    EYE SURGERY      LIVER TRANSPLANTATION  08/20/2013    PARACENTESIS      Abdominal Paracentesis(Therapeutic) with Imaging Guidance    SC COLONOSCOPY FLX DX W/COLLJ SPEC WHEN PFRMD N/A 9/12/2016    Procedure: COLONOSCOPY;  Surgeon: Hailee Soto MD;  [facility]; Service: General    ROTATOR CUFF REPAIR      x2     Social History   Social History     Substance and Sexual Activity   Alcohol Use Yes    Frequency: 2-4 times a month     Social History     Substance and Sexual Activity   Drug Use No    Comment: Per [employer] Drug use way back in college over 40 yrs ago     Social History     Tobacco Use   Smoking Status Current Every Day Smoker    Packs/day: 0 25    Years: 35 00    Pack years: 8 75    Types: Cigarettes   Smokeless Tobacco Never Used     E-Cigarette/Vaping    E-Cigarette Use Current Every Day User      E-Cigarette/Vaping Substances    Nicotine Yes     THC No     CBD No     Flavoring No     Other No     Unknown No      Immunization History   Administered Date(s) Administered    INFLUENZA 10/14/2014, 11/01/2016, 11/20/2017    Influenza Quadrivalent, 6-35 Months IM 11/01/2016    Influenza, recombinant, quadrivalent,injectable, preservative free 09/19/2018, 10/12/2019, 10/20/2020    Influenza, seasonal, injectable 10/06/2010, 10/26/2011, 11/02/2012, 10/14/2014, 10/30/2015, 11/04/2016    Meningococcal MCV4P 11/20/2017    Pneumococcal Polysaccharide PPV23 04/29/2013, 09/19/2018    SARS-CoV-2 / COVID-19 mRNA IM ([employer]) 03/21/2021, 04/11/2021    Tdap 10/08/2017    Zoster Vaccine Recombinant 10/04/2019, 12/06/2019     Last Tetanus:  2017  Family History: Non-contributory      Meds/Allergies   all current active meds have been reviewed and PTA meds:   Prior to Admission Medications   Prescriptions Last Dose Informant Patient Reported? Taking?    ALPRAZolam Anjaliery Counce) 0 25 mg tablet  Self No No   Sig: TAKE 1 TABLET BY MOUTH EVERY DAY AS NEEDED FOR ANXIETY   CALCIUM-MAGNESIUM-VITAMIN D ER PO  Self Yes No   Sig: Take 1 tablet by mouth daily   ONE DAILY MULTIPLE VITAMIN PO  Self Yes No   Sig: Take by mouth  acetaminophen (TYLENOL) 325 mg tablet  Self Yes No   Sig: Take 650 mg by mouth every 6 (six) hours as needed for mild pain  aspirin (ECOTRIN LOW STRENGTH) 81 mg EC tablet  Self Yes No   Sig: Take 81 mg by mouth daily   atorvastatin (LIPITOR) 40 mg tablet  Self No No   Sig: Take 1 tablet (40 mg total) by mouth daily   docusate sodium (COLACE) 100 mg capsule  Self Yes No   Sig: Take 100 mg by mouth 2 (two) times a day    gabapentin (NEURONTIN) 300 mg capsule   No No   Sig: Take 1 capsule (300 mg total) by mouth 2 (two) times a day   lisinopril (ZESTRIL) 20 mg tablet  Self No No   Sig: Take 1 tablet (20 mg total) by mouth daily The patient should contact his family doctor for further orders or set up an appointment before next  Refill  omeprazole (PriLOSEC) 20 mg delayed release capsule  Self No No   Sig: TAKE 1 CAPSULE BY MOUTH TWICE DAILY   ondansetron (ZOFRAN-ODT) 4 mg disintegrating tablet  Self No No   Sig: Take 1 tablet (4 mg total) by mouth every 6 (six) hours as needed for nausea or vomiting   tacrolimus (PROGRAF) 1 mg capsule  Self No No   Sig: TAKE 3 CAPSULES(3 MG TOTAL) BY MOUTH TWICE DAILY      Facility-Administered Medications Last Administration Doses Remaining   bupivacaine (PF) (MARCAINE) 0 25 % injection 20 mL None recorded 1   methylPREDNISolone acetate (DEPO-MEDROL) injection 80 mg None recorded 1          Allergies   Allergen Reactions    Macrolides And Ketolides      Annotation - [id number]: The patient is on Antirejection medications and they will reduce the effective ness of the medications           PHYSICAL EXAM      Objective   Vitals:   First set: Temperature: 98 5 °F (36 9 °C) (05/09/21 1206)  Pulse: 101 (05/09/21 1206)  Respirations: 18 (05/09/21 1206)  Blood Pressure: 139/80 (05/09/21 1206)    Primary Survey:   (A) Airway:  Intact  (B) Breathing:  Equal bilateral  (C) Circulation: Pulses:   normal  (D) Disabliity:  GCS Total:  15  (E) Expose:  Completed    Secondary Survey: (Click on Physical Exam tab above)  Physical Exam  Constitutional:       Appearance: Normal appearance  HENT:      Head: Normocephalic  Nose: Nose normal       Mouth/Throat:      Mouth: Mucous membranes are moist    Eyes:      Pupils: Pupils are equal, round, and reactive to light  Neck:      Musculoskeletal: Normal range of motion and neck supple  No muscular tenderness  Cardiovascular:      Rate and Rhythm: Normal rate and regular rhythm  Pulses: Normal pulses  Pulmonary:      Effort: Pulmonary effort is normal  No respiratory distress  Breath sounds: Normal breath sounds  Abdominal:      General: Abdomen is flat  There is no distension  Tenderness: There is no abdominal tenderness  There is no guarding or rebound  Musculoskeletal:      Comments: +tenderness to palpation over the left knee just superior to the patella  No significant edema or deformity  He is unable to bend the knee or lift the left leg off the bed  His pelvis is stable and hips and ankles are nontender to palpation  No other edema, contusions or deformities  Skin:     General: Skin is warm and dry  Capillary Refill: Capillary refill takes less than 2 seconds  Neurological:      General: No focal deficit present  Mental Status: He is alert and oriented to person, place, and time  Sensory: No sensory deficit  Motor: No weakness  Psychiatric:         Mood and Affect: Mood normal          Thought Content: Thought content normal          Judgment: Judgment normal          Invasive Devices     None                 Lab Results: Results: I have personally reviewed pertinent reports    CBC/BMP/INR pending  Imaging/EKG Studies: Results: I have personally reviewed pertinent reports  Xr Knee 4+ Vw Left Injury    Result Date: 5/9/2021  Impression: Large joint effusion is suspicious for significant injury  Equivocal cortical step offs and intracondylar regions are identified to suggests subtle nondisplaced fractures  Nonemergent MRI follow-up may be useful to also assess for any internal derangement  The study was marked in [facility] for immediate notification  Workstation performed: [id number]     Ct Lower Extremity Wo Contrast Left    Result Date: 5/9/2021  Impression: Intra-articular distal femoral fracture involving small portion of the medial femoral condyle  Please note the proximal extent of this fracture is not included within the field-of-view, however does not likely extend proximal to the mid diaphysis The study was marked in [employer] for immediate notification   Workstation performed: [id number]     Other Studies:  No new    Code Status: Level 1 - Full Code  Advance Directive and Living Will:      Power of :    POLST:

## 2021-05-09 NOTE — ASSESSMENT & PLAN NOTE
- receives injections in his low back for DJD and sciatica, follows with the pain center  - takes gabapentin 300 mg BID chronically as well

## 2021-05-09 NOTE — ASSESSMENT & PLAN NOTE
-patient has a history of hepatocellular carcinoma status post liver transplant 2018  -continue home medications, including tacrolimus

## 2021-05-09 NOTE — ASSESSMENT & PLAN NOTE
- nondisplaced left medial femoral condyle fracture, present on admission   - knee immobilizer to the left lower extremity  - Appreciate Orthopedic surgery evaluation, recommendations and interventions as noted  Orthopedics will see patient in the morning on 05/10   - Maintain non weightbearing status on the left lower extremity in a knee immobilizer  - Monitor left lower extremity neurovascular exam   - Continue multimodal analgesic regimen   - Continue DVT prophylaxis  - PT and OT evaluation and treatment as indicated  - Outpatient follow up with Orthopedic surgery for re-evaluation

## 2021-05-09 NOTE — ED PROVIDER NOTES
History  Chief Complaint   Patient presents with    Knee Injury     left knee pain, missed a step and fell on left knee  Denies hitting head/LOC, takes aspirin  Denies any other complaints     HPI     Patient reports trip and fall, struck his left knee on the ground  Since then has been having difficulty with pain, swelling and bearing weight  Did not hit his head or lose consciousness  Does take aspirin  Denies any headache, blurred vision, chest pain  Reports pain in the superior aspect of his left knee  Denies any ankle pain  Denies any hip pain  Denies any syncope or presyncopal symptoms  Prior to Admission Medications   Prescriptions Last Dose Informant Patient Reported? Taking? ALPRAZolam (XANAX) 0 25 mg tablet  Self No No   Sig: TAKE 1 TABLET BY MOUTH EVERY DAY AS NEEDED FOR ANXIETY   CALCIUM-MAGNESIUM-VITAMIN D ER PO  Self Yes No   Sig: Take 1 tablet by mouth daily   ONE DAILY MULTIPLE VITAMIN PO  Self Yes No   Sig: Take by mouth  acetaminophen (TYLENOL) 325 mg tablet  Self Yes No   Sig: Take 650 mg by mouth every 6 (six) hours as needed for mild pain  aspirin (ECOTRIN LOW STRENGTH) 81 mg EC tablet  Self Yes No   Sig: Take 81 mg by mouth daily   atorvastatin (LIPITOR) 40 mg tablet  Self No No   Sig: Take 1 tablet (40 mg total) by mouth daily   docusate sodium (COLACE) 100 mg capsule  Self Yes No   Sig: Take 100 mg by mouth 2 (two) times a day    gabapentin (NEURONTIN) 300 mg capsule   No No   Sig: Take 1 capsule (300 mg total) by mouth 2 (two) times a day   lisinopril (ZESTRIL) 20 mg tablet  Self No No   Sig: Take 1 tablet (20 mg total) by mouth daily The patient should contact his family doctor for further orders or set up an appointment before next  Refill     omeprazole (PriLOSEC) 20 mg delayed release capsule  Self No No   Sig: TAKE 1 CAPSULE BY MOUTH TWICE DAILY   ondansetron (ZOFRAN-ODT) 4 mg disintegrating tablet  Self No No   Sig: Take 1 tablet (4 mg total) by mouth every 6 (six) hours as needed for nausea or vomiting   tacrolimus (PROGRAF) 1 mg capsule  Self No No   Sig: TAKE 3 CAPSULES(3 MG TOTAL) BY MOUTH TWICE DAILY      Facility-Administered Medications Last Administration Doses Remaining   bupivacaine (PF) (MARCAINE) 0 25 % injection 20 mL None recorded 1   methylPREDNISolone acetate (DEPO-MEDROL) injection 80 mg None recorded 1          Past Medical History:   Diagnosis Date    Alcoholism ([place] [address] )     Bacterial peritonitis ([place] [address] )     Bowel disease     Cancer ([place] [address] )     liver    Cataract     Depression     Fracture     Gastroesophageal reflux     Hepatic disease     Hepatocellular carcinoma ([place] [address] )     History of colon polyps     Hypertension     Liver cancer ([place] [address] )     Liver disease     Stomach disease        Past Surgical History:   Procedure Laterality Date    APPENDECTOMY      CATARACT EXTRACTION      EXPLORATORY LAPAROTOMY  09/2011    EYE SURGERY      LIVER TRANSPLANTATION  08/20/2013    PARACENTESIS      Abdominal Paracentesis(Therapeutic) with Imaging Guidance    OK COLONOSCOPY FLX DX W/COLLJ SPEC WHEN PFRMD N/A 9/12/2016    Procedure: COLONOSCOPY;  Surgeon: Chantell Saenz MD;  Location: [facility]; Service: General    ROTATOR CUFF REPAIR      x2       Family History   Problem Relation Age of Onset    Coronary artery disease Mother         CABG    Prostate cancer Mother         Prostate Gland    Heart disease Mother         Cardiac Disorder    Vascular Disease Mother     Hypertension Mother         Benign    Diabetes Maternal Grandmother         Mellitus    Clotting disorder Maternal Grandfather         Embolism    Seizures Paternal Grandmother         Epilepsy    Other Paternal Grandfather         Accident    Liver cancer Family     Heart disease Family         Cardiac Disorder    Hypertension Family         Benign     I have reviewed and agree with the history as documented      E-Cigarette/Vaping    E-Cigarette Use Current Every Day User E-Cigarette/Vaping Substances    Nicotine Yes     THC No     CBD No     Flavoring No     Other No     Unknown No      Social History     Tobacco Use    Smoking status: Current Every Day Smoker     Packs/day: 0 25     Years: 35 00     Pack years: 8 75     Types: Cigarettes    Smokeless tobacco: Never Used   Substance Use Topics    Alcohol use: Yes     Frequency: 2-4 times a month    Drug use: No     Comment: Per [employer] Drug use way back in college over 40 yrs ago       Review of Systems   Musculoskeletal:        Left knee pain and swelling   All other systems reviewed and are negative  Physical Exam  Physical Exam  Vitals signs and nursing note reviewed  Constitutional:       General: He is not in acute distress  Appearance: He is well-developed  He is not diaphoretic  HENT:      Head: Normocephalic and atraumatic  Right Ear: External ear normal       Left Ear: External ear normal    Eyes:      General:         Right eye: No discharge  Left eye: No discharge  Pupils: Pupils are equal, round, and reactive to light  Neck:      Musculoskeletal: Normal range of motion and neck supple  Thyroid: No thyromegaly  Trachea: No tracheal deviation  Cardiovascular:      Rate and Rhythm: Normal rate and regular rhythm  Heart sounds: No murmur  Pulmonary:      Effort: Pulmonary effort is normal       Breath sounds: Normal breath sounds  Abdominal:      General: Bowel sounds are normal  There is no distension  Palpations: Abdomen is soft  Tenderness: There is no abdominal tenderness  Musculoskeletal: Normal range of motion  General: No deformity  Comments: Diminished range of motion secondary to pain  Significant joint effusion  No overlying laceration  No deformity  Intact distal pulse and sensation  Skin:     General: Skin is warm  Capillary Refill: Capillary refill takes less than 2 seconds     Neurological:      Mental Status: He is alert and oriented to person, place, and time  Cranial Nerves: No cranial nerve deficit  Motor: No abnormal muscle tone     Psychiatric:         Behavior: Behavior normal          Vital Signs  ED Triage Vitals [05/09/21 1206]   Temperature Pulse Respirations Blood Pressure SpO2   98 5 °F (36 9 °C) 101 18 139/80 98 %      Temp Source Heart Rate Source Patient Position - Orthostatic VS BP Location FiO2 (%)   Oral Monitor -- Left arm --      Pain Score       2           Vitals:    05/09/21 1206   BP: 139/80   Pulse: 101         Visual Acuity      ED Medications  Medications   nicotine (NICODERM CQ) 21 mg/24 hr TD 24 hr patch 1 patch (has no administration in time range)   docusate sodium (COLACE) capsule 100 mg (has no administration in time range)   enoxaparin (LOVENOX) subcutaneous injection 30 mg (has no administration in time range)   naloxone (NARCAN) 0 04 mg/mL syringe 0 04 mg (has no administration in time range)   methocarbamol (ROBAXIN) tablet 500 mg (has no administration in time range)   ondansetron (ZOFRAN) injection 4 mg (has no administration in time range)   oxyCODONE (ROXICODONE) IR tablet 5 mg (has no administration in time range)   HYDROmorphone (DILAUDID) injection 0 5 mg (has no administration in time range)   acetaminophen (TYLENOL) tablet 650 mg (has no administration in time range)   oxyCODONE (ROXICODONE) immediate release tablet 10 mg (has no administration in time range)   HYDROcodone-acetaminophen (NORCO) 5-325 mg per tablet 1 tablet (1 tablet Oral Given 5/9/21 1301)       Diagnostic Studies  Results Reviewed     Procedure Component Value Units Date/Time    Comprehensive metabolic panel [[id number]]     Lab Status: No result Specimen: Blood     CBC and differential [[id number]]     Lab Status: No result Specimen: Blood                  CT lower extremity wo contrast left   Final Result by Sunshine Truong MD (05/09 1417)      Intra-articular distal femoral fracture involving small portion of the medial femoral condyle  Please note the proximal extent of this fracture is not included within the field-of-view, however does not likely extend proximal to the mid diaphysis      The study was marked in EPIC for immediate notification  Workstation performed: [id number]         XR knee 4+ vw left injury   Final Result by Mauricio Stuart MD (05/09 1332)      Large joint effusion is suspicious for significant injury  Equivocal cortical step offs and intracondylar regions are identified to suggests subtle nondisplaced fractures  Nonemergent MRI follow-up may be useful to also assess for any internal derangement  The study was marked in [facility] for immediate notification  Workstation performed: [id number]                    Procedures  Procedures         ED Course                                           MDM  Number of Diagnoses or Management Options  Fall, initial encounter: new and requires workup  Femoral distal fracture [facility]): new and requires workup  Left knee pain: new and requires workup  Diagnosis management comments: X-ray of possible fracture, CT obtained which shows minimally displaced distal femoral fracture did not hit his head or lose consciousness  Discussed with Orthopedics, Dr Erika Ceja who recommended knee immobilizer at this time, not weight-bearing, admission to Trauma  Discussed with Trauma saw patient emergency department, plan to admit to their service    Patient's pain improved with oral Norco          Amount and/or Complexity of Data Reviewed  Tests in the radiology section of CPT®: ordered and reviewed  Discuss the patient with other providers: yes    Risk of Complications, Morbidity, and/or Mortality  Presenting problems: moderate  Diagnostic procedures: moderate  Management options: high    Patient Progress  Patient progress: improved      Disposition  Final diagnoses:   Fall, initial encounter   Left knee pain   Femoral distal fracture [facility])     Time reflects when diagnosis was documented in both MDM as applicable and the Disposition within this note     Time User Action Codes Description Comment    5/9/2021  3:09 PM Dwaine Grossman Add [S72 413A] Femoral condyle fracture ([address] )     5/9/2021  3:15 PM Armen Grijalva Mark Márquez  [facility]] Fall, initial encounter     5/9/2021  3:15 PM Armen Grijalva [A23 009] Left knee pain     5/9/2021  3:15 PM Armen Grijalva [X36 672S] Femoral distal fracture [facility])       ED Disposition     ED Disposition Condition Date/Time Comment    Admit Stable Sun May 9, 2021  3:15 PM Case was discussed with trauma and the patient's admission status was agreed to be Admission Status: inpatient status to the service of Dr Rolanda Oreilly   Follow-up Information    None         Patient's Medications   Discharge Prescriptions    No medications on file     No discharge procedures on file      PDMP Review       Value Time User    PDMP Reviewed  Yes 4/14/2021  8:36 AM Saurav Doran MD          ED Provider  Electronically Signed by           Eloisa Kehr, MD  05/09/21 8848

## 2021-05-10 LAB
ANION GAP SERPL CALCULATED.3IONS-SCNC: 10 MMOL/L (ref 4–13)
BUN SERPL-MCNC: 16 MG/DL (ref 5–25)
CALCIUM SERPL-MCNC: 8.8 MG/DL (ref 8.3–10.1)
CHLORIDE SERPL-SCNC: 103 MMOL/L (ref 100–108)
CO2 SERPL-SCNC: 26 MMOL/L (ref 21–32)
CREAT SERPL-MCNC: 1.17 MG/DL (ref 0.6–1.3)
GFR SERPL CREATININE-BSD FRML MDRD: 66 ML/MIN/1.73SQ M
GLUCOSE SERPL-MCNC: 125 MG/DL (ref 65–140)
POTASSIUM SERPL-SCNC: 5.1 MMOL/L (ref 3.5–5.3)
SODIUM SERPL-SCNC: 139 MMOL/L (ref 136–145)

## 2021-05-10 PROCEDURE — 80048 BASIC METABOLIC PNL TOTAL CA: CPT | Performed by: PHYSICIAN ASSISTANT

## 2021-05-10 PROCEDURE — 99222 1ST HOSP IP/OBS MODERATE 55: CPT | Performed by: PHYSICIAN ASSISTANT

## 2021-05-10 PROCEDURE — 99223 1ST HOSP IP/OBS HIGH 75: CPT

## 2021-05-10 PROCEDURE — 97116 GAIT TRAINING THERAPY: CPT

## 2021-05-10 PROCEDURE — 99232 SBSQ HOSP IP/OBS MODERATE 35: CPT | Performed by: SURGERY

## 2021-05-10 PROCEDURE — 97163 PT EVAL HIGH COMPLEX 45 MIN: CPT

## 2021-05-10 PROCEDURE — 97167 OT EVAL HIGH COMPLEX 60 MIN: CPT

## 2021-05-10 RX ORDER — HYDROMORPHONE HCL/PF 1 MG/ML
0.5 SYRINGE (ML) INJECTION EVERY 4 HOURS PRN
Status: DISCONTINUED | OUTPATIENT
Start: 2021-05-10 | End: 2021-05-11

## 2021-05-10 RX ORDER — ACETAMINOPHEN 325 MG/1
650 TABLET ORAL EVERY 6 HOURS SCHEDULED
Status: DISCONTINUED | OUTPATIENT
Start: 2021-05-10 | End: 2021-05-11 | Stop reason: HOSPADM

## 2021-05-10 RX ORDER — POLYETHYLENE GLYCOL 3350 17 G/17G
17 POWDER, FOR SOLUTION ORAL DAILY PRN
Status: DISCONTINUED | OUTPATIENT
Start: 2021-05-10 | End: 2021-05-11

## 2021-05-10 RX ORDER — GABAPENTIN 300 MG/1
300 CAPSULE ORAL 3 TIMES DAILY
Status: DISCONTINUED | OUTPATIENT
Start: 2021-05-10 | End: 2021-05-10

## 2021-05-10 RX ORDER — GABAPENTIN 300 MG/1
300 CAPSULE ORAL 2 TIMES DAILY
Status: DISCONTINUED | OUTPATIENT
Start: 2021-05-10 | End: 2021-05-11 | Stop reason: HOSPADM

## 2021-05-10 RX ADMIN — OXYCODONE HYDROCHLORIDE 10 MG: 10 TABLET ORAL at 20:04

## 2021-05-10 RX ADMIN — OXYCODONE HYDROCHLORIDE 10 MG: 10 TABLET ORAL at 05:16

## 2021-05-10 RX ADMIN — METHOCARBAMOL 500 MG: 500 TABLET ORAL at 23:32

## 2021-05-10 RX ADMIN — CALCIUM 2 TABLET: 500 TABLET ORAL at 09:37

## 2021-05-10 RX ADMIN — GABAPENTIN 300 MG: 300 CAPSULE ORAL at 20:01

## 2021-05-10 RX ADMIN — TACROLIMUS 3 MG: 1 CAPSULE ORAL at 09:37

## 2021-05-10 RX ADMIN — HYDROMORPHONE HYDROCHLORIDE 0.5 MG: 1 INJECTION, SOLUTION INTRAMUSCULAR; INTRAVENOUS; SUBCUTANEOUS at 06:57

## 2021-05-10 RX ADMIN — METHOCARBAMOL 500 MG: 500 TABLET ORAL at 17:02

## 2021-05-10 RX ADMIN — LISINOPRIL 20 MG: 20 TABLET ORAL at 09:37

## 2021-05-10 RX ADMIN — NICOTINE 1 PATCH: 21 PATCH, EXTENDED RELEASE TRANSDERMAL at 09:38

## 2021-05-10 RX ADMIN — OXYCODONE HYDROCHLORIDE 10 MG: 10 TABLET ORAL at 11:11

## 2021-05-10 RX ADMIN — GABAPENTIN 300 MG: 300 CAPSULE ORAL at 17:02

## 2021-05-10 RX ADMIN — HYDROMORPHONE HYDROCHLORIDE 0.5 MG: 1 INJECTION, SOLUTION INTRAMUSCULAR; INTRAVENOUS; SUBCUTANEOUS at 02:29

## 2021-05-10 RX ADMIN — ENOXAPARIN SODIUM 30 MG: 30 INJECTION SUBCUTANEOUS at 02:35

## 2021-05-10 RX ADMIN — ACETAMINOPHEN 650 MG: 325 TABLET, FILM COATED ORAL at 23:32

## 2021-05-10 RX ADMIN — ENOXAPARIN SODIUM 30 MG: 30 INJECTION SUBCUTANEOUS at 17:02

## 2021-05-10 RX ADMIN — ATORVASTATIN CALCIUM 40 MG: 40 TABLET, FILM COATED ORAL at 09:37

## 2021-05-10 RX ADMIN — DOCUSATE SODIUM 100 MG: 100 CAPSULE, LIQUID FILLED ORAL at 17:02

## 2021-05-10 RX ADMIN — OXYCODONE HYDROCHLORIDE 10 MG: 10 TABLET ORAL at 01:08

## 2021-05-10 RX ADMIN — PANTOPRAZOLE SODIUM 20 MG: 20 TABLET, DELAYED RELEASE ORAL at 05:17

## 2021-05-10 RX ADMIN — METHOCARBAMOL 500 MG: 500 TABLET ORAL at 01:04

## 2021-05-10 RX ADMIN — ACETAMINOPHEN 650 MG: 325 TABLET, FILM COATED ORAL at 17:02

## 2021-05-10 RX ADMIN — OXYCODONE HYDROCHLORIDE 10 MG: 10 TABLET ORAL at 16:01

## 2021-05-10 RX ADMIN — ASPIRIN 81 MG: 81 TABLET, COATED ORAL at 09:37

## 2021-05-10 RX ADMIN — ACETAMINOPHEN 650 MG: 325 TABLET, FILM COATED ORAL at 04:46

## 2021-05-10 RX ADMIN — METHOCARBAMOL 500 MG: 500 TABLET ORAL at 13:19

## 2021-05-10 RX ADMIN — METHOCARBAMOL 500 MG: 500 TABLET ORAL at 06:57

## 2021-05-10 RX ADMIN — ONDANSETRON 4 MG: 2 INJECTION INTRAMUSCULAR; INTRAVENOUS at 09:42

## 2021-05-10 RX ADMIN — GABAPENTIN 300 MG: 300 CAPSULE ORAL at 09:37

## 2021-05-10 RX ADMIN — HYDROMORPHONE HYDROCHLORIDE 0.5 MG: 1 INJECTION, SOLUTION INTRAMUSCULAR; INTRAVENOUS; SUBCUTANEOUS at 21:49

## 2021-05-10 RX ADMIN — TACROLIMUS 3 MG: 1 CAPSULE ORAL at 20:02

## 2021-05-10 RX ADMIN — ACETAMINOPHEN 650 MG: 325 TABLET, FILM COATED ORAL at 13:19

## 2021-05-10 RX ADMIN — DOCUSATE SODIUM 100 MG: 100 CAPSULE, LIQUID FILLED ORAL at 09:37

## 2021-05-10 NOTE — PHYSICAL THERAPY NOTE
PHYSICAL THERAPY TREATMENT NOTE    Patient Name: Siena LOGAN Date: 5/10/2021     05/10/21 1130   PT Last Visit   PT Visit Date 05/10/21   Pain Assessment   Pain Assessment Tool Pain Assessment not indicated - pt denies pain   Pain Score 7   Pain Location/Orientation Orientation: Left; Location: Leg   Restrictions/Precautions   LLE Weight Bearing Per Order NWB   Braces or Orthoses Other (Comment)  (left knee immobilizer)   Other Precautions Chair Alarm; Bed Alarm;WBS;Fall Risk;Pain  ([place])   General   Chart Reviewed Yes   Family/Caregiver Present No   Cognition   Arousal/Participation Alert;Arousable   Attention Attends with cues to redirect   Orientation Level Oriented to person; Other (Comment)  (pt was identified w/ full name, birth date)   Following Commands Follows multistep commands with increased time or repetition   Subjective   Subjective pt agreed to PT intervention  mobility education was provided regarding NWB status, transfers and roller walker use   education was completed via teachback, demonstration and verbal instruction  Transfers   Sit to Stand 3  Moderate assistance   Additional items Assist x 1; Increased time required;Verbal cues  (for hand placement, LE positioning)   Stand to Sit 4  Minimal assistance   Additional items Assist x 1; Increased time required;Verbal cues  (for body positioning, hand placement, NWB compliance)   Additional Comments pt needed physical assistance to maintain NWB status  Ambulation/Elevation   Gait pattern Foward flexed; Short stride; Excessively slow   Gait Assistance 4  Minimal assist   Additional items Assist x 1;Verbal cues; Tactile cues  (for walker positioning, NWB compliance)   Assistive Device Rolling walker; Other (Comment)  (left knee immobilizer)   Distance 7 feet  (additional not possible due to pain and fatigue)   Balance   Static Sitting Fair   Static Standing Poor +   Ambulatory Poor +  (w/ roller walker)   Activity Tolerance   Activity Tolerance Patient limited by fatigue;Patient limited by pain   Nurse Made Aware spoke to Jordy Bullard Se[place], [place]    Assessment   Prognosis Good   Problem List Decreased strength;Decreased range of motion;Decreased endurance; Impaired balance;Decreased mobility; Decreased safety awareness;Orthopedic restrictions;Pain   Assessment Therapist provided education to pt for mobility technique including transfers and roller walker management  Education was provided due to findings from evaluation  Occasional repetition was needed for carryover to be noted  Pt was found to have improvement after education w/ improved ambulation tolerance  Pt continues to be a fall risk  continued inpatient PT tx is indicated to reduce fall risk factors  Goals   Patient Goals go home   STG Expiration Date 05/20/21   Short Term Goal #1 pt will: Recall and adhere to NWB of R LE at all times to facilitate fracture healing, Increase bilateral LE strength 1/2 grade to facilitate independent mobility, Perform all bed mobility tasks modified independent to decrease fall risk factors, Perform all transfers modified independent to improve independence, Ambulate 100 ft  with roller walker w/ supervision w/o LOB to expedite eventual return to independent level of function, Navigate 1 stair w/ modx1 with w/ least restrictive assistive device to facilitate return to previous living environment, Increase ambulatory balance 1 grade to decrease risk for falls, Complete exercise program independently to improve strength and endurance, Tolerate 3 hr OOB to faciliate upright tolerance and Improve Barthel Index score to 80 or greater to facilitate independence   PT Treatment Day 1   Plan   Treatment/Interventions Functional transfer training;LE strengthening/ROM; Elevations; Therapeutic exercise; Endurance training;Patient/family training;Equipment eval/education; Bed mobility;Gait training   Progress Progressing toward goals   PT Frequency 5x/wk; Other (Comment)  (and 1x on weekend)   Recommendation   PT Discharge Recommendation Post acute rehabilitation services   Additional Comments recommend roller walker use w/ mobility   AM-PAC Basic Mobility Inpatient   Turning in Bed Without Bedrails 3   Lying on Back to Sitting on Edge of Flat Bed 3   Moving Bed to Chair 3   Standing Up From Chair 3   Walk in Room 2   Climb 3-5 Stairs 1   Basic Mobility Inpatient Raw Score 15   Basic Mobility Standardized Score 36 97     Skilled inpatient PT recommended while in hospital to progress pt toward treatment goals      Rice Gottron, PT

## 2021-05-10 NOTE — ASSESSMENT & PLAN NOTE
- nondisplaced left medial femoral condyle fracture, present on admission   - knee immobilizer to the left lower extremity  - Appreciate Orthopedic surgery evaluation, recommendations and interventions as noted  Orthopedics to evaluate patient this morning    - Maintain non weightbearing status on the left lower extremity in a knee immobilizer  - Monitor left lower extremity neurovascular exam   - Continue multimodal analgesic regimen   - Continue DVT prophylaxis  - PT and OT evaluation and treatment as indicated  - Outpatient follow up with Orthopedic surgery for re-evaluation

## 2021-05-10 NOTE — PHYSICAL THERAPY NOTE
PHYSICAL THERAPY EVALUATION NOTE    Patient Name: Chante HERRMANNO Date: 5/10/2021  AGE:   63 y o  Mrn:   [id number]  ADMIT DX:  Femoral condyle fracture ([facility] [address] [facility] ) [S72 413A]  Femoral distal fracture ([facility] ) Marija Mckeon  Left knee pain [M25 562]  Left knee injury [S89 92XA]  Fall, initial encounter [W19  XXXA]    Past Medical History:   Diagnosis Date    Alcoholism ([place] [address] )     Bacterial peritonitis ([place] [address] )     Bowel disease     Cancer ([place] [address] )     liver    Cataract     Depression     Fracture     Gastroesophageal reflux     Hepatic disease     Hepatocellular carcinoma ([place] [address] )     History of colon polyps     Hypertension     Liver cancer ([place] [address] )     Liver disease     Stomach disease      Length Of Stay: 0  PHYSICAL THERAPY EVALUATION :    05/10/21 1120   PT Last Visit   PT Visit Date 05/10/21   Note Type   Note type Evaluation   Pain Assessment   Pain Assessment Tool 0-10   Pain Score 7   Pain Location/Orientation Orientation: Left; Location: Leg   Home Living   Type of [address] [place] [address] One level; Other (Comment)  (1+1 JIGNA  1 step to get to living room)   Additional Comments lives w/ wife and inlaws  ambulates w/o device  owns walker  independent w/ ADLs and driving  1 fall in last 6 months  pt works part time  Prior Function   Comments pt seen supine in bed  agreed to PT eval  reports having L LE pain   Restrictions/Precautions   LLE Weight Bearing Per Order NWB   Braces or Orthoses Other (Comment)  (left knee immobilizer)   Other Precautions Chair Alarm; Bed Alarm;WBS;Fall Risk;Pain  (Masimo)   General   Additional Pertinent History room air resting pulse ox 95% and 83 BPM, active 94% and 88 BPM    Family/Caregiver Present No   Cognition   Arousal/Participation Alert   Attention Attends with cues to redirect   Orientation Level Oriented to person; Other (Comment)  (pt was identified w/ full name, birth date)   Following Commands Follows multistep commands with increased time or repetition   RUE Assessment   RUE Assessment WFL   LUE Assessment   LUE Assessment WFL   RLE Assessment   RLE Assessment WFL  (4- to 4/5)   LLE Assessment   LLE Assessment X  (knee not tested  hip and ankle 3-/5)   Light Touch   RLE Light Touch Grossly intact   LLE Light Touch Grossly intact   Bed Mobility   Supine to Sit 3  Moderate assistance   Additional items Assist x 1;HOB elevated; Bedrails; Increased time required;Verbal cues;LE management  (for trunk/LE positioning)   Transfers   Sit to Stand 3  Moderate assistance   Additional items Assist x 1; Increased time required;Verbal cues  (for hand placement, LE positioning, NWB status)   Stand to Sit 3  Moderate assistance   Additional items Assist x 1; Increased time required;Verbal cues  (for body positioning, hand placement, NWB status)   Additional Comments pt needed physical assistance to maintain NWB status  Ambulation/Elevation   Gait pattern Foward flexed; Short stride; Excessively slow   Gait Assistance 4  Minimal assist   Additional items Assist x 1;Verbal cues; Tactile cues  (for walker positioning, NWB maintenance)   Assistive Device Rolling walker; Other (Comment)  (left knee immobilizer)   Distance 4 feet  (additional not possible due to pain and fatigue)   Stair Management Assistance Not tested  (due to limited ambulation tolerance, pain, NWB compliance)   Balance   Static Sitting Fair   Static Standing Poor +   Ambulatory Poor  (w/ roller walker)   Activity Tolerance   Activity Tolerance Patient limited by fatigue;Patient limited by pain   Nurse Made Aware spoke to Jordy Beatty Seaman, Betha Nab CM   Assessment   Prognosis Good   Problem List Decreased strength;Decreased range of motion;Decreased endurance; Impaired balance;Decreased mobility; Decreased safety awareness;Orthopedic restrictions;Pain   Assessment Pt was in a rush and tripped, catching his toe on the door jam and falling down 2 steps, landing directly on her left knee on the  patio  Dx: left medial femoral condyle fx  order placed for PT eval and tx, w/ activity order of up in chair and NWB L LE w/ knee immobilizer  pt presents w/ comorbidities of chronic LBP, liver cancer w/ transplant, HTN, and rotator cuff repair and personal factors of stair(s) to enter home, positive fall history, depression and tobacco use  pt presents w/ pain, weakness, decreased ROM, decreased endurance, impaired balance, gait deviations, decreased safety awareness, orthopedic restrictions and fall risk  these impairments are evident in findings from physical examination (weakness and decreased ROM), mobility assessment (need for mod assist w/ all phases of mobility when usually mobilizing independently, tolerance to only 4 feet of ambulation and need for cueing for mobility technique and NWB compliance), and Barthel Index: 55/100  pt needed input for mobility technique and safety  pt is at risk for falls due to physical and safety awareness deficits  pt's clinical presentation is unstable/unpredictable (evident in need for assist w/ all phases of mobility when usually mobilizing independently, tolerance to only 4 feet of ambulation, pain impacting overall mobility status and need for input for mobility technique/safety)  pt needs inpatient PT tx to improve mobility deficits and progress mobility training as appropriate  discharge recommendation is for inpatient rehab to reduce fall risk and maximize level of functional independence      Goals   Patient Goals go home   STG Expiration Date 05/20/21   Short Term Goal #1 pt will: Recall and adhere to NWB of R LE at all times to facilitate fracture healing, Increase bilateral LE strength 1/2 grade to facilitate independent mobility, Perform all bed mobility tasks modified independent to decrease fall risk factors, Perform all transfers modified independent to improve independence, Ambulate 100 ft  with roller walker w/ supervision w/o LOB to expedite eventual return to independent level of function, Navigate 1 stair w/ modx1 with w/ least restrictive assistive device to facilitate return to previous living environment, Increase ambulatory balance 1 grade to decrease risk for falls, Complete exercise program independently to improve strength and endurance, Tolerate 3 hr OOB to faciliate upright tolerance and Improve Barthel Index score to 80 or greater to facilitate independence   Plan   Treatment/Interventions Functional transfer training;LE strengthening/ROM; Elevations; Therapeutic exercise; Endurance training;Patient/family training;Equipment eval/education; Bed mobility;Gait training   PT Frequency 5x/wk; Other (Comment)  (and 1x on weekend)   Recommendation   PT Discharge Recommendation Post acute rehabilitation services   Additional Comments recommend roller walker use w/ mobility   AM-PAC Basic Mobility Inpatient   Turning in Bed Without Bedrails 3   Lying on Back to Sitting on Edge of Flat Bed 3   Moving Bed to Chair 3   Standing Up From Chair 3   Walk in Room 2   Climb 3-5 Stairs 1   Basic Mobility Inpatient Raw Score 15   Basic Mobility Standardized Score 36 97   Barthel Index   Feeding 10   Bathing 0   Grooming Score 5   Dressing Score 5   Bladder Score 10   Bowels Score 10   Toilet Use Score 5   Transfers (Bed/Chair) Score 10   Mobility (Level Surface) Score 0   Stairs Score 0   Barthel Index Score 55     The patient's AM-PAC Basic Mobility Inpatient Short Form Raw Score is 15, Standardized Score is 36 97  A standardized score less than 42 9 suggests the patient may benefit from discharge to post-acute rehabilitation services  Please also refer to the recommendation of the Physical Therapist for safe discharge planning  Skilled PT recommended while in hospital and upon DC to progress pt toward treatment goals       Mavis Miller, PT

## 2021-05-10 NOTE — ASSESSMENT & PLAN NOTE
- Acute pain secondary to traumatic injuries  - Placed on multi modal analgesic regimen  - Bowel regimen as long as using opioids   - Continue to monitor pain and adjust regimen as indicated  - patient states that his pain is currently controlled with his current pain regimen, but is receiving max doses of p r n  Medication as frequent as possible  Will continue to monitor pain control as patient is more active today  Will adjust as indicated

## 2021-05-10 NOTE — CASE MANAGEMENT
LOS: 0 DAYS  PATIENT IS NOT A BUNDLE  PATIENT IS NOT A READMISSION  UNPLANNED RISK OF READMISSION: N/A  PATIENT IS OBS  CM met with the Patient and his spouse, Kit Jean, at the bedside  Patient was alert and oriented, sitting up in bed  CM introduced self and role  Patient resides in a ranch-style home with his spouse  There is 1+1 JIGNA  Patient has a handicapped accessible in-law suite, which is occupied by his in-laws, but could use if absolutely necessary  Prior to admission, Patient was independent with all ADLs and utilized no DME  Patient states that he has a RW and WC to use at home if needed  Patient has had VNA services in the past following his liver transplant, but is unable to recall the agency  Patient has no STR history  Patient utilizes [facility] in [place] and is able to afford all of his medications  Patient denies any MH history or concerns  Patient reports that he smokes about 1/2 ppd of cigarettes and consumes alcohol minimally  Patient identifies his spouse, Kit Jean, as his health care representative and states that he has a 5 wishes  Patient does not have a formal POA, or AD in addition to the 5 wishes  Patient receives SSD as his main source of income and states that 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 income  Patient is a community  and states that his wife would be able to transport upon dc  Patient sees his PCP, Dr Dalia Key, every 3 months  CM reviewed the recommendation for rehab upon dc  Patient states that he feels that he would benefit from rehab as he does not feel that he would be safe at home given his mobility and difficulty with transfers  Patient is requesting a referral to [facility]  CM sent referral to [facility] via [facility] for acute rehab  CM dept will follow for review       CM reviewed discharge planning process including the following: identifying caregivers at home, preference for d/c planning needs, availability of treatment team to discuss questions or concerns patient and/or family may have regarding diagnosis, plan of care, old or new medications and discharge planning   CM will continue to follow for care coordination and update assessment as appropriate

## 2021-05-10 NOTE — OCCUPATIONAL THERAPY NOTE
Occupational Therapy Evaluation     Patient Name: Mary Rowell  AMUHX'B Date: 5/10/2021  Problem List  Principal Problem:    Fracture of femoral condyle, left, closed ([facility] )  Active Problems:    History of liver transplant ([facility] )    Fall    Acute pain due to trauma    Chronic low back pain    Past Medical History  Past Medical History:   Diagnosis Date    Alcoholism ([facility] )     [facility] )     Bowel disease     Cancer ([facility] )     liver    Cataract     Depression     Fracture     Gastroesophageal reflux     Hepatic disease     [facility]at[facility] carcinoma [facility] )     History of colon polyps     Hypertension     Liver cancer ([facility] )     Liver disease     Stomach disease      Past Surgical History  Past Surgical History:   Procedure Laterality Date    APPENDECTOMY      CATARACT EXTRACTION      EXPLORATORY LAPAROTOMY  09/2011    EYE SURGERY      LIVER TRANSPLANTATION  08/20/2013    PARACENTESIS      Abdominal Paracentesis(Therapeutic) with Imaging Guidance    VT COLONOSCOPY FLX DX W/COLLJ SPEC WHEN PFRMD N/A 9/12/2016    Procedure: COLONOSCOPY;  Surgeon: Toyin Webb MD;  Location:  GI LAB; Service: General    ROTATOR CUFF REPAIR      x2        05/10/21 1131   OT Last Visit   OT Visit Date 05/10/21  (Monday)   Note Type   Note type Evaluation   Restrictions/Precautions   Weight Bearing Precautions Per Order Yes   LLE Weight Bearing Per Order NWB   Braces or Orthoses LE Immobilizer; Other (Comment)  (knee immobilizer)   Other Precautions Chair Alarm; Bed Alarm;WBS;Fall Risk;Pain  (Oswald)   Pain Assessment   Pain Assessment Tool 0-10   Pain Score 7   Pain Location/Orientation Orientation: Left; Location: Knee; Location: Leg   Effect of Pain on Daily Activities limits activity tolerance and I w/ ADL performance   Patient's Stated Pain Goal No pain   Hospital Pain Intervention(s) Cold applied;Repositioned; Ambulation/increased activity; Emotional support  (RN, Jordy medicated pt during eval)   Home Living   Type of [address] [facility] One level;Performs ADLs on one level; Able to live on main level with bedroom/bathroom;Stairs to enter with rails   Bathroom Shower/Tub Tub/shower unit   [employer] Raised   Bathroom Equipment Other (Comment)  (no DME)   [address]; Other (Comment)  (not using)   Additional Comments Pt reports living w/ wife and his in-laws in [address] w/ 1+1 JIGNA from [employer]  Pt reports walk in shower for his in-laws  Prior Function   Level of [place] Independent with ADLs and functional mobility   Lives With Spouse; Other (Comment)  (mother and father in law)   ADL Assistance Independent   IADLs Independent   Falls in the last 6 months 1 to 4   Vocational Part time employment   Comments Pt reports I w/ ADL/ IADL w/ out use of AD or DME  Lifestyle   Autonomy Pt reports I w/ ADL/ IADL PTA w/ out use of AD or DME   Reciprocal Relationships Pt reports living in [address] w/ 1+ 1 JIGNA w/ his wife and in laws   Service to Others Pt reports working part time in [employer]   Intrinsic Gratification Pt reports enjoying hunting and fishing   Psychosocial   Patient Behaviors/Mood Cooperative;Pleasant   ADL   Where Assessed Edge of bed   Eating Assistance 6  Modified independent   Eating Deficit Setup   Grooming Assistance 5  Supervision/Setup  (seated at EOB)   Grooming Deficit Setup; Increased time to complete   UB Bathing Assistance Unable to assess   LB Bathing Assistance Unable to assess   UB Dressing Assistance 5  Supervision/Setup   UB Dressing Deficit Setup; Increased time to complete   LB Dressing Assistance 2  Maximal Assistance   LB Dressing Deficit Don/doff R sock; Don/doff L sock; Setup;Steadying; Increased time to complete;Verbal cueing;Supervision/safety   Toileting Assistance  Unable to assess   Toileting Deficit Bedside commode  (recommend use of commode for B UE support)   Additional Comments Edcuated pt on L LE NWB and tech to complete LBD   Bed Mobility   Supine to Sit 3  Moderate assistance   Additional items Assist x 1;HOB elevated; Increased time required;Verbal cues;LE management; Bedrails   Sit to Supine Unable to assess   Additional Comments Pt seated OOB in chair post eval w/ needs met, call bell in reach, needs met, and chair alarm activated   Transfers   Sit to Stand 3  Moderate assistance   Additional items Assist x 1; Increased time required;Verbal cues; Bedrails;Armrests  (from elevated bed height)   Stand to Sit 3  Moderate assistance  (cues for hand placement)   Additional items Assist x 1; Increased time required;Verbal cues; Bedrails;Armrests   Stand pivot 4  Minimal assistance   Additional items Assist x 1; Increased time required;Verbal cues  (using RW)   Additional Comments Pt required mod A to complete sit <> stand from elevated bed height  Verbalized understanding of L LE NWB   Functional Mobility   Functional Mobility 4  Minimal assistance   Additional Comments few feet using RW   Additional items Rolling walker   Balance   Static Sitting Fair   Static Standing Poor +   Ambulatory Poor +   Activity Tolerance   Activity Tolerance Patient limited by fatigue;Patient limited by pain   Medical Staff Made Aware care coordination w/ PTLYN  Spoke w/ trauma and Hawa MAST   Nurse Made Aware per RN [facility] appropriate to see pt   RUE Assessment   RUE Assessment WFL   RUE Strength   RUE Overall Strength Within Functional Limits - able to perform ADL tasks with strength   LUE Assessment   LUE Assessment WFL   LUE Strength   LUE Overall Strength Within Functional Limits - able to perform ADL tasks with strength   Hand Function   Gross Motor Coordination Functional   Fine Motor Coordination Functional   Sensation   Light Touch Not tested   Sharp/Dull Not tested   Vision-Basic Assessment   Current Vision Wears glasses all the time   Cognition   Overall Cognitive Status [facility]   Arousal/Participation Alert; Cooperative   Attention Attends with cues to redirect   Orientation Level Oriented X4   Memory Within functional limits   Following Commands Follows multistep commands with increased time or repetition   Comments Identfiied pt by full name and birthdate  Pt alert and generally oriented  Verbalized understanding of L LE NWB  Assessment   Limitation Decreased ADL status; Decreased endurance;Decreased self-care trans;Decreased high-level ADLs; Decreased UE strength   Assessment Pt is a 61yo male [facility]  Pt presents w/ fracture of femoral condyle L LE and significant PMH impacting his occupational performance including rotator cuff repair X 2, chronic back pain, s/p liver transplant, liver ca hx / tx, depression, alcoholism, tobacco use, HTN, trochanteric bursitis B hips, lumbar radiculopathy  Per orthopedics, recommend NWB L LE in knee immobilizer and non operative mgmt  Pt presented as level B trauma following a fall at home walking out to patio down 2 steps and landing on  patio  Pt reports living w/ wife and in laws in [facility] w/ 1+1 JIGNA  Pt reports I w/ ADL/ IADL w/ out use of AD or DME  Personal factors impacting performance include steps to enter home, tobacco use, alcohol use  Pt w/ active OT orders and activity orders  Upon eval, pt alert and oriented  Able to participate in conversation and communicate wants/needs  Pt verbalized understanding L LE NWB  Pt required mod A to complete bed mobility supine to sit at EOB  Pt required mod A to stand from elevated bed height  Pt required max A to complete LBD  Pt required S to complete UBD  Pt required min A for short distance functional mobility using RW  Pt presents w/ increased pain, orthopedic restrictions, decreased L LE ROM /strength, decreased standing tolerance, decreased standing balance impacting his I w/ dressing, bathing, oral hygiene, functional mobility, functional transfers, activity engagement, clothing mgmt, community mobility and yard work  Pt completing ADL below baseline level of I and would benefit from OT while in acute care to address deficits  Recommend post acute rehab when medically stable for discharge from acute care to return to baseline level of I  Pt would benefit from w/c, commode, and RW  Will continue to follow   Goals   Patient Goals Pt stated that he would like to return home and plans to sleep on recliner   Plan   Treatment Interventions ADL retraining;Functional transfer training;UE strengthening/ROM; Endurance training;Patient/family training;Equipment evaluation/education; Compensatory technique education;Continued evaluation; Energy conservation; Activityengagement   Goal Expiration Date 05/17/21   OT Frequency 3-5x/wk   Recommendation   OT Discharge Recommendation Post acute rehabilitation services   Equipment Recommended Shower/Tub chair with back ($);Bedside commode; Other (comment)  (RW and use of w/c w/ L LE elevating leg rest)   Commode Type Standard   -PAC Daily Activity Inpatient   Lower Body Dressing 2   Bathing 2   Toileting 3   Upper Body Dressing 4   Grooming 4   Eating 4   Daily Activity Raw Score 19   Daily Activity Standardized Score (Calc for Raw Score >=11) 40 22   -Located within [facility] Applied Cognition Inpatient   Following a Speech/Presentation 4   Understanding Ordinary Conversation 4   Taking Medications 4   Remembering Where Things Are Placed or Put Away 4   Remembering List of 4-5 Errands 4   Taking Care of Complicated Tasks 4   Applied Cognition Raw Score 24   Applied Cognition Standardized Score 62 21   Barthel Index   Feeding 10   Bathing 0   Grooming Score 5   Dressing Score 5   Bladder Score 10   Bowels Score 10   Toilet Use Score 5   Transfers (Bed/Chair) Score 10   Mobility (Level Surface) Score 0   Stairs Score 0   Barthel Index Score 55      The patient's raw score on the AM-PAC Daily Activity inpatient short form is 19, standardized score is 40 22, greater than 39 4   Patients at this level are likely to benefit from discharge to home  Please refer to the recommendation of the Occupational Therapist for safe discharge planning  Recommend post acute rehab when medically stable for discharge from acute care at this time as pt completing ADL below baseline level of I and is NWB L LE     Pt goals to be met by 5/17/2021:  -Pt will complete bed mobility supine <> sit w/ S to max I w/ ADL performance and improve activity engagement    -Pt will complete UBD w/ mod I after set- up    -Pt will complete LBD w/ min A x1 after set - up using Jen Pruitt as needed     -Pt will complete functional transfers to bed, chair, and toilet using AD, DME as needed w/ S to max I w/ ADL performance    -Pt will complete functional tub /shower transfer using AD, DME as needed w/ S to max I w/ bathing    -Pt will demonstrate improved functional standing tolerance for at least 5 minutes using AD as needed while engaged in ADL tasks w/ at least fair balance to max I w/ functional mobility to return home    -Pt will consistently engage in short distance functional mobility using AD, DME as needed w/ S to max I w/ ADL performance to return home    -Pt will demonstrate good attention and understanding EC tech during ADL tasks    -Pt will demonstrate good attention and understanding L LE NWB during ADL tasks to max I to return home    [facility], OTR/L

## 2021-05-10 NOTE — PLAN OF CARE
Problem: PHYSICAL THERAPY ADULT  Goal: Performs mobility at highest level of function for planned discharge setting  See evaluation for individualized goals  Description: Treatment/Interventions: Functional transfer training, LE strengthening/ROM, Elevations, Therapeutic exercise, Endurance training, Patient/family training, Equipment eval/education, Bed mobility, Gait training          See flowsheet documentation for full assessment, interventions and recommendations  Outcome: Progressing  Note: Prognosis: Good  Problem List: Decreased strength, Decreased range of motion, Decreased endurance, Impaired balance, Decreased mobility, Decreased safety awareness, Orthopedic restrictions, Pain  Assessment: Therapist provided education to pt for mobility technique including transfers and roller walker management  Education was provided due to findings from evaluation  Occasional repetition was needed for carryover to be noted  Pt was found to have improvement after education w/ improved ambulation tolerance  Pt continues to be a fall risk  continued inpatient PT tx is indicated to reduce fall risk factors  PT Discharge Recommendation: Post acute rehabilitation services          See flowsheet documentation for full assessment

## 2021-05-10 NOTE — ARC ADMISSION
This writer received referral on patient for possible [facility]C placement  This writer is corresponding with CM  PM&R was requested  Will continue to follow at this time

## 2021-05-10 NOTE — PROGRESS NOTES
[facility]  Tertiary/ Progress Note - Edson Hendrix 1955, 63 y o  male MRN: [id number]  Unit/Bed#: S -01 Encounter: [id number]  Primary Care Provider: Katie Bowers MD   Date and time admitted to hospital: 5/9/2021 12:17 PM    Fall  Assessment & Plan  - mechanical fall directly onto left knee  - sustaining the below stated injury  -PT and OT evaluations pending for 5/10  -case management consulted     * Fracture of femoral condyle, left, closed ([address] )  Assessment & Plan  - nondisplaced left medial femoral condyle fracture, present on admission   - knee immobilizer to the left lower extremity  - Appreciate Orthopedic surgery evaluation, recommendations and interventions as noted  Orthopedics to evaluate patient this morning    - Maintain non weightbearing status on the left lower extremity in a knee immobilizer  - Monitor left lower extremity neurovascular exam   - Continue multimodal analgesic regimen   - Continue DVT prophylaxis  - PT and OT evaluation and treatment as indicated  - Outpatient follow up with Orthopedic surgery for re-evaluation  Chronic low back pain  Assessment & Plan  - receives injections in his low back for DJD and sciatica, follows with the pain center  - takes gabapentin 300 mg BID chronically as well    Acute pain due to trauma  Assessment & Plan  - Acute pain secondary to traumatic injuries  - Placed on multi modal analgesic regimen  - Bowel regimen as long as using opioids   - Continue to monitor pain and adjust regimen as indicated  - patient states that his pain is currently controlled with his current pain regimen, but is receiving max doses of p r n  Medication as frequent as possible  Will continue to monitor pain control as patient is more active today  Will adjust as indicated        History of liver transplant [facility])  Assessment & Plan  -patient has a history of hepatocellular carcinoma status post liver transplant 2018  -continue home medications, including tacrolimus        TERTIARY TRAUMA SURVEY NOTE    Prophylaxis: Sequential compression device (Venodyne)  and Enoxaparin (Lovenox)    Disposition:  Pending ortho eval, PT/OT eval, and pain control  Code status:  Level 1 - Full Code    Consultants:  Orthopedics, PT/OT  Is the patient 72 years or older?: No          SUBJECTIVE:     Tertiary Exam Due on: 5/10/5021    Mechanism of Injury:Fall    Details related to Injury:  Fall from standing  No head strike  Chief Complaint:  Right knee pain    HPI/Last 24 hour events: This is a 47-year-old  who describes suffering a trip and fall yesterday  During fall he struck his left knee on the ground and has been having significant pain and difficulty ambulating since  Primary and secondary survey revealed an intra-articular distal femoral fracture involving a small portion of the medial femoral condyle  Per Orthopedics recommendations, a knee immobilizer was applied and patient was admitted for pain control  Formal PT/OT eval ortho evaluation pending  Upon my evaluation today, patient states I finally feel like were getting in front of the pain  He describes having continued significant pain in the right knee  He expresses concern of ambulating while being nonweightbearing  He denies any decreased sensation or motor dysfunction distal to the injury  He describes having chronic back pain and states that the pain has been under control with his current pain regimen  He denies any other other sites of pain or discomfort  A 10 point review of systems was completed and is otherwise negative not mentioned above      Active medications:           Current Facility-Administered Medications:     acetaminophen (TYLENOL) tablet 650 mg, 650 mg, Oral, Q4H PRN, 650 mg at 05/10/21 0446    ALPRAZolam (XANAX) tablet 0 25 mg, 0 25 mg, Oral, Daily PRN    aspirin (ECOTRIN LOW STRENGTH) EC tablet 81 mg, 81 mg, Oral, Daily   atorvastatin (LIPITOR) tablet 40 mg, 40 mg, Oral, Daily    calcium carbonate (OYSTER SHELL,OSCAL) 500 mg tablet 2 tablet, 2 tablet, Oral, Daily With Breakfast    docusate sodium (COLACE) capsule 100 mg, 100 mg, Oral, BID    enoxaparin (LOVENOX) subcutaneous injection 30 mg, 30 mg, Subcutaneous, Q12H, 30 mg at 05/10/21 0235    gabapentin (NEURONTIN) capsule 300 mg, 300 mg, Oral, BID, 300 mg at 05/09/21 2058    HYDROmorphone (DILAUDID) injection 0 5 mg, 0 5 mg, Intravenous, Q1H PRN, 0 5 mg at 05/10/21 0229    lisinopril (ZESTRIL) tablet 20 mg, 20 mg, Oral, Daily    methocarbamol (ROBAXIN) tablet 500 mg, 500 mg, Oral, Q6H [facility], 500 mg at 05/10/21 0104    naloxone (NARCAN) 0 04 mg/mL syringe 0 04 mg, 0 04 mg, Intravenous, Q1MIN PRN    nicotine (NICODERM CQ) 21 mg/24 hr TD 24 hr patch 1 patch, 1 patch, Transdermal, Daily, 1 patch at 05/09/21 2058    ondansetron (ZOFRAN) injection 4 mg, 4 mg, Intravenous, Q4H PRN    oxyCODONE (ROXICODONE) immediate release tablet 10 mg, 10 mg, Oral, Q4H PRN, 10 mg at 05/10/21 0516    oxyCODONE (ROXICODONE) IR tablet 5 mg, 5 mg, Oral, Q4H PRN    pantoprazole (PROTONIX) EC tablet 20 mg, 20 mg, Oral, Early Morning, 20 mg at 05/10/21 0517    tacrolimus (PROGRAF) capsule 3 mg, 3 mg, Oral, Q12H [facility], 3 mg at 05/09/21 2059      OBJECTIVE:     Vitals:   Vitals:    05/10/21 0313   BP: 123/69   Pulse: 75   Resp: 18   Temp: 98 1 °F (36 7 °C)   SpO2: 96%       Physical Exam:   GENERAL APPEARANCE:  No acute distress  Cooperative  NEURO:  GCS 15  Light touch sensation intact throughout  HEENT:  Normocephalic, atraumatic  Neck is supple  CV:  Regular rate and rhythm  +2 radial and dorsalis pedis pulses, bilaterally  LUNGS:  Lungs are clear to auscultation, bilaterally  No wheezing, no rhonchi, no rales  GI:  Abdomen is soft and nontender  Bowel sounds are present  :  Pelvis stable  MSK:  Left lower extremity in knee immobilizer  Left knee swelling/effusion noted    Patient displays full range of motion of bilateral ankles and toes  All other extremities display full range of motion/strength  No deformities  SKIN:  Skin is warm, dry, well perfused  I/O:   I/O       05/08 0701 - 05/09 0700 05/09 0701 - 05/10 0700    P  O   480    Total Intake(mL/kg)  480 (4 8)    Urine (mL/kg/hr)  400    Total Output  400    Net  +80                Invasive Devices: Invasive Devices     Peripheral Intravenous Line            Peripheral IV 05/09/21 Right Antecubital less than 1 day                  Imaging:   Xr Knee 4+ Vw Left Injury    Result Date: 5/9/2021  Impression: Large joint effusion is suspicious for significant injury  Equivocal cortical step offs and intracondylar regions are identified to suggests subtle nondisplaced fractures  Nonemergent MRI follow-up may be useful to also assess for any internal derangement  The study was marked in [facility] for immediate notification  Workstation performed: [id number]     Ct Lower Extremity Wo Contrast Left    Result Date: 5/9/2021  Impression: Intra-articular distal femoral fracture involving small portion of the medial femoral condyle  Please note the proximal extent of this fracture is not included within the field-of-view, however does not likely extend proximal to the mid diaphysis The study was marked in [facility] for immediate notification   Workstation performed: [id number]       Labs:   CBC:   Lab Results   Component Value Date    WBC 11 21 (H) 05/09/2021    HGB 14 5 05/09/2021    HCT 42 2 05/09/2021    MCV 99 (H) 05/09/2021     05/09/2021    MCH 34 1 05/09/2021    MCHC 34 4 05/09/2021    RDW 12 0 05/09/2021    MPV 12 2 05/09/2021    NRBC 0 05/09/2021     CMP:   Lab Results   Component Value Date     05/09/2021    CO2 28 05/09/2021    BUN 15 05/09/2021    CREATININE 1 30 05/09/2021    CALCIUM 9 0 05/09/2021    AST 24 05/09/2021    ALT 28 05/09/2021    ALKPHOS 74 05/09/2021    EGFR 58 05/09/2021

## 2021-05-10 NOTE — CONSULTS
PHYSICAL MEDICINE AND REHABILITATION CONSULT NOTE  Colletta Needle 63 y o  male MRN: [id number]  Unit/Bed#: S -01 Encounter: [id number]    Requested by:   ANGELA Lim  Reason for Consultation:  Rehabilitation medicine evaluation and assistance with appropriate disposition  Primary Rehabilitation Diagnosis:   Orthopedic Disorders:  08 9  Other Orthopedic Intra-articular distal femoral fracture involving portion of the lateral femoral condyle  Etiologic Diagnosis:  See above      Assessment and Recommendations:  68-year-old male with a past medical history of hepatocellular carcinoma status post liver transplant on chronic immunosuppression with tacrolimus, incisional abdominal hernia with chronic weak trunk, bilateral carotid stenosis,hypertension, hyperlipidemia,  peripheral vascular disease with vascular claudication, GERD, chronic low back pain sciatica with recent fall down 2 stairs directly on the left knee on a concrete patio who presented to the hospital and found to have left lateral femoral condyle fracture  X-ray showed They recommend non operative management with nonweightbearing precautions of left lower extremity with knee immobilizer in follow-up as an outpatient in 2 weeks  Prior Level of Function and Social history:    Patient lives with wife in in laws in 1 level home with 1+1 step to enter and additional steps to get to the living room  He works part-time    Independent with ADLs  Independent with ambulation    Current Level of Function:    Transfers moderate assist x1, ambulation 4 ft Min assist x1  Lower body dressing max assist,  Toileting estimated Min to mod assist  Lower body bathing estimated at max assist    Decline in ADLs and mobility:  Multifactorial:   Combination of acute and chronic functional deficits complicating rehabilitation and recovery    Acute:   Significantly painful lateral femoral condyle fracture, nonweightbearing precautions, decreased range of motion in knee extension brace  Chronic:  Chronic weak trunk related to abdominal surgeries and incisional hernia, bilateral lower extremity vascular claudication, chronic low back pain with sciatica  - Optimal management of each as listed    - Continue PT, OT while in acute setting to improve functional mobility, transfers, upper and lower body strengthening, conditioning, balance, and gait training with appropriate assistive device  Disposition recommendation:     [facility] - patient is good candidate for transfer to [facility] pending insurance auth and facility acceptance     RATIONALE:   I have reviewed this patient's medical and functional history, hospital course, skilled therapy notes, and have evaluated the patient in person  In my professional judgment and rehabilitation experience, this patient MEETS the medical necessity criteria for an inpatient ARU stay:   A  Condition requires supervision by a rehabilitation physician, defined as a licensed physician with specialized training and experience in inpatient rehabilitation  The requirement for medical supervision means that the rehabilitation physician must conduct face-to-face visits with the patient at least 3 days per week throughout the patient's stay in the IRF to assess the patient both medically and functionally, as well as to modify the course of treatment as needed to maximize the patient's capacity to benefit from the rehabilitation process  B  Patient requires an intensive and coordinated interdisciplinary team approach of providing rehabilitation  Under current industry standards, this intensive rehabilitation therapy program generally consists of at least 3 hours of therapy per day at least 5 days per week   In certain well-documented cases, this intensive rehabilitation therapy program might instead consist of at least 15 hours of intensive rehabilitation therapy within a 7 consecutive day period, beginning with the date of admission to the IRF   C  Patient is reasonably expected to adequately participate, and benefit significantly from, the intensive rehabilitation therapy program at time of admission to the IRF  The patient can only be expected to benefit significantly from the intensive rehabilitation therapy program if the patient's condition and functional status are such that the patient can reasonably be expected to make measurable improvement (that will be of practical value to improve the patient's functional capacity or adaptation to impairments) as a result of the rehabilitation treatment, and if such improvement can be expected to be made within a prescribed period of time  The patient need not be expected to achieve complete independence in the domain of self-care nor be expected to return to his or her prior level of functioning in order to meet this standard  D  The patient must require the active and ongoing therapeutic intervention of multiple therapy disciplines (PT, OT, SLP, or prosthetics/orthotics), one of which must be PT or OT  Medicare Benefit Policy Manual Chapter 1 - Inpatient Hospital Services Covered Under Part A; 110 2 - Inpatient Rehabilitation Facility Medical Necessity Criteria NameRegulator es  pdf     Overall Medical Status:  - Vitals: stable, patient saturating well on room air  - Recent labs: mild leukocytosis  - Recent imaging:  see elsewhere  - Continue to monitor vitals, labs, exam closely   - Additional Work-up/management prior to potential transfer to rehab/discharge:    none    Ortho/MSK -  left lateral femoral condyle fracture; CLBP with sciatica   - NWB LLE in knee immobilizer  - Ortho follow-up 2 weeks  - Recommend Vit D level; consider osteoporosis OP work-up - possible increased risk of osteoporesis with hx of transplant alone and with Tacrolimus    Cardiopulmonary - PAD with vascular claudication, HTN, HL  - antithrombotics, statin, optimal BP mgmt; adjustments in skilled therapies  - closely monitor vitals with and without activity, orthostatics, clinical exam, and labs as patient is at increased risk for volume overload and dehydration  - adjust medications and fluid intake accordingly    Pain - Significant; sub-optimal control   - Gabapentin 300mg BID for chronic radiculopathy - may also help as adjuvant for current acute pain  - Robaxin 500mg Q6H  - Oxy 5-10mg Q4H PRN and HM 0 5mg IV Q4H PRN   - Consider increasing gabapentin to 300mg TID     Bladder function - intact  - monitor for retention, incontinence, signs/symptoms of UTI  - recommend toileting (bedpan only if unable to use commode or toilet, but monitor skin closely) program Q2-4 H during day and Q4-6H overnight      Bowel function - hx of constipation  - Colace BID   - PRN bowel regimen - added PRN miralax     Encounter for skin care -   - Frequent turning, Q2H, EHOB cushion when OOB in chair  - Consider/provide hydragard BID for buttocks, sacrum, and heels  - Ensure optimal bowel/bladder hygiene   - Monitor closely       VTE prophylaxis   - As outlined by primary team      # Other medical issues:     Per primary service        ==================================================================    Chief Complaint:  Left knee pain     History of Present Illness:   53-year-old male with a past medical history of hepatocellular carcinoma status post liver transplant on chronic immunosuppression with tacrolimus, incisional abdominal hernia with chronic weak trunk, bilateral carotid stenosis,hypertension, hyperlipidemia,  peripheral vascular disease with vascular claudication, GERD, chronic low back pain sciatica with recent fall down 2 stairs directly on the left knee on a concrete patio who presented to the hospital and found to have left lateral femoral condyle fracture    X-ray showed They recommend non operative management with nonweightbearing precautions of left lower extremity with knee immobilizer in follow-up as an outpatient in 2 weeks  On eval, patient reports severe lateral knee pain worse with any movements in bed  Patient reports current pain regiment partially helpful but can have pretty significant breakthrough pain  Patient notes chronic weak trunk from his abdominal surgeries he does have stable incisional hernia  Patient reports history of intermixed constipation and diarrhea but takes chronic Colace  Last bowel movement 2 days ago  He does note some chronic lightheadedness with changes in position which is stable recently  He reports chronic low back pain with intermittent radiating pain in buttocks and legs  He denies fever, chills, sweats, calf pain, or other new complaints  Review of Systems:     Complete review of systems obtained  Please see HPI for details with other significant symptoms or history listed here: Otherwise, 14 point review of systems completed and was otherwise unremarkable  Allergies   Allergen Reactions    Macrolides And Ketolides      Annotation - [id number]: The patient is on Antirejection medications and they will reduce the effective ness of the medications         Current Facility-Administered Medications:     acetaminophen (TYLENOL) tablet 650 mg, 650 mg, Oral, Q6H [address], Mary Grossman PA-C, 650 mg at 05/10/21 1319    ALPRAZolam (XANAX) tablet 0 25 mg, 0 25 mg, Oral, Daily PRN, Mary Grossman PA-C    aspirin (ECOTRIN LOW STRENGTH) EC tablet 81 mg, 81 mg, Oral, Daily, ZAID HerC, 81 mg at 05/10/21 4009    atorvastatin (LIPITOR) tablet 40 mg, 40 mg, Oral, Daily, Mary Grossman PA-C, 40 mg at 05/10/21 3099    calcium carbonate (OYSTER SHELL,OSCAL) 500 mg tablet 2 tablet, 2 tablet, Oral, Daily With Breakfast, Mary Grossman PA-C, 2 tablet at 05/10/21 3257    docusate sodium (COLACE) capsule 100 mg, 100 mg, Oral, BID, Mary Grossman PA-C, 100 mg at 05/10/21 0937    enoxaparin (LOVENOX) subcutaneous injection 30 mg, 30 mg, Subcutaneous, Q12H, Mary Grossman PA-C, 30 mg at 05/10/21 0235    gabapentin (NEURONTIN) capsule 300 mg, 300 mg, Oral, BID, Mary Grossman PA-C, 300 mg at 05/10/21 7605    HYDROmorphone (DILAUDID) injection 0 5 mg, 0 5 mg, Intravenous, Q4H PRN, Sen Kidney, PA-C    lisinopril (ZESTRIL) tablet 20 mg, 20 mg, Oral, Daily, Mary Grossman PA-C, 20 mg at 05/10/21 4025    methocarbamol (ROBAXIN) tablet 500 mg, 500 mg, Oral, Q6H [facility], Mary Grossman PA-C, 500 mg at 05/10/21 1319    naloxone (NARCAN) 0 04 mg/mL syringe 0 04 mg, 0 04 mg, Intravenous, Q1MIN PRN, Sen KidneyLU    nicotine (NICODERM CQ) 21 mg/24 hr TD 24 hr patch 1 patch, 1 patch, Transdermal, Daily, Mary Grossman PA-C, 1 patch at 05/10/21 7407    ondansetron (ZOFRAN) injection 4 mg, 4 mg, Intravenous, Q4H PRN, Sen Rodriguez PA-C, 4 mg at 05/10/21 6516    oxyCODONE (ROXICODONE) immediate release tablet 10 mg, 10 mg, Oral, Q4H PRN, Mary Grossman PA-C, 10 mg at 05/10/21 1111    oxyCODONE (ROXICODONE) IR tablet 5 mg, 5 mg, Oral, Q4H PRN, Sen KidneyLU    pantoprazole (PROTONIX) EC tablet 20 mg, 20 mg, Oral, Early Morning, Mary Grossman PA-C, 20 mg at 05/10/21 0517    tacrolimus (PROGRAF) capsule 3 mg, 3 mg, Oral, Q12H [facility], Mary Grossman PA-C, 3 mg at 05/10/21 6277    Past Medical History:   Diagnosis Date    Alcoholism ([facility] )     Bacterial peritonitis ([facility] )     Bowel disease     Cancer ([address] )     liver    Cataract     Depression     Fracture     Gastroesophageal reflux     Hepatic disease     [facility]     History of colon polyps     Hypertension     Liver cancer [facility]     Liver disease     Stomach disease        Past Surgical History:   Procedure Laterality Date    APPENDECTOMY      CATARACT EXTRACTION      EXPLORATORY LAPAROTOMY  09/2011    EYE SURGERY      LIVER TRANSPLANTATION  08/20/2013    PARACENTESIS Abdominal Paracentesis(Therapeutic) with Imaging Guidance    MT COLONOSCOPY FLX DX W/COLLJ SPEC WHEN PFRMD N/A 9/12/2016    Procedure: COLONOSCOPY;  Surgeon: Jama Florence MD;  Location: [facility];   Service: General    ROTATOR CUFF REPAIR      x2      Family History   Problem Relation Age of Onset    Coronary artery disease Mother         CABG    Prostate cancer Mother         Prostate Gland    Heart disease Mother         Cardiac Disorder    Vascular Disease Mother     Hypertension Mother         Benign    Diabetes Maternal Grandmother         Mellitus    Clotting disorder Maternal Grandfather         Embolism    Seizures Paternal Grandmother         Epilepsy    Other Paternal Grandfather         Accident    Liver cancer Family     Heart disease Family         Cardiac Disorder    Hypertension Family         Benign       Social History available currently in EMR:  (See additional SH as outlined above)   Social History     Socioeconomic History    Marital status: /Civil Union     Spouse name: None    Number of children: 3    Years of education: College, bachelors degree    Highest education level: None   Occupational History    Occupation:    Social Needs    Financial resource strain: None    Food insecurity     Worry: None     Inability: None    Transportation needs     Medical: None     Non-medical: None   Tobacco Use    Smoking status: Current Every Day Smoker     Packs/day: 0 25     Years: 35 00     Pack years: 8 75     Types: Cigarettes    Smokeless tobacco: Never Used   Substance and Sexual Activity    Alcohol use: Yes     Frequency: 2-4 times a month    Drug use: No     Comment: Per [employer] Drug use way back in college over 40 yrs ago    Sexual activity: None   Lifestyle    Physical activity     Days per week: None     Minutes per session: None    Stress: None   Relationships    Social connections     Talks on phone: None     Gets together: None Attends Judaism service: None     Active member of club or organization: None     Attends meetings of clubs or organizations: None     Relationship status: None    Intimate partner violence     Fear of current or ex partner: None     Emotionally abused: None     Physically abused: None     Forced sexual activity: None   Other Topics Concern    None   Social History Narrative    Daily coffee Consumption (2 Cups/Day)    Daily Cola Consumption (1 Cans/day)    Disabled    Lives with spouse       Physical Examination:   Temp:  [97 8 °F (36 6 °C)-98 8 °F (37 1 °C)] 97 8 °F (36 6 °C)  HR:  [75-81] 81  Resp:  [16-18] 18  BP: (123-157)/(69-93) 132/80  General: Awake, alert in NAD  HENT: NCAT, MMM  Neck: Supple, no lymphadenopathy  Respiratory: Unlabored breathing, breath sounds equal, Lungs CTA, no wheezes, rales, or rhonchi  Cardiovascular: Regular rate and rhythm, no murmurs, rubs, or gallops  Gastrointestinal: Soft, non-tender, fairly-distended, normoactive bowel sounds, abdominal incisional hernia  Genitourinary: No tinsley  SkiN/MSK/Extremities: L knee in extension knee brace with generalized knee swelling and mild LLE>RLE edema without calf TTP  Neurologic/Psych:   MENTAL STATUS: awake, oriented to person, place, time, and situation  Affect: Euthymic   CN II-XII: grossly intact except L eye lateral strabismus   Strength/MMT:  5/5 except prox LLE not fully tested   No dysmetria    Data:  Lab Results   Component Value Date    HGB 14 5 05/09/2021    HGB 14 7 12/30/2015    HCT 42 2 05/09/2021    HCT 43 7 12/30/2015    WBC 11 21 (H) 05/09/2021    WBC 13 79 (H) 12/30/2015     12/30/2015    K 5 1 05/10/2021    K 5 0 12/30/2015     05/10/2021     12/30/2015    GLUCOSE 84 12/30/2015    CREATININE 1 17 05/10/2021    CREATININE 1 18 12/30/2015    BUN 16 05/10/2021    BUN 13 12/30/2015         Xr Knee 4+ Vw Left Injury    Result Date: 5/9/2021  Impression: Large joint effusion is suspicious for significant injury  Equivocal cortical step offs and intracondylar regions are identified to suggests subtle nondisplaced fractures  Nonemergent MRI follow-up may be useful to also assess for any internal derangement  The study was marked in [facility] for immediate notification  Workstation performed: [id number]     Ct Lower Extremity Wo Contrast Left    Result Date: 5/9/2021  Impression: Intra-articular distal femoral fracture involving small portion of the medial femoral condyle  Please note the proximal extent of this fracture is not included within the field-of-view, however does not likely extend proximal to the mid diaphysis The study was marked in [employer] for immediate notification  Workstation performed: [id number]       Pertinent labs and imaging reviewed  Thank you for allowing the PM&R service to participate in the care of this patient  We will continue to follow Melinda Gonzalez progress with you  Please do not hesitate to call with questions or concerns      Collette Coma MD, [facility]  Physical Medicine and Rehabilitation  Brain Injury Medicine

## 2021-05-10 NOTE — PLAN OF CARE
Problem: OCCUPATIONAL THERAPY ADULT  Goal: Performs self-care activities at highest level of function for planned discharge setting  See evaluation for individualized goals  Description: Treatment Interventions: ADL retraining, Functional transfer training, UE strengthening/ROM, Endurance training, Patient/family training, Equipment evaluation/education, Compensatory technique education, Continued evaluation, Energy conservation, Activityengagement  Equipment Recommended: Shower/Tub chair with back ($), Bedside commode, Other (comment)(RW and use of w/c w/ L LE elevating leg rest)       See flowsheet documentation for full assessment, interventions and recommendations  Note: Limitation: Decreased ADL status, Decreased endurance, Decreased self-care trans, Decreased high-level ADLs, Decreased UE strength     Assessment: Pt is a 63yo male [facility] on 5/9/2021  Pt presents w/ fracture of femoral condyle L LE and significant PMH impacting his occupational performance including rotator cuff repair X 2, chronic back pain, s/p liver transplant, liver ca hx / tx, depression, alcoholism, tobacco use, HTN, trochanteric bursitis B hips, lumbar radiculopathy  Per orthopedics, recommend NWB L LE in knee immobilizer and non operative mgmt  Pt presented as level B trauma following a fall at home walking out to patio down 2 steps and landing on  patio  Pt reports living w/ wife and in laws in [facility] w/ 1+1 JIGNA  Pt reports I w/ ADL/ IADL w/ out use of AD or DME  Personal factors impacting performance include steps to enter home, tobacco use, alcohol use  Pt w/ active OT orders and activity orders  Upon eval, pt alert and oriented  Able to participate in conversation and communicate wants/needs  Pt verbalized understanding L LE NWB  Pt required mod A to complete bed mobility supine to sit at EOB  Pt required mod A to stand from elevated bed height  Pt required max A to complete LBD   Pt required S to complete UBD  Pt required min A for short distance functional mobility using RW  Pt presents w/ increased pain, orthopedic restrictions, decreased L LE ROM /strength, decreased standing tolerance, decreased standing balance impacting his I w/ dressing, bathing, oral hygiene, functional mobility, functional transfers, activity engagement, clothing mgmt, community mobility and yard work  Pt completing ADL below baseline level of I and would benefit from OT while in acute care to address deficits  Recommend post acute rehab when medically stable for discharge from acute care to return to baseline level of I  Pt would benefit from w/c, commode, and RW   Will continue to follow     OT Discharge Recommendation: Post acute rehabilitation services

## 2021-05-10 NOTE — CONSULTS
Consult Note - Orthopedics   Shruthi Jackson 63 y o  male MRN: [id number]  Unit/Bed#: S -01 Encounter: [id number]      Assessment & Plan     Left lateral femoral condyle fracture after mechanical fall 5/9/21  1  Recommend non operative management  2  NWB LLE in knee immobilizer  3  PT/OT  4  Pain Control  5  DVT prophylaxis  6  Follow-up as outpatient in two weeks    Chief Complaint     Left knee pain    History of the Present Illness     Shruthi Jackson is a 63 y o  male seen in orthopedic consultation at the request of Abdullahi Don DO for for evaluation of patient's left knee  Patient sustained a mechanical fall yesterday  He tripped over the threshold of the door and fell down two steps directly onto his left knee on a  patio  He denies loss of consciousness or hitting his head  After the fall he noted knee pain with swelling and inability to bear weight on the left lower extremity  He reported to the emergency department where x-rays and a CT scan were performed revealing a distal femur fracture  Currently his pain is well controlled at rest   Pain is worse with movement  He denies numbness and tingling  No fevers or chills  Prior to this injury he ambulated independently without the use of an assistive device  Physical Exam     /71   Pulse 79   Temp 98 °F (36 7 °C)   Resp 16   Ht 6' (1 829 m)   Wt 99 3 kg (219 lb)   SpO2 95%   BMI 29 70 kg/m²     Left knee:  Skin intact  Small to moderate  No erythema ecchymosis or swelling  Tenderness to palpation lateral femoral condyle  Range of motion and strength testing is deferred  Motor/sensation intact distally  Eyes:  Anicteric sclerae  ENT:  Trachea midline  Lungs:  Clear to auscultation bilaterally  Cardiovascular:  Regular rate and rhythm with audible S1 and S2  Abdomen:  Soft and nontender  Lymphatic:  No palpable lymphadenopathy  Skin:  Intact without erythema    Neurologic:  Sensation grossly intact to light touch  Psychiatric:  Mood and affect are appropriate  Data Review     I have personally reviewed pertinent films in PACS, and my interpretation follows:    CT scan and x-rays left knee 5/9/21:  Nondisplaced intra-articular distal femur fracture involving a small portion of the lateral femoral condyle  I have personally reviewed pertinent lab results  Lab Results   Component Value Date     12/30/2015    K 4 4 05/09/2021    K 5 0 12/30/2015     05/09/2021     12/30/2015    CO2 28 05/09/2021    CO2 29 12/30/2015    BUN 15 05/09/2021    BUN 13 12/30/2015    CREATININE 1 30 05/09/2021    CREATININE 1 18 12/30/2015    GLUCOSE 84 12/30/2015     Lab Results   Component Value Date    WBC 11 21 (H) 05/09/2021    WBC 13 79 (H) 12/30/2015    HGB 14 5 05/09/2021    HGB 14 7 12/30/2015     05/09/2021     12/30/2015     Lab Results   Component Value Date    INR 1 12 04/26/2019    INR 1 22 (H) 12/30/2015       Past Medical History:   Diagnosis Date    [facility] [address] [facility]     [facility] peritonitis [facility] [address] [facility]     Bowel disease     Cancer [facility] [address] [facility]     liver    Cataract     Depression     Fracture     Gastroesophageal reflux     Hepatic disease     Hepatocellular carcinoma [facility] [address] [facility]     History of colon polyps     Hypertension     Liver cancer [facility] [address] [facility]     Liver disease     Stomach disease        Past Surgical History:   Procedure Laterality Date    APPENDECTOMY      CATARACT EXTRACTION      EXPLORATORY LAPAROTOMY  09/2011    EYE SURGERY      LIVER TRANSPLANTATION  08/20/2013    PARACENTESIS      Abdominal Paracentesis(Therapeutic) with Imaging Guidance    NE COLONOSCOPY FLX DX W/COLLJ SPEC WHEN PFRMD N/A 9/12/2016    Procedure: COLONOSCOPY;  Surgeon: Chantell Saenz MD;  Location: [facility]; Service: General    ROTATOR CUFF REPAIR      x2       Allergies   Allergen Reactions    Macrolides And Ketolides      Annotation - [id number]:  The patient is on Antirejection medications and they will reduce the effective ness of the medications  No current facility-administered medications on file prior to encounter  Current Outpatient Medications on File Prior to Encounter   Medication Sig Dispense Refill    acetaminophen (TYLENOL) 325 mg tablet Take 650 mg by mouth every 6 (six) hours as needed for mild pain   ALPRAZolam (XANAX) 0 25 mg tablet TAKE 1 TABLET BY MOUTH EVERY DAY AS NEEDED FOR ANXIETY 30 tablet 1    aspirin (ECOTRIN LOW STRENGTH) 81 mg EC tablet Take 81 mg by mouth daily      atorvastatin (LIPITOR) 40 mg tablet Take 1 tablet (40 mg total) by mouth daily 90 tablet 3    CALCIUM-MAGNESIUM-VITAMIN D ER PO Take 1 tablet by mouth daily      docusate sodium (COLACE) 100 mg capsule Take 100 mg by mouth 2 (two) times a day   gabapentin (NEURONTIN) 300 mg capsule Take 1 capsule (300 mg total) by mouth 2 (two) times a day 60 capsule 5    lisinopril (ZESTRIL) 20 mg tablet Take 1 tablet (20 mg total) by mouth daily The patient should contact his family doctor for further orders or set up an appointment before next  Refill  90 tablet 0    omeprazole (PriLOSEC) 20 mg delayed release capsule TAKE 1 CAPSULE BY MOUTH TWICE DAILY 180 capsule 3    ondansetron (ZOFRAN-ODT) 4 mg disintegrating tablet Take 1 tablet (4 mg total) by mouth every 6 (six) hours as needed for nausea or vomiting 20 tablet 5    ONE DAILY MULTIPLE VITAMIN PO Take by mouth        tacrolimus (PROGRAF) 1 mg capsule TAKE 3 CAPSULES(3 MG TOTAL) BY MOUTH TWICE DAILY 540 capsule 3       Social History     Tobacco Use    Smoking status: Current Every Day Smoker     Packs/day: 0 25     Years: 35 00     Pack years: 8 75     Types: Cigarettes    Smokeless tobacco: Never Used   Substance Use Topics    Alcohol use: Yes     Frequency: 2-4 times a month    Drug use: No     Comment: Per [employer] use way back in college over 40 yrs ago       Family History   Problem Relation Age of Onset    Coronary artery disease Mother         CABG    Prostate cancer Mother         Prostate Gland    Heart disease Mother         Cardiac Disorder    Vascular Disease Mother     Hypertension Mother         Benign    Diabetes Maternal Grandmother         Mellitus    Clotting disorder Maternal Grandfather         Embolism    Seizures Paternal Grandmother         Epilepsy    Other Paternal Grandfather         Accident    Liver cancer Family     Heart disease Family         Cardiac Disorder    Hypertension Family         Benign       Review of Systems     As stated in the HPI  All other systems were reviewed and are negative

## 2021-05-10 NOTE — PLAN OF CARE
Problem: Prexisting or High Potential for Compromised Skin Integrity  Goal: Skin integrity is maintained or improved  Description: INTERVENTIONS:  - Identify patients at risk for skin breakdown  - Assess and monitor skin integrity  - Assess and monitor nutrition and hydration status  - Monitor labs   - Assess for incontinence   - Turn and reposition patient  - Assist with mobility/ambulation  - Relieve pressure over bony prominences  - Avoid friction and shearing  - Provide appropriate hygiene as needed including keeping skin clean and dry  - Evaluate need for skin moisturizer/barrier cream  - Collaborate with interdisciplinary team   - Patient/family teaching  - Consider wound care consult   Outcome: Progressing     Problem: PAIN - ADULT  Goal: Verbalizes/displays adequate comfort level or baseline comfort level  Description: Interventions:  - Encourage patient to monitor pain and request assistance  - Assess pain using appropriate pain scale  - Administer analgesics based on type and severity of pain and evaluate response  - Implement non-pharmacological measures as appropriate and evaluate response  - Consider cultural and social influences on pain and pain management  - Notify physician/advanced practitioner if interventions unsuccessful or patient reports new pain  Outcome: Progressing     Problem: SAFETY ADULT  Goal: Patient will remain free of falls  Description: INTERVENTIONS:  - Assess patient frequently for physical needs  -  Identify cognitive and physical deficits and behaviors that affect risk of falls    -  [place] fall precautions as indicated by assessment   - Educate patient/family on patient safety including physical limitations  - Instruct patient to call for assistance with activity based on assessment  - Modify environment to reduce risk of injury  - Consider OT/PT consult to assist with strengthening/mobility  Outcome: Progressing  Goal: Maintain or return to baseline ADL function  Description: INTERVENTIONS:  -  Assess patient's ability to carry out ADLs; assess patient's baseline for ADL function and identify physical deficits which impact ability to perform ADLs (bathing, care of mouth/teeth, toileting, grooming, dressing, etc )  - Assess/evaluate cause of self-care deficits   - Assess range of motion  - Assess patient's mobility; develop plan if impaired  - Assess patient's need for assistive devices and provide as appropriate  - Encourage maximum independence but intervene and supervise when necessary  - Involve family in performance of ADLs  - Assess for home care needs following discharge   - Consider OT consult to assist with ADL evaluation and planning for discharge  - Provide patient education as appropriate  Outcome: Progressing  Goal: Maintain or return mobility status to optimal level  Description: INTERVENTIONS:  - Assess patient's baseline mobility status (ambulation, transfers, stairs, etc )    - Identify cognitive and physical deficits and behaviors that affect mobility  - Identify mobility aids required to assist with transfers and/or ambulation (gait belt, sit-to-stand, lift, walker, cane, etc )  - [place] fall precautions as indicated by assessment  - Record patient progress and toleration of activity level on Mobility SBAR; progress patient to next Phase/Stage  - Instruct patient to call for assistance with activity based on assessment  - Consider rehabilitation consult to assist with strengthening/weightbearing, etc   Outcome: Progressing     Problem: DISCHARGE PLANNING  Goal: Discharge to home or other facility with appropriate resources  Description: INTERVENTIONS:  - Identify barriers to discharge w/patient and caregiver  - Arrange for needed discharge resources and transportation as appropriate  - Identify discharge learning needs (meds, wound care, etc )  - Refer to Case Management Department for coordinating discharge planning if the patient needs post-hospital services based on physician/advanced practitioner order or complex needs related to functional status, cognitive ability, or social support system  Outcome: Progressing     Problem: MUSCULOSKELETAL - ADULT  Goal: Maintain or return mobility to safest level of function  Description: INTERVENTIONS:  - Assess patient's ability to carry out ADLs; assess patient's baseline for ADL function and identify physical deficits which impact ability to perform ADLs (bathing, care of mouth/teeth, toileting, grooming, dressing, etc )  - Assess/evaluate cause of self-care deficits   - Assess range of motion  - Assess patient's mobility  - Assess patient's need for assistive devices and provide as appropriate  - Encourage maximum independence but intervene and supervise when necessary  - Involve family in performance of ADLs  - Assess for home care needs following discharge   - Consider OT consult to assist with ADL evaluation and planning for discharge  - Provide patient education as appropriate  Outcome: Progressing  Goal: Maintain proper alignment of affected body part  Description: INTERVENTIONS:  - Support, maintain and protect limb and body alignment  - Provide patient/ family with appropriate education  Outcome: Progressing

## 2021-05-11 ENCOUNTER — HOSPITAL ENCOUNTER (INPATIENT)
Facility: HOSPITAL | Age: 66
LOS: 9 days | Discharge: HOME HEALTH | DRG: 560 | End: 2021-05-20
Attending: PHYSICAL MEDICINE & REHABILITATION | Admitting: PHYSICAL MEDICINE & REHABILITATION
Payer: MEDICARE

## 2021-05-11 VITALS
DIASTOLIC BLOOD PRESSURE: 78 MMHG | SYSTOLIC BLOOD PRESSURE: 137 MMHG | OXYGEN SATURATION: 96 % | HEART RATE: 79 BPM | RESPIRATION RATE: 18 BRPM | TEMPERATURE: 98.8 F | BODY MASS INDEX: 29.66 KG/M2 | WEIGHT: 219 LBS | HEIGHT: 72 IN

## 2021-05-11 DIAGNOSIS — Z78.9 IMPAIRED MOBILITY AND ACTIVITIES OF DAILY LIVING: ICD-10-CM

## 2021-05-11 DIAGNOSIS — I10 ESSENTIAL HYPERTENSION: Primary | ICD-10-CM

## 2021-05-11 DIAGNOSIS — Z74.09 IMPAIRED MOBILITY AND ACTIVITIES OF DAILY LIVING: ICD-10-CM

## 2021-05-11 DIAGNOSIS — S72.402A CLOSED FRACTURE OF LEFT DISTAL FEMUR (HCC): ICD-10-CM

## 2021-05-11 DIAGNOSIS — E78.2 MIXED HYPERLIPIDEMIA: ICD-10-CM

## 2021-05-11 LAB — SARS-COV-2 RNA RESP QL NAA+PROBE: NEGATIVE

## 2021-05-11 PROCEDURE — NC001 PR NO CHARGE: Performed by: SURGERY

## 2021-05-11 PROCEDURE — U0005 INFEC AGEN DETEC AMPLI PROBE: HCPCS | Performed by: NURSE PRACTITIONER

## 2021-05-11 PROCEDURE — 97535 SELF CARE MNGMENT TRAINING: CPT

## 2021-05-11 PROCEDURE — 99222 1ST HOSP IP/OBS MODERATE 55: CPT | Performed by: PHYSICAL MEDICINE & REHABILITATION

## 2021-05-11 PROCEDURE — 97530 THERAPEUTIC ACTIVITIES: CPT

## 2021-05-11 PROCEDURE — 97116 GAIT TRAINING THERAPY: CPT

## 2021-05-11 PROCEDURE — 99238 HOSP IP/OBS DSCHRG MGMT 30/<: CPT | Performed by: SURGERY

## 2021-05-11 PROCEDURE — U0003 INFECTIOUS AGENT DETECTION BY NUCLEIC ACID (DNA OR RNA); SEVERE ACUTE RESPIRATORY SYNDROME CORONAVIRUS 2 (SARS-COV-2) (CORONAVIRUS DISEASE [COVID-19]), AMPLIFIED PROBE TECHNIQUE, MAKING USE OF HIGH THROUGHPUT TECHNOLOGIES AS DESCRIBED BY CMS-2020-01-R: HCPCS | Performed by: NURSE PRACTITIONER

## 2021-05-11 PROCEDURE — NC001 PR NO CHARGE

## 2021-05-11 RX ORDER — PANTOPRAZOLE SODIUM 20 MG/1
20 TABLET, DELAYED RELEASE ORAL
Status: CANCELLED | OUTPATIENT
Start: 2021-05-12

## 2021-05-11 RX ORDER — ACETAMINOPHEN 325 MG/1
650 TABLET ORAL EVERY 6 HOURS PRN
Status: DISCONTINUED | OUTPATIENT
Start: 2021-05-11 | End: 2021-05-20 | Stop reason: HOSPADM

## 2021-05-11 RX ORDER — GABAPENTIN 300 MG/1
300 CAPSULE ORAL 2 TIMES DAILY
Status: DISCONTINUED | OUTPATIENT
Start: 2021-05-11 | End: 2021-05-17

## 2021-05-11 RX ORDER — POLYETHYLENE GLYCOL 3350 17 G/17G
17 POWDER, FOR SOLUTION ORAL DAILY
Status: DISCONTINUED | OUTPATIENT
Start: 2021-05-12 | End: 2021-05-20 | Stop reason: HOSPADM

## 2021-05-11 RX ORDER — TACROLIMUS 1 MG/1
3 CAPSULE ORAL EVERY 12 HOURS SCHEDULED
Status: DISCONTINUED | OUTPATIENT
Start: 2021-05-11 | End: 2021-05-20 | Stop reason: HOSPADM

## 2021-05-11 RX ORDER — POLYETHYLENE GLYCOL 3350 17 G/17G
17 POWDER, FOR SOLUTION ORAL DAILY
Status: CANCELLED | OUTPATIENT
Start: 2021-05-12

## 2021-05-11 RX ORDER — CALCIUM CARBONATE 500(1250)
2 TABLET ORAL
Status: DISCONTINUED | OUTPATIENT
Start: 2021-05-12 | End: 2021-05-19

## 2021-05-11 RX ORDER — AMOXICILLIN 250 MG
1 CAPSULE ORAL 2 TIMES DAILY
Status: CANCELLED | OUTPATIENT
Start: 2021-05-11

## 2021-05-11 RX ORDER — ASPIRIN 325 MG
325 TABLET ORAL 2 TIMES DAILY
Status: CANCELLED | OUTPATIENT
Start: 2021-05-11

## 2021-05-11 RX ORDER — ACETAMINOPHEN 325 MG/1
650 TABLET ORAL EVERY 6 HOURS SCHEDULED
Status: DISCONTINUED | OUTPATIENT
Start: 2021-05-11 | End: 2021-05-11

## 2021-05-11 RX ORDER — ALPRAZOLAM 0.25 MG/1
0.25 TABLET ORAL DAILY PRN
Status: CANCELLED | OUTPATIENT
Start: 2021-05-11

## 2021-05-11 RX ORDER — ONDANSETRON 2 MG/ML
4 INJECTION INTRAMUSCULAR; INTRAVENOUS EVERY 4 HOURS PRN
Status: CANCELLED | OUTPATIENT
Start: 2021-05-11

## 2021-05-11 RX ORDER — PANTOPRAZOLE SODIUM 20 MG/1
20 TABLET, DELAYED RELEASE ORAL
Status: DISCONTINUED | OUTPATIENT
Start: 2021-05-12 | End: 2021-05-12

## 2021-05-11 RX ORDER — LISINOPRIL 20 MG/1
20 TABLET ORAL DAILY
Status: DISCONTINUED | OUTPATIENT
Start: 2021-05-12 | End: 2021-05-20 | Stop reason: HOSPADM

## 2021-05-11 RX ORDER — OXYCODONE HYDROCHLORIDE 5 MG/1
5 TABLET ORAL EVERY 4 HOURS PRN
Status: DISCONTINUED | OUTPATIENT
Start: 2021-05-11 | End: 2021-05-20 | Stop reason: HOSPADM

## 2021-05-11 RX ORDER — ATORVASTATIN CALCIUM 40 MG/1
40 TABLET, FILM COATED ORAL DAILY
Status: CANCELLED | OUTPATIENT
Start: 2021-05-12

## 2021-05-11 RX ORDER — OXYCODONE HYDROCHLORIDE 10 MG/1
10 TABLET ORAL EVERY 4 HOURS PRN
Status: DISCONTINUED | OUTPATIENT
Start: 2021-05-11 | End: 2021-05-20 | Stop reason: HOSPADM

## 2021-05-11 RX ORDER — GABAPENTIN 300 MG/1
300 CAPSULE ORAL 2 TIMES DAILY
Status: CANCELLED | OUTPATIENT
Start: 2021-05-11

## 2021-05-11 RX ORDER — POLYETHYLENE GLYCOL 3350 17 G/17G
17 POWDER, FOR SOLUTION ORAL DAILY
Status: DISCONTINUED | OUTPATIENT
Start: 2021-05-11 | End: 2021-05-11 | Stop reason: HOSPADM

## 2021-05-11 RX ORDER — OXYCODONE HYDROCHLORIDE 5 MG/1
5 TABLET ORAL EVERY 4 HOURS PRN
Status: CANCELLED | OUTPATIENT
Start: 2021-05-11

## 2021-05-11 RX ORDER — METHOCARBAMOL 500 MG/1
500 TABLET, FILM COATED ORAL EVERY 6 HOURS SCHEDULED
Status: CANCELLED | OUTPATIENT
Start: 2021-05-11

## 2021-05-11 RX ORDER — NICOTINE 21 MG/24HR
1 PATCH, TRANSDERMAL 24 HOURS TRANSDERMAL DAILY
Status: CANCELLED | OUTPATIENT
Start: 2021-05-12

## 2021-05-11 RX ORDER — TACROLIMUS 1 MG/1
3 CAPSULE ORAL EVERY 12 HOURS SCHEDULED
Status: CANCELLED | OUTPATIENT
Start: 2021-05-11

## 2021-05-11 RX ORDER — CALCIUM CARBONATE 500(1250)
2 TABLET ORAL
Status: CANCELLED | OUTPATIENT
Start: 2021-05-12

## 2021-05-11 RX ORDER — BISACODYL 10 MG
10 SUPPOSITORY, RECTAL RECTAL DAILY PRN
Status: DISCONTINUED | OUTPATIENT
Start: 2021-05-11 | End: 2021-05-12

## 2021-05-11 RX ORDER — OXYCODONE HYDROCHLORIDE 10 MG/1
10 TABLET ORAL EVERY 4 HOURS PRN
Status: CANCELLED | OUTPATIENT
Start: 2021-05-11

## 2021-05-11 RX ORDER — AMOXICILLIN 250 MG
1 CAPSULE ORAL 2 TIMES DAILY
Status: DISCONTINUED | OUTPATIENT
Start: 2021-05-11 | End: 2021-05-11 | Stop reason: HOSPADM

## 2021-05-11 RX ORDER — ACETAMINOPHEN 325 MG/1
650 TABLET ORAL EVERY 6 HOURS SCHEDULED
Status: CANCELLED | OUTPATIENT
Start: 2021-05-11

## 2021-05-11 RX ORDER — AMOXICILLIN 250 MG
1 CAPSULE ORAL 2 TIMES DAILY
Status: DISCONTINUED | OUTPATIENT
Start: 2021-05-11 | End: 2021-05-12

## 2021-05-11 RX ORDER — ASPIRIN 325 MG
325 TABLET ORAL 2 TIMES DAILY
Status: DISCONTINUED | OUTPATIENT
Start: 2021-05-11 | End: 2021-05-12

## 2021-05-11 RX ORDER — LISINOPRIL 20 MG/1
20 TABLET ORAL DAILY
Status: CANCELLED | OUTPATIENT
Start: 2021-05-12

## 2021-05-11 RX ORDER — ALPRAZOLAM 0.25 MG/1
0.25 TABLET ORAL DAILY PRN
Status: DISCONTINUED | OUTPATIENT
Start: 2021-05-11 | End: 2021-05-20 | Stop reason: HOSPADM

## 2021-05-11 RX ORDER — ATORVASTATIN CALCIUM 40 MG/1
40 TABLET, FILM COATED ORAL DAILY
Status: DISCONTINUED | OUTPATIENT
Start: 2021-05-12 | End: 2021-05-20 | Stop reason: HOSPADM

## 2021-05-11 RX ORDER — ONDANSETRON 4 MG/1
4 TABLET, ORALLY DISINTEGRATING ORAL EVERY 6 HOURS PRN
Status: DISCONTINUED | OUTPATIENT
Start: 2021-05-11 | End: 2021-05-20 | Stop reason: HOSPADM

## 2021-05-11 RX ADMIN — CALCIUM 2 TABLET: 500 TABLET ORAL at 09:02

## 2021-05-11 RX ADMIN — OXYCODONE HYDROCHLORIDE 10 MG: 10 TABLET ORAL at 01:19

## 2021-05-11 RX ADMIN — METHOCARBAMOL 500 MG: 500 TABLET ORAL at 05:56

## 2021-05-11 RX ADMIN — METHOCARBAMOL 500 MG: 500 TABLET ORAL at 12:04

## 2021-05-11 RX ADMIN — ACETAMINOPHEN 650 MG: 325 TABLET ORAL at 21:49

## 2021-05-11 RX ADMIN — OXYCODONE HYDROCHLORIDE 10 MG: 10 TABLET ORAL at 07:21

## 2021-05-11 RX ADMIN — ASPIRIN 325 MG ORAL TABLET 325 MG: 325 PILL ORAL at 21:33

## 2021-05-11 RX ADMIN — OXYCODONE HYDROCHLORIDE 10 MG: 10 TABLET ORAL at 19:56

## 2021-05-11 RX ADMIN — ACETAMINOPHEN 650 MG: 325 TABLET, FILM COATED ORAL at 05:56

## 2021-05-11 RX ADMIN — OXYCODONE HYDROCHLORIDE 10 MG: 10 TABLET ORAL at 15:53

## 2021-05-11 RX ADMIN — PANTOPRAZOLE SODIUM 20 MG: 20 TABLET, DELAYED RELEASE ORAL at 05:56

## 2021-05-11 RX ADMIN — TACROLIMUS 3 MG: 1 CAPSULE ORAL at 09:01

## 2021-05-11 RX ADMIN — ACETAMINOPHEN 650 MG: 325 TABLET, FILM COATED ORAL at 12:03

## 2021-05-11 RX ADMIN — HYDROMORPHONE HYDROCHLORIDE 0.5 MG: 1 INJECTION, SOLUTION INTRAMUSCULAR; INTRAVENOUS; SUBCUTANEOUS at 03:04

## 2021-05-11 RX ADMIN — GABAPENTIN 300 MG: 300 CAPSULE ORAL at 18:48

## 2021-05-11 RX ADMIN — OXYCODONE HYDROCHLORIDE 10 MG: 10 TABLET ORAL at 11:52

## 2021-05-11 RX ADMIN — TACROLIMUS 3 MG: 1 CAPSULE ORAL at 21:33

## 2021-05-11 RX ADMIN — NICOTINE 1 PATCH: 21 PATCH, EXTENDED RELEASE TRANSDERMAL at 09:02

## 2021-05-11 RX ADMIN — ENOXAPARIN SODIUM 30 MG: 30 INJECTION SUBCUTANEOUS at 05:56

## 2021-05-11 RX ADMIN — ATORVASTATIN CALCIUM 40 MG: 40 TABLET, FILM COATED ORAL at 09:02

## 2021-05-11 RX ADMIN — GABAPENTIN 300 MG: 300 CAPSULE ORAL at 09:02

## 2021-05-11 RX ADMIN — POLYETHYLENE GLYCOL 3350 17 G: 17 POWDER, FOR SOLUTION ORAL at 09:02

## 2021-05-11 RX ADMIN — SENNOSIDES AND DOCUSATE SODIUM 1 TABLET: 8.6; 5 TABLET ORAL at 18:48

## 2021-05-11 RX ADMIN — ASPIRIN 81 MG: 81 TABLET, COATED ORAL at 09:02

## 2021-05-11 RX ADMIN — DOCUSATE SODIUM AND SENNOSIDES 1 TABLET: 8.6; 5 TABLET, FILM COATED ORAL at 09:02

## 2021-05-11 RX ADMIN — LISINOPRIL 20 MG: 20 TABLET ORAL at 09:02

## 2021-05-11 NOTE — ASSESSMENT & PLAN NOTE
- receives injections in his low back for DJD and sciatica, follows with the pain center  - takes gabapentin 300 mg BID chronically as well  - continue to follow-up with Pain Center as an outpatient

## 2021-05-11 NOTE — ASSESSMENT & PLAN NOTE
- mechanical fall directly onto left knee  - sustaining the below stated injury  - PT and OT evaluations pending for 5/10  - case management consulted   - Rehab placement pending

## 2021-05-11 NOTE — PHYSICAL THERAPY NOTE
PHYSICAL THERAPY TREATMENT NOTE    Patient Name: Isac Manley  MQTSM'V Date: 21 3695   PT Last Visit   PT Visit Date 21   Note Type   Note Type Treatment   Pain Assessment   Pain Assessment Tool 0-10   Pain Score 7   Pain Location/Orientation Orientation: Left; Location: Leg   Hospital Pain Intervention(s) Repositioned; Ambulation/increased activity   Restrictions/Precautions   Weight Bearing Precautions Per Order Yes   LLE Weight Bearing Per Order NWB   Braces or Orthoses LE Immobilizer   Other Precautions Chair Alarm; Bed Alarm;WBS;Fall Risk;Pain  ([place])   General   Chart Reviewed Yes   Response to Previous Treatment Patient with no complaints from previous session  Family/Caregiver Present No   Cognition   Overall Cognitive Status WFL   Arousal/Participation Alert; Cooperative   Attention Attends with cues to redirect   Comments Pt identified by name and   Subjective   Subjective Agrees to PT treatment; requests to eventually try crutches   Bed Mobility   Supine to Sit Unable to assess  (OOB in chair prior to session)   Sit to Supine 4  Minimal assistance   Additional items Assist x 1; Increased time required;LE management;Verbal cues   Transfers   Sit to Stand 4  Minimal assistance   Additional items Assist x 1; Increased time required;Verbal cues;Armrests   Stand to Sit 4  Minimal assistance   Additional items Assist x 1; Increased time required;Verbal cues   Ambulation/Elevation   Gait pattern Improper Weight shift;Decreased foot clearance; Forward Flexion; Short stride; Excessively slow  (maintains NWB LLE well)   Gait Assistance 4  Minimal assist   Additional items Assist x 1;Verbal cues   Assistive Device Rolling walker  (LLE knee immobilizer)   Distance 13` x1  (limited due to mild lightheadedness/exhaustion)   Balance   Static Sitting Fair +   Static Standing Fair -   Dynamic Standing Fair -   Ambulatory Poor +   Endurance Deficit   Endurance Deficit Yes   Endurance Deficit Description reports exhaustion   Activity Tolerance   Activity Tolerance Patient limited by fatigue;Patient limited by pain   Nurse Made Aware Per RN pt appropriate to treat   Exercises   Ankle Pumps Supine;10 reps;AROM; Bilateral   Assessment   Prognosis Good   Problem List Decreased strength;Decreased range of motion;Decreased endurance; Impaired balance;Decreased mobility; Decreased safety awareness;Orthopedic restrictions;Pain   Assessment Pt agrees to PT treatment and is cooperative throughout session  Extensive education provided on potential use of crutches vs  RW due to pt request to use crutches  Continuing to recommend use of RW over the use of crutches as pt is physically exhausted from 13` of ambulation with RW  However, may trial crutch training in future due to pt request   PT answered many questions about possible use of commode and management of LE  Progress noted this session as pt is able to ambulate increased distance and requires decreased level of assist for transfers/ambulation with use of RW  Will continue to benefit from ongoing skilled PT to maximize his functional mobility and increase his level of independence  Recommending rehab at time of discharge when medically appropriate  Goals   Patient Goals to go to rehab, then back home   STG Expiration Date 05/20/21   PT Treatment Day 2   Plan   Treatment/Interventions Functional transfer training;LE strengthening/ROM; Therapeutic exercise; Endurance training;Equipment eval/education; Bed mobility;Gait training;Patient/family training;Elevations   Progress Progressing toward goals   PT Frequency Other (Comment)  (6x/week)   Recommendation   PT Discharge Recommendation Post acute rehabilitation services   Equipment Recommended [facility] Recommended Wheeled walker   AM-PAC Basic Mobility Inpatient   Turning in Bed Without Bedrails 3   Lying on Back to Sitting on Edge of Flat Bed 3   Moving Bed to Chair 3   Standing Up From Chair 3   Walk in Room 3   Climb 3-5 Stairs 2   Basic Mobility Inpatient Raw Score 17   Basic Mobility Standardized Score 39 67   The patient's AM-PAC Basic Mobility Inpatient Short Form Raw Score is 17, Standardized Score is 39 67  A standardized score less than 42 9 suggests the patient may benefit from discharge to post-acute rehabilitation services  Please also refer to the recommendation of the Physical Therapist for safe discharge planning        Heidi Madison, PT,DPT

## 2021-05-11 NOTE — OCCUPATIONAL THERAPY NOTE
[address] Progress Note     Patient Name: Yumiko SHIRLEY Date: 5/11/2021  Problem List  Principal Problem:    Fracture of femoral condyle, left, closed [facility])  Active Problems:    History of liver transplant [facility])    Fall    Acute pain due to trauma    Chronic low back pain       05/11/21 0826   OT Last Visit   OT Visit Date 05/11/21  (Tuesday)   Note Type   Note Type Treatment   Restrictions/Precautions   Weight Bearing Precautions Per Order Yes   LLE Weight Bearing Per Order NWB   Braces or Orthoses LE Immobilizer   Other Precautions Bed Alarm; Chair Alarm;WBS;Fall Risk;Pain  ([place] on room air)   General   Response to Previous Treatment Patient with no complaints from previous session   Family/Caregiver Present No  Pt reports his wife will be in later   Pain Assessment   Pain Assessment Tool 0-10   Pain Score 7   Pain Location/Orientation Orientation: Left; Location: Leg   Effect of Pain on Daily Activities limits activity tolerance w/ ADL performance   Patient's Stated Pain Goal No pain   Hospital Pain Intervention(s) Repositioned; Ambulation/increased activity; Emotional support  (RN medicated prior to session)   ADL   Eating Assistance 7  Independent   Eating Deficit Setup   Eating Comments seated OOB in chair at tray table   Grooming Assistance 6  Modified Independent   Grooming Deficit Setup   Grooming Comments seated OOB in chair at tray table   UB Bathing Assistance 5  Supervision/Setup   UB Bathing Deficit Setup;Verbal cueing; Increased time to complete   UB Bathing Comments seated OOB in chair   LB Bathing Assistance Unable to assess   LB Bathing Comments pt declined LB bathing and reports will stay in his shorts until his wife comes in later w/ clean clothes   UB Dressing Assistance 6  Modified independent   [address] [place] [address] St; Increased time to complete   UB Dressing Comments seated in chair   Toileting Deficit Use of bedpan/urinal setup   Bed Mobility   Supine to Sit 3 Moderate assistance   Additional items Assist x 1;LE management; Increased time required   Sit to Supine Unable to assess   Additional Comments Pt seated OOB in chair at end of session w/ needs met, call bell in reach and chair alarm activated   Transfers   Sit to Stand   (min <> mod A)   Additional items Assist x 1; Increased time required;Armrests; Bedrails;Verbal cues   Stand to Sit 4  Minimal assistance   Additional items Assist x 1; Armrests; Bedrails; Increased time required;Verbal cues   Additional Comments Pt performed sit <> stand from elevated bed height w/ + time   Functional Mobility   Functional Mobility 4  Minimal assistance   Additional Comments short distances using RW   Additional items Rolling walker   Cognition   Overall Cognitive Status WFL   Arousal/Participation Alert; Cooperative   Attention Attends with cues to redirect   Orientation Level Oriented X4   Memory Within functional limits   Following Commands Follows multistep commands with increased time or repetition   Comments Identified pt by full name and birthdate  Demo good recall L LE NWB   Activity Tolerance   Activity Tolerance Patient limited by pain   Medical Staff Made Aware per Jordy ROBERTS appropriate to see pt  Spoke w/ PTVioletta   Assessment   Assessment Pt seen for skilled OT tx session day 1 from [id number] focusing on activity engagement  Pt agreeable and motivated to participate in session  Pt required consistent physical assistance to complete bed mobility  Thearpist educated pt on use of leg  to max I w/ bed mobility  Pt required min - mod A to complete sit <> stand w/ good recall L LE NWB  Pt engaged in bathing  / dressing seated OOB in chair after set- up  Pt engaged in short distance functional mobility using RW w/ min A  Pt seated OOB in chair at end of session  Continue to recommend post acute rehab when medically stable for discharge to return to baseline level of I   Will continue to follow   Plan   Treatment Interventions ADL retraining;Functional transfer training; Endurance training;Patient/family training;Equipment evaluation/education; Compensatory technique education;Continued evaluation; Energy conservation; Activityengagement   Goal Expiration Date 05/17/21   OT Treatment Day 1  (Tuesday)   OT Frequency 3-5x/wk   Recommendation   OT Discharge Recommendation Post acute rehabilitation services   Equipment Recommended Shower/Tub chair with back ($);Bedside commode   Commode Type Standard   AM-PAC Daily Activity Inpatient   Lower Body Dressing 2   Bathing 3   Toileting 3   Upper Body Dressing 4   Grooming 4   Eating 4   Daily Activity Raw Score 20   Daily Activity Standardized Score (Calc for Raw Score >=11) 42 03   AM-[facility] Applied Cognition Inpatient   Following a Speech/Presentation 4   Understanding Ordinary Conversation 4   Taking Medications 4   Remembering Where Things Are Placed or Put Away 4   Remembering List of 4-5 Errands 4   Taking Care of Complicated Tasks 4   Applied Cognition Raw Score 24   Applied Cognition Standardized Score 62 21   Barthel Index   Feeding 10   Bathing 0   Grooming Score 5   Dressing Score 5   Bladder Score 10   Bowels Score 10   Toilet Use Score 5   Transfers (Bed/Chair) Score 10   Mobility (Level Surface) Score 0   Stairs Score 0   Barthel Index Score 55      The patient's raw score on the AM-PAC Daily Activity inpatient short form is 19, standardized score is 40 22, greater than 39 4  Patients at this level are likely to benefit from discharge to home  Please refer to the recommendation of the Occupational Therapist for safe discharge planning    Recommend post acute rehab to return to baseline level of I as pt completing ADL below baseline  [facility], OTR/L

## 2021-05-11 NOTE — PROGRESS NOTES
[facility]  Progress Note - Shoshana Amato 1955, 63 y o  male MRN: [id number]  Unit/Bed#: S -01 Encounter: [id number]  Primary Care Provider: Kristi Cisneros MD   Date and time admitted to hospital: 5/9/2021 12:17 PM    Fall  Assessment & Plan  - mechanical fall directly onto left knee  - sustaining the below stated injury  - PT and OT evaluations pending for 5/10  - case management consulted   - Rehab placement pending  * Fracture of femoral condyle, left, closed (HCC)  Assessment & Plan  - nondisplaced left medial femoral condyle fracture, present on admission   - knee immobilizer to the left lower extremity  - Orthopedic surgery consulted, note and recommendations appreciated  Will FU in 2 weeks as OP    - Continue knee immobilizer and maintain non weightbearing status on the left lower extremity   - Monitor left lower extremity neurovascular exam   - Continue multimodal analgesic regimen   - Continue DVT prophylaxis  - PT and OT evaluated and note appreciated  - PMR evaluated and note appreciated  ARC recommended  Chronic low back pain  Assessment & Plan  - receives injections in his low back for DJD and sciatica, follows with the pain center  - takes gabapentin 300 mg BID chronically as well    Acute pain due to trauma  Assessment & Plan  - Acute pain secondary to traumatic injuries  - Placed on multi modal analgesic regimen  - Bowel regimen as long as using opioids   - Continue to monitor pain and adjust regimen as indicated  - Pain breakthrough requirements have reduced over the past 24 hours  History of liver transplant [facility])  Assessment & Plan  -patient has a history of hepatocellular carcinoma status post liver transplant 2018  -continue home medications, including tacrolimus          Disposition: ARC placement pending         SUBJECTIVE:  Chief Complaint:  Right knee pain    Subjective:  Patient states that his pain is improved in comparison to yesterday  He confirms that he has been needing less IV pain medication over the past 24 hours  Confirms that sensation and range of motion distal to the injury remain intact He describes being able to get out to the chair with minimal assistance  Last bowel movement was 5/9   10-point review of systems was completed and is negative not otherwise mentioned above        OBJECTIVE:     Meds/Allergies     Current Facility-Administered Medications:     acetaminophen (TYLENOL) tablet 650 mg, 650 mg, Oral, Q6H [facility], Mary Grossman PA-C, 650 mg at 05/11/21 0556    ALPRAZolam (XANAX) tablet 0 25 mg, 0 25 mg, Oral, Daily PRN, Mary Grossman PA-C    aspirin (ECOTRIN LOW STRENGTH) EC tablet 81 mg, 81 mg, Oral, Daily, Mary Grossman PA-C, 81 mg at 05/10/21 0504    atorvastatin (LIPITOR) tablet 40 mg, 40 mg, Oral, Daily, Mary Grossman PA-C, 40 mg at 05/10/21 8987    calcium carbonate (OYSTER SHELL,OSCAL) 500 mg tablet 2 tablet, 2 tablet, Oral, Daily With Breakfast, Mary Grossman PA-C, 2 tablet at 05/10/21 9667    docusate sodium (COLACE) capsule 100 mg, 100 mg, Oral, BID, Mary Grossman PA-C, 100 mg at 05/10/21 1702    enoxaparin (LOVENOX) subcutaneous injection 30 mg, 30 mg, Subcutaneous, Q12H, Mary Grossman PA-C, 30 mg at 05/11/21 0556    gabapentin (NEURONTIN) capsule 300 mg, 300 mg, Oral, BID, Sea Rahman PA-C, 300 mg at 05/10/21 2001    HYDROmorphone (DILAUDID) injection 0 5 mg, 0 5 mg, Intravenous, Q4H PRN, Collin Garza PA-C, 0 5 mg at 05/11/21 0304    lisinopril (ZESTRIL) tablet 20 mg, 20 mg, Oral, Daily, Mary Grossman PA-C, 20 mg at 05/10/21 7950    methocarbamol (ROBAXIN) tablet 500 mg, 500 mg, Oral, Q6H [facility], Mary Grossman PA-C, 500 mg at 05/11/21 0556    naloxone (NARCAN) 0 04 mg/mL syringe 0 04 mg, 0 04 mg, Intravenous, Q1MIN PRN, Collin Garza PA-C    nicotine (NICODERM CQ) 21 mg/24 hr TD 24 hr patch 1 patch, 1 patch, Transdermal, Daily, Kamilah Merino PA-C, 1 patch at 05/10/21 8928    ondansetron (ZOFRAN) injection 4 mg, 4 mg, Intravenous, Q4H PRN, Kamilah eMrino PA-C, 4 mg at 05/10/21 1739    oxyCODONE (ROXICODONE) immediate release tablet 10 mg, 10 mg, Oral, Q4H PRN, Kamilah Merino PA-C, 10 mg at 05/11/21 0119    oxyCODONE (ROXICODONE) IR tablet 5 mg, 5 mg, Oral, Q4H PRN, Kamilah Merino PA-C    pantoprazole (PROTONIX) EC tablet 20 mg, 20 mg, Oral, Early Morning, Mary Grossman PA-C, 20 mg at 05/11/21 0556    polyethylene glycol (MIRALAX) packet 17 g, 17 g, Oral, Daily PRN, Mamadou Nam MD    tacrolimus (PROGRAF) capsule 3 mg, 3 mg, Oral, Q12H [address], Mary Grossman PA-C, 3 mg at 05/10/21 2002     Vitals:   Vitals:    05/10/21 2234   BP: 127/66   Pulse: 68   Resp: 16   Temp: 98 5 °F (36 9 °C)   SpO2: 97%       Intake/Output:  I/O       05/09 0701 - 05/10 0700 05/10 0701 - 05/11 0700    P  O  480 340    Total Intake(mL/kg) 480 (4 8) 340 (3 4)    Urine (mL/kg/hr) 500 1025 (0 4)    Total Output 500 1025    Net -20 -685                 Nutrition/GI Proph/Bowel Reg:  Continue current diet  Will increase bowel regimen  Physical Exam:   GENERAL APPEARANCE:  No acute distress  NEURO:  GCS 15  light touch sensation intact throughout  HEENT:  Normocephalic, atraumatic  Neck is supple  CV:  Regular rate and rhythm  No murmur, no rub, no gallop  +2 radial and dorsalis pedis pulses, bilaterally  LUNGS:  Lungs are clear to auscultation, bilaterally  No wheezing, no rhonchi, no rales  GI:  Abdomen is soft and nontender  Bowel sounds are present  :  Pelvis is stable  MSK:  Left lower extremity in knee immobilizer  Left knee swelling decreased in comparison to previous assessments  Displays the ability to to range right ankle and right toes  All other extremities display full strength and no deformities  SKIN:  Skin is warm, dry, well perfused      Invasive Devices     Peripheral Intravenous Line Peripheral IV 05/09/21 Right Antecubital 1 day                 Lab Results:   Results: I have personally reviewed pertinent reports   , BMP/CMP:   Lab Results   Component Value Date    SODIUM 139 05/10/2021    K 5 1 05/10/2021     05/10/2021    CO2 26 05/10/2021    BUN 16 05/10/2021    CREATININE 1 17 05/10/2021    CALCIUM 8 8 05/10/2021    EGFR 66 05/10/2021    and CBC: No results found for: WBC, HGB, HCT, MCV, PLT, ADJUSTEDWBC, MCH, MCHC, RDW, MPV, NRBC  Imaging/EKG Studies: Results: I have personally reviewed pertinent reports      Other Studies:  None  VTE Prophylaxis: Sequential compression device (Venodyne)  and Enoxaparin (Lovenox)

## 2021-05-11 NOTE — PLAN OF CARE
Problem: Potential for Falls  Goal: Patient will remain free of falls  Description: INTERVENTIONS:  - Assess patient frequently for physical needs  -  Identify cognitive and physical deficits and behaviors that affect risk of falls    -  [place] fall precautions as indicated by assessment   - Educate patient/family on patient safety including physical limitations  - Instruct patient to call for assistance with activity based on assessment  - Modify environment to reduce risk of injury  - Consider OT/PT consult to assist with strengthening/mobility  Outcome: Progressing     Problem: PAIN - ADULT  Goal: Verbalizes/displays adequate comfort level or baseline comfort level  Description: Interventions:  - Encourage patient to monitor pain and request assistance  - Assess pain using appropriate pain scale  - Administer analgesics based on type and severity of pain and evaluate response  - Implement non-pharmacological measures as appropriate and evaluate response  - Consider cultural and social influences on pain and pain management  - Notify physician/advanced practitioner if interventions unsuccessful or patient reports new pain  Outcome: Progressing

## 2021-05-11 NOTE — PROGRESS NOTES
PHYSICAL MEDICINE AND REHABILITATION   PREADMISSION ASSESSMENT     Projected [facility] Diagnoses:  Impairment of mobility, safety and Activities of Daily Living (ADLs) due to Orthopedic Disorders:  08 9  Other Orthopedic Intra-articular distal femoral fracture involving portion of the lateral femoral condyle  Etiologic: s/p fall; Left Distal femoral condyle  ( closed ) fracture  Date of Onset: 5/9/21   Date of surgery: n/a    PATIENT INFORMATION  Name: Yumiko Doran Phone #: [phone] (M)   Address: [address]  [place] [address]  YOB: 1955 Age: 63 y o  SS#   Marital Status: /Civil Union  Ethnicity:  White ; not  or   Employment Status: part time employment  Extended Emergency Contact Information  Primary Emergency Contact: [employer]  Address: [address] [place] [address] Phone: [phone]  Mobile Phone: [phone]  Relation: Spouse  Advance Directive: Level 1 Full code - ( no advance directive on file )    INSURANCE/COVERAGE:     Primary Payor: MEDICARE / Plan: MEDICARE A AND B / Product Type: Medicare A & B Fee for Service /   Secondary Payer:[employer]   Payer Contact: n/a Payer Contact:n/a   Contact Phone: n/a Contact Phone:n/a     Authorization #: n/a  Coverage Dates:n/a  LCD: n/a  MEDICARE #:[id number]   Medicare Days: 60/30/60  Medical Record #: [id number]    REFERRAL SOURCE:   Referring provider: Emilie Starr DO  Referring facility: [address] [place] [address]  Room: S /S -01  PCP: Yudy Kendrick MD PCP phone number: [phone]    MEDICAL INFORMATION  HPI: Josy Saenz is a 63 y o  male with a medical history of but not limited to - Alcoholism hepatocellular carcinoma ( s/p liver transplant 2018 ) on chronic immunosuppression ; depression  abdominal hernia with chronic weak trunk, bilateral carotid stenosis,hypertension, hyperlipidemia, PVD , GERD, chronic low back pain  / sciatica who presented to [facility] on 5/9/21 with left knee pain / swelling - reporting a recent fall down 2 stairs directly on the left knee on to concrete  X-ray showed -  left lateral femoral condyle fracture  Ortho was consulted  Ortho recommended non operative management with nonweightbearing precautions of left lower extremity with knee immobilizer with pain control  Patients pain is currently under control with PO PRN analgesics  During course of hospital stay there has been no noted issues / concerns of a medical nature  PT/OT therapies were consulted and continue to follow  PM&R was consulted and continue to follow patient  All medical disciplines agree patient is ready for discharge  Inpatient acute rehabilitation physician was consulted  Upon review of patients case and PT/OT therapies ; PM&R recommendations, [facility] physician feels Jaquan Rosenberg is a good candidate / appropriate for inpatient acute rehab at this time  Jaquan Rosenberg has demonstrated the willingness and the tolerance to participate in the required 3 hours of therapies per day  COVID (-) 5/11/21       Past Medical History:   Past Surgical History:    Allergies:     Past Medical History:   Diagnosis Date    Alcoholism ([facility]     Bacterial peritonitis ([facility] )     Bowel disease     Cancer ([facility]     liver    Cataract     Depression     Fracture     Gastroesophageal reflux     Hepatic disease     Hepatocellular carcinoma ([facility]     History of colon polyps     Hypertension     Liver cancer ([facility]     Liver disease     Stomach disease     Past Surgical History:   Procedure Laterality Date    APPENDECTOMY      CATARACT EXTRACTION      EXPLORATORY LAPAROTOMY  09/2011    EYE SURGERY      LIVER TRANSPLANTATION  08/20/2013    PARACENTESIS      Abdominal Paracentesis(Therapeutic) with Imaging Guidance    TN COLONOSCOPY FLX DX W/COLLJ SPEC WHEN PFRMD N/A 9/12/2016    Procedure: COLONOSCOPY;  Surgeon: Lea Horton MD;  Location: [facility]; Service: General    ROTATOR CUFF REPAIR      x2     Allergies   Allergen Reactions    Macrolides And Ketolides      Annotation - [id number]: The patient is on Antirejection medications and they will reduce the effective ness of the medications  Comorbidities/Surgeries in the last 100 days: h/o Alcoholism ; liver transport 2018 ; depression; GERD; HTN ; s/p fall with left knee injury    CURRENT VITAL SIGNS:   Temp:  [97 8 °F (36 6 °C)-98 5 °F (36 9 °C)] 98 2 °F (36 8 °C)  HR:  [68-81] 79  Resp:  [16-18] 16  BP: (127-167)/(66-89) 167/89   Intake/Output Summary (Last 24 hours) at 5/11/2021 1339  Last data filed at 5/11/2021 0701  Gross per 24 hour   Intake 340 ml   Output 1275 ml   Net -935 ml        LABORATORY RESULTS:      Lab Results   Component Value Date    HGB 14 5 05/09/2021    HGB 14 7 12/30/2015    HCT 42 2 05/09/2021    HCT 43 7 12/30/2015    WBC 11 21 (H) 05/09/2021    WBC 13 79 (H) 12/30/2015     Lab Results   Component Value Date    BUN 16 05/10/2021    BUN 13 12/30/2015     12/30/2015    K 5 1 05/10/2021    K 5 0 12/30/2015     05/10/2021     12/30/2015    GLUCOSE 84 12/30/2015    CREATININE 1 17 05/10/2021    CREATININE 1 18 12/30/2015     Lab Results   Component Value Date    PROTIME 14 5 (H) 04/26/2019    PROTIME 15 1 (H) 12/30/2015    INR 1 12 04/26/2019    INR 1 22 (H) 12/30/2015        DIAGNOSTIC STUDIES:  Xr Knee 4+ Vw Left Injury    Result Date: 5/9/2021  Impression: Large joint effusion is suspicious for significant injury  Equivocal cortical step offs and intracondylar regions are identified to suggests subtle nondisplaced fractures  Nonemergent MRI follow-up may be useful to also assess for any internal derangement  The study was marked in [facility] for immediate notification   Workstation performed: [id number]     Ct Lower Extremity Wo Contrast Left    Addendum Date: 5/10/2021    ADDENDUM: This addendum corrects a laterality error in the initial report  Fracture is located within the LATERAL femoral condyle  Medial femoral condyle is within normal limits  Result Date: 5/10/2021  Impression: Intra-articular distal femoral fracture involving small portion of the medial femoral condyle  Please note the proximal extent of this fracture is not included within the field-of-view, however does not likely extend proximal to the mid diaphysis The study was marked in [facility] for immediate notification   Workstation performed: [id number]       PRECAUTIONS/SPECIAL NEEDS:  Tobacco:   Social History     Tobacco Use   Smoking Status Current Every Day Smoker    Packs/day: 0 25    Years: 35 00    Pack years: 8 75    Types: Cigarettes   Smokeless Tobacco Never Used   , Alcohol:    Social History     Substance and Sexual Activity   Alcohol Use Yes    Frequency: 2-4 times a month   , Splints/Braces: LLE knee immobilizer, Anticoagulation:  Baby ASA 81 mg ; Lovenox, Edema Management, Safety Concerns, Pain Management, Dietary Restrictions: Regular diet, Language Preference: English and Fall precautions     MEDICATIONS:     Current Facility-Administered Medications:     acetaminophen (TYLENOL) tablet 650 mg, 650 mg, Oral, Q6H [facility], Mary Grossman PA-C, 650 mg at 05/11/21 1203    ALPRAZolam (XANAX) tablet 0 25 mg, 0 25 mg, Oral, Daily PRN, Mary Grossman PA-C    aspirin (ECOTRIN LOW STRENGTH) EC tablet 81 mg, 81 mg, Oral, Daily, Mary Grossman PA-C, 81 mg at 05/11/21 0902    atorvastatin (LIPITOR) tablet 40 mg, 40 mg, Oral, Daily, Mary Grossman PA-C, 40 mg at 05/11/21 0902    calcium carbonate (OYSTER SHELL,OSCAL) 500 mg tablet 2 tablet, 2 tablet, Oral, Daily With Breakfast, Mary Grossman PA-C, 2 tablet at 05/11/21 0902    enoxaparin (LOVENOX) subcutaneous injection 30 mg, 30 mg, Subcutaneous, Q12H, Mary Grossman PA-C, 30 mg at 05/11/21 0556    gabapentin (NEURONTIN) capsule 300 mg, 300 mg, Oral, BID, Sea L LU Rahman, 300 mg at 05/11/21 0902    lisinopril (ZESTRIL) tablet 20 mg, 20 mg, Oral, Daily, Mary Grossman PA-C, 20 mg at 05/11/21 0902    methocarbamol (ROBAXIN) tablet 500 mg, 500 mg, Oral, Q6H [address], Mary Grossman PA-C, 500 mg at 05/11/21 1204    naloxone (NARCAN) 0 04 mg/mL syringe 0 04 mg, 0 04 mg, Intravenous, Q1MIN PRN, Max LU Luther    nicotine (NICODERM CQ) 21 mg/24 hr TD 24 hr patch 1 patch, 1 patch, Transdermal, Daily, Mary Grossman PA-C, 1 patch at 05/11/21 0902    ondansetron (ZOFRAN) injection 4 mg, 4 mg, Intravenous, Q4H PRN, Max LU Luther, 4 mg at 05/10/21 8940    oxyCODONE (ROXICODONE) immediate release tablet 10 mg, 10 mg, Oral, Q4H PRN, Mary Grossman PA-C, 10 mg at 05/11/21 1152    oxyCODONE (ROXICODONE) IR tablet 5 mg, 5 mg, Oral, Q4H PRN, Max LU Luther    pantoprazole (PROTONIX) EC tablet 20 mg, 20 mg, Oral, Early Morning, Mary Grossman PA-C, 20 mg at 05/11/21 0556    polyethylene glycol (MIRALAX) packet 17 g, 17 g, Oral, Daily, ROSALINA Hi, 17 g at 05/11/21 0902    senna-docusate sodium (SENOKOT S) 8 6-50 mg per tablet 1 tablet, 1 tablet, Oral, BID, ROSALINA Hi, 1 tablet at 05/11/21 0902    tacrolimus (PROGRAF) capsule 3 mg, 3 mg, Oral, Q12H [address], Mary Grossman PA-C, 3 mg at 05/11/21 0901    SKIN INTEGRITY:   Skin is intact     PRIOR LEVEL OF FUNCTION:  He lives in a(n) single family home  Kiesha Good is  and lives with their spouse  Self Care: Independent, Indoor Mobility: Independent, Stairs (in/outdoor): Independent and Cognition: Independent    FALLS IN THE LAST 6 MONTHS: 1    HOME ENVIRONMENT:  The living area: can live on one level  There are 2 steps to enter the home  The patient will not have 24 hour supervision/physical assistance available upon discharge      PREVIOUS DME:  Equipment in home (previous DME): None    FUNCTIONAL STATUS:  Physical Therapy Occupational Therapy Speech Therapy   As per PT    PT Visit Date 21   Note Type   Note Type Treatment   Pain Assessment   Pain Assessment Tool 0-10   Pain Score 7   Pain Location/Orientation Orientation: Left; Location: Leg   Hospital Pain Intervention(s) Repositioned; Ambulation/increased activity   Restrictions/Precautions   Weight Bearing Precautions Per Order Yes   LLE Weight Bearing Per Order NWB   Braces or Orthoses LE Immobilizer   Other Precautions Chair Alarm; Bed Alarm;WBS;Fall Risk;Pain  (Masimo)   General   Chart Reviewed Yes   Response to Previous Treatment Patient with no complaints from previous session  Family/Caregiver Present No   Cognition   Overall Cognitive Status WFL   Arousal/Participation Alert; Cooperative   Attention Attends with cues to redirect   Comments Pt identified by name and   Subjective   Subjective Agrees to PT treatment; requests to eventually try crutches   Bed Mobility   Supine to Sit Unable to assess  (OOB in chair prior to session)   Sit to Supine 4  Minimal assistance   Additional items Assist x 1; Increased time required;LE management;Verbal cues   Transfers   Sit to Stand 4  Minimal assistance   Additional items Assist x 1; Increased time required;Verbal cues;Armrests   Stand to Sit 4  Minimal assistance   Additional items Assist x 1; Increased time required;Verbal cues   Ambulation/Elevation   Gait pattern Improper Weight shift;Decreased foot clearance; Forward Flexion; Short stride; Excessively slow  (maintains NWB LLE well)   Gait Assistance 4  Minimal assist   Additional items Assist x 1;Verbal cues   Assistive Device Rolling walker  (LLE knee immobilizer)   Distance 13` x1  (limited due to mild lightheadedness/exhaustion)   Balance   Static Sitting Fair +   Static Standing Fair -   Dynamic Standing Fair -   Ambulatory Poor +   Endurance Deficit   Endurance Deficit Yes   Endurance Deficit Description reports exhaustion   Activity Tolerance   Activity Tolerance Patient limited by fatigue;Patient limited by pain   Nurse Made Aware Per RN pt appropriate to treat   Exercises   Ankle Pumps Supine;10 reps;AROM; Bilateral   Assessment   Prognosis Good   Problem List Decreased strength;Decreased range of motion;Decreased endurance; Impaired balance;Decreased mobility; Decreased safety awareness;Orthopedic restrictions;Pain   Assessment Pt agrees to PT treatment and is cooperative throughout session  Extensive education provided on potential use of crutches vs  RW due to pt request to use crutches  Continuing to recommend use of RW over the use of crutches as pt is physically exhausted from 13` of ambulation with RW  However, may trial crutch training in future due to pt request   PT answered many questions about possible use of commode and management of LE  Progress noted this session as pt is able to ambulate increased distance and requires decreased level of assist for transfers/ambulation with use of RW  Will continue to benefit from ongoing skilled PT to maximize his functional mobility and increase his level of independence  Recommending rehab at time of discharge when medically appropriate  As per OT     OT Visit Date 05/11/21  (Tuesday)   Note Type   Note Type Treatment   Restrictions/Precautions   Weight Bearing Precautions Per Order Yes   LLE Weight Bearing Per Order NWB   Braces or Orthoses LE Immobilizer   Other Precautions Bed Alarm; Chair Alarm;WBS;Fall Risk;Pain  (Oswald on room air)   General   Response to Previous Treatment Patient with no complaints from previous session   Family/Caregiver Present No  Pt reports his wife will be in later   Pain Assessment   Pain Assessment Tool 0-10   Pain Score 7   Pain Location/Orientation Orientation: Left; Location: Leg   Effect of Pain on Daily Activities limits activity tolerance w/ ADL performance   Patient's Stated Pain Goal No pain   Hospital Pain Intervention(s) Repositioned; Ambulation/increased activity; Emotional support  (RN medicated prior to session)   ADL   Eating Assistance 7  Independent   Eating Deficit Setup   Eating Comments seated OOB in chair at tray table   Grooming Assistance 6  Modified Independent   Grooming Deficit Setup   Grooming Comments seated OOB in chair at tray table   UB Bathing Assistance 5  Supervision/Setup   UB Bathing Deficit Setup;Verbal cueing; Increased time to complete   UB Bathing Comments seated OOB in chair   LB Bathing Assistance Unable to assess   LB Bathing Comments pt declined LB bathing and reports will stay in his shorts until his wife comes in later w/ clean clothes   UB Dressing Assistance 6  Modified independent   [address]; Increased time to complete   UB Dressing Comments seated in chair   Toileting Deficit Use of bedpan/urinal setup   Bed Mobility   Supine to Sit 3  Moderate assistance   Additional items Assist x 1;LE management; Increased time required   Sit to Supine Unable to assess   Additional Comments Pt seated OOB in chair at end of session w/ needs met, call bell in reach and chair alarm activated   Transfers   Sit to Stand    (min <> mod A)   Additional items Assist x 1; Increased time required;Armrests; Bedrails;Verbal cues   Stand to Sit 4  Minimal assistance   Additional items Assist x 1; Armrests; Bedrails; Increased time required;Verbal cues   Additional Comments Pt performed sit <> stand from elevated bed height w/ + time   Functional Mobility   Functional Mobility 4  Minimal assistance   Additional Comments short distances using RW   Additional items Rolling walker   Cognition   Overall Cognitive Status WFL   Arousal/Participation Alert; Cooperative   Attention Attends with cues to redirect   Orientation Level Oriented X4   Memory Within functional limits   Following Commands Follows multistep commands with increased time or repetition   Comments Identified pt by full name and birthdate   Demo good recall L LE NWB   Activity Tolerance   Activity Tolerance Patient limited by pain   Medical Staff Made Aware per Jordy ROBERTS appropriate to see pt  Spoke w/ PTRyne   Assessment   Assessment Pt seen for skilled OT tx session day 1 from [id number] focusing on activity engagement  Pt agreeable and motivated to participate in session  Pt required consistent physical assistance to complete bed mobility  Thearpist educated pt on use of leg  to max I w/ bed mobility  Pt required min - mod A to complete sit <> stand w/ good recall L LE NWB  Pt engaged in bathing  / dressing seated OOB in chair after set- up  Pt engaged in short distance functional mobility using RW w/ min A  Pt seated OOB in chair at end of session  Continue to recommend post acute rehab when medically stable for discharge to return to baseline level of I  Will continue to follow      n/a     CURRENT GAP IN FUNCTION  Prior to Admission: Functional Status: Patient was independent with mobility/ambulation, transfers, ADL's, IADL's  Estimated length of stay: 10 to 14 days    Anticipated Post-Discharge Disposition/Treatment  Disposition: Return to previous home/apartment  Outpatient Services: Physical Therapy (PT) and Occupational Therapy (OT)    BARRIERS TO DISCHARGE  Lovenox, Weakness, Pain, Balance Difficulty, Fatigue, Home Accessibility, Caregiver Accessibility, Financial Resources and Equipment Needs    INTERVENTIONS FOR DISCHARGE  Adaptive equipment, Patient/Family/Caregiver Education, Financial Assistance, Arrange DME needs, Medication Changes per MD, Therapy exercises and Energy conservation education     REQUIRED THERAPY:  Patient will require PT and OT 90 minutes each per day, five days per week to achieve rehab goals       REQUIRED FUNCTIONAL AND MEDICAL MANAGEMENT FOR INPATIENT REHABILITATION:  Pain Management: Overall pain is well controlled, Deep Vein Thrombosis (DVT) Prophylaxis:  low molecular weight heparin, SCD's while in bed and nursing education and bowel/bladder management , internal medicine to monitor and manage medical conditions,PM&R to maximize function and provide medical oversight, PT/OT intervention, patient and family eductiaon and training and any consults as needed    RECOMMENDED LEVEL OF CARE:   Krissy Li is a 63 y o  male with a medical history of but not limited to - Alcoholism hepatocellular carcinoma ( s/p liver transplant 2018 ) on chronic immunosuppression ; depression  abdominal hernia with chronic weak trunk, bilateral carotid stenosis,hypertension, hyperlipidemia, PVD , GERD, chronic low back pain  / sciatica who presented to [address] [place] [address] on 5/9/21 with left knee pain / swelling - reporting a recent fall down 2 stairs directly on the left knee on to concrete  X-ray showed -  left lateral femoral condyle fracture  Ortho was consulted  Ortho recommended non operative management with nonweightbearing precautions of left lower extremity with knee immobilizer with pain control  Patients pain is currently under control with PO PRN analgesics  During course of hospital stay there has been no noted issues / concerns of a medical nature  PT/OT therapies were consulted and continue to follow  PM&R was consulted and continue to follow patient  All medical disciplines agree patient is ready for discharge  Inpatient acute rehabilitation physician was consulted  Upon review of patients case and PT/OT therapies ; PM&R recommendations, [facility] physician feels Krissy Li is a good candidate / appropriate for inpatient acute rehab at this time  Krissy Li has demonstrated the willingness and the tolerance to participate in the required 3 hours of therapies per day  Prior to admission patient reports he was independent with ADLs / functional mobility and iADLs   Currently with therapies - bed mobility min assist ;  transfers moderate assist x1, rolling walker / ambulation 4 ft min assist ; upper body ADLs min assist ; lower body ADLs mod - max assist, toileting min - mod assist  Nursing is being recommended for education and bowel / bladder management, internal medicine to monitor and manage medical conditions,PM&R to maximize function and provide medical oversight and inpatient rehab to maximize self care,mobilty,strength and endurance upon discharge to home

## 2021-05-11 NOTE — PLAN OF CARE
Problem: PHYSICAL THERAPY ADULT  Goal: Performs mobility at highest level of function for planned discharge setting  See evaluation for individualized goals  Description: Treatment/Interventions: Functional transfer training, LE strengthening/ROM, Elevations, Therapeutic exercise, Endurance training, Patient/family training, Equipment eval/education, Bed mobility, Gait training          See flowsheet documentation for full assessment, interventions and recommendations  Outcome: Progressing  Note: Prognosis: Good  Problem List: Decreased strength, Decreased range of motion, Decreased endurance, Impaired balance, Decreased mobility, Decreased safety awareness, Orthopedic restrictions, Pain  Assessment: Pt agrees to PT treatment and is cooperative throughout session  Extensive education provided on potential use of crutches vs  RW due to pt request to use crutches  Continuing to recommend use of RW over the use of crutches as pt is physically exhausted from 13` of ambulation with RW  However, may trial crutch training in future due to pt request   PT answered many questions about possible use of commode and management of LE  Progress noted this session as pt is able to ambulate increased distance and requires decreased level of assist for transfers/ambulation with use of RW  Will continue to benefit from ongoing skilled PT to maximize his functional mobility and increase his level of independence  Recommending rehab at time of discharge when medically appropriate  PT Discharge Recommendation: Post acute rehabilitation services          See flowsheet documentation for full assessment
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
## 2021-05-11 NOTE — TREATMENT PLAN
Individualized Plan of [address] [facility] [address] 63 y o  male MRN: [id number]  Unit/Bed#: -01 Encounter: [id number]     PATIENT INFORMATION  ADMISSION DATE: 5/11/2021  4:55 PM JAVON CATEGORY: Left distal femur fracture   ADMISSION DIAGNOSIS: Closed fracture of left distal femur ([address] ) [[id number]]  EXPECTED LOS: 10-14 days     MEDICAL/FUNCTIONAL PROGNOSIS  Based on my assessment of the patient's medical conditions and current functional status, the prognosis for attaining medical and functional goals or the IRF stay is:  Good    Medical Goals: Patient will be medically stable for discharge to [facility] restrictive envrionment upon completion of rehab program and Patient will be able to manage medical conditions and comorbid conditions with medications and follow up upon completion of rehab program    [address]: Home - Assistance    ANTICIPATED FOLLOW-UP SERVICE:   Home Health Services: PT, OT and Nursing    DISCIPLINE SPECIFIC PLANS:  Required Disciplines & Services: Rehabillitation Nursing and Case Management    REQUIRED THERAPY:  Therapy Hours per Day Days per Week Total Days   Physical Therapy 1 5 5-6 7   Occupational Therapy 1 5 5-6 7   Speech/Language Therapy 0 0 0       ANTICIPATED FUNCTIONAL OUTCOMES:  ADL:   supervision-modified independent level with least restrictive assistive device   Bladder/Bowel:   supervision-modified independent level with least restrictive assistive device   Transfers:   supervision-modified independent level with least restrictive assistive device   Locomotion:   supervision-modified independent level with least restrictive assistive device   Cognitive:       DISCHARGE PLANNING NEEDS  Equipment needs: Discharge needs to be reviewed with team      REHAB ANTICIPATED PARTICIPATION RESTRICTIONS:  None

## 2021-05-11 NOTE — DISCHARGE SUMMARY
[facility]  Discharge- Siena Webb 1955, 63 y o  male MRN: [id number]  Unit/Bed#: S -01 Encounter: [id number]  Primary Care Provider: Azul Jasso MD   Date and time admitted to hospital: 5/9/2021 12:17 PM    Fall  Assessment & Plan  - mechanical fall directly onto left knee  - sustaining the below stated injury  - accepted to [facility]  Transferring at 4:00 p m  * Fracture of femoral condyle, left, closed (HCC)  Assessment & Plan  - nondisplaced left medial femoral condyle fracture, present on admission   - knee immobilizer to the left lower extremity  - Orthopedic surgery consulted, note and recommendations appreciated  Will FU in 2 weeks as OP    - Discussed with Elaine Angeles, will also start on aspirin 325 BID for 30 days  - Continue knee immobilizer and maintain non weightbearing status on the left lower extremity  - Continue multimodal analgesic regimen   - PT and OT evaluated and note appreciated  - PMR evaluated and note appreciated  [facility] accepted  Will be discharged today at 4pm         Chronic low back pain  Assessment & Plan  - receives injections in his low back for DJD and sciatica, follows with the pain center  - takes gabapentin 300 mg BID chronically as well  - continue to follow-up with Pain Center as an outpatient  Acute pain due to trauma  Assessment & Plan  - Acute pain secondary to traumatic injuries  - Placed on multi modal analgesic regimen  - Bowel regimen as long as using opioids        History of liver transplant [facility])  Assessment & Plan  -patient has a history of hepatocellular carcinoma status post liver transplant 2018  -continue home medications, including tacrolimus            Resolved Problems  Date Reviewed: 5/11/2021    None          Admission Date:   Admission Orders (From admission, onward)     Ordered        05/10/21 1619  Inpatient Admission  Once         05/09/21 1512  Place in Observation  Once Admitting Diagnosis: Femoral condyle fracture ([facility] [address] ) [S72 413A]  Femoral distal fracture ([facility] [address] ) [S72 409A]  Left knee pain [M25 562]  Left knee injury [S89 92XA]  Fall, initial encounter [W19  XXXA]    HPI:  This is a 53-year-old male who fell on 5/9/2021  He describes that he was rushing and tripped, catching his toe on the door jam and falling down 2 steps landing directly on his left knee  CT revealed that patient had a intra-articular distal femur fracture including a small portion of the medial femoral condyle  Procedures Performed: No orders of the defined types were placed in this encounter  Summary of Hospital Course:  Patient was admitted for pain control  Knee immobilizer was applied  Orthopedics was consulted evaluated patient at bedside  Patient was deemed non operative  He is to continue wearing his knee immobilizer and be nonweightbearing in his left lower extremity  PT and OT did evaluate him and recommended rehab  Patient was accepted to [facility]  DVT prophylaxis was discussed with Orthopedics provider, Mariola Cruz PA-C, who confirmed that patient is to be on aspirin 325 mg b i d  For the next 30 days  Patient is to follow-up with orthopedics in 2 weeks, if he is still in [facility] at that time orthopedics consult can be placed for patient to be seen inpatient  [facility] to transfer patient at 4:00 p m  Significant Findings, Care, Treatment and Services Provided:   Left knee XR:  Large joint effusion is suspicious for significant injury  Equivocal cortical step offs and intracondylar regions are identified to suggests subtle nondisplaced fractures  Left lower extremity CT:  Intra-articular distal femoral fracture involving small portion of the medial femoral condyle  Complications:  None    Condition at Discharge: good       Discharge instructions/Information to patient and family:   See after visit summary for information provided to patient and family        Provisions for Follow-Up Care:  See after visit summary for information related to follow-up care and any pertinent home health orders  PCP: Reji Pedersen MD    Disposition: Short-term rehab at [place] [address] Readmission:  Discharge readmit to [facility]    Discharge Statement   I spent 20 minutes discharging the patient  This time was spent on the day of discharge  I had direct contact with the patient on the day of discharge  Additional documentation is required if more than 30 minutes were spent on discharge  Discharge Medications:  See after visit summary for reconciled discharge medications provided to patient and family

## 2021-05-11 NOTE — DISCHARGE INSTRUCTIONS
· Per Orthopedics recommendations, started on aspirin 325 mg x 30 days  · Follow-up with orthopedics in 2 weeks  · Continue nonweightbearing in left lower extremity  · Continue to wear knee immobilizer  ·   Leg Fracture   WHAT YOU NEED TO KNOW:   A leg fracture is a break in a bone in your leg  DISCHARGE INSTRUCTIONS:   Call your local emergency number (911 in the [address] [facility]) if:   You suddenly feel lightheaded and short of breath  You have chest pain when you take a deep breath or cough  You cough up blood  Seek care immediately if:   Your leg feels warm, tender, and painful  It may look swollen and red  The pain in your leg gets worse even after you rest and take medicine  Your cast gets wet or damaged  Your leg or toes are numb  Your leg becomes swollen, cold, or blue  Call your doctor or bone specialist if:   You have a fever  Your cast or brace is too tight  You notice new blood stains or a bad smell coming from under the cast     You have new or worsening trouble moving your leg  You have questions or concerns about your condition or care  Medicines: You may need any of the following:  Prescription pain medicine  may be given  Ask your healthcare provider how to take this medicine safely  Some prescription pain medicines contain acetaminophen  Do not take other medicines that contain acetaminophen without talking to your healthcare provider  Too much acetaminophen may cause liver damage  Prescription pain medicine may cause constipation  Ask your healthcare provider how to prevent or treat constipation  NSAIDs , such as ibuprofen, help decrease swelling, pain, and fever  NSAIDs can cause stomach bleeding or kidney problems in certain people  If you take blood thinner medicine, always ask your healthcare provider if NSAIDs are safe for you  Always read the medicine label and follow directions  Acetaminophen  decreases pain and fever   It is available without a doctor's order  Ask how much to take and how often to take it  Follow directions  Read the labels of all other medicines you are using to see if they also contain acetaminophen, or ask your doctor or pharmacist  Acetaminophen can cause liver damage if not taken correctly  Do not use more than 4 grams (4,000 milligrams) total of acetaminophen in one day  Take your medicine as directed  Contact your healthcare provider if you think your medicine is not helping or if you have side effects  Tell him of her if you are allergic to any medicine  Keep a list of the medicines, vitamins, and herbs you take  Include the amounts, and when and why you take them  Bring the list or the pill bottles to follow-up visits  Carry your medicine list with you in case of an emergency  Cast or splint care: Ask when it is okay to take a bath or shower  Do not let your cast or splint get wet  Before you bathe, cover the cast or splint with a plastic bag  Tape the bag to your skin above the cast or splint to seal out water  Keep your leg out of the water in case the bag breaks  If a plaster cast gets wet and soft, call your healthcare provider  Check the skin around the cast or brace every day  You may put lotion on any red or sore areas  Do not  push down or lean on the cast or brace  Do not  put a sharp or pointed object inside the cast to scratch an itch  Self-care:   Rest  your leg as directed and avoid activities that cause leg pain  Apply ice  on your leg for 15 to 20 minutes every hour or as directed  Use an ice pack, or put crushed ice in a plastic bag  Cover it with a towel before you apply it  Ice helps prevent tissue damage and decreases swelling and pain  Elevate  your leg above the level of your heart as often as you can  This will help decrease swelling and pain  Prop your leg on pillows or blankets to keep it elevated comfortably  Use crutches or a walker  as directed   Crutches will help you walk and take some weight off your leg while it heals  Physical therapy  may be recommended  A physical therapist teaches you exercises to help improve movement and strength, and to decrease pain  Follow up with your doctor or bone specialist as directed: You may need to return to have your splint or cast removed  You may need an x-ray of your leg to check how well the bone has healed  Write down your questions so you remember to ask them during your visits  © Copyright [employer] Information is for End User's use only and may not be sold, redistributed or otherwise used for commercial purposes  All illustrations and images included in CareNotes® are the copyrighted property of [employer]  or [address] [place], [place] [address]yobani   The above information is an  only  It is not intended as medical advice for individual conditions or treatments  Talk to your doctor, nurse or pharmacist before following any medical regimen to see if it is safe and effective for you    ·   ·   ·

## 2021-05-11 NOTE — CASE MANAGEMENT
CM met with the Patient to confirm that Patient is willing to go to [facility] at  [facility]  Patient called his wife to discuss the same  Patient and his wife state that they are ok with , but prefer [facility]  CM explained that there may be no bed availability at [facility], but would inform [facility] of their preference  CM informed by [facility]; Patient has been accepted for admission at [address] [place] [address]  Patient will admit to room 262  CM met with the Patient at [facility] bedside [facility]  Patient verbalized his understanding  CM reviewed safety concerns with the Patient regarding having his spouse transport  Patient states that his spouse is not available today and is requesting CM to set up transport  CM sent BLS transport request to [facility] via [address]  CM dept will follow for confirmed transport time

## 2021-05-11 NOTE — H&P
PHYSICAL MEDICINE AND REHABILITATION H&P/ADMISSION NOTE  Megan Barron 63 y o  male MRN: [id number]  Unit/Bed#: -01 Encounter: [id number]     Rehab Diagnosis: Impairment of mobility, safety and Activities of Daily Living (ADLs) due to Orthopedic Disorders:  08 9  Other Orthopedic Left distal femur fracture of the medial femoral condyle    History of Present Illness:   Megan Barron is a 63 y o  male history of liver transplantation in August 2013 at [facility] related to alcoholic cirrhosis and [facility]  on chronic Prograf, incisional abdominal hernia, hypertension, PAD, carotid stenosis, GERD, known lumbar radiculopathy who presented to the [address] on 5/9/21 status post mechanical fall on stairs  No LOC  Imaging revealed a large left knee joint effusion and a left distal femur fracture involving the medial femoral condyle  Patient seen by Orthopedics, Dr Jane Pate, and treated non operatively  Patient was deemed NWB left lower extremity and placed in a knee immobilizer at all times  Recommended to follow up in 2 weeks  Placed on Lovenox for DVT prophylaxis  Patient found to have acute functional deficits from his baseline, therefore was admitted to [facility] for acute inpatient rehabilitation  Plan:     Rehabilitation   Functional deficits:  Left lower extremity weakness, impaired endurance, impaired balance, impaired mobility, self care   Begin PT/OT  Rehabilitation goals are to achieve a supervision-modified independent level with mobility and self care  Prognosis is good  ELOS is 10-14 days  Estimated discharge is home  DVT prophylaxis   Managed on subcutaneous Lovenox   SCDs    Pain   Acute nociceptive pain located in left knee:  Managed on p r n  Tylenol, topical ice, adding topical Lidoderm patch, p r n  Oxycodone IR 5-10 mg q 4 hours p r n  for moderate to severe pain additionally     Chronic neuropathic pain related to known lumbar radiculopathy:  Managed on gabapentin 300 mg b i d  (home dose)  Sees Dr Emilia Martinez as an outpatient    Bladder plan   Continent    Bowel plan   Continent   Constipation has not had a bowel movement for several days:  Initiate p r n  Bowel regimen    Code Status   Full code      * Closed fracture of left distal femur (HCC)  Assessment & Plan  · Sustained after mechanical fall on 5/9/21  · Left distal femur fracture involving the medial femoral condyle  · Seen by Orthopedics and treated non operatively  · NWB LLE  · Knee immobilizer left knee at all times  · Lovenox for DVT prophylaxis  · Follow-up with orthopedics in 2 weeks - Dr Olivia Hood hypertension  Assessment & Plan  · Managed on lisinopril 20 mg daily (home dose)  · Internal medicine consultants managing    Carotid stenosis, asymptomatic, bilateral  Assessment & Plan  · Right (>70%)  Left (50-69%) carotid stenosis   · Managed on aspirin and Lipitor  · Due for repeat carotid ultrasound as outpatient  · Follow-up with vascular surgery recommended additionally    Electrolyte abnormality  Assessment & Plan  · K = 5 1  · WBC = 11K  · Recheck in AM    PAD (peripheral artery disease) ([address] )  Assessment & Plan  · Managed on aspirin and Lipitor  · Recommend repeat LEADS and vascular surgery follow-up as outpatient  · LEADS from August 2020:   · RIGHT LOWER LIMB:Evaluation shows a 50-75% stenosis in the distal superficial femoral artery  There is also evidence of tibio-peroneal arterial occlusive disease  LEFT LOWER LIMB:  This resting evaluation shows a high grade stenosis vs occlusion with trickle  flow in the distal superficial femoral artery        Hernia of abdominal cavity  Assessment & Plan  · Postoperative  · Stable    Mixed hyperlipidemia  Assessment & Plan  · Managed on Lipitor 40 mg daily (home dose)    Tobacco abuse  Assessment & Plan  · Recommend cessation    History of liver transplant [facility])  Assessment & Plan  · History of OLT liver transplantation at [facility] [address]  on 5/20/00 due to alcoholic cirrhosis and hepatocellular carcinoma  · Managed currently on Prograf 3 mg q 12 hours  · Prograf level of checked every 3-6 months - patient states he is due for a level  · Prograf level managed by Dr Dimitri Akhtar his primary care physician  · Patient follows yearly with [address] transplant clinic    Chronic gastroesophageal reflux disease  Assessment & Plan  · Managed on Protonix 20 mg b i d  (therapeutic exchange for home omeprazole)      Subjective:   Patient seen face to face upon arrival   Reports left knee pain during movement  Maintained in knee immobilizer currently  Denies chest pain or shortness of breath  Has not had a bowel movements for several days    Review of Systems   Constitutional: Negative  HENT: Negative  Eyes: Negative  Respiratory: Negative  Cardiovascular: Negative  Gastrointestinal: Negative  Endocrine: Negative  Genitourinary: Negative  Musculoskeletal: Positive for joint swelling  Skin: Negative  Allergic/Immunologic: Negative  Neurological: Negative  Hematological: Negative  Psychiatric/Behavioral: Negative  Function:  Prior level of function and living situation:  Patient resides with his wife and in laws in a ranch style home with 1+ 1 step to enter  Patient states there is a handicap assessable in-law suite  First-floor setup possible and assistance is available for part of the day  PLOF:  Independent with mobility and self-care without assistive device  Working part-time doing side jobs  +     + tobacco use half pack per day  Current level of function:  Physical Therapy:  Minimal assistance with bed mobility transfers, ambulation with rolling walker 13 ft hopping    Needing assistance with wheelchair negotiation  Occupational Therapy:  Supervision-Min assist for upper body ADLs, moderate assistance for lower body ADLs    Physical Exam:  /67   Pulse 85   Temp 97 8 °F (36 6 °C) (Tympanic)   Resp 18   Ht 6' (1 829 m)   Wt 98 6 kg (217 lb 6 oz)   SpO2 99%   BMI 29 48 kg/m²        Intake/Output Summary (Last 24 hours) at 5/12/2021 0843  Last data filed at 5/12/2021 0801  Gross per 24 hour   Intake 240 ml   Output 875 ml   Net -635 ml       Body mass index is 29 48 kg/m²  Physical Exam  Vitals signs and nursing note reviewed  Constitutional:       General: He is not in acute distress  HENT:      Head: Normocephalic and atraumatic  Nose: Nose normal       Mouth/Throat:      Mouth: Mucous membranes are moist    Eyes:      Conjunctiva/sclera: Conjunctivae normal    Neck:      Musculoskeletal: Neck supple  Cardiovascular:      Rate and Rhythm: Normal rate and regular rhythm  Pulses: Normal pulses  Pulmonary:      Effort: Pulmonary effort is normal       Breath sounds: Normal breath sounds  No wheezing or rales  Abdominal:      General: Bowel sounds are normal  There is no distension  Palpations: Abdomen is soft  Tenderness: There is no abdominal tenderness  Comments: Well-healed abdominal incisions with a small incisional hernia towards the right   Musculoskeletal:         General: Swelling (Swelling surrounding knee and distal thigh), tenderness and signs of injury present  Comments: Knee maintained in extension   Skin:     General: Skin is warm  Neurological:      Mental Status: He is alert and oriented to person, place, and time  Sensory: No sensory deficit  Comments: Strength in bilateral upper extremities is graded as a 5/5  Strength in right lower extremity graded as a 4+/5 throughout  Strength in left lower extremity: 1/5 hip flexion (pain limited examination), left knee not tested, ankle dorsiflexion and plantar flexion graded as a 4+/5    Psychiatric:         Mood and Affect: Mood normal             Labs, medications, and imaging personally reviewed      Laboratory:    Lab Results   Component Value Date SODIUM 136 (L) 05/12/2021    K 3 9 05/12/2021     05/12/2021    CO2 29 05/12/2021    BUN 16 05/12/2021    CREATININE 1 11 05/12/2021    GLUC 92 05/12/2021    CALCIUM 9 6 05/12/2021     Lab Results   Component Value Date    WBC 9 00 05/12/2021    HGB 14 3 05/12/2021    HCT 41 4 05/12/2021     (H) 05/12/2021     05/12/2021     Lab Results   Component Value Date    INR 1 12 04/26/2019    INR 1 14 11/17/2018    INR 1 12 03/22/2018    PROTIME 14 5 (H) 04/26/2019    PROTIME 14 7 (H) 11/17/2018    PROTIME 14 4 03/22/2018         Current Facility-Administered Medications:     acetaminophen (TYLENOL) tablet 650 mg, 650 mg, Oral, Q6H PRN, Harshad Berrios MD, 650 mg at 05/11/21 2149    ALPRAZolam (XANAX) tablet 0 25 mg, 0 25 mg, Oral, Daily PRN, Harshad Berrios MD    aspirin tablet 325 mg, 325 mg, Oral, BID, Harshad Berrios MD, 325 mg at 05/12/21 0805    atorvastatin (LIPITOR) tablet 40 mg, 40 mg, Oral, Daily, Harshad Berrios MD, 40 mg at 05/12/21 0805    bisacodyl (DULCOLAX) rectal suppository 10 mg, 10 mg, Rectal, Daily PRN, Harshad Berrios MD    calcium carbonate (OYSTER SHELL,OSCAL) 500 mg tablet 2 tablet, 2 tablet, Oral, Daily With Breakfast, Harshad Berrios MD, 2 tablet at 05/12/21 0805    enoxaparin (LOVENOX) subcutaneous injection 40 mg, 40 mg, Subcutaneous, Q24H [facility], Harshad Berrios MD, 40 mg at 05/12/21 0831    gabapentin (NEURONTIN) capsule 300 mg, 300 mg, Oral, BID, Harshad Berrios MD, 300 mg at 05/12/21 0805    lidocaine (LIDODERM) 5 % patch 1 patch, 1 patch, Topical, Daily, Harshad Berrios MD, 1 patch at 05/12/21 0831    lisinopril (ZESTRIL) tablet 20 mg, 20 mg, Oral, Daily, Harshad Berrios MD, 20 mg at 05/12/21 0805    magnesium hydroxide (MILK OF MAGNESIA) oral suspension 30 mL, 30 mL, Oral, Daily PRN, Harshad Berrios MD    multivitamin-minerals (CENTRUM) tablet 1 tablet, 1 tablet, Oral, Daily, Harshad Berrios MD    ondansetron (ZOFRAN-ODT) dispersible tablet 4 mg, 4 mg, Oral, Q6H PRN, Burt Vega MD    oxyCODONE (ROXICODONE) immediate release tablet 10 mg, 10 mg, Oral, Q4H PRN, Burt Vega MD, 10 mg at 05/12/21 0832    oxyCODONE (ROXICODONE) IR tablet 5 mg, 5 mg, Oral, Q4H PRN, Burt Vega MD    pantoprazole (PROTONIX) EC tablet 20 mg, 20 mg, Oral, BID AC, Burt Vega MD    polyethylene glycol (MIRALAX) packet 17 g, 17 g, Oral, Daily, Burt Vega MD, 17 g at 05/12/21 0805    senna-docusate sodium (SENOKOT S) 8 6-50 mg per tablet 1 tablet, 1 tablet, Oral, BID, Burt Vega MD, 1 tablet at 05/12/21 0805    tacrolimus (PROGRAF) capsule 3 mg, 3 mg, Oral, Q12H [address], Burt Vega MD, 3 mg at 05/12/21 0805     Allergies   Allergen Reactions    Macrolides And Ketolides      Annotation - [id number]: The patient is on Antirejection medications and they will reduce the effective ness of the medications        Patient Active Problem List    Diagnosis Date Noted    Closed fracture of left distal femur ([facility] ) 05/09/2021     Priority: High    Essential hypertension 07/05/2016     Priority: Medium    Carotid stenosis, asymptomatic, bilateral 10/14/2020     Priority: Low    Hernia of abdominal cavity 05/12/2021    PAD (peripheral artery disease) ([facility] ) 05/12/2021    Electrolyte abnormality 05/12/2021    Fall 05/09/2021    Acute pain due to trauma 05/09/2021    Chronic low back pain 05/09/2021    Claudication in peripheral vascular disease ([place] [address] ) 08/26/2020    Leg cramps 07/15/2020    Medicare annual wellness visit, subsequent 12/09/2019    Primary insomnia 03/25/2019    Mixed hyperlipidemia 09/19/2018    Seasonal allergies 10/27/2017    Ringing in ears, bilateral 10/27/2017    Short-term memory loss 10/27/2017    Intervertebral disc disorder with radiculopathy of lumbar region 04/24/2017    Bursitis of both hips 02/17/2017    Sacroiliitis ([facility] ) 66/14/9386    Nonalcoholic liver disease, chronic 07/05/2016    Osteopenia 10/13/2015    Neck pain 06/27/2014    Right arm pain 06/27/2014    Lung mass 03/18/2014    Chronic gastroesophageal reflux disease 03/06/2014    History of liver transplant ([place] [address] ) 08/22/2013    Alcohol dependence in remission ([place] [address] ) 01/05/2012    Abnormal electrocardiogram 11/14/2011    PPD negative 11/14/2011    Tobacco abuse 11/14/2011     Past Medical History:   Diagnosis Date    Alcoholism ([place] [address] )     Arthritis     Bacterial peritonitis ([place] [address] )     Bowel disease     Cancer ([place] [address] )     liver    Cataract     Depression     Fracture     Gastroesophageal reflux     Hepatic disease     Hepatocellular carcinoma ([place] [address] )     History of colon polyps     Hypertension     Liver cancer ([place] [address] )     Liver disease     Stomach disease      Past Surgical History:   Procedure Laterality Date    APPENDECTOMY      BACK SURGERY      CATARACT EXTRACTION      EXPLORATORY LAPAROTOMY  09/2011    EYE SURGERY      LIVER TRANSPLANTATION  08/20/2013    PARACENTESIS      Abdominal Paracentesis(Therapeutic) with Imaging Guidance    OK COLONOSCOPY FLX DX W/COLLJ SPEC WHEN PFRMD N/A 9/12/2016    Procedure: COLONOSCOPY;  Surgeon: Verito Jessica MD;  Location: [facility]; Service: General    ROTATOR CUFF REPAIR      x2     Social History     Socioeconomic History    Marital status: /Civil Union     Spouse name: Not on file    Number of children: 3    Years of education: College, bachelors degree    Highest education level: Not on file   Occupational History    Occupation:    Social Needs    Financial resource strain: Not on file    Food insecurity     Worry: Not on file     Inability: Not on file   [employer] needs     Medical: Not on file     Non-medical: Not on file   Tobacco Use    Smoking status: Current Every Day Smoker     Packs/day: 0 25     Years: 35 00     Pack years: 8 75     Types: Cigarettes     Start date: 6/11/1978    Smokeless tobacco: Never Used   Substance and Sexual Activity    Alcohol use:  Yes     Alcohol/week: 2 0 standard drinks     Types: 1 Glasses of wine, 1 Standard drinks or equivalent per week     Frequency: 2-4 times a month     Drinks per session: 1 or 2     Binge frequency: Never    Drug use: No     Comment: Per [employer] Drug use way back in college over 40 yrs ago    Sexual activity: Not on file   Lifestyle    Physical activity     Days per week: Not on file     Minutes per session: Not on file    Stress: Not on file   Relationships    Social connections     Talks on phone: Not on file     Gets together: Not on file     Attends Christianity service: Not on file     Active member of club or organization: Not on file     Attends meetings of clubs or organizations: Not on file     Relationship status: Not on file    Intimate partner violence     Fear of current or ex partner: Not on file     Emotionally abused: Not on file     Physically abused: Not on file     Forced sexual activity: Not on file   Other Topics Concern    Not on file   Social History Narrative    Daily coffee Consumption (2 Cups/Day)    Daily Cola Consumption (1 Cans/day)    Disabled    Lives with spouse     Social History     Tobacco Use   Smoking Status Current Every Day Smoker    Packs/day: 0 25    Years: 35 00    Pack years: 8 75    Types: Cigarettes    Start date: 6/11/1978   Smokeless Tobacco Never Used     Social History     Substance and Sexual Activity   Alcohol Use Yes    Alcohol/week: 2 0 standard drinks    Types: 1 Glasses of wine, 1 Standard drinks or equivalent per week    Frequency: 2-4 times a month    Drinks per session: 1 or 2    Binge frequency: Never     Family History   Problem Relation Age of Onset    Coronary artery disease Mother         CABG    Prostate cancer Mother         Prostate Gland    Heart disease Mother         Cardiac Disorder    Vascular Disease Mother     Hypertension Mother         Benign    Diabetes Maternal Grandmother         Mellitus    Clotting disorder Maternal Grandfather Embolism    Seizures Paternal Grandmother         Epilepsy    Other Paternal Grandfather         Accident    Liver cancer Family     Heart disease Family         Cardiac Disorder    Hypertension Family         Benign         Medical Necessity Criteria for [facility] Admission: Electrolyte imbalance:  hyperkalemia, Hypertension, Bowel/Bladder Management, Leukocystosis and Acute pain, monitoring of liver function status post transplantation  In addition, the preadmission screen, post-admission physical evaluation, overall plan of care and admissions order demonstrate a reasonable expectation that the following criteria were met at the time of admission to the [facility]  1  The patient requires active and ongoing therapeutic intervention of multiple therapy disciplines (physical therapy, occupational therapy, speech-language pathology, or prosthetics/orthotics), one of which is physical or occupational therapy  2  Patient requires an intensive rehabilitation therapy program, as defined in Chapter 1, section 110 2 2 of the CMS Medicare Policy Manual  This intensive rehabilitation therapy program will consist of at least 3 hours of therapy per day at least 5 days per week or at least 15 hours of intensive rehabilitation therapy within a 7 consecutive day period, beginning with the date of admission to the [facility]  3  The patient is reasonably expected to actively participate in, and benefit significantly from, the intensive rehabilitation therapy program as defined in Chapter 1, section 110 2 2 of the CMS Medicare Policy Manual at this time of admission to the [facility]  He can reasonably be expected to make measurable improvement (that will be of practical value to improve the patients functional capacity or adaptation to impairments) as a result of the rehabilitation treatment, as defined in section 110 3, and such improvement can be expected to be made within the prescribed period of time   As noted in the CMS Medicare Policy Manual, the patient need not be expected to achieve complete independence in the domain of self-care nor be expected to return to his or her prior level of functioning in order to meet this standard  4  The patient must require physician supervision by a rehabilitation physician  As such, a rehabilitation physician will conduct face-to-face visits with the patient at least 3 days per week throughout the patients stay in the [facility] to assess the patient both medically and functionally, as well as to modify the course of treatment as needed to maximize the patients capacity to benefit from the rehabilitation process  5  The patient requires an intensive and coordinated interdisciplinary approach to providing rehabilitation, as defined in Chapter 1, section 110 2 5 of the CMS Medicare Policy Manual  This will be achieved through periodic team conferences, conducted at least once in a 7-day period, and comprising of an interdisciplinary team of medical professionals consisting of: a rehabilitation physician, registered nurse,  and/or , and a licensed/certified therapist from each therapy discipline involved in treating the patient  Changes Since Pre-admission Assessment: None -This patient's participation in rehab continues to be reasonable, necessary and appropriate  CMS Required Post-Admission Physician Evaluation Elements  History and Physical, including medical history, functional history and active comorbidities as in above text  Post-Admission Physician Evaluation:  The patient has the potential to make improvement and is in need of physical, occupational, and/or therapy services  The patient may also need nutritional services  Given the patient's complex medical condition and risk of further medical complications, rehabilitative services cannot be safely provided at a lower level of care, such as a skilled nursing facility   I have reviewed the patient's functional and medical status at the time of the preadmission screening and they are the same as on the day of this admission  I acknowledge that I have personally performed a full physical examination on this patient within 24 hours of admission  The patient and/or family demonstrated understanding the rehabilitation program and the discharge process after we discussed them  Agree in entirety: yes  Minor adaptions: none    Major changes: none    Kraig Chaudhry MD    ** Please Note: Fluency Direct voice to text software may have been used in the creation of this document   **

## 2021-05-11 NOTE — ASSESSMENT & PLAN NOTE
- nondisplaced left medial femoral condyle fracture, present on admission   - knee immobilizer to the left lower extremity  - Orthopedic surgery consulted, note and recommendations appreciated  Will FU in 2 weeks as OP    - Discussed with Adriane Nichoslon, Ortho, will also start on aspirin 325 BID for 30 days  - Continue knee immobilizer and maintain non weightbearing status on the left lower extremity  - Continue multimodal analgesic regimen   - PT and OT evaluated and note appreciated  - PMR evaluated and note appreciated  [facility] accepted   Will be discharged today at 4pm

## 2021-05-11 NOTE — ASSESSMENT & PLAN NOTE
- nondisplaced left medial femoral condyle fracture, present on admission   - knee immobilizer to the left lower extremity  - Orthopedic surgery consulted, note and recommendations appreciated  Will FU in 2 weeks as OP    - Continue knee immobilizer and maintain non weightbearing status on the left lower extremity   - Monitor left lower extremity neurovascular exam   - Continue multimodal analgesic regimen   - Continue DVT prophylaxis  - PT and OT evaluated and note appreciated  - PMR evaluated and note appreciated  [facility] recommended

## 2021-05-11 NOTE — CASE MANAGEMENT
CM informed by Enmanuel Saleh at [facility] that BLS transport is scheduled for 4pm with [facility]  CM informed Trauma, RN, and facility of same  CM met with the Patient at the bedside to inform of planned transportation  Patient verbalized his understanding  CM dept will continue to follow the Patient through dc

## 2021-05-11 NOTE — ASSESSMENT & PLAN NOTE
- mechanical fall directly onto left knee  - sustaining the below stated injury  - accepted to [facility]  Transferring at 4:00 p m

## 2021-05-11 NOTE — PLAN OF CARE
Problem: OCCUPATIONAL THERAPY ADULT  Goal: Performs self-care activities at highest level of function for planned discharge setting  See evaluation for individualized goals  Description: Treatment Interventions: ADL retraining, Functional transfer training, UE strengthening/ROM, Endurance training, Patient/family training, Equipment evaluation/education, Compensatory technique education, Continued evaluation, Energy conservation, Activityengagement  Equipment Recommended: Shower/Tub chair with back ($), Bedside commode, Other (comment)(RW and use of w/c w/ L LE elevating leg rest)       See flowsheet documentation for full assessment, interventions and recommendations  Outcome: Progressing  Note: Limitation: Decreased ADL status, Decreased endurance, Decreased self-care trans, Decreased high-level ADLs, Decreased UE strength     Assessment: Pt seen for skilled OT tx session day 1 from  focusing on activity engagement  Pt agreeable and motivated to participate in session  Pt required consistent physical assistance to complete bed mobility  Thearpist educated pt on use of leg  to max I w/ bed mobility  Pt required min - mod A to complete sit <> stand w/ good recall L LE NWB  Pt engaged in bathing  / dressing seated OOB in chair after set- up  Pt engaged in short distance functional mobility using RW w/ min A  Pt seated OOB in chair at end of session  Continue to recommend post acute rehab when medically stable for discharge to return to baseline level of I   Will continue to follow     OT Discharge Recommendation: Post acute rehabilitation services

## 2021-05-12 ENCOUNTER — EPISODE CHANGES (OUTPATIENT)
Dept: CASE MANAGEMENT | Facility: HOSPITAL | Age: 66
End: 2021-05-12

## 2021-05-12 ENCOUNTER — TRANSITIONAL CARE MANAGEMENT (OUTPATIENT)
Dept: INTERNAL MEDICINE CLINIC | Facility: CLINIC | Age: 66
End: 2021-05-12

## 2021-05-12 ENCOUNTER — TELEPHONE (OUTPATIENT)
Dept: OBGYN CLINIC | Facility: HOSPITAL | Age: 66
End: 2021-05-12

## 2021-05-12 PROBLEM — K46.9 HERNIA OF ABDOMINAL CAVITY: Status: ACTIVE | Noted: 2021-05-12

## 2021-05-12 PROBLEM — I73.9 PAD (PERIPHERAL ARTERY DISEASE) (HCC): Status: ACTIVE | Noted: 2021-05-12

## 2021-05-12 PROBLEM — E87.8 ELECTROLYTE ABNORMALITY: Status: ACTIVE | Noted: 2021-05-12

## 2021-05-12 PROBLEM — S72.402A CLOSED FRACTURE OF LEFT DISTAL FEMUR (HCC): Status: ACTIVE | Noted: 2021-05-09

## 2021-05-12 LAB
ANION GAP SERPL CALCULATED.3IONS-SCNC: 4 MMOL/L (ref 5–14)
BASOPHILS # BLD AUTO: 0 THOUSANDS/ΜL (ref 0–0.1)
BASOPHILS NFR BLD AUTO: 1 % (ref 0–1)
BUN SERPL-MCNC: 16 MG/DL (ref 5–25)
CALCIUM SERPL-MCNC: 9.6 MG/DL (ref 8.4–10.2)
CHLORIDE SERPL-SCNC: 103 MMOL/L (ref 97–108)
CO2 SERPL-SCNC: 29 MMOL/L (ref 22–30)
CREAT SERPL-MCNC: 1.11 MG/DL (ref 0.7–1.5)
EOSINOPHIL # BLD AUTO: 0.2 THOUSAND/ΜL (ref 0–0.4)
EOSINOPHIL NFR BLD AUTO: 2 % (ref 0–6)
ERYTHROCYTE [DISTWIDTH] IN BLOOD BY AUTOMATED COUNT: 12.3 %
GFR SERPL CREATININE-BSD FRML MDRD: 70 ML/MIN/1.73SQ M
GLUCOSE P FAST SERPL-MCNC: 92 MG/DL (ref 70–99)
GLUCOSE SERPL-MCNC: 92 MG/DL (ref 70–99)
HCT VFR BLD AUTO: 41.4 % (ref 41–53)
HGB BLD-MCNC: 14.3 G/DL (ref 13.5–17.5)
LYMPHOCYTES # BLD AUTO: 1.8 THOUSANDS/ΜL (ref 0.5–4)
LYMPHOCYTES NFR BLD AUTO: 20 % (ref 25–45)
MCH RBC QN AUTO: 34.9 PG (ref 26–34)
MCHC RBC AUTO-ENTMCNC: 34.5 G/DL (ref 31–36)
MCV RBC AUTO: 101 FL (ref 80–100)
MONOCYTES # BLD AUTO: 0.7 THOUSAND/ΜL (ref 0.2–0.9)
MONOCYTES NFR BLD AUTO: 7 % (ref 1–10)
NEUTROPHILS # BLD AUTO: 6.3 THOUSANDS/ΜL (ref 1.8–7.8)
NEUTS SEG NFR BLD AUTO: 70 % (ref 45–65)
PLATELET # BLD AUTO: 188 THOUSANDS/UL (ref 150–450)
PLATELET BLD QL SMEAR: ADEQUATE
PMV BLD AUTO: 10.6 FL (ref 8.9–12.7)
POTASSIUM SERPL-SCNC: 3.9 MMOL/L (ref 3.6–5)
RBC # BLD AUTO: 4.08 MILLION/UL (ref 4.5–5.9)
RBC MORPH BLD: NORMAL
SODIUM SERPL-SCNC: 136 MMOL/L (ref 137–147)
WBC # BLD AUTO: 9 THOUSAND/UL (ref 4.5–11)

## 2021-05-12 PROCEDURE — 80048 BASIC METABOLIC PNL TOTAL CA: CPT | Performed by: PHYSICAL MEDICINE & REHABILITATION

## 2021-05-12 PROCEDURE — 97166 OT EVAL MOD COMPLEX 45 MIN: CPT

## 2021-05-12 PROCEDURE — 97535 SELF CARE MNGMENT TRAINING: CPT

## 2021-05-12 PROCEDURE — TCMXX

## 2021-05-12 PROCEDURE — 97530 THERAPEUTIC ACTIVITIES: CPT

## 2021-05-12 PROCEDURE — 99221 1ST HOSP IP/OBS SF/LOW 40: CPT | Performed by: INTERNAL MEDICINE

## 2021-05-12 PROCEDURE — 97542 WHEELCHAIR MNGMENT TRAINING: CPT

## 2021-05-12 PROCEDURE — 97116 GAIT TRAINING THERAPY: CPT

## 2021-05-12 PROCEDURE — 97110 THERAPEUTIC EXERCISES: CPT

## 2021-05-12 PROCEDURE — 99233 SBSQ HOSP IP/OBS HIGH 50: CPT | Performed by: PHYSICAL MEDICINE & REHABILITATION

## 2021-05-12 PROCEDURE — 80197 ASSAY OF TACROLIMUS: CPT | Performed by: NURSE PRACTITIONER

## 2021-05-12 PROCEDURE — 97163 PT EVAL HIGH COMPLEX 45 MIN: CPT

## 2021-05-12 PROCEDURE — 85025 COMPLETE CBC W/AUTO DIFF WBC: CPT | Performed by: PHYSICAL MEDICINE & REHABILITATION

## 2021-05-12 RX ORDER — NICOTINE 21 MG/24HR
21 PATCH, TRANSDERMAL 24 HOURS TRANSDERMAL DAILY
Status: DISCONTINUED | OUTPATIENT
Start: 2021-05-12 | End: 2021-05-12

## 2021-05-12 RX ORDER — BISACODYL 10 MG
10 SUPPOSITORY, RECTAL RECTAL DAILY PRN
Status: DISCONTINUED | OUTPATIENT
Start: 2021-05-12 | End: 2021-05-20 | Stop reason: HOSPADM

## 2021-05-12 RX ORDER — LIDOCAINE 50 MG/G
1 PATCH TOPICAL DAILY
Status: DISCONTINUED | OUTPATIENT
Start: 2021-05-12 | End: 2021-05-20 | Stop reason: HOSPADM

## 2021-05-12 RX ORDER — NICOTINE 21 MG/24HR
14 PATCH, TRANSDERMAL 24 HOURS TRANSDERMAL DAILY
Status: DISCONTINUED | OUTPATIENT
Start: 2021-05-13 | End: 2021-05-20 | Stop reason: HOSPADM

## 2021-05-12 RX ORDER — ASPIRIN 81 MG/1
81 TABLET, CHEWABLE ORAL DAILY
Status: DISCONTINUED | OUTPATIENT
Start: 2021-05-12 | End: 2021-05-20 | Stop reason: HOSPADM

## 2021-05-12 RX ORDER — DOCUSATE SODIUM 100 MG/1
100 CAPSULE, LIQUID FILLED ORAL 2 TIMES DAILY
Status: DISCONTINUED | OUTPATIENT
Start: 2021-05-12 | End: 2021-05-20 | Stop reason: HOSPADM

## 2021-05-12 RX ORDER — PANTOPRAZOLE SODIUM 20 MG/1
20 TABLET, DELAYED RELEASE ORAL
Status: DISCONTINUED | OUTPATIENT
Start: 2021-05-12 | End: 2021-05-20 | Stop reason: HOSPADM

## 2021-05-12 RX ADMIN — OXYCODONE HYDROCHLORIDE 10 MG: 10 TABLET ORAL at 00:01

## 2021-05-12 RX ADMIN — OXYCODONE HYDROCHLORIDE 10 MG: 10 TABLET ORAL at 19:52

## 2021-05-12 RX ADMIN — OXYCODONE HYDROCHLORIDE 10 MG: 10 TABLET ORAL at 15:06

## 2021-05-12 RX ADMIN — DOCUSATE SODIUM 100 MG: 100 CAPSULE, LIQUID FILLED ORAL at 17:33

## 2021-05-12 RX ADMIN — SENNOSIDES AND DOCUSATE SODIUM 1 TABLET: 8.6; 5 TABLET ORAL at 08:05

## 2021-05-12 RX ADMIN — POLYETHYLENE GLYCOL 3350 17 G: 17 POWDER, FOR SOLUTION ORAL at 08:05

## 2021-05-12 RX ADMIN — LISINOPRIL 20 MG: 20 TABLET ORAL at 08:05

## 2021-05-12 RX ADMIN — GABAPENTIN 300 MG: 300 CAPSULE ORAL at 17:33

## 2021-05-12 RX ADMIN — TACROLIMUS 3 MG: 1 CAPSULE ORAL at 08:05

## 2021-05-12 RX ADMIN — ONDANSETRON 4 MG: 4 TABLET, ORALLY DISINTEGRATING ORAL at 12:02

## 2021-05-12 RX ADMIN — TACROLIMUS 3 MG: 1 CAPSULE ORAL at 20:57

## 2021-05-12 RX ADMIN — LIDOCAINE 1 PATCH: 50 PATCH TOPICAL at 08:31

## 2021-05-12 RX ADMIN — ASPIRIN 325 MG ORAL TABLET 325 MG: 325 PILL ORAL at 08:05

## 2021-05-12 RX ADMIN — ENOXAPARIN SODIUM 40 MG: 40 INJECTION SUBCUTANEOUS at 08:31

## 2021-05-12 RX ADMIN — NICOTINE 21 MG: 21 PATCH, EXTENDED RELEASE TRANSDERMAL at 11:43

## 2021-05-12 RX ADMIN — CALCIUM 2 TABLET: 500 TABLET ORAL at 08:05

## 2021-05-12 RX ADMIN — OXYCODONE HYDROCHLORIDE 10 MG: 10 TABLET ORAL at 04:13

## 2021-05-12 RX ADMIN — PANTOPRAZOLE SODIUM 20 MG: 20 TABLET, DELAYED RELEASE ORAL at 17:33

## 2021-05-12 RX ADMIN — ATORVASTATIN CALCIUM 40 MG: 40 TABLET, FILM COATED ORAL at 08:05

## 2021-05-12 RX ADMIN — PANTOPRAZOLE SODIUM 20 MG: 20 TABLET, DELAYED RELEASE ORAL at 06:08

## 2021-05-12 RX ADMIN — GABAPENTIN 300 MG: 300 CAPSULE ORAL at 08:05

## 2021-05-12 RX ADMIN — Medication 1 TABLET: at 11:42

## 2021-05-12 RX ADMIN — DOCUSATE SODIUM 100 MG: 100 CAPSULE, LIQUID FILLED ORAL at 11:42

## 2021-05-12 RX ADMIN — OXYCODONE HYDROCHLORIDE 10 MG: 10 TABLET ORAL at 08:32

## 2021-05-12 NOTE — ASSESSMENT & PLAN NOTE
Last carotid ultrasound on 10/7/2020 showed:  >70% stenosis of the R ICA and 50-69% stenosis of the L ICA  Continue aspirin 81mg daily and Lipitor 40mg daily  Recommended for repeat US in 6 months - has order but did not complete yet  Recommend to follow-up with Vascular Surgery

## 2021-05-12 NOTE — ASSESSMENT & PLAN NOTE
· Sustained after mechanical fall on 5/9/21  · Left distal femur fracture involving the medial femoral condyle  · Seen by Orthopedics and treated non operatively  · NWB LLE  · Knee immobilizer left knee at all times - with tubigrip to protect skin  · Lovenox for DVT prophylaxis  · Follow-up with orthopedics in 2 weeks - Dr Kaity Barnett

## 2021-05-12 NOTE — ASSESSMENT & PLAN NOTE
· Right (>70%)  Left (50-69%) carotid stenosis   · Managed on aspirin and Lipitor  · Due for repeat carotid ultrasound as outpatient  · Follow-up with vascular surgery recommended additionally

## 2021-05-12 NOTE — PROGRESS NOTES
Patient concerned because his hernia spot feels "different" after moving to get into bed  Notified Dary MILLER who evaluated patient      María Elena Quiroz, OT

## 2021-05-12 NOTE — PROGRESS NOTES
PM&R PROGRESS NOTE:  Rosanne Borrego 63 y o  male MRN: [id number]  Unit/Bed#: -01 Encounter: [id number]           Rehab Diagnosis: Impairment of mobility, safety and Activities of Daily Living (ADLs) due to Orthopedic Disorders:  08 9  Other Orthopedic Left distal femur fracture of the medial femoral condyle     History of Present Illness:   Rosanne Borrego is a 63 y o  male history of liver transplantation in August 2013 at [facility] related to alcoholic cirrhosis and [facility]  on chronic Prograf, incisional abdominal hernia, hypertension, PAD, carotid stenosis, GERD, known lumbar radiculopathy who presented to the [facility] on 5/9/21 status post mechanical fall on stairs  No LOC  Imaging revealed a large left knee joint effusion and a left distal femur fracture involving the medial femoral condyle  Patient seen by Orthopedics, Dr Magdy More, and treated non operatively  Patient was deemed NWB left lower extremity and placed in a knee immobilizer at all times  Recommended to follow up in 2 weeks  Placed on Lovenox for DVT prophylaxis  Patient found to have acute functional deficits from his baseline, therefore was admitted to [facility] for acute inpatient rehabilitation  SUBJECTIVE:  Patient seen face to face status post as occupational therapy evaluation  His pain is controlled on Tylenol and oxycodone which he takes as needed  He has had no bowel movement yet, but is passing gas  ASSESSMENT: Stable, progressing      PLAN:    Rehabilitation   Functional deficits:  Left lower extremity weakness, impaired endurance, impaired balance, impaired mobility, self care   Continue current rehabilitation plan of care to maximize function   Functional update:   o Evaluations in progress   Estimated Discharge: TBD      DVT prophylaxis  · Managed on subcutaneous Lovenox - patient is at high risk of DVT    Patient and wife are agreeable to Education for home  · SCDs     Pain  · Acute nociceptive pain located in left knee:  Managed on p r n  Tylenol, topical ice, adding topical Lidoderm patch, p r n  Oxycodone IR 5-10 mg q 4 hours p r n  for moderate to severe pain additionally  · Chronic neuropathic pain related to known lumbar radiculopathy:  Managed on gabapentin 300 mg b i d  (home dose)  Sees Dr Rosette Eric as an outpatient     Bladder plan  · Continent     Bowel plan  · Continent  · Constipation has not had a bowel movement for several days:  Initiate p r n  Bowel regimen      * Closed fracture of left distal femur (HCC)  Assessment & Plan  · Sustained after mechanical fall on 5/9/21  · Left distal femur fracture involving the medial femoral condyle  · Seen by Orthopedics and treated non operatively  · NWB LLE  · Knee immobilizer left knee at all times - therapy looking to place Tubigrip material on skin to avoid any abrasions with brace  · Lovenox for DVT prophylaxis  · Follow-up with orthopedics in 2 weeks - Dr Magaly العلي hypertension  Assessment & Plan  · Managed on lisinopril 20 mg daily (home dose)  · Internal medicine consultants managing    Carotid stenosis, asymptomatic, bilateral  Assessment & Plan  · Right (>70%)  Left (50-69%) carotid stenosis   · Managed on aspirin and Lipitor  · Due for repeat carotid ultrasound as outpatient  · Follow-up with vascular surgery recommended additionally    Electrolyte abnormality  Assessment & Plan  · Resolved    PAD (peripheral artery disease) ([address] )  Assessment & Plan  · Managed on aspirin and Lipitor  · Recommend repeat LEADS and vascular surgery follow-up as outpatient  · LEADS from August 2020:   · RIGHT LOWER LIMB:Evaluation shows a 50-75% stenosis in the distal superficial femoral artery  There is also evidence of tibio-peroneal arterial occlusive disease  LEFT LOWER LIMB:  This resting evaluation shows a high grade stenosis vs occlusion with trickle  flow in the distal superficial femoral artery        Hernia of abdominal cavity  Assessment & Plan  · Postoperative  · Stable    Mixed hyperlipidemia  Assessment & Plan  · Managed on Lipitor 40 mg daily (home dose)    Tobacco abuse  Assessment & Plan  · Recommend cessation - patient is interested in quitting  · Nicotine patch ordered for cravings    History of liver transplant [facility])  Assessment & Plan  · History of OLT liver transplantation at [address] on 8/17/52 due to alcoholic cirrhosis and hepatocellular carcinoma  · Managed currently on Prograf 3 mg q 12 hours  · Prograf level of checked every 3-6 months - patient states he is due for a level  · Prograf level managed by Dr Sheba Campos his primary care physician  · Patient follows yearly with [facility] [address] transplant clinic    Chronic gastroesophageal reflux disease  Assessment & Plan  · Managed on Protonix 20 mg b i d  (therapeutic exchange for home omeprazole)        Appreciate IM consultants medical co-management  Labs, medications, and imaging personally reviewed  ROS:  A ten point review of systems was completed on 05/12/21 and pertinent positives are listed in subjective section  All other systems reviewed were negative  OBJECTIVE:   /67   Pulse 85   Temp 97 8 °F (36 6 °C) (Tympanic)   Resp 18   Ht 6' (1 829 m)   Wt 98 6 kg (217 lb 6 oz)   SpO2 99%   BMI 29 48 kg/m²       Physical Exam  Vitals signs and nursing note reviewed  Constitutional:       General: He is not in acute distress  HENT:      Head: Normocephalic and atraumatic  Nose: Nose normal       Mouth/Throat:      Mouth: Mucous membranes are moist    Eyes:      Conjunctiva/sclera: Conjunctivae normal    Neck:      Musculoskeletal: Neck supple  Cardiovascular:      Rate and Rhythm: Normal rate and regular rhythm  Pulses: Normal pulses  Pulmonary:      Effort: Pulmonary effort is normal       Breath sounds: Normal breath sounds  No wheezing or rales     Abdominal:      General: Bowel sounds are normal  There is no distension  Palpations: Abdomen is soft  Tenderness: There is no abdominal tenderness  Musculoskeletal:         General: Swelling and tenderness present  Left lower leg: Edema present  Skin:     General: Skin is warm  Neurological:      Mental Status: He is alert and oriented to person, place, and time        Comments: Left dorsiflexion, EHL, plantar flex and graded as a 4/5  Sensation to light touch intact in left leg   Psychiatric:         Mood and Affect: Mood normal           Lab Results   Component Value Date    WBC 9 00 05/12/2021    HGB 14 3 05/12/2021    HCT 41 4 05/12/2021     (H) 05/12/2021     05/12/2021     Lab Results   Component Value Date    SODIUM 136 (L) 05/12/2021    K 3 9 05/12/2021     05/12/2021    CO2 29 05/12/2021    BUN 16 05/12/2021    CREATININE 1 11 05/12/2021    GLUC 92 05/12/2021    CALCIUM 9 6 05/12/2021     Lab Results   Component Value Date    INR 1 12 04/26/2019    INR 1 14 11/17/2018    INR 1 12 03/22/2018    PROTIME 14 5 (H) 04/26/2019    PROTIME 14 7 (H) 11/17/2018    PROTIME 14 4 03/22/2018           Current Facility-Administered Medications:     acetaminophen (TYLENOL) tablet 650 mg, 650 mg, Oral, Q6H PRN, Jarrett Camarillo MD, 650 mg at 05/11/21 2149    ALPRAZolam (XANAX) tablet 0 25 mg, 0 25 mg, Oral, Daily PRN, Jarrett Camarillo MD    aspirin chewable tablet 81 mg, 81 mg, Oral, Daily, ROSALINA Jesus    atorvastatin (LIPITOR) tablet 40 mg, 40 mg, Oral, Daily, Jarrett Camarillo MD, 40 mg at 05/12/21 0805    bisacodyl (DULCOLAX) rectal suppository 10 mg, 10 mg, Rectal, Daily PRN, Jarrett Camarillo MD    calcium carbonate (OYSTER SHELL,OSCAL) 500 mg tablet 2 tablet, 2 tablet, Oral, Daily With Breakfast, Jarrett Camarillo MD, 2 tablet at 05/12/21 0805    docusate sodium (COLACE) capsule 100 mg, 100 mg, Oral, BID, ROSALINA Jesus, 100 mg at 05/12/21 1142    enoxaparin (LOVENOX) subcutaneous injection 40 mg, 40 mg, Subcutaneous, Q24H [address], Vinicio Hannah MD, 40 mg at 05/12/21 0831    gabapentin (NEURONTIN) capsule 300 mg, 300 mg, Oral, BID, Vinicio Hannah MD, 300 mg at 05/12/21 0805    lidocaine (LIDODERM) 5 % patch 1 patch, 1 patch, Topical, Daily, Vinicio Hannah MD, 1 patch at 05/12/21 0831    lisinopril (ZESTRIL) tablet 20 mg, 20 mg, Oral, Daily, Vinicio Hannah MD, 20 mg at 05/12/21 0805    magnesium hydroxide (MILK OF MAGNESIA) oral suspension 30 mL, 30 mL, Oral, Daily PRN, Vinicio Hannah MD    multivitamin-minerals (CENTRUM) tablet 1 tablet, 1 tablet, Oral, Daily, Vinicio Hannah MD, 1 tablet at 05/12/21 1142    nicotine (NICODERM CQ) 21 mg/24 hr TD 24 hr patch 21 mg, 21 mg, Transdermal, Daily, ROSALINA Prince, 21 mg at 05/12/21 1143    ondansetron (ZOFRAN-ODT) dispersible tablet 4 mg, 4 mg, Oral, Q6H PRN, Vinicio Hannah MD, 4 mg at 05/12/21 1202    oxyCODONE (ROXICODONE) immediate release tablet 10 mg, 10 mg, Oral, Q4H PRN, Vinicio Hannah MD, 10 mg at 05/12/21 7261    oxyCODONE (ROXICODONE) IR tablet 5 mg, 5 mg, Oral, Q4H PRN, Vinicio Hannah MD    pantoprazole (PROTONIX) EC tablet 20 mg, 20 mg, Oral, BID AC, Vinicio Hannah MD    polyethylene glycol (MIRALAX) packet 17 g, 17 g, Oral, Daily, Vinicio Hannah MD, 17 g at 05/12/21 0805    tacrolimus (PROGRAF) capsule 3 mg, 3 mg, Oral, Q12H [address], Vinicio Hannah MD, 3 mg at 05/12/21 0805    Past Medical History:   Diagnosis Date    Alcoholism ([facility] )     Arthritis     Bacterial peritonitis ([facility] )     Bowel disease     Cancer ([facility] )     liver    Cataract     Depression     Fracture     Gastroesophageal reflux     Hepatic disease     Hepatocellular carcinoma ([address] )     History of colon polyps     Hypertension     Liver cancer ([facility] )     Liver disease     Stomach disease        Patient Active Problem List    Diagnosis Date Noted    Closed fracture of left distal femur ([facility] ) 05/09/2021     Priority: High    Essential hypertension 07/05/2016     Priority: Medium    Carotid stenosis, asymptomatic, bilateral 10/14/2020     Priority: Low    Hernia of abdominal cavity 05/12/2021    PAD (peripheral artery disease) ([facility] ) 05/12/2021    Electrolyte abnormality 05/12/2021    Fall 05/09/2021    Acute pain due to trauma 05/09/2021    Chronic low back pain 05/09/2021    Claudication in peripheral vascular disease ([facility] ) 08/26/2020    Leg cramps 07/15/2020    Medicare annual wellness visit, subsequent 12/09/2019    Primary insomnia 03/25/2019    Mixed hyperlipidemia 09/19/2018    Seasonal allergies 10/27/2017    Ringing in ears, bilateral 10/27/2017    Short-term memory loss 10/27/2017    Intervertebral disc disorder with radiculopathy of lumbar region 04/24/2017    Bursitis of both hips 02/17/2017    Sacroiliitis ([place] [address] ) 88/29/2387    Nonalcoholic liver disease, chronic 07/05/2016    Osteopenia 10/13/2015    Neck pain 06/27/2014    Right arm pain 06/27/2014    Lung mass 03/18/2014    Chronic gastroesophageal reflux disease 03/06/2014    History of liver transplant ([facility] ) 08/22/2013    Alcohol dependence in remission ([place] [address] ) 01/05/2012    Abnormal electrocardiogram 11/14/2011    PPD negative 11/14/2011    Tobacco abuse 11/14/2011          Jorden Jaeger MD    Total time spent:  30 minutes with more than 50% spent counseling/coordinating care  Counseling includes discussion with patient re: progress and discussion with patient of his/her current medical/functional state/information  Coordination of patient's care was performed in conjunction with consulting services  Time invested included review of patient's labs, vitals, and management of their comorbidities with continued monitoring  The care of the patient was extensively discussed and appropriate treatment plan was formulated unique for this patient  ** Please Note:  voice to text software may have been used in the creation of this document   Although proof errors in transcription or interpretation are a potential of such software**

## 2021-05-12 NOTE — NURSING NOTE
Pt slept good last night, took Tylenol 650 mg and Oxy IR 10 mg po for L knee pain  Oxy IR given last @ 0413, ice pack @ L knee, knee immobilizer kept in place  Will monitor

## 2021-05-12 NOTE — ASSESSMENT & PLAN NOTE
Continue with aspirin 81mg daily and Lipitor 40mg daily  LEADS completed in 08/2020:  50-70% stenosis in the R distal superficial femoral artery  High grade stenosis/occlusion with trickle flow to the L distal superficial femoral artery  Neurovascular checks each shift  Recommend follow-up with Vascular Surgery

## 2021-05-12 NOTE — ASSESSMENT & PLAN NOTE
Continue current pain regimen  Ordered gabapentin 300mg BID at home by Pain Management  Follows with Pain Management as outpatient for lumbar spinal stenosis and lumbar radiculopathy  Received steroid injections - last injection given on 11/4/2020  Ordered for next injection on 5/27/2021

## 2021-05-12 NOTE — ASSESSMENT & PLAN NOTE
· History of OLT liver transplantation at [facility] on 1/43/24 due to alcoholic cirrhosis and hepatocellular carcinoma  · Managed currently on Prograf 3 mg q 12 hours   · Prograf level of checked every 3-6 months - recent level 5 3, within range  · Patient follows yearly with [facility] transplant clinic

## 2021-05-12 NOTE — PROGRESS NOTES
ARC PT EVALUATION       05/12/21 1230   Patient Data   Rehab Impairment Impairment of mobility, safety, and ADLs due to Orthopedic Disorders: 08 9 Other Orthopedic Intra-articular distal femoral fracture involving portion of the lateral femoral condyle   Etiologic Diagnosis Left femoral condyle (closed) fracture   Date of Onset 05/09/21   Support System   Name Edilia Mazariegos [phone]   Relationship wife   [id number] [facility] [id number] to provide 24 hour supervision Yes   Able to provide physical help? No   Home Setup   Type of Home Single Level   Method of Entry Stairs;Curb   Number of Stairs 2  (1 threshold into garage, 1 level change into home)   Number of Stairs in Home 1  (sunken living room)   In Home Hand Rail Right   First Floor Bathroom Full;Tub; Shower;Combo;Curtain   First Floor Setup Available Yes   Home Modifications Necessary?   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)   Transportation    Prior Level of Function   Self-Care 3  Independent - Patient completed the activities by him/herself, with or without an assistive device, with no assistance from a helper  Indoor-Mobility (Ambulation) 3  Independent - Patient completed the activities by him/herself, with or without an assistive device, with no assistance from a helper  Stairs 3  Independent - Patient completed the activities by him/herself, with or without an assistive device, with no assistance from a helper  Functional Cognition 3  Independent - Patient completed the activities by him/herself, with or without an assistive device, with no assistance from a helper  Prior Device Used Z   None of the above   Falls in the Last Year   Number of falls in the past 12 months 1   Type of Injury Associated with Fall Injury  (current injury)   Patient Preference   Nickname (Patient Preference) Harris   Psychosocial   Psychosocial (WDL) WDL   Patient Behaviors/Mood Calm; Cooperative   Restrictions/Precautions   Precautions Fall Risk;Immunosuppressed;Pain   Weight Bearing Restrictions Yes   LLE Weight Bearing Per Order NWB   ROM Restrictions Yes   LLE ROM Restriction Range Limitation  (no knee flexion)   Braces or Orthoses LE Immobilizer   Pain Assessment   Pain Assessment Tool 0-10   Pain Score 4   Pain Location/Orientation Orientation: Left; Location: Leg   Pain Onset/Description Onset: Ongoing   Effect of Pain on Daily Activities tolerated at times, sometimes needs to stop exercise and rest   Hospital Pain Intervention(s) Cold applied;Repositioned; Rest   Transfer Bed/Chair/Wheelchair   Limitations Noted In Balance; Coordination; Endurance;Pain Management;UE Strength;LE Strength   Adaptive Equipment Roller Walker   Type of Assistance Needed Incidental touching   Amount of Physical Assistance Provided No physical assistance   Comment CGA for stand pivot transfer, NWB LLE   Chair/Bed-to-Chair Transfer CARE Score 4   Roll Left and Right   Comment pt experiencing pain when rolling to R, did not attempt to roll L due to patient discomfort    Reason if not Attempted Medical concerns   Roll Left and Right CARE Score 88   Sit to Lying   Type of Assistance Needed Physical assistance   Amount of Physical Assistance Provided 25%-49%   Comment Pascual for LLE management   Sit to Lying CARE Score 3   Lying to Sitting on Side of Bed   Type of Assistance Needed Physical assistance   Amount of Physical Assistance Provided 25%-49%   Comment Pascual for LLE management   Lying to Sitting on Side of Bed CARE Score 3   Sit to Stand   Type of Assistance Needed Incidental touching   Amount of Physical Assistance Provided No physical assistance   Comment CG w/ RW, NWB LLE   Sit to Stand CARE Score 4   Picking Up Object   Comment in w/c with reacher, marker from floor, pt independent   Reason if not Attempted Safety concerns Picking Up Object CARE Score 88   Car Transfer   Reason if not Attempted Environmental limitations   Car Transfer CARE Score 10   Ambulation   Primary Mode of Locomotion Prior to Admission Walk   Distance Walked (feet) 40 ft   Assist Device Roller Walker   Gait Pattern Inconsistant Holli; Slow Holli; Forward Flexion;Narrow HOLLIS;Step to; Hopping   Limitations Noted In Balance; Coordination;Device Management; Endurance; Heel Strike; Safety;Speed;Strength;Swing   Walk Assist Level Contact Guard   Findings CG w/ RW, NWB LLE   Walk 10 Feet   Type of Assistance Needed Incidental touching   Amount of Physical Assistance Provided No physical assistance   Comment CG w/ RW, NWB LLE   Walk 10 Feet CARE Score 4   Walk 50 Feet with Two Turns   Reason if not Attempted Safety concerns   Walk 50 Feet with Two Turns CARE Score 88   Walk 150 Feet   Reason if not Attempted Safety concerns   Walk 150 Feet CARE Score 88   Walking 10 Feet on Uneven Surfaces   Reason if not Attempted Safety concerns   Walking 10 Feet on Uneven Surfaces CARE Score 88   Wheelchair mobility   Type of Wheelchair Used 1  Manual   Method Right upper extremity; Left upper extremity   Assistance Provided For Replace Leg Rest;Remove Leg Rest   Distance Level Surface (feet) 200 ft  (x1, 70x2)   Distance Wheeled 3% Grade 20 ft  (x1)   Findings pt not needing any A with w/c mobility, even on ramp ascending and descending  Pt reported would prefer to go home w/c level   ascended ramp forward   Wheel 50 Feet with Two Turns   Type of Assistance Needed Supervision   Amount of Physical Assistance Provided No physical assistance   Wheel 50 Feet with Two Turns CARE Score 4   Wheel 150 Feet   Type of Assistance Needed Supervision   Amount of Physical Assistance Provided No physical assistance   Wheel 150 Feet CARE Score 4   Curb or Single Stair   Reason if not Attempted Safety concerns   1 Step (Curb) CARE Score 88   4 Steps   Reason if not Attempted Safety concerns   4 Steps CARE Score 88   12 Steps   Reason if not Attempted Safety concerns   12 Steps CARE Score 88   Comprehension   QI: Comprehension 3  Usually Understands: Understands most conversations, but misses some part/intent of message  Requires cues at times to understand   (hard of hearing)   Expression   QI: Expression 4  Express complex messages without difficulty and with speech that is clear and easy to [place]   RLE Assessment   RLE Assessment WFL  (4-/5 grossly)   LLE Assessment   LLE Assessment   (DF 5/5, GTE 4+/5, plan to assess hip flexion in lying ns)   RUE Assessment   RUE Assessment WFL   LUE Assessment   LUE Assessment WFL   Coordination   Movements are Fluid and Coordinated 1   Sensation   Light Touch No apparent deficits   Cognition   Overall Cognitive Status WFL   Arousal/Participation Alert; Cooperative   Attention Attends with cues to redirect   Memory Within functional limits   Following Commands Follows multistep commands with increased time or repetition   Therapeutic Exercise   Therapeutic Exercise/Activity LLE: standing hip flexion 10x1, 5x1 limited due to pain  seated: LAQ RLE 2# 3x10, march RLE 2# 3x10, ball squeeze 3" 3x10, hip abduction MRE RLE 3" 3x10   Discharge Information   Vocational Plan Return to work;Part Time   Barriers to Return to [employer]; Endurance   Patient's Discharge Plan home with wife   Patient's Rehab Expectations "to get more independent with mobility, to be able to move around in my house as I want, be able to get up and go to the bathroom myself, be able to get out of a chair on my own "   Barriers to Discharge Home Decreased Strength;Decreased Endurance;Pain; Safety Considerations   Impressions Pt presents to [address]  s/p fall on 5/9/2021 resulting in a closed femoral condyle fracture of LLE, pt NWB with knee immobilizer   Other comorbidities include: liver transplant (2018), chronic immunosuppression, depression, abdominal hernia, B carotid stenosis, HTN, hyperlipidemia, PVD, GERD, and chronic low back pain with sciatica  Prior to admission, pt was independent with all ADLs and was an , did not amb with any AD  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 and would like to get back to work as soon as possible  Pt lives with his wife and his wife's parents in a ranch home  During session pt reporting differences in stair description in home, will need to confirm in upcoming sessions, possibly have family bring in pictures  Pt states his home is handicap accessible, and built an addition onto the house for wife's parents which is also handicap accessible (can use their bathroom to shower)  Pt's family has walkers, SPC, and transport chair but unsure if wife's parents use them daily  Family also has reacher at home  Pt's wife and parents will be able to provide 24/7 S upon d/c  Pt currently functioning at a CG level for mobility, and educated to use w/c mostly as opposed to hopping with RW  Goals set for SHEILA Javier 7-10 days  Barriers to d/c include inaccessible home environment, decreased strength, decreased balance, pain, NWB status, and decreased activity tolerance  POC to include gait training for short distances w/ RW, stair training (once family confirms stairs in house), LE strengthening, transfer training, endurance training, balance, and modalities for pain management      PT Therapy Minutes   PT Time In 1230   PT Time Out 1400   PT Total Time (minutes) 90   PT Mode of treatment - Individual (minutes) 90   PT Mode of treatment - Concurrent (minutes) 0   PT Mode of treatment - Group (minutes) 0   PT Mode of treatment - Co-treat (minutes) 0   PT Mode of Treatment - Total time(minutes) 90 minutes   PT Cumulative Minutes 90   Cumulative Minutes   Cumulative therapy minutes 180

## 2021-05-12 NOTE — CONSULTS
[address] [place] 1955, 63 y o  male MRN: [id number]  Unit/Bed#: [facility] 553-57 Encounter: [id number]  Primary Care Provider: Robert Lambert MD   Date and time admitted to hospital: 5/11/2021  4:55 PM    Inpatient consult to Internal Medicine  Consult performed by: ROSALINA Phan  Consult ordered by: Harshad Berrios MD          PAD (peripheral artery disease) [facility])  Assessment & Plan  Continue with aspirin 81mg daily and Lipitor 40mg daily  LEADS completed in 08/2020:  50-70% stenosis in the R distal superficial femoral artery  High grade stenosis/occlusion with trickle flow to the L distal superficial femoral artery  Neurovascular checks each shift  Recommend follow-up with Vascular Surgery  Hernia of abdominal cavity  Assessment & Plan  Stable  Repaired in 2011  Chronic low back pain  Assessment & Plan  Continue current pain regimen  Ordered gabapentin 300mg BID at home by Pain Management  Follows with Pain Management as outpatient for lumbar spinal stenosis and lumbar radiculopathy  Received steroid injections - last injection given on 11/4/2020  Ordered for next injection on 5/27/2021  Carotid stenosis, asymptomatic, bilateral  Assessment & Plan  Last carotid ultrasound on 10/7/2020 showed:  >70% stenosis of the R ICA and 50-69% stenosis of the L ICA  Continue aspirin 81mg daily and Lipitor 40mg daily  Recommended for repeat US in 6 months - has order but did not complete yet  Recommend to follow-up with Vascular Surgery  Mixed hyperlipidemia  Assessment & Plan  Continue home dose of Lipitor 40mg daily  Last lipid panel on 10/26/2020 as follows: Cholesterol 159, Triglycerides 146, HDL 66, and LDL 64  Tobacco abuse  Assessment & Plan  Smokes cigarettes 1/2 pack a day for 40+ years  Interested in smoking cessation  Continue nicotine patch      History of liver transplant [facility])  Assessment & Plan  History of liver transplant in 3550 due to alcoholic cirrhosis and hepatocellular carcinoma  [facility]  Currently on Tacrolimus 3mg BID  PCP managing current dosage and has level checked every 3-6 months  Last Tacrolimus level 6 9 on 10/26/2020  Essential hypertension  Assessment & Plan  Continue with home dose of lisinopril 20mg daily  Monitor BP with routine VS   Avoid orthostasis  Chronic gastroesophageal reflux disease  Assessment & Plan  Stable  Takes omeprazole 20mg BID at home  Substitute with Protonix 20mg BID while inpatient  * Closed fracture of left distal femur [facility])  Assessment & Plan  5/9 - mechanical fall at home  Nondisplaced L medial femoral condyle fracture  Non-operative treatment  NWB to LLE with knee immobilizer  Pain control  DVT ppx with Lovenox  Follow-up with Ortho in 2 weeks (5/24/21)  PT/OT Therapies  Primary team following  History of Present Illness:    Yumiko Doran is a 63 y o  male with a PMH of lumbar spinal stenosis and lumbar radiculopathy, liver transplant in 2013, GERD, HTN, HLD, carotid stenosis, and PAD who originally presented to [address] [place] [address] on 5/9/2021 after a mechanical fall with L knee pain/swelling and difficulty bearing weight  Patient did not have a head strike or loss of consciousness at the time of fall  X-ray showed that patient had a non-displaced L medial femoral condyle fracture  Orthopedics was consulted and recommended non-operative management at this time with NWB to LLE with knee immobilizer and follow-up in 2 weeks  Patient admitted to [facility] on 5/11/2021; we are consulted for medical clearance  Pt examined while sitting in WC in pt room  Complaints of L knee pain 6/10, aching/throbbing, improves with pain medication and ice  States that the intermittent tylenol as needed has been helping with his pain as well  Therapy went well this morning    Denies any lightheadedness or dizziness  Denies any numbness or tingling in his LLE  Reviewed medications and medical history  Smokes cigarettes, 1/2 pack a day for 40+ years, and is interested in quitting  Will continue nicotine patch while in rehab  LBM was on 5/9 and states it is normal for him to have periods where he does not have a BM for 3 days  Takes colace BID at home - will restart  Denies any abdominal discomfort or pain  Review of Systems:    Review of Systems   Constitutional: Negative for chills, fatigue and fever  Respiratory: Negative for cough and shortness of breath  Cardiovascular: Negative for chest pain, palpitations and leg swelling  Gastrointestinal: Negative for abdominal distention, abdominal pain, constipation, diarrhea, nausea and vomiting  [facility] 5/9   Genitourinary: Negative for difficulty urinating  Musculoskeletal: Positive for arthralgias (L knee pain, 6/10, aching, throbbing, improves with pain medication and ice)  Neurological: Negative for dizziness, light-headedness, numbness and headaches  Psychiatric/Behavioral: Negative for sleep disturbance         Past Medical and Surgical History:     Past Medical History:   Diagnosis Date    [facility]     Arthritis     Bacterial peritonitis ([facility]     Bowel disease     Cancer [facility]     liver    Cataract     Depression     Fracture     Gastroesophageal reflux     Hepatic disease     Hepatocellular carcinoma ([facility]     History of colon polyps     Hypertension     Liver cancer ([facility]     Liver disease     Stomach disease        Past Surgical History:   Procedure Laterality Date    APPENDECTOMY      BACK SURGERY      CATARACT EXTRACTION      EXPLORATORY LAPAROTOMY  09/2011    EYE SURGERY      LIVER TRANSPLANTATION  08/20/2013    PARACENTESIS      Abdominal Paracentesis(Therapeutic) with Imaging Guidance    KY COLONOSCOPY FLX DX W/COLLJ SPEC WHEN PFRMD N/A 9/12/2016    Procedure: COLONOSCOPY;  Surgeon: Elida Simpson MD; Location: [facility]; Service: General    ROTATOR CUFF REPAIR      x2       Meds/Allergies:    all medications and allergies reviewed    Allergies: Allergies   Allergen Reactions    Macrolides And Ketolides      Annotation - [id number]: The patient is on Antirejection medications and they will reduce the effective ness of the medications  Social History:     Marital Status: /Civil Union    Substance Use History:   Social History     Substance and Sexual Activity   Alcohol Use Yes    Alcohol/week: 2 0 standard drinks    Types: 1 Glasses of wine, 1 Standard drinks or equivalent per week    Frequency: 2-4 times a month    Drinks per session: 1 or 2    Binge frequency: Never     Social History     Tobacco Use   Smoking Status Current Every Day Smoker    Packs/day: 0 25    Years: 35 00    Pack years: 8 75    Types: Cigarettes    Start date: 6/11/1978   Smokeless Tobacco Never Used     Social History     Substance and Sexual Activity   Drug Use No    Comment: Per [employer] Drug use way back in college over 40 yrs ago       Family History:  Reviewed    Physical Exam:     Vitals:   Blood Pressure: 134/67 (05/12/21 0758)  Pulse: 85 (05/12/21 0758)  Temperature: 97 8 °F (36 6 °C) (05/12/21 0429)  Temp Source: Tympanic (05/12/21 0429)  Respirations: 18 (05/12/21 0429)  Height: 6' (182 9 cm) (05/11/21 1655)  Weight - Scale: 98 6 kg (217 lb 6 oz) (05/11/21 1655)  SpO2: 99 % (05/12/21 0429)    Physical Exam  Vitals signs and nursing note reviewed  Constitutional:       Appearance: Normal appearance  HENT:      Head: Normocephalic and atraumatic  Neck:      Musculoskeletal: Normal range of motion and neck supple  Vascular: No carotid bruit  Cardiovascular:      Rate and Rhythm: Normal rate and regular rhythm  Pulses: Normal pulses  Heart sounds: Normal heart sounds  No murmur  No friction rub  Pulmonary:      Effort: Pulmonary effort is normal  No respiratory distress        Breath sounds: Normal breath sounds  No wheezing or rhonchi  Abdominal:      General: Abdomen is flat  Bowel sounds are normal  There is no distension  Palpations: Abdomen is soft  Tenderness: There is no abdominal tenderness  Musculoskeletal:      Right lower leg: No edema  Left lower leg: No edema  Comments: LLE in knee immobilizer  Skin:     General: Skin is warm and dry  Capillary Refill: Capillary refill takes less than 2 seconds  Neurological:      Mental Status: He is alert and oriented to person, place, and time  Psychiatric:         Mood and Affect: Mood normal          Behavior: Behavior normal            Additional Data:     Labs and imaging reviewed  M*Modal software was used to dictate this note  It may contain errors with dictating incorrect words or incorrect spelling  Please contact the provider directly with any questions

## 2021-05-12 NOTE — TELEPHONE ENCOUNTER
Patient needs 2 week follow up with ortho for his L knee fx  Located in [place]  Will need an appointment with Dr Darolyn Canavan once discharged

## 2021-05-12 NOTE — ASSESSMENT & PLAN NOTE
Smokes cigarettes 1/2 pack a day for 40+ years  Interested in smoking cessation  Continue nicotine patch

## 2021-05-12 NOTE — ASSESSMENT & PLAN NOTE
· Managed on aspirin and Lipitor  · Recommend repeat LEADS and vascular surgery follow-up as outpatient  · LEADS from August 2020:   · RIGHT LOWER LIMB:Evaluation shows a 50-75% stenosis in the distal superficial femoral artery  There is also evidence of tibio-peroneal arterial occlusive disease  LEFT LOWER LIMB:  This resting evaluation shows a high grade stenosis vs occlusion with trickle  flow in the distal superficial femoral artery

## 2021-05-12 NOTE — PCC CARE MANAGEMENT
Cm has met with pt and reviewed rehab routine and cm role  Pt lives in a single family rancher home with his wife  He has 1 JIGNA  He has a walk in shower with a seat and tub/shower  Pt reports have several walkers and some transport chairs at home  Pt has attended outpt therapy at [facility] and has had home services but cannot recall with what company  Pt denies any hx of STR  Pt uses [facility] [address] in [place] and has used [facility] in the past but wishes to use [facility] at d/c  Discussed team meeting process, potential LOS and IMM  Pt has been educated on potential discharge recommendations for continued care  Following to assist with d/c planning needs and care coordination

## 2021-05-12 NOTE — PCC PHYSICAL THERAPY
5/12/2021  Barriers to d/c include inaccessible home environment, decreased strength, decreased balance, pain, NWB status, and decreased activity tolerance  POC to include gait training for short distances w/ RW, stair training (once family confirms stairs in house), LE strengthening, transfer training, endurance training, balance, and modalities for pain management  ELOS 7-10 days  Anticipating d/c date 5/20/2021

## 2021-05-12 NOTE — ASSESSMENT & PLAN NOTE
Continue home dose of Lipitor 40mg daily  Last lipid panel on 10/26/2020 as follows: Cholesterol 159, Triglycerides 146, HDL 66, and LDL 64

## 2021-05-12 NOTE — PCC OCCUPATIONAL THERAPY
5/19/21  Pt has achieved OT goals of Yaya  Pt is Ind for ADL performance and ADL transfer's, demonstrating G ability to maintain LLE NWB restrictions  Pt plan to D/C home on 5/20/21 with Family Support and Home PT services  DME includes W/C  Pt reports having RW, shower chair with back, and LH reacher  Pt with no further questions at this time for OT         5/12/21  Pt supine in bed upon therapist arrival  Pt reporting he lives in a ranch home with a curb step+1 step to enter home with R hand rail  Pt reports he was indep with all ADLs, +, indep with meds  Reports wife is able to only provide S at dc but not physical assistance 2* to having leukemia, can A with IADLs  At this time pt is req Pascual/CGA for transfers, Pascual LB wash/dress 2* to limited ROM/NWB, s/u UB wash/dress, Pascual toilet transfer, pain  Pt reporting he would like to be home by next Thurs 2* to son getting  on Friday 5/21  Therapist educated pt and spoke with team to update  Pt seen for 90mins of skilled OT services with emphasis on self-care, toileting, bed mobility, as well as therapist educated pt on rehab process and ELOS which pt verbalized understanding  Pt presents with the following performance component deficits impacting ADL/IADL skills: pain, dec fx reach, dec standing balance, LLE NWB with immobilizer  Pt to continue to benefit from continued acute rehab OT services during hospital stay to address defined deficits and to maximize level of functional independence in the following Occupational Performance areas: grooming, bathing/shower, toilet hygiene, dressing, medication management, functional mobility, community mobility, clothing management  Treatment approaches may include: OT eval, self-care (LHAE), there ex, therapeutic activity, modalities   Pt presents with good rehab potential  Pt is unsafe to D/C home at this time, recommending ELOS 7-10 days to achieve Yaya level goals with least restrictive device to address these areas and resume prior occupational roles to maximize independence to reduce risk of fall and decrease risk of readmission  Pt receptive to dc wc level at time of DC in order to maintain sound RLE

## 2021-05-12 NOTE — ASSESSMENT & PLAN NOTE
History of liver transplant in 1143 due to alcoholic cirrhosis and hepatocellular carcinoma  [facility]  Currently on Tacrolimus 3mg BID  PCP managing current dosage and has level checked every 3-6 months  Last Tacrolimus level 6 9 on 10/26/2020

## 2021-05-12 NOTE — PROGRESS NOTES
[facility]  OT LTGS ELOS 7-10 days wc level,educate on LHAE for LB dressing  05/12/21 0830   Rehab Team Goals   ADL Team Goal Patient will be independent with ADLs with least restrictive device upon completion of rehab program   Rehab Team Interventions   OT Interventions Self Care; Therapeutic Exercise; Energy Conservation;Patient/Family Education;Group Therapy   Eating Goal   Eating Goal 06  Independent - Patient completes the activity by him/herself with no assistance from a helper  Assist Device Upper Dentures; Lower Dentures   Meal Complete All meals   Status Ongoing; Target goal - one week  (7-10 days)   Interventions Optimal Position   Grooming Goal   Oral Hygiene Goal 06  Independent - Patient completes the activity by him/herself with no assistance from a helper  Task Wash/Dry Face;Wash/Dry Hands;Brush Teeth;Comb Hair;Complete Groom   Environment Seated at [employer]  (wc level)   Safety Precautions NWB;Safety Precaution   Status Ongoing; Target goal - one week  (7-10 days)   Intervention Tolerance Work;Balance Work; Therapeutic Exercise   Tub/Shower Transfer Goal   Method   (NA Sb)   Bathing Goal   Shower/bathe self Goal 06  Independent - Patient completes the activity by him/herself with no assistance from a helper  Environment Seated;Standing;Sponge Bath   Safety Precautions NWB;Safety Precaution   Status Ongoing; Target goal - one week  (7-10 days)   Intervention ADL Training; Therapeutic Exercise   Upper Body Dressing Goal   Upper body dressing Goal 06  Independent - Patient completes the activity by him/herself with no assistance from a helper  Task Upper Body;Arms in/out   Environment Seated   Status Ongoing; Target goal - one week  (7-10 days)   Lower Body Dressing Goal   Lower body dressing Goal 06  Independent - Patient completes the activity by him/herself with no assistance from a helper   (including LE immobilizer doff/don/maange straps)   Putting on/taking off footwear Goal 06   Independent - Patient completes the activity by him/herself with no assistance from a helper  [facility] - MONICA LEPE)   Task Lower Body;Shoe/Slipper;Socks;Pants; Undergarment   Adaptive Equipment Reacher;Sock Aide; Shoe Horn;Dressing Stick   Environment Seated;Standing   Safety Precautions NWB;Safety Precaution   Status Ongoing; Target goal - one week  (7-10 days)   Intervention Balance Work; Therapeutic Exercise; Tolerance Work   Toileting Transfer Goal   Toilet transfer Goal 06  Independent - Patient completes the activity by him/herself with no assistance from a helper  Assistive Device [employer]; Bedside Commode   Safety NWB;Safety Precaution   Status Ongoing; Target goal - one week  (7-10 days)   Intervention Balance Work;ADL Training   Toileting Goal   Toileting hygiene Goal 06  Independent - Patient completes the activity by him/herself with no assistance from a helper  Task Pants Up;Pants Down;Hygiene   Safety Balance   Status Ongoing; Target goal - one week  (7-10 days)   Intervention ADL Training;Balance Work;Assistive Device   Comprehension Goal   Comprehension Assist Level Moderate [place]   Assist Device Glasses; Hearing Aide   Function Demand Complex Needs   Status Ongoing; Target goal - one week  (7-10 days)   Expression Goal   Expression Assist Level Independant   Function Demand Complex Needs   Status Ongoing; Target goal - one week  (7-10 days')   Meal Prep and Kitchen Mobility   Assist Level Independent  (wc elvel)   Status Ongoing; Target goal - one week  (7-10 days)   Medication Management   Assist Level Independent  (PTA used AM/PM pill box)   Status Ongoing; Target goal - one week  (7-10 days)   Laundry   Assist Level   (wife completes)

## 2021-05-12 NOTE — PROGRESS NOTES
OCCUPATIONAL THERAPY EVALUATION  [facility]       05/12/21 7824   Patient Data   Rehab Impairment Impairment of mobility, safety and Activities of Daily Living (ADLs) due to Orthopedic Disorders:  08 9  Other Orthopedic Intra-articular distal femoral fracture involving portion of the lateral femoral condyle   Etiologic Diagnosis  s/p fall; Left Distal femoral condyle  ( closed ) fracture   Date of Onset 05/09/21   Support System   Name Edilia Mazariegos [id number]   Relationship wife   Health Status has leukemia   Able to provide 24 hour supervision Yes   Able to provide physical help? No   Home Setup   Type of Home Single Level   Method of Entry Stairs;Curb;Hand Rail Right   Number of Stairs 2   First Floor Bathroom Full;Tub; Shower;Combo;Curtain   First Floor Bathroom Accessibility Raised toilet seat   Second Floor Bathroom Full; Shower;Door;Grab Bars;Built-in shower seat   Second Floor Bathroom Accessibility Raised toilet seat;Grab bars by toilet;Grab bars in tub/shower   First Floor Setup Available Yes   Home Modifications Necessary?   (penidng progress)   Home Modification Comment pending progress   Available Equipment Standard Walker;Roller Walker;Single [employer]  (transport chair)   Baseline Information   Vocation Other  (reports disability, however working in [address] [place] [address])   Transportation    Prior IADL Participation   Money Management Identify Money;Estimate Costs;Estimate Change;Combine Bills;Manage Checkbook  (50/50 with wife)   Meal Preparation Other  (wfie completes, reports eating out a lot)   Laundry Full Participation   Home Cleaning Other;Vacuum  (wife completes)   Prior Level of Function   Self-Care 3  Independent - Patient completed the activities by him/herself, with or without an assistive device, with no assistance from a helper  Indoor-Mobility (Ambulation) 3  Independent - Patient completed the activities by him/herself, with or without an assistive device, with no assistance from a helper  Stairs 3  Independent - Patient completed the activities by him/herself, with or without an assistive device, with no assistance from a helper  Functional Cognition 3  Independent - Patient completed the activities by him/herself, with or without an assistive device, with no assistance from a helper  Prior Assistance Needed for Household Chores/Cleaning  (used Am/PM pill box)   Prior Device Used Z  None of the above   Falls in the Last Year   Number of falls in the past 12 months 1   Type of Injury Associated with Fall Injury  (current injury)   Patient Preference   Nickname (Patient Preference) Harris   Psychosocial   Psychosocial (WDL) WDL   Patient Behaviors/Mood Calm; Cooperative   Length of Time/Family Visitation 2-4 hrs   Restrictions/Precautions   Precautions Fall Risk   Weight Bearing Restrictions Yes   LLE Weight Bearing Per Order NWB   Braces or Orthoses LE Immobilizer   Pain Assessment   Pain Assessment Tool 0-10   Pain Score 7   Pain Location/Orientation   (calf)   Pain Onset/Description Onset: Ongoing   Effect of Pain on Daily Activities limits fx performance   Patient's Stated Pain Goal No pain   Hospital Pain Intervention(s) Cold applied;Repositioned; Rest  (ice applied end of session)   Multiple Pain Sites No   Eating Assessment   Dentures Upper; Lower  (does not have at hospital)   Type of Assistance Needed Independent   Amount of Physical Assistance Provided No physical assistance   Eating CARE Score 6   Oral Hygiene   Type of Assistance Needed Independent   Amount of Physical Assistance Provided No physical assistance   Comment seated at suink  pt reports he has upper/lower dentures however not at hospital  completed rinsing of mouth and brushing tongue   Oral Hygiene CARE Score 6   Grooming   Able To Comb/Brush Hair;Wash/Dry Face;Brush/Clean Teeth;Wash/Dry Hands   Limitation Noted In Strength;Timeliness   Findings seated at sink   Tub/Shower Transfer   Reason Not Assessed Safety; Medical  (LLE NWB)   Shower/Bathe Self   Type of Assistance Needed Physical assistance   Amount of Physical Assistance Provided 25%-49%   Comment pt able to wash 9/10 parts  2* [place] NWB req A to wash foot  CGA in stance when washing dago/buttock area with G ability to maintain LLE NWB   Shower/Bathe Self CARE Score 3   Bathing   Assessed Bath Style Sponge Bath   Anticipated D/C Bath Style Sponge Bath   Able to Gather/Transport No   Able to [employer] Temperature No   Able to Wash/Rinse/Dry (body part) Left Arm;Right Arm;L Upper Leg;R Upper Leg;R Lower Leg/Foot;Chest;Abdomen;Perineal Area; Buttocks   Limitations Noted in Balance; Endurance;Strength   Positioning Seated;Standing   Dressing/Undressing Clothing   Remove UB Clothes Pullover Shirt   Don UB Clothes Pullover Shirt   Type of Assistance Needed Set-up / clean-up   Amount of Physical Assistance Provided No physical assistance   Comment seated   Upper Body Dressing CARE Score 5   Remove LB Clothes Undergarment; Shorts;Socks   Don [email or web address]; Shorts;Socks  (LE immobilizer)   Type of Assistance Needed Physical assistance   Amount of Physical Assistance Provided Less than 25%   Comment TA to adjust LE immobilizer  A to don undergarmnet/shorts thorugh LLE, CGA in stance for CM   Lower Body Dressing CARE Score 3   Limitations Noted In Balance; Endurance;Strength;ROM; Timeliness   Positioning Supported Sit;Standing   Putting On/Taking Off Footwear   Type of Assistance Needed Physical assistance   Amount of Physical Assistance Provided Total assistance   Comment TA to don socks   Putting On/Taking Off Footwear CARE Score 1   Toileting Hygiene   Type of Assistance Needed Physical assistance   Amount of Physical Assistance Provided 25%-49%   Comment Pascual for balance   Toileting Hygiene CARE Score 3   Toilet Transfer   Surface Assessed Standard Commode   Transfer Technique Stand Pivot   Limitations Noted In Balance; Endurance;LE Strength   Adaptive Equipment Grab Bar Positioning Concerns LE Support;Grab Bars   Type of Assistance Needed Physical assistance   Amount of Physical Assistance Provided 25%-49%   Comment Pascual SPT with bhavna of GB, stool for LLE support when sitting   Toilet Transfer CARE Score 3   Toileting   Able to Pull Clothing down yes, up yes  Able to Manage Clothing Closures Yes   Manage Hygiene Bladder   Limitations Noted In Balance;ROM;UE Strength;LE Strength   Adaptive Equipment Grab Bar   Transfer Bed/Chair/Wheelchair   Positioning Concerns   (LLE NWB)   Limitations Noted In Balance; Endurance;UE Strength;LE Strength   Adaptive Equipment Roller Walker   Type of Assistance Needed Physical assistance   Amount of Physical Assistance Provided Less than 25%   Comment Pascual/CGA for SPT with RW while able to m aintain LLE NWB   Chair/Bed-to-Chair Transfer CARE Score 3   Lying to Sitting on Side of Bed   Type of Assistance Needed Physical assistance   Amount of Physical Assistance Provided 25%-49%   Comment Pascual for LLE mngmnt   Lying to Sitting on Side of Bed CARE Score 3   Sit to Stand   Type of Assistance Needed Physical assistance   Amount of Physical Assistance Provided Less than 25%   Sit to Stand CARE Score 3   Comprehension   Assist Devices Glasses; Hearing Aid   Auditory Complex   Visual Complex   QI: Comprehension 3  Usually Understands: Understands most conversations, but misses some part/intent of message  Requires cues at times to understand  Comprehension (FIM) 6 - Understands complex/abstract but requires  glasses for visual comp   Expression   Verbal Complex   Non-Verbal Complex   Intelligibility Sentence   QI: Expression 4   Express complex messages without difficulty and with speech that is clear and easy to [place]   Expression (FIM) 6 - Expresses complex/abstract but requires:  more time   LLE Assessment   LLE Assessment   (NWB)   RUE Assessment   RUE Assessment WFL   LUE Assessment   LUE Assessment WFL   Sensation   Light Touch No apparent deficits   Cognition   Overall Cognitive Status WFL   Arousal/Participation Alert; Cooperative   Attention Attends with cues to redirect   Orientation Level Oriented X4   Memory Within functional limits   Following Commands Follows multistep commands with increased time or repetition   Discharge [address]   (reports on disability, but is working as well)   Barriers to Return to [employer]; Endurance   Patient's Discharge Plan "To go home and make it to my son's wedding on 5/21/21   Patient's Rehab Expectations "To take care of myself"   Barriers to Discharge Home Decreased Strength;Decreased Endurance;Pain;Unsafe Home Setup  ([employer])   Aurea Dodd is a 63 y o  male with a medical history of but not limited to - Alcoholism hepatocellular carcinoma ( s/p liver transplant 2018 ) on chronic immunosuppression, depression  abdominal hernia with chronic weak trunk, bilateral carotid stenosis,hypertension, hyperlipidemia, PVD , GERD, chronic low back pain/sciatica who presented to [facility] on 5/9/21 with left knee pain/swelling - reporting a recent fall down 2 stairs directly on the left knee on to concrete  X-ray showed -  left lateral femoral condyle fracture  Ortho was consulted  Ortho recommended non operative management with nonweightbearing precautions of left lower extremity with knee immobilizer with pain control  Patients pain is currently under control with PO PRN analgesics  Pt supine in bed upon therapist arrival  Pt reporting he lives in a ranch home with a curb step+1 step to enter home with R hand rail  Pt reports he was indep with all ADLs, +, indep with meds  Reports wife is able to only provide S at dc but not physical assistance 2* to having leukemia, can A with IADLs  At this time pt is req Pascual/CGA for transfers, Pascual LB wash/dress 2* to limited ROM/NWB, s/u UB wash/dress, Pascual toilet transfer, pain   Pt reporting he would like to be home by next Thurs 2* to son getting  on Friday 5/21  Therapist educated pt and spoke with team to update  Pt seen for 90mins of skilled OT services with emphasis on self-care, toileting, bed mobility, as well as therapist educated pt on rehab process and ELOS which pt verbalized understanding  Pt presents with the following performance component deficits impacting ADL/IADL skills: pain, dec fx reach, dec standing balance, LLE NWB with immobilizer  Pt to continue to benefit from continued acute rehab OT services during hospital stay to address defined deficits and to maximize level of functional independence in the following Occupational Performance areas: grooming, bathing/shower, toilet hygiene, dressing, medication management, functional mobility, community mobility, clothing management  Treatment approaches may include: OT eval, self-care (LHAE), there ex, therapeutic activity, modalities  Pt presents with good rehab potential  Pt is unsafe to D/C home at this time, recommending ELOS 7-10 days to achieve [place] level goals with least restrictive device to address these areas and resume prior occupational roles to maximize independence to reduce risk of fall and decrease risk of readmission  Pt receptive to dc wc level at time of DC in order to maintain sound RLE          OT Therapy Minutes   OT Time In 0830   OT Time Out 1000   OT Total Time (minutes) 90   OT Mode of treatment - Individual (minutes) 90   OT Mode of treatment - Concurrent (minutes) 0   OT Mode of treatment - Group (minutes) 0   OT Mode of treatment - Co-treat (minutes) 0   OT Mode of Treatment - Total time(minutes) 90 minutes   OT Cumulative Minutes 90   Cumulative Minutes   Cumulative therapy minutes 90

## 2021-05-13 ENCOUNTER — PATIENT OUTREACH (OUTPATIENT)
Dept: CASE MANAGEMENT | Facility: OTHER | Age: 66
End: 2021-05-13

## 2021-05-13 PROCEDURE — 97530 THERAPEUTIC ACTIVITIES: CPT

## 2021-05-13 PROCEDURE — 99232 SBSQ HOSP IP/OBS MODERATE 35: CPT | Performed by: INTERNAL MEDICINE

## 2021-05-13 PROCEDURE — 99233 SBSQ HOSP IP/OBS HIGH 50: CPT | Performed by: PHYSICAL MEDICINE & REHABILITATION

## 2021-05-13 PROCEDURE — 97110 THERAPEUTIC EXERCISES: CPT

## 2021-05-13 PROCEDURE — 97112 NEUROMUSCULAR REEDUCATION: CPT

## 2021-05-13 PROCEDURE — 97116 GAIT TRAINING THERAPY: CPT

## 2021-05-13 RX ADMIN — TACROLIMUS 3 MG: 1 CAPSULE ORAL at 09:23

## 2021-05-13 RX ADMIN — ENOXAPARIN SODIUM 40 MG: 40 INJECTION SUBCUTANEOUS at 09:19

## 2021-05-13 RX ADMIN — Medication 1 TABLET: at 12:12

## 2021-05-13 RX ADMIN — OXYCODONE HYDROCHLORIDE 10 MG: 10 TABLET ORAL at 00:15

## 2021-05-13 RX ADMIN — OXYCODONE HYDROCHLORIDE 10 MG: 10 TABLET ORAL at 23:52

## 2021-05-13 RX ADMIN — Medication 14 MG: at 09:19

## 2021-05-13 RX ADMIN — POLYETHYLENE GLYCOL 3350 17 G: 17 POWDER, FOR SOLUTION ORAL at 09:19

## 2021-05-13 RX ADMIN — GABAPENTIN 300 MG: 300 CAPSULE ORAL at 17:23

## 2021-05-13 RX ADMIN — OXYCODONE HYDROCHLORIDE 10 MG: 10 TABLET ORAL at 05:31

## 2021-05-13 RX ADMIN — PANTOPRAZOLE SODIUM 20 MG: 20 TABLET, DELAYED RELEASE ORAL at 16:58

## 2021-05-13 RX ADMIN — TACROLIMUS 3 MG: 1 CAPSULE ORAL at 21:08

## 2021-05-13 RX ADMIN — DOCUSATE SODIUM 100 MG: 100 CAPSULE, LIQUID FILLED ORAL at 17:23

## 2021-05-13 RX ADMIN — ATORVASTATIN CALCIUM 40 MG: 40 TABLET, FILM COATED ORAL at 09:18

## 2021-05-13 RX ADMIN — OXYCODONE HYDROCHLORIDE 5 MG: 5 TABLET ORAL at 19:41

## 2021-05-13 RX ADMIN — GABAPENTIN 300 MG: 300 CAPSULE ORAL at 09:19

## 2021-05-13 RX ADMIN — DOCUSATE SODIUM 100 MG: 100 CAPSULE, LIQUID FILLED ORAL at 09:19

## 2021-05-13 RX ADMIN — ASPIRIN 81 MG: 81 TABLET, CHEWABLE ORAL at 09:18

## 2021-05-13 RX ADMIN — PANTOPRAZOLE SODIUM 20 MG: 20 TABLET, DELAYED RELEASE ORAL at 06:18

## 2021-05-13 RX ADMIN — ACETAMINOPHEN 650 MG: 325 TABLET ORAL at 03:43

## 2021-05-13 RX ADMIN — CALCIUM 2 TABLET: 500 TABLET ORAL at 09:23

## 2021-05-13 RX ADMIN — OXYCODONE HYDROCHLORIDE 10 MG: 10 TABLET ORAL at 15:22

## 2021-05-13 RX ADMIN — LIDOCAINE 1 PATCH: 50 PATCH TOPICAL at 09:19

## 2021-05-13 RX ADMIN — OXYCODONE HYDROCHLORIDE 10 MG: 10 TABLET ORAL at 10:14

## 2021-05-13 NOTE — PROGRESS NOTES
PM&R PROGRESS NOTE:  Chante Lopez 63 y o  male MRN: [id number]  Unit/Bed#: -01 Encounter: [id number]           Rehab Diagnosis: Impairment of mobility, safety and Activities of Daily Living (ADLs) due to Orthopedic Disorders:  08 9  Other Orthopedic Left distal femur fracture of the medial femoral condyle     History of Present Illness:   Chante Lopez is a 63 y o  male history of liver transplantation in August 2013 at [facility] related to alcoholic cirrhosis and [address]  on chronic Prograf, incisional abdominal hernia, hypertension, PAD, carotid stenosis, GERD, known lumbar radiculopathy who presented to the [address] on 5/9/21 status post mechanical fall on stairs  No LOC  Imaging revealed a large left knee joint effusion and a left distal femur fracture involving the medial femoral condyle  Patient seen by Orthopedics, Dr Mendoza James, and treated non operatively  Patient was deemed NWB left lower extremity and placed in a knee immobilizer at all times  Recommended to follow up in 2 weeks  Placed on Lovenox for DVT prophylaxis  Patient found to have acute functional deficits from his baseline, therefore was admitted to [facility] for acute inpatient rehabilitation  SUBJECTIVE:  Patient seen face to face  Doing well in therapies  No BM yet, therefore suppository later today  Pain is controlled with Oxy IR      ASSESSMENT: Stable, progressing      PLAN:    Rehabilitation   Functional deficits:  Left lower extremity weakness, impaired endurance, impaired balance, impaired mobility, self care   Continue current rehabilitation plan of care to maximize function  I personally attended, reviewed, and discussed medical and functional updates in team conference today  Please refer to advance care planning note for details     Estimated Discharge: Discharge home Thursday 5/20/21 with home care PT      DVT prophylaxis  · Managed on subcutaneous Lovenox - patient is at high risk of DVT   Patient and wife are agreeable to Education for home  · SCDs     Pain  · Acute nociceptive pain located in left knee:  Managed on p r n  Tylenol, topical ice, adding topical Lidoderm patch, p r n  Oxycodone IR 5-10 mg q 4 hours p r n  for moderate to severe pain additionally  · Chronic neuropathic pain related to known lumbar radiculopathy:  Managed on gabapentin 300 mg b i d  (home dose)  Sees Dr Ruiz Oar as an outpatient     Bladder plan  · Continent     Bowel plan  · Continent  · Constipation has not had a bowel movement for several days:  Initiate p r n  Bowel regimen      * Closed fracture of left distal femur (HCC)  Assessment & Plan  · Sustained after mechanical fall on 5/9/21  · Left distal femur fracture involving the medial femoral condyle  · Seen by Orthopedics and treated non operatively  · NWB LLE  · Knee immobilizer left knee at all times - with tubigrip to protect skin  · Lovenox for DVT prophylaxis  · Follow-up with orthopedics in 2 weeks - Dr Raine Condon hypertension  Assessment & Plan  · Managed on lisinopril 20 mg daily (home dose)  · Internal medicine consultants managing    Carotid stenosis, asymptomatic, bilateral  Assessment & Plan  · Right (>70%)  Left (50-69%) carotid stenosis   · Managed on aspirin and Lipitor  · Due for repeat carotid ultrasound as outpatient  · Follow-up with vascular surgery recommended additionally    Electrolyte abnormality  Assessment & Plan  · Resolved    PAD (peripheral artery disease) ([address] )  Assessment & Plan  · Managed on aspirin and Lipitor  · Recommend repeat LEADS and vascular surgery follow-up as outpatient  · LEADS from August 2020:   · RIGHT LOWER LIMB:Evaluation shows a 50-75% stenosis in the distal superficial femoral artery  There is also evidence of tibio-peroneal arterial occlusive disease    LEFT LOWER LIMB:  This resting evaluation shows a high grade stenosis vs occlusion with trickle  flow in the distal superficial femoral artery  Hernia of abdominal cavity  Assessment & Plan  · Postoperative  · Stable    Mixed hyperlipidemia  Assessment & Plan  · Managed on Lipitor 40 mg daily (home dose)    Tobacco abuse  Assessment & Plan  · Recommend cessation - patient is interested in quitting  · Nicotine patch ordered for cravings    History of liver transplant [facility])  Assessment & Plan  · History of OLT liver transplantation at [address] on 5/71/06 due to alcoholic cirrhosis and hepatocellular carcinoma  · Managed currently on Prograf 3 mg q 12 hours  · Prograf level of checked every 3-6 months - patient states he is due for a level  · Prograf level managed by Dr Maggi Dozier his primary care physician  · Patient follows yearly with [facility] [place] transplant clinic    Chronic gastroesophageal reflux disease  Assessment & Plan  · Managed on Protonix 20 mg b i d  (therapeutic exchange for home omeprazole)        Appreciate IM consultants medical co-management  Labs, medications, and imaging personally reviewed  ROS:  A ten point review of systems was completed on 05/13/21 and pertinent positives are listed in subjective section  All other systems reviewed were negative  OBJECTIVE:   /72 (BP Location: Right arm)   Pulse 93   Temp 98 °F (36 7 °C) (Tympanic)   Resp 18   Ht 6' (1 829 m)   Wt 98 6 kg (217 lb 6 oz)   SpO2 96%   BMI 29 48 kg/m²       Physical Exam  Vitals signs and nursing note reviewed  Constitutional:       General: He is not in acute distress  HENT:      Head: Normocephalic and atraumatic  Nose: Nose normal       Mouth/Throat:      Mouth: Mucous membranes are moist    Eyes:      Conjunctiva/sclera: Conjunctivae normal    Neck:      Musculoskeletal: Neck supple  Cardiovascular:      Rate and Rhythm: Normal rate and regular rhythm  Pulses: Normal pulses  Pulmonary:      Effort: Pulmonary effort is normal       Breath sounds: Normal breath sounds  No wheezing or rales  Abdominal:      General: Bowel sounds are normal  There is no distension  Palpations: Abdomen is soft  Tenderness: There is no abdominal tenderness  Musculoskeletal:         General: Tenderness and signs of injury present  No swelling  Left lower leg: Edema present  Skin:     General: Skin is warm  Neurological:      Mental Status: He is alert and oriented to person, place, and time        Comments: Progressing in transfer to [address] [place] [address] with RW   Psychiatric:         Mood and Affect: Mood normal           Lab Results   Component Value Date    WBC 9 00 05/12/2021    HGB 14 3 05/12/2021    HCT 41 4 05/12/2021     (H) 05/12/2021     05/12/2021     Lab Results   Component Value Date    SODIUM 136 (L) 05/12/2021    K 3 9 05/12/2021     05/12/2021    CO2 29 05/12/2021    BUN 16 05/12/2021    CREATININE 1 11 05/12/2021    GLUC 92 05/12/2021    CALCIUM 9 6 05/12/2021     Lab Results   Component Value Date    INR 1 12 04/26/2019    INR 1 14 11/17/2018    INR 1 12 03/22/2018    PROTIME 14 5 (H) 04/26/2019    PROTIME 14 7 (H) 11/17/2018    PROTIME 14 4 03/22/2018           Current Facility-Administered Medications:     acetaminophen (TYLENOL) tablet 650 mg, 650 mg, Oral, Q6H PRN, Burt Vega MD, 650 mg at 05/13/21 0343    ALPRAZolam (XANAX) tablet 0 25 mg, 0 25 mg, Oral, Daily PRN, Burt Vega MD    aspirin chewable tablet 81 mg, 81 mg, Oral, Daily, ROSALINA Gupta, 81 mg at 05/13/21 5003    atorvastatin (LIPITOR) tablet 40 mg, 40 mg, Oral, Daily, Burt Vega MD, 40 mg at 05/13/21 1919    bisacodyl (DULCOLAX) rectal suppository 10 mg, 10 mg, Rectal, Daily PRN, Burt Vega MD    calcium carbonate (OYSTER SHELL,OSCAL) 500 mg tablet 2 tablet, 2 tablet, Oral, Daily With Breakfast, Burt Vega MD, 2 tablet at 05/13/21 0950    docusate sodium (COLACE) capsule 100 mg, 100 mg, Oral, BID, ROSALINA Gupta, 100 mg at 05/13/21 0919    enoxaparin (LOVENOX) subcutaneous injection 40 mg, 40 mg, Subcutaneous, Q24H [facility], Jaylen Casas MD, 40 mg at 05/13/21 0919    gabapentin (NEURONTIN) capsule 300 mg, 300 mg, Oral, BID, Jaylen Casas MD, 300 mg at 05/13/21 0919    lidocaine (LIDODERM) 5 % patch 1 patch, 1 patch, Topical, Daily, Jaylen Casas MD, 1 patch at 05/13/21 0919    lisinopril (ZESTRIL) tablet 20 mg, 20 mg, Oral, Daily, Jaylen Casas MD, 20 mg at 05/12/21 0805    magnesium hydroxide (MILK OF MAGNESIA) oral suspension 30 mL, 30 mL, Oral, Daily PRN, Jaylen Casas MD    multivitamin-minerals (CENTRUM) tablet 1 tablet, 1 tablet, Oral, Daily, Jaylen Casas MD, 1 tablet at 05/13/21 1212    nicotine (NICODERM CQ) 14 mg/24hr TD 24 hr patch 14 mg, 14 mg, Transdermal, Daily, ROSALINA Arizmendi, 14 mg at 05/13/21 0919    ondansetron (ZOFRAN-ODT) dispersible tablet 4 mg, 4 mg, Oral, Q6H PRN, Jaylen Casas MD, 4 mg at 05/12/21 1202    oxyCODONE (ROXICODONE) immediate release tablet 10 mg, 10 mg, Oral, Q4H PRN, Jaylen Casas MD, 10 mg at 05/13/21 1014    oxyCODONE (ROXICODONE) IR tablet 5 mg, 5 mg, Oral, Q4H PRN, Jaylen Casas MD    pantoprazole (PROTONIX) EC tablet 20 mg, 20 mg, Oral, BID AC, Jaylen Casas MD, 20 mg at 05/13/21 0618    polyethylene glycol (MIRALAX) packet 17 g, 17 g, Oral, Daily, Jaylen Casas MD, 17 g at 05/13/21 0919    tacrolimus (PROGRAF) capsule 3 mg, 3 mg, Oral, Q12H [facility], Jaylen Casas MD, 3 mg at 05/13/21 5173    Past Medical History:   Diagnosis Date    [facility]     Arthritis     B[facility] peritonitis [facility] )     Bowel disease     Cancer ([facility] )     liver    Cataract     Depression     Fracture     Gastroesophageal reflux     Hepatic disease     Hepatocellular carcinoma ([facility] )     History of colon polyps     Hypertension     Liver cancer ([place] [address] )     Liver disease     Stomach disease        Patient Active Problem List    Diagnosis Date Noted    Closed fracture of left distal femur ([facility] ) 05/09/2021     Priority: High    Essential hypertension 07/05/2016     Priority: Medium    Carotid stenosis, asymptomatic, bilateral 10/14/2020     Priority: Low    Hernia of abdominal cavity 05/12/2021    PAD (peripheral artery disease) ([facility] ) 05/12/2021    Electrolyte abnormality 05/12/2021    Fall 05/09/2021    Acute pain due to trauma 05/09/2021    Chronic low back pain 05/09/2021    Claudication in peripheral vascular disease ([facility] ) 08/26/2020    Leg cramps 07/15/2020    Medicare annual wellness visit, subsequent 12/09/2019    Primary insomnia 03/25/2019    Mixed hyperlipidemia 09/19/2018    Seasonal allergies 10/27/2017    Ringing in ears, bilateral 10/27/2017    Short-term memory loss 10/27/2017    Intervertebral disc disorder with radiculopathy of lumbar region 04/24/2017    Bursitis of both hips 02/17/2017    Sacroiliitis ([place] [address] ) 95/57/9809    Nonalcoholic liver disease, chronic 07/05/2016    Osteopenia 10/13/2015    Neck pain 06/27/2014    Right arm pain 06/27/2014    Lung mass 03/18/2014    Chronic gastroesophageal reflux disease 03/06/2014    History of liver transplant ([place] [address] ) 08/22/2013    Alcohol dependence in remission ([place] [address] ) 01/05/2012    Abnormal electrocardiogram 11/14/2011    PPD negative 11/14/2011    Tobacco abuse 11/14/2011          Bola Whitmore MD    Total time spent:  45 minutes with more than 50% spent counseling/coordinating care  Counseling includes discussion with patient re: progress and discussion with patient of his/her current medical/functional state/information  Coordination of patient's care was performed in conjunction with consulting services  Time invested included review of patient's labs, vitals, and management of their comorbidities with continued monitoring  The care of the patient was extensively discussed and appropriate treatment plan was formulated unique for this patient  ** Please Note:  voice to text software may have been used in the creation of this document   Although proof errors in transcription or interpretation are a potential of such software**

## 2021-05-13 NOTE — ASSESSMENT & PLAN NOTE
History of liver transplant in 0501 due to alcoholic cirrhosis and hepatocellular carcinoma  [facility]  Currently on Tacrolimus 3mg BID  PCP managing current dosage and has level checked every 3-6 months  Last Tacrolimus level 6 9 on 10/26/2020

## 2021-05-13 NOTE — PLAN OF CARE
Problem: Potential for Falls  Goal: Patient will remain free of falls  Description: INTERVENTIONS:  - Assess patient frequently for physical needs  -  Identify cognitive and physical deficits and behaviors that affect risk of falls    -  [place] fall precautions as indicated by assessment   - Educate patient/family on patient safety including physical limitations  - Instruct patient to call for assistance with activity based on assessment  - Modify environment to reduce risk of injury  - Consider OT/PT consult to assist with strengthening/mobility  Outcome: Progressing     Problem: Prexisting or High Potential for Compromised Skin Integrity  Goal: Skin integrity is maintained or improved  Description: INTERVENTIONS:  - Identify patients at risk for skin breakdown  - Assess and monitor skin integrity  - Assess and monitor nutrition and hydration status  - Monitor labs   - Assess for incontinence   - Turn and reposition patient  - Assist with mobility/ambulation  - Relieve pressure over bony prominences  - Avoid friction and shearing  - Provide appropriate hygiene as needed including keeping skin clean and dry  - Evaluate need for skin moisturizer/barrier cream  - Collaborate with interdisciplinary team   - Patient/family teaching  - Consider wound care consult   Outcome: Progressing     Problem: PAIN - ADULT  Goal: Verbalizes/displays adequate comfort level or baseline comfort level  Description: Interventions:  - Encourage patient to monitor pain and request assistance  - Assess pain using appropriate pain scale  - Administer analgesics based on type and severity of pain and evaluate response  - Implement non-pharmacological measures as appropriate and evaluate response  - Consider cultural and social influences on pain and pain management  - Notify physician/advanced practitioner if interventions unsuccessful or patient reports new pain  Outcome: Progressing     Problem: Nutrition/Hydration-ADULT  Goal: Nutrient/Hydration intake appropriate for improving, restoring or maintaining nutritional needs  Description: Monitor and assess patient's nutrition/hydration status for malnutrition  Collaborate with interdisciplinary team and initiate plan and interventions as ordered  Monitor patient's weight and dietary intake as ordered or per policy  Utilize nutrition screening tool and intervene as necessary  Determine patient's food preferences and provide high-protein, high-caloric foods as appropriate       INTERVENTIONS:  - Monitor oral intake, urinary output, labs, and treatment plans  - Assess nutrition and hydration status and recommend course of action  - Evaluate amount of meals eaten  - Assist patient with eating if necessary   - Allow adequate time for meals  - Recommend/ encourage appropriate diets, oral nutritional supplements, and vitamin/mineral supplements  - Order, calculate, and assess calorie counts as needed  - Recommend, monitor, and adjust tube feedings and TPN/PPN based on assessed needs  - Assess need for intravenous fluids  - Provide specific nutrition/hydration education as appropriate  - Include patient/family/caregiver in decisions related to nutrition  Outcome: Progressing

## 2021-05-13 NOTE — PROGRESS NOTES
[address]   Progress Note - Floyce Prudent 1955, 63 y o  male MRN: [id number]  Unit/Bed#: -01 Encounter: [id number]  Primary Care Provider: Cristy Jack MD   Date and time admitted to hospital: 5/11/2021  4:55 PM    PAD (peripheral artery disease) [facility])  Assessment & Plan  Continue with aspirin 81mg daily and Lipitor 40mg daily  LEADS completed in 08/2020:  50-70% stenosis in the R distal superficial femoral artery  High grade stenosis/occlusion with trickle flow to the L distal superficial femoral artery  Neurovascular checks each shift  Recommend follow-up with Vascular Surgery  Hernia of abdominal cavity  Assessment & Plan  Stable  Repaired in 2011  Chronic low back pain  Assessment & Plan  Continue current pain regimen  Ordered gabapentin 300mg BID at home by Pain Management  Follows with Pain Management as outpatient for lumbar spinal stenosis and lumbar radiculopathy  Received steroid injections - last injection given on 11/4/2020  Ordered for next injection on 5/27/2021  Carotid stenosis, asymptomatic, bilateral  Assessment & Plan  Last carotid ultrasound on 10/7/2020 showed:  >70% stenosis of the R ICA and 50-69% stenosis of the L ICA  Continue aspirin 81mg daily and Lipitor 40mg daily  Recommended for repeat US in 6 months - has order but did not complete yet  Recommend to follow-up with Vascular Surgery  Mixed hyperlipidemia  Assessment & Plan  Continue home dose of Lipitor 40mg daily  Last lipid panel on 10/26/2020 as follows: Cholesterol 159, Triglycerides 146, HDL 66, and LDL 64  Tobacco abuse  Assessment & Plan  Smokes cigarettes 1/2 pack a day for 40+ years  Interested in smoking cessation  Continue nicotine patch  History of liver transplant [facility])  Assessment & Plan  History of liver transplant in 1160 due to alcoholic cirrhosis and hepatocellular carcinoma    Follows with [facility] GI transplant clinic  Currently on Tacrolimus 3mg BID  PCP managing current dosage and has level checked every 3-6 months  Last Tacrolimus level 6 9 on 10/26/2020  Essential hypertension  Assessment & Plan  Continue with home dose of lisinopril 20mg daily  Monitor BP with routine VS   Avoid orthostasis  Chronic gastroesophageal reflux disease  Assessment & Plan  Stable  Takes omeprazole 20mg BID at home  Substitute with Protonix 20mg BID while inpatient  * Closed fracture of left distal femur [facility])  Assessment & Plan   - mechanical fall at home  Nondisplaced L medial femoral condyle fracture  Non-operative treatment  NWB to LLE with knee immobilizer  Pain control  DVT ppx with Lovenox  Follow-up with Ortho in 2 weeks (21)  PT/OT Therapies  Primary team following  VTE Pharmacologic Prophylaxis:   Pharmacologic: Enoxaparin (Lovenox)  Mechanical VTE Prophylaxis in Place: Yes  - sequential compression devices  Current Length of Stay: 2 day(s)    Current Patient Status: Inpatient Rehab     Discharge Plan: As per primary team     Code Status: Level 1 - Full Code    Subjective:   Pt examined while pt sitting in bed in pt room  Complaints of constipation  LBM was on   Passing gas but feels bloated and is having some acid reflux  Recommended a suppository and pt was in agreement  Complaints of 7/10 L knee pain, aching/throbbing, worse at this time because he just finished therapy, but states that pain resolves with pain medication  Therapy has been going well today  Slept well last night  Objective:     Vitals:   Temp (24hrs), Av 7 °F (36 5 °C), Min:97 4 °F (36 3 °C), Max:98 °F (36 7 °C)    Temp:  [97 4 °F (36 3 °C)-98 °F (36 7 °C)] 98 °F (36 7 °C)  HR:  [76-84] 84  Resp:  [18] 18  BP: (132-140)/(58-75) 140/67  SpO2:  [96 %-99 %] 96 %  Body mass index is 29 48 kg/m²  Review of Systems   Constitutional: Negative for chills, fatigue and fever     Respiratory: Negative for cough and shortness of breath  Cardiovascular: Negative for chest pain, palpitations and leg swelling  Gastrointestinal: Positive for constipation  Negative for abdominal distention, abdominal pain, diarrhea, nausea and vomiting  LBM 5/9, + acid reflux   Genitourinary: Negative for difficulty urinating  Musculoskeletal: Positive for arthralgias (L knee pain, 7/10, aching/throbbing, improves with pain medication)  Neurological: Negative for dizziness, light-headedness, numbness and headaches  Psychiatric/Behavioral: Negative for sleep disturbance  Input and Output Summary (last 24 hours): Intake/Output Summary (Last 24 hours) at 5/13/2021 0917  Last data filed at 5/13/2021 0900  Gross per 24 hour   Intake 780 ml   Output 1300 ml   Net -520 ml       Physical Exam:     Physical Exam  Vitals signs and nursing note reviewed  Constitutional:       Appearance: Normal appearance  HENT:      Head: Normocephalic and atraumatic  Neck:      Musculoskeletal: Normal range of motion and neck supple  Cardiovascular:      Rate and Rhythm: Normal rate and regular rhythm  Pulses: Normal pulses  Heart sounds: Normal heart sounds  No murmur  No friction rub  Pulmonary:      Effort: Pulmonary effort is normal  No respiratory distress  Breath sounds: Normal breath sounds  No wheezing or rhonchi  Abdominal:      General: Abdomen is flat  Bowel sounds are normal  There is no distension  Palpations: Abdomen is soft  Tenderness: There is no abdominal tenderness  Musculoskeletal:      Right lower leg: No edema  Left lower leg: No edema  Comments: LLE in knee immobilizer  Skin:     General: Skin is warm and dry  Neurological:      Mental Status: He is alert and oriented to person, place, and time     Psychiatric:         Mood and Affect: Mood normal          Behavior: Behavior normal          Additional Data:     Labs:    Results from last 7 days   Lab Units 05/12/21  0428   WBC Thousand/uL 9 00   HEMOGLOBIN g/dL 14 3   HEMATOCRIT % 41 4   PLATELETS Thousands/uL 188   NEUTROS PCT % 70*   LYMPHS PCT % 20*   MONOS PCT % 7   EOS PCT % 2     Results from last 7 days   Lab Units 05/12/21  0428  05/09/21  1807   SODIUM mmol/L 136*   < > 138   POTASSIUM mmol/L 3 9   < > 4 4   CHLORIDE mmol/L 103   < > 102   CO2 mmol/L 29   < > 28   BUN mg/dL 16   < > 15   CREATININE mg/dL 1 11   < > 1 30   ANION GAP mmol/L 4*   < > 8   CALCIUM mg/dL 9 6   < > 9 0   ALBUMIN g/dL  --   --  4 0   TOTAL BILIRUBIN mg/dL  --   --  0 46   ALK PHOS U/L  --   --  74   ALT U/L  --   --  28   AST U/L  --   --  24   GLUCOSE RANDOM mg/dL 92   < > 103    < > = values in this interval not displayed                         Labs reviewed    Imaging:    Imaging reviewed    Recent Cultures (last 7 days):           Last 24 Hours Medication List:   Current Facility-Administered Medications   Medication Dose Route Frequency Provider Last Rate    acetaminophen  650 mg Oral Q6H PRN Paul Tao MD      ALPRAZolam  0 25 mg Oral Daily PRN Paul Tao MD      aspirin  81 mg Oral Daily ROSALINA Cook      atorvastatin  40 mg Oral Daily Paul Tao MD      bisacodyl  10 mg Rectal Daily PRN Paul Tao MD      calcium carbonate  2 tablet Oral Daily With Breakfast Paul Tao MD      docusate sodium  100 mg Oral BID ROSALINA Cook      enoxaparin  40 mg Subcutaneous Q24H [facility] Paul Tao MD      gabapentin  300 mg Oral BID Paul Tao MD      lidocaine  1 patch Topical Daily Paul Tao MD      lisinopril  20 mg Oral Daily Paul Tao MD      magnesium hydroxide  30 mL Oral Daily PRN Paul Tao MD      multivitamin-minerals  1 tablet Oral Daily Paul Tao MD      nicotine  14 mg Transdermal Daily ROSALINA Cook      ondansetron  4 mg Oral Q6H PRN Paul Tao MD      oxyCODONE  10 mg Oral Q4H PRN Paul Tao MD      oxyCODONE  5 mg Oral Q4H PRN Paul Tao MD  pantoprazole  20 mg Oral BID AC Kayleigh Hinkle MD      polyethylene glycol  17 g Oral Daily Kayleigh Hinkle MD      tacrolimus  3 mg Oral Q12H Coral Timmons MD          M*Modal software was used to dictate this note  It may contain errors with dictating incorrect words or incorrect spelling  Please contact the provider directly with any questions

## 2021-05-13 NOTE — NURSING NOTE
RN offered suppository twice, pt refused and stated "I want it later tonight  Will pass on to information to next shift

## 2021-05-13 NOTE — CASE MANAGEMENT
Cm met with pt and reviewed team meeting update  Pt is in agreement with a d/c on 5/20 with continued home therapy  Discussed ordering wheelchair and a walker  CM explained the process to pt  Following to assist w/ d/c planning needs  In preparation for d/c, cm placed referral for home PT to [facility]  Awaiting determination

## 2021-05-13 NOTE — TEAM CONFERENCE
Acute RehabilitationTeam Conference Note  Date: 5/13/2021   Time: 1:07 PM       Patient Name:  Siena Webb       Medical Record Number: [id number]   YOB: 1955  Sex:  Male          Room/Bed:  [facility] 262/[facility] 262-01  Payor Info:  Payor: Fredo Jones / Plan: MEDICARE A AND B / Product Type: Medicare A & B Fee for Service /      Admitting Diagnosis: Closed fracture of left distal femur ([facility] ) [S72 402A]   Admit Date/Time:  5/11/2021  4:55 PM  Admission Comments: No comment available     Primary Diagnosis:  Closed fracture of left distal femur ([facility] )  Principal Problem: Closed fracture of left distal femur [facility])    Patient Active Problem List    Diagnosis Date Noted    Hernia of abdominal cavity 05/12/2021    P[facility]peripheral artery disease) ([facility] ) 05/12/2021    Electrolyte abnormality 05/12/2021    Fall 05/09/2021    Closed fracture of left distal femur ([facility] ) 05/09/2021    Acute pain due to trauma 05/09/2021    Chronic low back pain 05/09/2021    Carotid stenosis, asymptomatic, bilateral 10/14/2020    Claudication in peripheral vascular disease ([facility] ) 08/26/2020    Leg cramps 07/15/2020    Medicare annual wellness visit, subsequent 12/09/2019    Primary insomnia 03/25/2019    Mixed hyperlipidemia 09/19/2018    Seasonal allergies 10/27/2017    Ringing in ears, bilateral 10/27/2017    Short-term memory loss 10/27/2017    Intervertebral disc disorder with radiculopathy of lumbar region 04/24/2017    Bursitis of both hips 02/17/2017    Sacroiliitis ([facility] ) 02/17/2017    Essential hypertension 44/11/5545    Nonalcoholic liver disease, chronic 07/05/2016    Osteopenia 10/13/2015    Neck pain 06/27/2014    Right arm pain 06/27/2014    Lung mass 03/18/2014    Chronic gastroesophageal reflux disease 03/06/2014    History of liver transplant ([facility] ) 08/22/2013    Alcohol dependence in remission ([facility] ) 01/05/2012    Abnormal electrocardiogram 11/14/2011    PPD negative 11/14/2011    Tobacco abuse 11/14/2011       Physical Therapy:    Weight Bearing Status: Non-weight bearing  Transfers: Incidental Touching  Bed Mobility: Minimal Assistance  Amulation Distance (ft): 40 feet  Ambulation: Incidental Touching  Assistive Device for Ambulation: Roller Walker  Wheelchair Mobility Distance: 200 ft  Wheelchair Mobility: Supervision  Number of Stairs: (pending)  Ramp: Supervision  Assistive Device for Ramp: Wheelchair  Discharge Recommendations: Home with:  [address] with[de-identified] Outpatient Physical Therapy    5/12/2021  Barriers to d/c include inaccessible home environment, decreased strength, decreased balance, pain, NWB status, and decreased activity tolerance  POC to include gait training for short distances w/ RW, stair training (once family confirms stairs in house), LE strengthening, transfer training, endurance training, balance, and modalities for pain management  ELOS 7-10 days  Anticipating d/c date 5/20/2021  Occupational Therapy:  Eating: Independent  Grooming: Supervision  Bathing: Minimal Assistance  Bathing: Minimal Assistance  Upper Body Dressing: (setup)  Lower Body Dressing: Minimal Assistance  Toileting: Minimal Assistance  Tub/Shower Transfer: (NA safety)  Toilet Transfer: Minimal Assistance  Cognition: Within Defined Limits  Orientation: Person, Place, Time, Situation  Discharge Recommendations: (pending progress)       Pt supine in bed upon therapist arrival  Pt reporting he lives in a ranch home with a curb step+1 step to enter home with R hand rail  Pt reports he was indep with all ADLs, +, indep with meds  Reports wife is able to only provide S at dc but not physical assistance 2* to having leukemia, can A with IADLs  At this time pt is req Pascual/CGA for transfers, Pascual LB wash/dress 2* to limited ROM/NWB, s/u UB wash/dress, Pascual toilet transfer, pain  Pt reporting he would like to be home by next Thurs 2* to son getting  on Friday 5/21   Therapist educated pt and spoke with team to update  Pt seen for 90mins of skilled OT services with emphasis on self-care, toileting, bed mobility, as well as therapist educated pt on rehab process and ELOS which pt verbalized understanding  Pt presents with the following performance component deficits impacting ADL/IADL skills: pain, dec fx reach, dec standing balance, LLE NWB with immobilizer  Pt to continue to benefit from continued acute rehab OT services during hospital stay to address defined deficits and to maximize level of functional independence in the following Occupational Performance areas: grooming, bathing/shower, toilet hygiene, dressing, medication management, functional mobility, community mobility, clothing management  Treatment approaches may include: OT eval, self-care (LHAE), there ex, therapeutic activity, modalities  Pt presents with good rehab potential  Pt is unsafe to D/C home at this time, recommending ELOS 7-10 days to achieve Yaya level goals with least restrictive device to address these areas and resume prior occupational roles to maximize independence to reduce risk of fall and decrease risk of readmission  Pt receptive to dc wc level at time of DC in order to maintain sound RLE  Speech Therapy:           No notes on file    Nursing Notes:     Diet Type: Regular/House                                                                     Pain Location/Orientation: Orientation: Left, Location: Knee  Pain Score: 7                       Hospital Pain Intervention(s): Medication (See MAR), Repositioned          Pt is a 63 y o  male with a PMH of lumbar spinal stenosis, lumbar radiculopathy, liver transplant in 2013, GERD, HTN, HLD, carotid stenosis, and PAD who originally presented to [address] [place] [address] on 5/9/2021 after a mechanical fall with L knee pain/swelling and difficulty bearing weight  Patient did not hit his head or loss of consciousness at the time of fall    X-ray showed that patient had a non-displaced L medial femoral condyle fracture  Orthopedics was consulted and recommended non-operative management at this time with NWB to LLE with knee immobilizer and follow-up in 2 weeks  Patient admitted to [address] [place] [address]  on 5/11/2021     Patient on ASA 81 mg po daily for PAD  On Gabapentin 300 mg po BID for chronic back pain  Pt had steroid injection previously and will get one on 5/27/21 for back pain  Lidoderm patch, Oxy IR, Tylenol and ice for L knee pain  On Lipitor 40 mg po daily for hyperlipidemia  On Nicotine patch for hx of smoking  On Tacrolimus 300 mg po daily for hx of liver transplant last 2013  On Lisinopril 20 mg po daily for HTN, Protonix 40 mg po daily for GERD  Lovenox 40 mg SQ  for DVT ppx and RLE SCD's  NWB to LLE, knee immobilizer @ all times  Pt is continent of bowel and bladder, on Colace 100 mg BID and Miralax for constipation  We will conitnue to monitor v/s, lab results, pain management effectiveness, safety for transfers, skin checks for using the leg immobilizer  Will encourage patient independence with ADL's  Case Management:     Discharge Planning  Living Arrangements: Spouse/significant other  Support Systems: Spouse/significant other  Assistance Needed: DME  Type of Current Residence: Private residence  Current Home Care Services: No  Cm has met with pt and reviewed rehab routine and cm role  Pt lives in a single family rancher home with his wife  He has 1 JIGNA  He has a walk in shower with a seat and tub/shower  Pt reports have several walkers and some transport chairs at home  Pt has attended outpt therapy at [facility] and has had home services but cannot recall with what company  Pt denies any hx of STR  Pt uses [facility] [address] [facility] and has used [facility] in the past but wishes to use [facility] at d/c  Discussed team meeting process, potential LOS and IMM  Pt has been educated on potential discharge recommendations for continued care   Following to assist with d/c planning needs and care coordination  Is the patient actively participating in therapies? yes  List any modifications to the treatment plan:     Barriers Interventions   Pain Medication management   JIGNA Stairs training   Decreased endurance Conditioning   Decreased dynamic reach Flexibility and LHAE   NWB LLE Education, trials of mobility     Is the patient making expected progress toward goals? yes  List any update or changes to goals:     Medical Goals: Patient will be medically stable for discharge to [facility] restrictive envrionment upon completion of rehab program and Patient will be able to manage medical conditions and comorbid conditions with medications and follow up upon completion of rehab program    Weekly Team Goals:   Rehab Team Goals  ADL Team Goal: Patient will be independent with ADLs with least restrictive device upon completion of rehab program  Transfer Team Goal: Patient will be independent with transfers with least restrictive device upon completion of rehab program  Locomotion Team Goal: Patient will be independent with locomotion with least restrictive device upon completion of rehab program    Discussion: Pt presents with the above barriers  He is a min a x1 for transfer  He is min a for ambulation hopping but wheelchair is recommended  He is supervision for wheelchair mobility  He is a min a for LB adls and supervision for up adls  Recommendations are for home PT  Anticipated Discharge Date:  5/20  [facility] Team Members Present: The following team members are supervising care for this patient and were present during this Weekly Team Conference      Physician: Dr Karen Foster MD  : EMILY Gtz  Registered Nurse: Fan Mathur, ALEJANDRA  Physical Therapist: Chris Mello DPT and Wiliam Monique PT  Occupational Therapist: Leticia Davison MS, OTR/L  Speech Therapist: Mavis Mack MS, CCC-SLP  Other:

## 2021-05-13 NOTE — ASSESSMENT & PLAN NOTE
5/9 - mechanical fall at home  Nondisplaced L medial femoral condyle fracture  Non-operative treatment  NWB to LLE with knee immobilizer  Pain control  DVT ppx with Lovenox  Follow-up with Ortho in 2 weeks (5/24/21)  PT/OT Therapies  Primary team following

## 2021-05-13 NOTE — PROGRESS NOTES
05/13/21 1440   Pain Assessment   Pain Assessment Tool 0-10   Pain Score 7   Pain Location/Orientation Orientation: Left; Location: Leg   Hospital Pain Intervention(s) Rest  (requested pain meds towards end of session, RN maksim provided)   Restrictions/Precautions   Precautions Fall Risk;Immunosuppressed;Pain   Weight Bearing Restrictions Yes   LLE Weight Bearing Per Order NWB   ROM Restrictions Yes   LLE ROM Restriction Range Limitation  (no knee flex)   Braces or Orthoses LE Immobilizer   Subjective   Subjective Agreeable to PT tx  Received from [place] [id number]  Sit to Stand   Type of Assistance Needed Supervision   Amount of Physical Assistance Provided No physical assistance   Comment S with RW, able to maintain NWB, at times appears to place foot on ground but no weight bearing   Sit to Stand CARE Score 4   Bed-Chair Transfer   Type of Assistance Needed Incidental touching   Amount of Physical Assistance Provided No physical assistance   Comment CG/CS SPT with RW   Chair/Bed-to-Chair Transfer CARE Score 4   Car Transfer   Reason if not Attempted Environmental limitations   Car Transfer CARE Score 10   Walk 10 Feet   Type of Assistance Needed Supervision   Amount of Physical Assistance Provided No physical assistance   Comment CS with RW, maintains NWB   Walk 10 Feet CARE Score 4   Walk 50 Feet with Two Turns   Comment fatigued at 36'   Reason if not Attempted Safety concerns   Walk 50 Feet with Two Turns CARE Score 88   Walk 150 Feet   Reason if not Attempted Safety concerns   Walk 150 Feet CARE Score 88   Walking 10 Feet on Uneven Surfaces   Reason if not Attempted Safety concerns   Walking 10 Feet on Uneven Surfaces CARE Score 88   Ambulation   Does the patient walk? 2   Yes   Primary Mode of Locomotion Prior to Admission Walk   Distance Walked (feet) 40 ft  (x1, 10'x2)   Assist Device Roller Walker   Gait Pattern Hopping   Walk Assist Level Close Supervision   Findings pt CS with hopping, RW slightly low but pt prefers this way, fatigued at 36'   Wheel 50 Feet with Two Turns   Type of Assistance Needed Supervision   Amount of Physical Assistance Provided No physical assistance   Wheel 50 Feet with Two Turns CARE Score 4   Wheelchair mobility   Type of Wheelchair Used 1  Manual   Method Right upper extremity; Left upper extremity   Assistance Provided For Remove Leg Rest;Replace Leg Rest;Locking Brakes   Distance Level Surface (feet) 50 ft   Findings reviewed with pt setup of w/c for transfers this session educating pt to setup transfer so that L leg rest can swing out and pt does not have to remove due to difficulty of replacing leg rest  Pt able to setup transfer from w/c to bed, needing cues to lock brakes for safety  Curb or Single Stair   Style negotiated Curb   Type of Assistance Needed Incidental touching   Amount of Physical Assistance Provided No physical assistance   1 Step (Curb) CARE Score 4   4 Steps   Reason if not Attempted Safety concerns   4 Steps CARE Score 88   12 Steps   Reason if not Attempted Safety concerns   12 Steps CARE Score 88   Stairs   Type Curb   # of Steps 1  (x3)   Weight Bearing Precautions NWB;LLE   Assist Devices Roller Walker   Findings performed on weight scale this session to [facility] 3" step into pt's home from garage  Pt completing it forward ascending first with RW, maintained NWB, then trialed backwards x2  Pt reports he prefers backwards ascending, forwards descending as easier to maintain and manage NWB LLE  Pt reporting he has 8" curb from home then into kitchen area, did educate pt on sitting on w/c placed inside kitchen which pt agreeable to, will trial in upcoming sessions  Picking Up Object   Reason if not Attempted Safety concerns   Picking Up Object CARE Score 88   Therapeutic Interventions   Strengthening standing TE with LLE: Hip flex, abd, and ext all 3x10, standing rest breaks between sets      Balance Static stance on RLE w/o UE assist, at times having pt reach to touch PT hand, needing to reach for RW at times for balance, at best able to maintain static stance on RLE for 40 sec max  Performed x8 mins total to work on VOYAA  Other Comments   Comments Spent increased time with pt discussing with home setup as unsure of steps inside home  Had pt draw diagram, found that from garage into house (mud room) pt has 3" step to enter, mud room is tight and not much room for w/c to be placed inside door  Discovered that from mud room, pt enters kitchen, there is an 8" step to enter the kitchen  From there kitchen, living room, pt's room on that level, however if pt wants to go out to patio, will have to complete x2 7 5" steps down into family room to go out to patio  Pt reports he does not have to go out to patio, will show pt how he can complete x2 steps with w/c method but did advise pt that recommend someone bring w/c down into family room if distance to patio too far to hop and if patio uneven surface  Assessment   Treatment Assessment 60 min skilled PT session focusing on determining pt's home layout to determine what needs to be completed for pt to safely d/c home, LE strenghtening, stair and gait trng with RW, and balance interventions  Overall pt did well maintaining NWB this session on LLE, at times slighly rests leg on ground but no w/b through LLE  Found that pt has x2 steps inside home, one 3" which pt complete with use of weight scale today, the other 8" step  Did educate pt that w/c could be placed inside kitchen for pt to sit into, pt agreeable to this, may trial  Pt also reporting 2 steps into family room, does not have to perform on d/c but may trial with pt to show him how it would be completed  Pt tolerated standing TE well, c/o some leg weakness and soreness but able to complete  At this time planning for d/c home next Thurs 5/20 with recommendations for home PT   POC to cont to focus on b/l LE strengthening, transfer and gait trng with RW, single step trng with RW (backwards ascending) and 2 step ascending/descend with use of RW and wc, w/c propulsion, w/c part management, and w/c setup to perform transfers, conditioning and balance tasks to decrease pt's risk for falls upon d/c and caregiver burden  End of session wife and pt's father-in-law present, wife reports she was hoping pt would d/c home w/o w/c  Did educate wife on need for w/c due to fatigue, did educate pt and wife that pt will need RW for d/c home as pt does not have, pt to look on [employer] for RW, did also offer [address] [employer] [address] and [facility] have RW's  Family/Caregiver Present yes, wife Edilia Mazariegos [phone] and pt's Father in law present at end of session   Problem List Decreased strength;Decreased range of motion;Decreased endurance; Impaired balance;Decreased mobility; Decreased safety awareness;Orthopedic restrictions;Pain   Barriers to Discharge Decreased caregiver support   PT Barriers   Functional Limitation Car transfers; Ramp negotiation;Stair negotiation;Standing;Transfers; Walking   Plan   Treatment/Interventions Functional transfer training;LE strengthening/ROM; Elevations; Therapeutic exercise; Endurance training;Gait training;Equipment eval/education   Progress Progressing toward goals   Recommendation   PT Discharge Recommendation Home with home health rehabilitation   Equipment Recommended Wheelchair;Walker   PT Therapy Minutes   PT Time In 1440   PT Time Out 1540   PT Total Time (minutes) 60   PT Mode of treatment - Individual (minutes) 5   PT Mode of treatment - Concurrent (minutes) 55   PT Mode of treatment - Group (minutes) 0   PT Mode of treatment - Co-treat (minutes) 0   PT Mode of Treatment - Total time(minutes) 60 minutes   PT Cumulative Minutes 190   Therapy Time missed   Time missed?  No

## 2021-05-13 NOTE — PROGRESS NOTES
05/13/21 1400   Pain Assessment   Pain Assessment Tool 0-10   Pain Score 3   Pain Location/Orientation Orientation: Left; Location: Knee   Pain Onset/Description Descriptor: Aching   Restrictions/Precautions   Precautions Fall Risk;Immunosuppressed;Pain   LLE Weight Bearing Per Order NWB   Braces or Orthoses LE Immobilizer   Cognition   Overall Cognitive Status WFL   Arousal/Participation Alert; Cooperative   Attention Attends with cues to redirect   Orientation Level Oriented X4   Memory Within functional limits   Following Commands Follows multistep commands with increased time or repetition   Subjective   Subjective " I still can't lift my leg "   Lying to Sitting on Side of Bed   Type of Assistance Needed Adaptive equipment;Supervision   Amount of Physical Assistance Provided No physical assistance   Comment use of leg  and bed rail with HOB elevated    Lying to Sitting on Side of Bed CARE Score 4   Sit to Stand   Type of Assistance Needed Incidental touching   Amount of Physical Assistance Provided No physical assistance   Comment RW   Sit to Stand CARE Score 4   Bed-Chair Transfer   Type of Assistance Needed Incidental touching   Amount of Physical Assistance Provided No physical assistance   Comment RW   Chair/Bed-to-Chair Transfer CARE Score 4   Walk 10 Feet   Type of Assistance Needed Incidental touching   Walk 10 Feet CARE Score 4   Ambulation   Does the patient walk? 2  Yes   Primary Mode of Locomotion Prior to Admission Walk   Distance Walked (feet) 10 ft  (x2)   Assist Device Roller Walker   Gait Pattern Inconsistant Holli; Slow Holli; Hopping   Limitations Noted In Balance; Endurance;Speed   Walk Assist Level Contact Guard   Wheel 50 Feet with Two Turns   Type of Assistance Needed Supervision   Wheel 50 Feet with Two Turns CARE Score 4   Wheelchair mobility   Does the patient use a wheelchair? 1  Yes   Type of Wheelchair Used 1   Manual   Therapeutic Interventions   Strengthening L 4 way ankle tband x30  Quad sets x50   Other SPT trials to different ht surfaces x4 with RW  Education on immobilizer fit and strap mgmt  Assessment   Treatment Assessment Pt engaged in 40 min skilled PT intervention with focus on mobility, L LE strengthening, and transfers  Pt in bed upon entry, edcuation for brace fitting and strap mgmt  Readjusted immobilizer to ensure proper knee support  Trialed use of leg  OOB as pt feels he still cannot lift his leg  Demonstrated good understanding of use OOB and to A L LE up onto leg rest of WC  Improving sit to stand and SPT wtih RW, maintains NWB L LE  Edcuation for quad sets throughout the day as well as L ankle AROM/alphabet  Care passed to other PT for further PT session  Problem List Decreased strength;Decreased range of motion;Decreased endurance; Impaired balance;Decreased mobility; Decreased safety awareness;Orthopedic restrictions;Pain   Barriers to Discharge Decreased caregiver support   Barriers to Discharge Comments ML home, multiple small steps  PT Barriers   Physical Impairment Decreased strength;Decreased range of motion; Impaired balance;Pain;Orthopedic restrictions   Functional Limitation Car transfers; Ramp negotiation;Stair negotiation;Standing;Transfers; Walking   Plan   Treatment/Interventions Functional transfer training;LE strengthening/ROM; Therapeutic exercise;Equipment eval/education;Patient/family training;Gait training;Bed mobility   Progress Progressing toward goals   Recommendation   PT Discharge Recommendation Home with home health rehabilitation   Equipment Recommended Wheelchair;Walker   PT Therapy Minutes   PT Time In 1400   PT Time Out 1440   PT Total Time (minutes) 40   PT Mode of treatment - Individual (minutes) 30   PT Mode of treatment - Concurrent (minutes) 10   PT Mode of treatment - Group (minutes) 0   PT Mode of treatment - Co-treat (minutes) 0   PT Mode of Treatment - Total time(minutes) 40 minutes   PT Cumulative Minutes 130 Therapy Time missed   Time missed?  No

## 2021-05-13 NOTE — PCC NURSING
63 y o  male with a PMH of lumbar spinal stenosis, lumbar radiculopathy, liver transplant in 2013, GERD, HTN, HLD, carotid stenosis, and PAD who originally presented to [address] [place] [address] on 5/9/2021 after a mechanical fall with L knee pain/swelling and difficulty bearing weight  Patient did not hit his head or loss of consciousness at the time of fall  X-ray showed that patient had a non-displaced L medial femoral condyle fracture  Orthopedics was consulted and recommended non-operative management at this time with NWB to LLE with knee immobilizer and follow-up in 2 weeks  Patient admitted to [address] [place] [address]  on 5/11/2021  Patient on ASA 81 mg po daily for PAD  On Gabapentin 300 mg po BID for chronic back pain  Pt had steroid injection previously and will get one on 5/27/21 for back pain  Lidoderm patch, Oxy IR, Tylenol and ice for L knee pain  On Lipitor 40 mg po daily for hyperlipidemia  On Nicotine patch for hx of smoking  On Tacrolimus 300 mg po daily for hx of liver transplant last 2013  On Lisinopril 20 mg po daily for HTN, Protonix 40 mg po daily for GERD  Lovenox 40 mg SQ  for DVT ppx and RLE SCD's  NWB to LLE, knee immobilizer @ all times  Pt is continent of bowel and bladder, on Colace 100 mg BID and Miralax for constipation  VTE ppx: lovenox & SCD to RLE  We will continue to monitor vital signs & lab results  Pt will have adequate pain relief to fully participate in therapies  Pt will understand importance of T/R & off loading to prevent pressure injury  Pt will have safe transfers with use of call bell & hourly rounding to prevent falls  We will encourage independence with ADL's while pt remaining NWB LLE

## 2021-05-13 NOTE — PROGRESS NOTES
05/13/21 1000   Pain Assessment   Pain Assessment Tool 0-10   Pain Score 7   Pain Location/Orientation Orientation: Left; Location: Leg;Location: Knee   Pain Onset/Description Onset: Ongoing;Frequency: Constant/Continuous; Descriptor: Sore;Descriptor: Aching   Effect of Pain on Daily Activities does have slight dec in knee pain during session 2* pain medication   Hospital Pain Intervention(s) Repositioned; Rest   Restrictions/Precautions   Precautions Fall Risk;Immunosuppressed;Pain   LLE Weight Bearing Per Order NWB   LLE ROM Restriction Range Limitation  (no knee flexion)   Braces or Orthoses LE Immobilizer   Lifestyle   Autonomy "I just have to get home and I think I'll figure it out"   Eating   Type of Assistance Needed Independent   Amount of Physical Assistance Provided No physical assistance   Eating CARE Score 6   Sit to Lying   Type of Assistance Needed Supervision; Adaptive equipment   Amount of Physical Assistance Provided No physical assistance   Comment reliance on bed rails for transition but improved management of LLE   Sit to Lying CARE Score 4   Sit to Stand   Type of Assistance Needed Incidental touching   Amount of Physical Assistance Provided No physical assistance   Comment RW   Sit to Stand CARE Score 4   Bed-Chair Transfer   Type of Assistance Needed Physical assistance   Amount of Physical Assistance Provided Less than 25%   Comment CGA/Pascual for both SPT and sit pivots this session  Chair/Bed-to-Chair Transfer CARE Score 3   Functional Standing Tolerance   Time 45-60s x 4   Activity clothespin task in stance to simulate dynamic reach for CM during toileting routine   Comments no LOB, able to sustain LLE NWB status, does fatigue quickly with unilateral UE release   Exercise Tools   Theraband Engaged pt in theraband therex with focus on UE strengthening and endurance to improve safety and indep with transfers, wc mobility, and UE supported tasks in stance to inc indep   Tolerated 3x10 in majority of planes with green (middle resistance) band but did req downgrade to orange (light moderate) band for shoulder flexion 2* h/o rotator cuff surgery limiting tolerance to full range  Educated on benefits of performing unsupported to engage core for inc stability during transfers as well  Overall tolerates  Cognition   Overall Cognitive Status WFL   Arousal/Participation Alert; Cooperative   Attention Attends with cues to redirect   Orientation Level Oriented X4   Memory Within functional limits   Following Commands Follows multistep commands with increased time or repetition   Activity Tolerance   Activity Tolerance Patient limited by pain   Assessment   Treatment Assessment Pt participated in skilled OT session with focus on fx transfers (SPT and sit pivot), wc management, standing balance and tolerance, UE/core strengthening  Pt reporting inc pain in LLE; therefore, limiting standing tasks until end of session to allow for medication to help  Educated pt on arm rest and leg rest management of wc to inc indep with transfers at MS  Pt req cuing about 50% of time to locate leg rest release and may benefit from tactile indicator on leg rest release to inc ability to locate  Pt req no cuing for brake management  Perform both sit pivots and stand pivots to inc indep with both  Pt did req Pascual on occasion during sit pivot for balance as well as cuing for management of NWB this session  With ext time, pt was able to position wc appropriately for transfers to mat table, arm chair, bed  Pt cont to report urgency to dc home mid to end of next week 2* son's wedding  To be appropriate to dc, pt will req cont therapy with focus on LB dressing, fx transfers with and without RW, CM in stance during toileting, shower transfers, and assessment of DME needs at [facility]  Pt does report having "accessible" bathroom 2* in-law suite in home with grab bars in toilet and walk-in shower   Pt in bed at end of session with all needs met, call bell in reach  Prognosis Good   Problem List Decreased strength;Decreased range of motion;Decreased endurance; Impaired balance;Decreased mobility; Decreased safety awareness;Orthopedic restrictions;Pain   Barriers to Discharge Decreased caregiver support   Plan   Treatment/Interventions ADL retraining;Functional transfer training; Therapeutic exercise; Endurance training;Patient/family training;Equipment eval/education; Bed mobility   Progress Progressing toward goals   Recommendation   OT Discharge Recommendation Home with home health rehabilitation  (pending pt progress)   OT Therapy Minutes   OT Time In 1000   OT Time Out 1130   OT Total Time (minutes) 90   OT Mode of treatment - Individual (minutes) 90   OT Mode of treatment - Concurrent (minutes) 0   OT Mode of treatment - Group (minutes) 0   OT Mode of treatment - Co-treat (minutes) 0   OT Mode of Treatment - Total time(minutes) 90 minutes   OT Cumulative Minutes 180   Therapy Time missed   Time missed?  No

## 2021-05-14 LAB — TACROLIMUS BLD-MCNC: 5.3 NG/ML (ref 2–20)

## 2021-05-14 PROCEDURE — 97110 THERAPEUTIC EXERCISES: CPT

## 2021-05-14 PROCEDURE — 97530 THERAPEUTIC ACTIVITIES: CPT

## 2021-05-14 PROCEDURE — 99232 SBSQ HOSP IP/OBS MODERATE 35: CPT | Performed by: INTERNAL MEDICINE

## 2021-05-14 PROCEDURE — 99233 SBSQ HOSP IP/OBS HIGH 50: CPT | Performed by: PHYSICAL MEDICINE & REHABILITATION

## 2021-05-14 PROCEDURE — 97535 SELF CARE MNGMENT TRAINING: CPT

## 2021-05-14 PROCEDURE — 97116 GAIT TRAINING THERAPY: CPT

## 2021-05-14 RX ADMIN — Medication 1 TABLET: at 12:13

## 2021-05-14 RX ADMIN — OXYCODONE HYDROCHLORIDE 10 MG: 10 TABLET ORAL at 06:04

## 2021-05-14 RX ADMIN — PANTOPRAZOLE SODIUM 20 MG: 20 TABLET, DELAYED RELEASE ORAL at 06:05

## 2021-05-14 RX ADMIN — OXYCODONE HYDROCHLORIDE 10 MG: 10 TABLET ORAL at 23:05

## 2021-05-14 RX ADMIN — ATORVASTATIN CALCIUM 40 MG: 40 TABLET, FILM COATED ORAL at 08:35

## 2021-05-14 RX ADMIN — GABAPENTIN 300 MG: 300 CAPSULE ORAL at 08:35

## 2021-05-14 RX ADMIN — OXYCODONE HYDROCHLORIDE 5 MG: 5 TABLET ORAL at 18:43

## 2021-05-14 RX ADMIN — BISACODYL 10 MG: 10 SUPPOSITORY RECTAL at 06:05

## 2021-05-14 RX ADMIN — ASPIRIN 81 MG: 81 TABLET, CHEWABLE ORAL at 08:35

## 2021-05-14 RX ADMIN — LISINOPRIL 20 MG: 20 TABLET ORAL at 08:35

## 2021-05-14 RX ADMIN — TACROLIMUS 3 MG: 1 CAPSULE ORAL at 21:34

## 2021-05-14 RX ADMIN — GABAPENTIN 300 MG: 300 CAPSULE ORAL at 17:04

## 2021-05-14 RX ADMIN — CALCIUM 2 TABLET: 500 TABLET ORAL at 08:35

## 2021-05-14 RX ADMIN — ENOXAPARIN SODIUM 40 MG: 40 INJECTION SUBCUTANEOUS at 08:34

## 2021-05-14 RX ADMIN — POLYETHYLENE GLYCOL 3350 17 G: 17 POWDER, FOR SOLUTION ORAL at 08:34

## 2021-05-14 RX ADMIN — OXYCODONE HYDROCHLORIDE 10 MG: 10 TABLET ORAL at 12:13

## 2021-05-14 RX ADMIN — ONDANSETRON 4 MG: 4 TABLET, ORALLY DISINTEGRATING ORAL at 08:40

## 2021-05-14 RX ADMIN — DOCUSATE SODIUM 100 MG: 100 CAPSULE, LIQUID FILLED ORAL at 08:35

## 2021-05-14 RX ADMIN — Medication 14 MG: at 08:36

## 2021-05-14 RX ADMIN — PANTOPRAZOLE SODIUM 20 MG: 20 TABLET, DELAYED RELEASE ORAL at 17:04

## 2021-05-14 RX ADMIN — LIDOCAINE 1 PATCH: 50 PATCH TOPICAL at 08:36

## 2021-05-14 RX ADMIN — TACROLIMUS 3 MG: 1 CAPSULE ORAL at 08:35

## 2021-05-14 RX ADMIN — DOCUSATE SODIUM 100 MG: 100 CAPSULE, LIQUID FILLED ORAL at 17:04

## 2021-05-14 NOTE — PLAN OF CARE
Problem: Potential for Falls  Goal: Patient will remain free of falls  Description: INTERVENTIONS:  - Assess patient frequently for physical needs  -  Identify cognitive and physical deficits and behaviors that affect risk of falls    -  [place] fall precautions as indicated by assessment   - Educate patient/family on patient safety including physical limitations  - Instruct patient to call for assistance with activity based on assessment  - Modify environment to reduce risk of injury  - Consider OT/PT consult to assist with strengthening/mobility  Outcome: Progressing     Problem: Prexisting or High Potential for Compromised Skin Integrity  Goal: Skin integrity is maintained or improved  Description: INTERVENTIONS:  - Identify patients at risk for skin breakdown  - Assess and monitor skin integrity  - Assess and monitor nutrition and hydration status  - Monitor labs   - Assess for incontinence   - Turn and reposition patient  - Assist with mobility/ambulation  - Relieve pressure over bony prominences  - Avoid friction and shearing  - Provide appropriate hygiene as needed including keeping skin clean and dry  - Evaluate need for skin moisturizer/barrier cream  - Collaborate with interdisciplinary team   - Patient/family teaching  - Consider wound care consult   Outcome: Progressing     Problem: PAIN - ADULT  Goal: Verbalizes/displays adequate comfort level or baseline comfort level  Description: Interventions:  - Encourage patient to monitor pain and request assistance  - Assess pain using appropriate pain scale  - Administer analgesics based on type and severity of pain and evaluate response  - Implement non-pharmacological measures as appropriate and evaluate response  - Consider cultural and social influences on pain and pain management  - Notify physician/advanced practitioner if interventions unsuccessful or patient reports new pain  Outcome: Progressing     Problem: INFECTION - ADULT  Goal: Absence or prevention of progression during hospitalization  Description: INTERVENTIONS:  - Assess and monitor for signs and symptoms of infection  - Monitor lab/diagnostic results  - Monitor all insertion sites, i e  indwelling lines, tubes, and drains  - Monitor endotracheal if appropriate and nasal secretions for changes in amount and color  - [place] appropriate cooling/warming therapies per order  - Administer medications as ordered  - Instruct and encourage patient and family to use good hand hygiene technique  - Identify and instruct in appropriate isolation precautions for identified infection/condition  Outcome: Progressing  Goal: Absence of fever/infection during neutropenic period  Description: INTERVENTIONS:  - Monitor WBC    Outcome: Progressing     Problem: SAFETY ADULT  Goal: Patient will remain free of falls  Description: INTERVENTIONS:  - Assess patient frequently for physical needs  -  Identify cognitive and physical deficits and behaviors that affect risk of falls    -  [place] fall precautions as indicated by assessment   - Educate patient/family on patient safety including physical limitations  - Instruct patient to call for assistance with activity based on assessment  - Modify environment to reduce risk of injury  - Consider OT/PT consult to assist with strengthening/mobility  Outcome: Progressing  Goal: Maintain or return to baseline ADL function  Description: INTERVENTIONS:  -  Assess patient's ability to carry out ADLs; assess patient's baseline for ADL function and identify physical deficits which impact ability to perform ADLs (bathing, care of mouth/teeth, toileting, grooming, dressing, etc )  - Assess/evaluate cause of self-care deficits   - Assess range of motion  - Assess patient's mobility; develop plan if impaired  - Assess patient's need for assistive devices and provide as appropriate  - Encourage maximum independence but intervene and supervise when necessary  - Involve family in performance of ADLs  - Assess for home care needs following discharge   - Consider OT consult to assist with ADL evaluation and planning for discharge  - Provide patient education as appropriate  Outcome: Progressing  Goal: Maintain or return mobility status to optimal level  Description: INTERVENTIONS:  - Assess patient's baseline mobility status (ambulation, transfers, stairs, etc )    - Identify cognitive and physical deficits and behaviors that affect mobility  - Identify mobility aids required to assist with transfers and/or ambulation (gait belt, sit-to-stand, lift, walker, cane, etc )  - [place] fall precautions as indicated by assessment  - Record patient progress and toleration of activity level on Mobility SBAR; progress patient to next Phase/Stage  - Instruct patient to call for assistance with activity based on assessment  - Consider rehabilitation consult to assist with strengthening/weightbearing, etc   Outcome: Progressing     Problem: DISCHARGE PLANNING  Goal: Discharge to home or other facility with appropriate resources  Description: INTERVENTIONS:  - Identify barriers to discharge w/patient and caregiver  - Arrange for needed discharge resources and transportation as appropriate  - Identify discharge learning needs (meds, wound care, etc )  - Arrange for interpretive services to assist at discharge as needed  - Refer to Case Management Department for coordinating discharge planning if the patient needs post-hospital services based on physician/advanced practitioner order or complex needs related to functional status, cognitive ability, or social support system  Outcome: Progressing     Problem: Nutrition/Hydration-ADULT  Goal: Nutrient/Hydration intake appropriate for improving, restoring or maintaining nutritional needs  Description: Monitor and assess patient's nutrition/hydration status for malnutrition  Collaborate with interdisciplinary team and initiate plan and interventions as ordered  Monitor patient's weight and dietary intake as ordered or per policy  Utilize nutrition screening tool and intervene as necessary  Determine patient's food preferences and provide high-protein, high-caloric foods as appropriate       INTERVENTIONS:  - Monitor oral intake, urinary output, labs, and treatment plans  - Assess nutrition and hydration status and recommend course of action  - Evaluate amount of meals eaten  - Assist patient with eating if necessary   - Allow adequate time for meals  - Recommend/ encourage appropriate diets, oral nutritional supplements, and vitamin/mineral supplements  - Order, calculate, and assess calorie counts as needed  - Recommend, monitor, and adjust tube feedings and TPN/PPN based on assessed needs  - Assess need for intravenous fluids  - Provide specific nutrition/hydration education as appropriate  - Include patient/family/caregiver in decisions related to nutrition  Outcome: Progressing

## 2021-05-14 NOTE — PROGRESS NOTES
PM&R PROGRESS NOTE:  Mary Rowell 63 y o  male MRN: [id number]  Unit/Bed#: -01 Encounter: [id number]           Rehab Diagnosis: Impairment of mobility, safety and Activities of Daily Living (ADLs) due to Orthopedic Disorders:  08 9  Other Orthopedic Left distal femur fracture of the medial femoral condyle     History of Present Illness:   Mary Rowell is a 63 y o  male history of liver transplantation in August 2013 at [facility] related to alcoholic cirrhosis and [address]  on chronic Prograf, incisional abdominal hernia, hypertension, PAD, carotid stenosis, GERD, known lumbar radiculopathy who presented to the [address] on 5/9/21 status post mechanical fall on stairs  No LOC  Imaging revealed a large left knee joint effusion and a left distal femur fracture involving the medial femoral condyle  Patient seen by Orthopedics, Dr Jacky Ponce, and treated non operatively  Patient was deemed NWB left lower extremity and placed in a knee immobilizer at all times  Recommended to follow up in 2 weeks  Placed on Lovenox for DVT prophylaxis  Patient found to have acute functional deficits from his baseline, therefore was admitted to [facility] for acute inpatient rehabilitation  SUBJECTIVE: Patient seen face to face  No acute issues overnight  Feeling he is progressing in learning his skills in therapies  +BM yesterday post suppository    ASSESSMENT: Stable, progressing      PLAN:    Rehabilitation   Functional deficits:  Left lower extremity weakness, impaired endurance, impaired balance, impaired mobility, self care   Continue current rehabilitation plan of care to maximize function       Function:  Physical Therapy Occupational Therapy Speech Therapy   Weight Bearing Status: Non-weight bearing  Transfers: Incidental Touching  Bed Mobility: Minimal Assistance  Amulation Distance (ft): 40 feet  Ambulation: Incidental Touching  Assistive Device for Ambulation: Roller Walker  Wheelchair Mobility Distance: 200 ft  Wheelchair Mobility: Supervision  Number of Stairs: (pending)  Ramp: Supervision  Assistive Device for Ramp: Wheelchair  Discharge Recommendations: Home with:  [address] with[de-identified] Outpatient Physical Therapy   Eating: Independent  Grooming: Supervision  Bathing: Minimal Assistance  Bathing: Minimal Assistance  Upper Body Dressing: (setup)  Lower Body Dressing: Minimal Assistance  Toileting: Minimal Assistance  Tub/Shower Transfer: (NA safety)  Toilet Transfer: Minimal Assistance  Cognition: Within Defined Limits  Orientation: Person, Place, Time, Situation                Estimated Discharge: Discharge home Thursday 5/20/21 with home care PT      DVT prophylaxis  · Managed on subcutaneous Lovenox - patient is at high risk of DVT  Patient and wife are agreeable to Education for home  · SCDs     Pain  · Acute nociceptive pain located in left knee:  Managed on p r n  Tylenol, topical ice, adding topical Lidoderm patch, p r n  Oxycodone IR 5-10 mg q 4 hours p r n  for moderate to severe pain additionally  · Chronic neuropathic pain related to known lumbar radiculopathy:  Managed on gabapentin 300 mg b i d  (home dose)  Sees Dr Coleen Hui as an outpatient     Bladder plan  · Continent     Bowel plan  · Continent  · Constipation has not had a bowel movement for several days:  Initiate p r n   Bowel regimen      * Closed fracture of left distal femur (HCC)  Assessment & Plan  · Sustained after mechanical fall on 5/9/21  · Left distal femur fracture involving the medial femoral condyle  · Seen by Orthopedics and treated non operatively  · NWB LLE  · Knee immobilizer left knee at all times - with tubigrip to protect skin  · Lovenox for DVT prophylaxis  · Follow-up with orthopedics in 2 weeks - Dr Dee Carr hypertension  Assessment & Plan  · Managed on lisinopril 20 mg daily (home dose)  · Internal medicine consultants managing    Carotid stenosis, asymptomatic, bilateral  Assessment & Plan  · Right (>70%)  Left (50-69%) carotid stenosis   · Managed on aspirin and Lipitor  · Due for repeat carotid ultrasound as outpatient  · Follow-up with vascular surgery recommended additionally    Electrolyte abnormality  Assessment & Plan  · Resolved    PAD (peripheral artery disease) ([address] )  Assessment & Plan  · Managed on aspirin and Lipitor  · Recommend repeat LEADS and vascular surgery follow-up as outpatient  · LEADS from August 2020:   · RIGHT LOWER LIMB:Evaluation shows a 50-75% stenosis in the distal superficial femoral artery  There is also evidence of tibio-peroneal arterial occlusive disease  LEFT LOWER LIMB:  This resting evaluation shows a high grade stenosis vs occlusion with trickle  flow in the distal superficial femoral artery  Hernia of abdominal cavity  Assessment & Plan  · Postoperative  · Stable    Mixed hyperlipidemia  Assessment & Plan  · Managed on Lipitor 40 mg daily (home dose)    Tobacco abuse  Assessment & Plan  · Recommend cessation - patient is interested in quitting  · Nicotine patch ordered for cravings    History of liver transplant [facility])  Assessment & Plan  · History of OLT liver transplantation at [address] [place] [address] on 1/03/01 due to alcoholic cirrhosis and hepatocellular carcinoma  · Managed currently on Prograf 3 mg q 12 hours  · Prograf level of checked every 3-6 months - patient states he is due for a level  · Prograf level managed by Dr John Shields his primary care physician  · Patient follows yearly with [address] [place] [address] transplant clinic    Chronic gastroesophageal reflux disease  Assessment & Plan  · Managed on Protonix 20 mg b i d  (therapeutic exchange for home omeprazole)        Appreciate IM consultants medical co-management  Labs, medications, and imaging personally reviewed  ROS:  A ten point review of systems was completed on 05/14/21 and pertinent positives are listed in subjective section   All other systems reviewed were negative  OBJECTIVE:   /70 (BP Location: Right arm)   Pulse 75   Temp 97 5 °F (36 4 °C) (Tympanic)   Resp 18   Ht 6' (1 829 m)   Wt 98 6 kg (217 lb 6 oz)   SpO2 96%   BMI 29 48 kg/m²       Physical Exam  Vitals signs and nursing note reviewed  Constitutional:       General: He is not in acute distress  HENT:      Head: Normocephalic and atraumatic  Nose: Nose normal       Mouth/Throat:      Mouth: Mucous membranes are moist    Eyes:      Conjunctiva/sclera: Conjunctivae normal    Neck:      Musculoskeletal: Neck supple  Cardiovascular:      Rate and Rhythm: Normal rate and regular rhythm  Pulses: Normal pulses  Pulmonary:      Effort: Pulmonary effort is normal       Breath sounds: Normal breath sounds  No wheezing or rales  Abdominal:      General: Bowel sounds are normal  There is no distension  Palpations: Abdomen is soft  Tenderness: There is no abdominal tenderness  Musculoskeletal:         General: No swelling  Comments: Knee slightly swollen, no skin breakdown   Skin:     General: Skin is warm  Neurological:      Mental Status: He is alert and oriented to person, place, and time        Comments: 4+/5 throughout in BUE and RLE  LLE: 4/5 DF, knee not tested   Psychiatric:         Mood and Affect: Mood normal           Lab Results   Component Value Date    WBC 9 00 05/12/2021    HGB 14 3 05/12/2021    HCT 41 4 05/12/2021     (H) 05/12/2021     05/12/2021     Lab Results   Component Value Date    SODIUM 136 (L) 05/12/2021    K 3 9 05/12/2021     05/12/2021    CO2 29 05/12/2021    BUN 16 05/12/2021    CREATININE 1 11 05/12/2021    GLUC 92 05/12/2021    CALCIUM 9 6 05/12/2021     Lab Results   Component Value Date    INR 1 12 04/26/2019    INR 1 14 11/17/2018    INR 1 12 03/22/2018    PROTIME 14 5 (H) 04/26/2019    PROTIME 14 7 (H) 11/17/2018    PROTIME 14 4 03/22/2018         Current Facility-Administered Medications:     acetaminophen (TYLENOL) tablet 650 mg, 650 mg, Oral, Q6H PRN, Kristin Mcgraw MD, 650 mg at 05/13/21 0343    ALPRAZolam (XANAX) tablet 0 25 mg, 0 25 mg, Oral, Daily PRN, Kristin Mcgraw MD    aspirin chewable tablet 81 mg, 81 mg, Oral, Daily, Cory Boas, CRNP, 81 mg at 05/14/21 0835    atorvastatin (LIPITOR) tablet 40 mg, 40 mg, Oral, Daily, Kristin Mcgraw MD, 40 mg at 05/14/21 0835    bisacodyl (DULCOLAX) rectal suppository 10 mg, 10 mg, Rectal, Daily PRN, Kristin Mcgraw MD, 10 mg at 05/14/21 0605    calcium carbonate (OYSTER SHELL,OSCAL) 500 mg tablet 2 tablet, 2 tablet, Oral, Daily With Breakfast, Kristin Mcgraw MD, 2 tablet at 05/14/21 0835    docusate sodium (COLACE) capsule 100 mg, 100 mg, Oral, BID, Cory Boas, CRNP, 100 mg at 05/14/21 0835    enoxaparin (LOVENOX) subcutaneous injection 40 mg, 40 mg, Subcutaneous, Q24H [address], Kristin Mcgraw MD, 40 mg at 05/14/21 0834    gabapentin (NEURONTIN) capsule 300 mg, 300 mg, Oral, BID, Kristin Mcgraw MD, 300 mg at 05/14/21 0835    lidocaine (LIDODERM) 5 % patch 1 patch, 1 patch, Topical, Daily, Kristin Mcgraw MD, 1 patch at 05/14/21 0836    lisinopril (ZESTRIL) tablet 20 mg, 20 mg, Oral, Daily, Kristin Mcgraw MD, 20 mg at 05/14/21 0835    magnesium hydroxide (MILK OF MAGNESIA) oral suspension 30 mL, 30 mL, Oral, Daily PRN, Kristin Mcgraw MD    multivitamin-minerals (CENTRUM) tablet 1 tablet, 1 tablet, Oral, Daily, Kristin Mcgraw MD, 1 tablet at 05/13/21 1212    nicotine (NICODERM CQ) 14 mg/24hr TD 24 hr patch 14 mg, 14 mg, Transdermal, Daily, Claude Boas, CRNP, 14 mg at 05/14/21 0836    ondansetron (ZOFRAN-ODT) dispersible tablet 4 mg, 4 mg, Oral, Q6H PRN, Kristin Mcgraw MD, 4 mg at 05/14/21 0840    oxyCODONE (ROXICODONE) immediate release tablet 10 mg, 10 mg, Oral, Q4H PRN, Kristin Mcgraw MD, 10 mg at 05/14/21 0604    oxyCODONE (ROXICODONE) IR tablet 5 mg, 5 mg, Oral, Q4H PRN, Kristin Mcgraw MD, 5 mg at 05/13/21 1941    pantoprazole (PROTONIX) EC tablet 20 mg, 20 mg, Oral, BID AC, Burt Vega MD, 20 mg at 05/14/21 0605    polyethylene glycol (MIRALAX) packet 17 g, 17 g, Oral, Daily, Burt Vega MD, 17 g at 05/14/21 0834    tacrolimus (PROGRAF) capsule 3 mg, 3 mg, Oral, Q12H [address], Burt Vega MD, 3 mg at 05/14/21 7757    Past Medical History:   Diagnosis Date    Alcoholism ([place] [address] )     Arthritis     Bacterial peritonitis ([place] [address] )     Bowel disease     Cancer ([place] [address] )     liver    Cataract     Depression     Fracture     Gastroesophageal reflux     Hepatic disease     Hepatocellular carcinoma ([place] [address] )     History of colon polyps     Hypertension     Liver cancer ([place] [address] )     Liver disease     Stomach disease        Patient Active Problem List    Diagnosis Date Noted    Closed fracture of left distal femur ([place] [address] ) 05/09/2021     Priority: High    Essential hypertension 07/05/2016     Priority: Medium    Carotid stenosis, asymptomatic, bilateral 10/14/2020     Priority: Low    Hernia of abdominal cavity 05/12/2021    PAD (peripheral artery disease) ([place] [address] ) 05/12/2021    Electrolyte abnormality 05/12/2021    Fall 05/09/2021    Acute pain due to trauma 05/09/2021    Chronic low back pain 05/09/2021    Claudication in peripheral vascular disease ([place] [address] ) 08/26/2020    Leg cramps 07/15/2020    Medicare annual wellness visit, subsequent 12/09/2019    Primary insomnia 03/25/2019    Mixed hyperlipidemia 09/19/2018    Seasonal allergies 10/27/2017    Ringing in ears, bilateral 10/27/2017    Short-term memory loss 10/27/2017    Intervertebral disc disorder with radiculopathy of lumbar region 04/24/2017    Bursitis of both hips 02/17/2017    Sacroiliitis ([facility] ) 09/81/2227    Nonalcoholic liver disease, chronic 07/05/2016    Osteopenia 10/13/2015    Neck pain 06/27/2014    Right arm pain 06/27/2014    Lung mass 03/18/2014    Chronic gastroesophageal reflux disease 03/06/2014    [facility] 08/[facility]/2013    Alcohol dependence in remission ([facility] ) 01/05/2012    Abnormal electrocardiogram 11/14/2011    PPD negative 11/14/2011    Tobacco abuse 11/14/2011          Daryl Serrano MD    Total time spent:  30 minutes with more than 50% spent counseling/coordinating care  Counseling includes discussion with patient re: progress and discussion with patient of his/her current medical/functional state/information  Coordination of patient's care was performed in conjunction with consulting services  Time invested included review of patient's labs, vitals, and management of their comorbidities with continued monitoring  The care of the patient was extensively discussed and appropriate treatment plan was formulated unique for this patient  ** Please Note:  voice to text software may have been used in the creation of this document   Although proof errors in transcription or interpretation are a potential of such software**

## 2021-05-14 NOTE — ASSESSMENT & PLAN NOTE
History of liver transplant in 9116 due to alcoholic cirrhosis and hepatocellular carcinoma  [facility]  Currently on Tacrolimus 3mg BID  PCP managing current dosage and has level checked every 3-6 months  Last Tacrolimus level 6 9 on 10/26/2020  Repeat Tacrolimus level pending

## 2021-05-14 NOTE — NURSING NOTE
I heard pt making loud noise, went inside to make sure he's ok  He said he woke up because he couldn't breathe, and something stuck in his throat  He thinks it might be saliva, but he's not sure  Nothing in his mouth, He was sleeping and possible pt woken up from a bad dream  But pt is ok now, No more complaints  Elevated HOB, Call bell within reach, instructed pt to call if he needs anything  Will continue to monitor

## 2021-05-14 NOTE — PROGRESS NOTES
[facility]  Progress Note - Sharri Lakhani 1955, 63 y o  male MRN: [id number]  Unit/Bed#: -01 Encounter: [id number]  Primary Care Provider: Geri Maxwell MD   Date and time admitted to hospital: 5/11/2021  4:55 PM    PAD (peripheral artery disease) [facility])  Assessment & Plan  Continue with aspirin 81mg daily and Lipitor 40mg daily  LEADS completed in 08/2020:  50-70% stenosis in the R distal superficial femoral artery  High grade stenosis/occlusion with trickle flow to the L distal superficial femoral artery  Neurovascular checks each shift  Recommend follow-up with Vascular Surgery  Hernia of abdominal cavity  Assessment & Plan  Stable  Repaired in 2011  Chronic low back pain  Assessment & Plan  Continue current pain regimen  Ordered gabapentin 300mg BID at home by Pain Management  Follows with Pain Management as outpatient for lumbar spinal stenosis and lumbar radiculopathy  Received steroid injections - last injection given on 11/4/2020  Ordered for next injection on 5/27/2021  Carotid stenosis, asymptomatic, bilateral  Assessment & Plan  Last carotid ultrasound on 10/7/2020 showed:  >70% stenosis of the R ICA and 50-69% stenosis of the L ICA  Continue aspirin 81mg daily and Lipitor 40mg daily  Recommended for repeat US in 6 months - has order but did not complete yet  Recommend to follow-up with Vascular Surgery  Mixed hyperlipidemia  Assessment & Plan  Continue home dose of Lipitor 40mg daily  Last lipid panel on 10/26/2020 as follows: Cholesterol 159, Triglycerides 146, HDL 66, and LDL 64  Tobacco abuse  Assessment & Plan  Smokes cigarettes 1/2 pack a day for 40+ years  Interested in smoking cessation  Continue nicotine patch  History of liver transplant [facility])  Assessment & Plan  History of liver transplant in 3155 due to alcoholic cirrhosis and hepatocellular carcinoma    Follows with [facility] GI transplant clinic  Currently on Tacrolimus 3mg BID  PCP managing current dosage and has level checked every 3-6 months  Last Tacrolimus level 6 9 on 10/26/2020  Repeat Tacrolimus level pending  Essential hypertension  Assessment & Plan  Continue with home dose of lisinopril 20mg daily  Monitor BP with routine VS   Avoid orthostasis  Chronic gastroesophageal reflux disease  Assessment & Plan  Stable  Takes omeprazole 20mg BID at home  Substitute with Protonix 20mg BID while inpatient  * Closed fracture of left distal femur [facility])  Assessment & Plan   - mechanical fall at home  Nondisplaced L medial femoral condyle fracture  Non-operative treatment  NWB to LLE with knee immobilizer  Pain control  DVT ppx with Lovenox  Follow-up with Ortho in 2 weeks (21)  PT/OT Therapies  Primary team following  VTE Pharmacologic Prophylaxis:   Pharmacologic: Enoxaparin (Lovenox)  Mechanical VTE Prophylaxis in Place: Yes - sequential compression devices  Current Length of Stay: 3 day(s)    Current Patient Status: Inpatient Rehab     Discharge Plan: As per primary team     Code Status: Level 1 - Full Code    Subjective:   Pt examined while pt sitting in bed in pt room  States that his L knee pain is minimal, 3/10, aching, improves with rest and pain medication  Had a BM this morning and his acid reflux has improved  Complaints of neuropathy pain to his L foot, states this has been going on before the fall and has not become worse  Informed that we could change his gabapentin regimen to help with the neuropathy and he said he would wait to address with pain management  Also reminded to let us know if the pain worsens or does not go away      Objective:     Vitals:   Temp (24hrs), Av °F (36 7 °C), Min:97 4 °F (36 3 °C), Max:99 °F (37 2 °C)    Temp:  [97 4 °F (36 3 °C)-99 °F (37 2 °C)] 97 5 °F (36 4 °C)  HR:  [75-93] 75  Resp:  [18] 18  BP: (104-143)/(70-74) 140/70  SpO2:  [96 %] 96 %  Body mass index is 29 48 kg/m²  Review of Systems   Constitutional: Negative for chills, fatigue and fever  Respiratory: Negative for cough and shortness of breath  Cardiovascular: Negative for chest pain, palpitations and leg swelling  Gastrointestinal: Negative for abdominal distention, abdominal pain, constipation, diarrhea, nausea and vomiting  LBM 5/14   Genitourinary: Negative for difficulty urinating  Musculoskeletal: Positive for arthralgias (L knee pain, 3/10, aching/throbbing, improves with rest and pain medication)  + neuropathy pain to L foot, chronic   Neurological: Negative for dizziness, weakness, light-headedness, numbness and headaches  Psychiatric/Behavioral: Negative for sleep disturbance  Input and Output Summary (last 24 hours): Intake/Output Summary (Last 24 hours) at 5/14/2021 0840  Last data filed at 5/14/2021 0831  Gross per 24 hour   Intake 660 ml   Output 1100 ml   Net -440 ml       Physical Exam:     Physical Exam  Vitals signs and nursing note reviewed  Constitutional:       Appearance: Normal appearance  HENT:      Head: Normocephalic and atraumatic  Neck:      Musculoskeletal: Normal range of motion and neck supple  Cardiovascular:      Rate and Rhythm: Normal rate and regular rhythm  Pulses: Normal pulses  Heart sounds: Normal heart sounds  No murmur  No friction rub  Pulmonary:      Effort: Pulmonary effort is normal  No respiratory distress  Breath sounds: Normal breath sounds  No wheezing or rhonchi  Abdominal:      General: Abdomen is flat  Bowel sounds are normal  There is no distension  Palpations: Abdomen is soft  Tenderness: There is no abdominal tenderness  Musculoskeletal:      Right lower leg: No edema  Left lower leg: No edema  Comments: L knee immobilizer   Skin:     General: Skin is warm and dry  Neurological:      Mental Status: He is alert and oriented to person, place, and time  Psychiatric:         Mood and Affect: Mood normal          Behavior: Behavior normal          Additional Data:     Labs:    Results from last 7 days   Lab Units 05/12/21  0428   WBC Thousand/uL 9 00   HEMOGLOBIN g/dL 14 3   HEMATOCRIT % 41 4   PLATELETS Thousands/uL 188   NEUTROS PCT % 70*   LYMPHS PCT % 20*   MONOS PCT % 7   EOS PCT % 2     Results from last 7 days   Lab Units 05/12/21  0428  05/09/21  1807   SODIUM mmol/L 136*   < > 138   POTASSIUM mmol/L 3 9   < > 4 4   CHLORIDE mmol/L 103   < > 102   CO2 mmol/L 29   < > 28   BUN mg/dL 16   < > 15   CREATININE mg/dL 1 11   < > 1 30   ANION GAP mmol/L 4*   < > 8   CALCIUM mg/dL 9 6   < > 9 0   ALBUMIN g/dL  --   --  4 0   TOTAL BILIRUBIN mg/dL  --   --  0 46   ALK PHOS U/L  --   --  74   ALT U/L  --   --  28   AST U/L  --   --  24   GLUCOSE RANDOM mg/dL 92   < > 103    < > = values in this interval not displayed                         Labs reviewed    Imaging:    Imaging reviewed    Recent Cultures (last 7 days):           Last 24 Hours Medication List:   Current Facility-Administered Medications   Medication Dose Route Frequency Provider Last Rate    acetaminophen  650 mg Oral Q6H PRN Vinicio Hannah MD      ALPRAZolam  0 25 mg Oral Daily PRN Vinicio Hannah MD      aspirin  81 mg Oral Daily ROSALINA Prince      atorvastatin  40 mg Oral Daily Vinicio Hannah MD      bisacodyl  10 mg Rectal Daily PRN Vinicio Hannah MD      calcium carbonate  2 tablet Oral Daily With Breakfast Vinicio Hannah MD      docusate sodium  100 mg Oral BID ROSALINA Prince      enoxaparin  40 mg Subcutaneous Q24H [address] Vinicio Hannah MD      gabapentin  300 mg Oral BID Vinicio Hannah MD      lidocaine  1 patch Topical Daily Vinicio Hannah MD      lisinopril  20 mg Oral Daily Vinicio Hannah MD      magnesium hydroxide  30 mL Oral Daily PRN Vinicio Hannah MD      multivitamin-minerals  1 tablet Oral Daily Vniicio Hannah MD      nicotine  14 mg Transdermal Daily Haylee Merino CRNP      ondansetron  4 mg Oral Q6H PRN Kraig Chaudhry MD      oxyCODONE  10 mg Oral Q4H PRN Kraig Chaudhry MD      oxyCODONE  5 mg Oral Q4H PRN Kraig Chaudhry MD      pantoprazole  20 mg Oral BID AC Kraig Chaudhry MD      polyethylene glycol  17 g Oral Daily Kraig Chaudhry MD      tacrolimus  3 mg Oral Q12H [facility] Kraig Chaudhry MD          M*Modal software was used to dictate this note  It may contain errors with dictating incorrect words or incorrect spelling  Please contact the provider directly with any questions

## 2021-05-14 NOTE — PROGRESS NOTES
05/14/21 0830   Pain Assessment   Pain Assessment Tool 0-10   Pain Score 8  (with movement,  )   Pain Location/Orientation Orientation: Left; Location: Leg   Restrictions/Precautions   Precautions Fall Risk;Immunosuppressed;Pain   Weight Bearing Restrictions Yes   LLE Weight Bearing Per Order NWB   ROM Restrictions Yes   LLE ROM Restriction Range Limitation  (no knee flexion)   Braces or Orthoses LE Immobilizer   Subjective   Subjective Pt agreed to session,  reports mild pain at rest but can inc to 8 with movement    Sit to Stand   Type of Assistance Needed Supervision   Comment RW   Sit to Stand CARE Score 4   Bed-Chair Transfer   Type of Assistance Needed Supervision   Comment RW-  close S    Chair/Bed-to-Chair Transfer CARE Score 4   Walk 10 Feet   Type of Assistance Needed Supervision   Comment CS with RW   Walk 10 Feet CARE Score 4   Walk 50 Feet with Two Turns   Type of Assistance Needed Supervision   Comment CS with RW   Walk 50 Feet with Two Turns CARE Score 4   Walk 150 Feet   Reason if not Attempted Activity not applicable   Walk 546 Feet CARE Score 9   Ambulation   Does the patient walk? 2  Yes   Primary Mode of Locomotion Prior to Admission Walk   Distance Walked (feet) 50 ft  (multiple short distances t/o gym)   Assist Device Roller Walker   Gait Pattern Hopping   Walk Assist Level Close Supervision   Findings VCs for slow but steady,  does well with maintain NWB,  no overt LOB    Wheel 50 Feet with Two Turns   Type of Assistance Needed Supervision   Wheel 50 Feet with Two Turns CARE Score 4   Wheel 150 Feet   Type of Assistance Needed Supervision   Wheel 150 Feet CARE Score 4   Wheelchair mobility   Does the patient use a wheelchair? 1  Yes   Type of Wheelchair Used 1   Manual   Findings Pt shows carry over with WC leg rest and getting leg on/off rest,  Pt did 1 time attempt to stand prior to locking chair,  needs S for safety and VCs    Curb or Single Stair   Style negotiated Curb   Type of Assistance Needed Incidental touching   Comment CG with RW retro hop up 4'' curb step using standing scale (CG for general balance during walker transition) ed pt on optimal foot placement and technique to inc safety   1 Step (Curb) CARE Score 4   Stairs   Findings Pt performed Retro hop up 4'' scale with education provided,  Also reviewed and had pt perform stand pivot with sit into WC placed on top of 8'' step-  Recommend pt not perform the 2 steps at home due to risking of LOB and inc injury,  but if pt would have to he could retro hop up bottom step and sit in WC sitting on 2nd step (did not perform this)    Equipment Use   opvizorep 10mins L3  UE and RLE for endurance   Assessment   Treatment Assessment 90 min session focused on multiple WC setup and transfers and short bouts of amb with 1 longer distance which pt was able to make the 48' ruiz which is progress  Pt is showing progress with bed mobility and doing well maintaining NWB  Spent large portion of talking and education about steps, risk factors, and performing home senarios  Overall pt showing good progress but would benefit from cont skilled PT to maximize balance, endurance, and LE strengthening to reduce fall risk at home, practice step senarios more and dec burden of care  Barriers to Discharge Decreased caregiver support   PT Barriers   Physical Impairment Decreased strength;Decreased range of motion; Impaired balance;Pain;Orthopedic restrictions   Functional Limitation Car transfers; Ramp negotiation;Stair negotiation;Standing;Transfers; Walking   Plan   Progress Progressing toward goals   Recommendation   PT Discharge Recommendation Home with home health rehabilitation   PT Therapy Minutes   PT Time In 0830   PT Time Out 1000   PT Total Time (minutes) 90   PT Mode of treatment - Individual (minutes) 90   PT Mode of treatment - Concurrent (minutes) 0   PT Mode of treatment - Group (minutes) 0   PT Mode of treatment - Co-treat (minutes) 0   PT Mode of Treatment - Total time(minutes) 90 minutes   PT Cumulative Minutes 280   Therapy Time missed   Time missed?  No

## 2021-05-14 NOTE — PROGRESS NOTES
05/14/21 1030   Pain Assessment   Pain Assessment Tool Pain Assessment not indicated - pt denies pain   Pain Score No Pain   Restrictions/Precautions   Precautions Fall Risk;Immunosuppressed;Pain   Weight Bearing Restrictions Yes   LLE Weight Bearing Per Order NWB   ROM Restrictions Yes   LLE ROM Restriction Range Limitation  (no knee flexion)   Braces or Orthoses LE Immobilizer   Sit to Lying   Type of Assistance Needed Supervision; Adaptive equipment   Amount of Physical Assistance Provided No physical assistance   Comment S with use of leg  and HOB elevated   Sit to Lying CARE Score 4   Lying to Sitting on Side of Bed   Type of Assistance Needed Physical assistance; Adaptive equipment   Amount of Physical Assistance Provided 25%-49%   Comment [place] with use of leg  to manage LLE, HOB elevated   Lying to Sitting on Side of Bed CARE Score 3   Sit to Stand   Type of Assistance Needed Supervision; Adaptive equipment   Amount of Physical Assistance Provided No physical assistance   Comment RW   Sit to Stand CARE Score 4   Bed-Chair Transfer   Type of Assistance Needed Supervision; Adaptive equipment   Amount of Physical Assistance Provided No physical assistance   Comment CS with RW   Chair/Bed-to-Chair Transfer CARE Score 4   Kitchen Mobility   Kitchen-Mobility Level Wheelchair   Kitchen Activity Retrieve items;Transport items   Kitchen Mobility Comments Pt engaged in fxnl kitchen mobility task from w/c level, requiring pt to retrieve items from fridge and [address] cabinet environment  EDU provided on position of w/c to safely perform item retreival  Pt demo G carryover, req S to retrieve/transport items back to kitchen table  EDU provided on keeping freq used items within reach  Pt receptive, also verbalizing having LH reacher at home  Therapeutic Excerise-Strength   UE Strength Yes   Right Upper Extremity- Strength   R Shoulder Flexion;ABduction; Extension;Horizontal ABduction   R Elbow Elbow flexion;Elbow extension   R Position Seated   Equipment Dumbbell  (2#)   R Weight/Reps/Sets 2 x 15   RUE Strength Comment Pt engaged in seated UB TE utilizing 2# dumbbell with focus of activity to improve pt's BUE strength for ADL transfer's and w/c mobility  Pt tolerated well with no reports of discomfort  Left Upper Extremity-Strength   L Shoulder Flexion;ABduction; Extension;Horizontal ABduction   L Elbow Elbow flexion;Elbow extension   L Position Seated   Equipment Dumbell  (2#)   L Weights/Reps/Sets 2 x 15   LUE Strength Comment See above note for details  Cognition   Overall Cognitive Status WFL   Arousal/Participation Alert; Cooperative   Attention Attends with cues to redirect   Orientation Level Oriented X4   Memory Within functional limits   Following Commands Follows multistep commands with increased time or repetition   Additional Activities   Additional Activities Other (Comment)   Additional Activities Comments Pt engaged in fxnl w/c mobility around [facility] and in hospital cafeteria environment  Pt josefa DIAS ability to safely navigate around obstacles within pathway without bumping LLE and UE's  HELLER discussed creating open environment for D/C home to ensure safe mobility and increased I with w/c mobility  Pt verbalized that his wife is in the process ofrearranging furniture in preparation for D/C  Pt also reports having in-law suite with accessible bathroom and shower s/u  Activity Tolerance   Activity Tolerance Patient tolerated treatment well   Assessment   Treatment Assessment Pt engaged in 90 min skilled OT Tx session with focus on fxnl w/c mobility, kitchen mobility, and UB TE  See above for further Tx details  HELLER provided EDU on positioning of w/c to easily access fridge and other areas of kitchen environment  Pt josefa DIAS carryover, completing at S level  EDU also provided on keeping freq used items within reach, with pt verbalizing that he does have a LH reacher at home if needed   Pt engaged in fxnl w/c mobility around [facility] and through cafeteria environment, safely navigating around obstacles within pathway  Pt reports his wife is currently rearranging furniture in home to allow for more accessible environment upon D/C  Pt reports concern of performing bed mobility at home, verbalizing difficulty to manage LLE without HOB elevated and use of bed rail  HELLER discussed having pt's wife take measurement of bed height for OT/PT to practice bed mobility during session  Plan to follow up in upcoming session  EDU on DME provided if pt would need bed rail and leg  for D/C  Pt receptive  Pt would continue to benefit from skilled OT services to improve ADL transfer's, increase I w/ light IADL management from w/c level, improve fxnl standing balance for toileting routine, and overall inc I with ADL performance to progress towards OT goals and dec caregiver burden upon D/C  Prognosis Good   Problem List Decreased strength;Decreased range of motion;Decreased endurance; Impaired balance;Decreased mobility;Orthopedic restrictions   Barriers to Discharge Decreased caregiver support   Plan   Treatment/Interventions ADL retraining;Functional transfer training; Therapeutic exercise; Endurance training;Patient/family training   Progress Progressing toward goals   Recommendation   OT Discharge Recommendation Home with home health rehabilitation   OT Therapy Minutes   OT Time In 1030   OT Time Out 1200   OT Total Time (minutes) 90   OT Mode of treatment - Individual (minutes) 90   OT Mode of treatment - Concurrent (minutes) 0   OT Mode of treatment - Group (minutes) 0   OT Mode of treatment - Co-treat (minutes) 0   OT Mode of Treatment - Total time(minutes) 90 minutes   OT Cumulative Minutes 270   Therapy Time missed   Time missed?  No

## 2021-05-14 NOTE — PLAN OF CARE
Problem: Potential for Falls  Goal: Patient will remain free of falls  Description: INTERVENTIONS:  - Assess patient frequently for physical needs  -  Identify cognitive and physical deficits and behaviors that affect risk of falls    -  [place] fall precautions as indicated by assessment   - Educate patient/family on patient safety including physical limitations  - Instruct patient to call for assistance with activity based on assessment  - Modify environment to reduce risk of injury  - Consider OT/PT consult to assist with strengthening/mobility  Outcome: Progressing

## 2021-05-15 PROCEDURE — 97110 THERAPEUTIC EXERCISES: CPT

## 2021-05-15 PROCEDURE — 97530 THERAPEUTIC ACTIVITIES: CPT

## 2021-05-15 PROCEDURE — 97116 GAIT TRAINING THERAPY: CPT

## 2021-05-15 RX ADMIN — OXYCODONE HYDROCHLORIDE 10 MG: 10 TABLET ORAL at 08:28

## 2021-05-15 RX ADMIN — OXYCODONE HYDROCHLORIDE 10 MG: 10 TABLET ORAL at 14:16

## 2021-05-15 RX ADMIN — TACROLIMUS 3 MG: 1 CAPSULE ORAL at 20:37

## 2021-05-15 RX ADMIN — ACETAMINOPHEN 650 MG: 325 TABLET ORAL at 06:12

## 2021-05-15 RX ADMIN — ENOXAPARIN SODIUM 40 MG: 40 INJECTION SUBCUTANEOUS at 08:27

## 2021-05-15 RX ADMIN — OXYCODONE HYDROCHLORIDE 10 MG: 10 TABLET ORAL at 03:33

## 2021-05-15 RX ADMIN — ATORVASTATIN CALCIUM 40 MG: 40 TABLET, FILM COATED ORAL at 08:27

## 2021-05-15 RX ADMIN — CALCIUM 2 TABLET: 500 TABLET ORAL at 08:27

## 2021-05-15 RX ADMIN — ASPIRIN 81 MG: 81 TABLET, CHEWABLE ORAL at 08:27

## 2021-05-15 RX ADMIN — LISINOPRIL 20 MG: 20 TABLET ORAL at 08:28

## 2021-05-15 RX ADMIN — GABAPENTIN 300 MG: 300 CAPSULE ORAL at 17:20

## 2021-05-15 RX ADMIN — DOCUSATE SODIUM 100 MG: 100 CAPSULE, LIQUID FILLED ORAL at 08:27

## 2021-05-15 RX ADMIN — Medication 14 MG: at 08:29

## 2021-05-15 RX ADMIN — TACROLIMUS 3 MG: 1 CAPSULE ORAL at 08:27

## 2021-05-15 RX ADMIN — DOCUSATE SODIUM 100 MG: 100 CAPSULE, LIQUID FILLED ORAL at 17:20

## 2021-05-15 RX ADMIN — PANTOPRAZOLE SODIUM 20 MG: 20 TABLET, DELAYED RELEASE ORAL at 06:11

## 2021-05-15 RX ADMIN — POLYETHYLENE GLYCOL 3350 17 G: 17 POWDER, FOR SOLUTION ORAL at 08:28

## 2021-05-15 RX ADMIN — GABAPENTIN 300 MG: 300 CAPSULE ORAL at 08:27

## 2021-05-15 RX ADMIN — LIDOCAINE 1 PATCH: 50 PATCH TOPICAL at 08:29

## 2021-05-15 RX ADMIN — Medication 1 TABLET: at 12:12

## 2021-05-15 RX ADMIN — OXYCODONE HYDROCHLORIDE 10 MG: 10 TABLET ORAL at 21:30

## 2021-05-15 RX ADMIN — PANTOPRAZOLE SODIUM 20 MG: 20 TABLET, DELAYED RELEASE ORAL at 17:20

## 2021-05-15 NOTE — PROGRESS NOTES
05/15/21 1230   Pain Assessment   Pain Assessment Tool 0-10   Pain Score 7   Pain Location/Orientation Orientation: Left; Location: Knee   Restrictions/Precautions   Precautions Fall Risk;Pain   Weight Bearing Restrictions Yes   LLE Weight Bearing Per Order NWB   ROM Restrictions Yes   LLE ROM Restriction Range Limitation  (no knee flex)   Braces or Orthoses LE Immobilizer   Lifestyle   Autonomy "I'm glad I came here  It definitely helped me in the ways I thougt it would"   Sit to Lying   Type of Assistance Needed Supervision   Amount of Physical Assistance Provided No physical assistance   Comment inc time to complete   Sit to Lying CARE Score 4   Lying to Sitting on Side of Bed   Type of Assistance Needed Supervision   Amount of Physical Assistance Provided No physical assistance   Comment inc time to complete   Lying to Sitting on Side of Bed CARE Score 4   Sit to Stand   Type of Assistance Needed Supervision   Amount of Physical Assistance Provided No physical assistance   Comment with RW   Sit to Stand CARE Score 4   Bed-Chair Transfer   Type of Assistance Needed Supervision   Amount of Physical Assistance Provided No physical assistance   Comment with RW   Chair/Bed-to-Chair Transfer CARE Score 4   Toileting Hygiene   Type of Assistance Needed Supervision   Amount of Physical Assistance Provided No physical assistance   Comment pt uses urinal seated edge of chair   Toileting Hygiene CARE Score 4   [address] Re-entry Level Wheelchair   Community Re-entry Level of Assistance Close supervision   Community Re-entry Pt engages in w/c propulsion around ARC and up/down ramp using primarily b/l UEs and R LE  Pt requires close supervision for going up ramp due to no anti-tippers not present on current w/c      Health Management   Health Management Level of Assistance Independent   Health Management Pt completes medication management activity using 6 medications and AM/PM pill organizer to simulate home context  Pt able to read medications and place simulated medications in organizer correctly, no errors noted  Exercise Tools   Theraband Pt engages in UE strengthening using orange theraband, 3x10 for each of the following: D2 flexion, horizontal shoulder abd, triceps extension  Strengthening completed in order to inc strength and endurance for ADLs/transfers  Good tolerance to exercises  Other Exercise Tool 1 Pt further engages in UE strengthening using 3# FW, 3x10 for shoulder press and 4# FW, 3x10 for elbow flex/ext  Good tolerance to exercises  Rest breaks provided as needed to manage fatigue  Cognition   Overall Cognitive Status WFL   Arousal/Participation Alert; Cooperative   Attention Within functional limits   Orientation Level Oriented X4   Memory Within functional limits   Following Commands Follows multistep commands without difficulty   Activity Tolerance   Activity Tolerance Patient tolerated treatment well   Assessment   Treatment Assessment Pt engages in 90 minute skilled OT session focusing on functional transfers, UE strengthening, w/c propulsion/management and medication management  Pt demo's good carryover of previously learned techs during transfers with RW and maintaining L LE NWBing  Pt demo's good recall of locking w/c brakes before performing STS transfers stating, "I'm surprised I'm able to remember that  Usually I don't " Strengthening completed in order to inc overall strength and endurance for inc use of RW and w/c due to [address] on L LE  No issues/errors with medication management activity using AM/PM pill organizer  Recommend continued skilled care in order to inc independence with self care, functional transfers, standing romana/bal, activity tolerance, and overall strength and endurance in order to decrease burden of care at d/c  Prognosis Good   Problem List Decreased mobility; Impaired balance;Decreased endurance;Pain;Orthopedic restrictions   Barriers to Discharge Decreased caregiver support; Inaccessible home environment   Plan   Treatment/Interventions ADL retraining;Functional transfer training; Therapeutic exercise; Endurance training;Patient/family training;Equipment eval/education; Compensatory technique education   OT Therapy Minutes   OT Time In 1230   OT Time Out 1400   OT Total Time (minutes) 90   OT Mode of treatment - Individual (minutes) 90   OT Mode of treatment - Concurrent (minutes) 0   OT Mode of treatment - Group (minutes) 0   OT Mode of treatment - Co-treat (minutes) 0   OT Mode of Treatment - Total time(minutes) 90 minutes   OT Cumulative Minutes 360   Therapy Time missed   Time missed?  No

## 2021-05-15 NOTE — PROGRESS NOTES
05/15/21 0830   Pain Assessment   Pain Assessment Tool 0-10   Pain Score 7   Pain Location/Orientation Location: Knee;Orientation: Left   Hospital Pain Intervention(s) Medication (See MAR)   Restrictions/Precautions   Precautions Fall Risk;Pain   Weight Bearing Restrictions Yes   LLE Weight Bearing Per Order NWB   ROM Restrictions Yes   LLE ROM Restriction Range Limitation  (No knee flex)   Braces or Orthoses LE Immobilizer   Cognition   Overall Cognitive Status WFL   Arousal/Participation Alert; Cooperative   Attention Within functional limits   Orientation Level Oriented X4   Memory Within functional limits   Following Commands Follows multistep commands without difficulty   Subjective   Subjective Pt sitting in w/c, agreeable to PT, report of dec knee pain   Roll Left and Right   Reason if not Attempted Medical concerns   Roll Left and Right CARE Score 88   Sit to Lying   Type of Assistance Needed Independent   Amount of Physical Assistance Provided No physical assistance   Sit to Lying CARE Score 6   Lying to Sitting on Side of Bed   Type of Assistance Needed Supervision;Verbal cues   Amount of Physical Assistance Provided No physical assistance   Lying to Sitting on Side of Bed CARE Score 4   Sit to Stand   Type of Assistance Needed Supervision; Adaptive equipment;Verbal cues   Amount of Physical Assistance Provided No physical assistance   Comment RW   Sit to Stand CARE Score 4   Bed-Chair Transfer   Type of Assistance Needed Supervision;Verbal cues   Amount of Physical Assistance Provided No physical assistance   Comment c RW   Chair/Bed-to-Chair Transfer CARE Score 4   Transfer Bed/Chair/Wheelchair   Limitations Noted In Balance;Pain Management   Adaptive Equipment Roller Walker   Stand Pivot Supervision   Sit to Stand Supervision   Stand to Sit Supervision   Supine to Sit Supervision   Sit to Supine Modified Independent   Findings @ bed sit<>supine, mod I using bed rail and pt hold on KI to lift L LE   At mat sit to supine, ind, supine to sit, S   Walk 10 Feet   Type of Assistance Needed Supervision   Amount of Physical Assistance Provided No physical assistance   Comment c RW   Walk 10 Feet CARE Score 4   Walk 50 Feet with Two Turns   Type of Assistance Needed Supervision   Comment c RW   Walk 50 Feet with Two Turns CARE Score 4   Walk 150 Feet   Reason if not Attempted Safety concerns   Walk 150 Feet CARE Score 88   Walking 10 Feet on Uneven Surfaces   Reason if not Attempted Safety concerns   Walking 10 Feet on Uneven Surfaces CARE Score 88   Ambulation   Does the patient walk? 2  Yes   Primary Mode of Locomotion Prior to Admission Walk   Distance Walked (feet)   (58ft, 50ft and 55ft)   Assist Device Roller Walker   Gait Pattern Hopping   Limitations Noted In Balance; Endurance;Speed   Walk Assist Level Supervision   Findings Good carry over in maintaining L LE NWB   Wheel 50 Feet with Two Turns   Type of Assistance Needed Independent   Amount of Physical Assistance Provided No physical assistance   Wheel 50 Feet with Two Turns CARE Score 6   Wheel 150 Feet   Type of Assistance Needed Independent   Amount of Physical Assistance Provided No physical assistance   Wheel 150 Feet CARE Score 6   Wheelchair mobility   Does the patient use a wheelchair? 1  Yes   Type of Wheelchair Used 1  Manual   Method Right upper extremity; Left upper extremity   Assistance Provided For Remove Leg Rest;Replace Leg Rest   Distance Level Surface (feet) 150 ft   Distance Wheeled 3% Grade 20 ft   Findings S managing ramp   Curb or Single Stair   Style negotiated Single stair   Type of Assistance Needed Incidental touching   Amount of Physical Assistance Provided No physical assistance   Comment CG/S c RW   1 Step (Curb) CARE Score 4   4 Steps   Reason if not Attempted Safety concerns   4 Steps CARE Score 88   12 Steps   Reason if not Attempted Safety concerns   12 Steps CARE Score 88   Stairs   Type Stairs   # of Steps   (1 six inch step, 2x)   Weight Bearing Precautions NWB;LLE   Assist Devices Roller Walker   Findings Pt ascend backward and descend forward, good carry over in maintining NWB L LE   Picking Up Object   Reason if not Attempted Safety concerns   Picking Up Object CARE Score 88   Therapeutic Interventions   Strengthening Supine: R LE hip flex and SAQ c 3#, SLR, modified bridging,; L SLR AAROM, heel slide abd, varghese QS, ankle pumps x 10 reps x 2 sets  Rest as needed   Assessment   Treatment Assessment Pt participate in skilled PT focus on safe mobility and w/c and RW  level  Patient was able to complete sit<>supine at bed, mod I using bed rail and supine to sit at mat, pt needed, S and vc for safe technique  Patient was able to transfer c RW, S and occasional vc for hand placement  He able to amb c RW x 50ft, 58ft and 55ft, S and good carry over in maintaining NWB on L LE  He also demonstrate improvement in managing 1 step ( 6inch) using RW, CG/CS  Cont skilled PT as per POC, maximized functional mobility level at w c and RW level, to allow safe d/c to home and dec burden on care  Problem List Decreased mobility; Impaired balance;Decreased endurance;Pain;Orthopedic restrictions   Barriers to Discharge Decreased caregiver support; Inaccessible home environment   PT Barriers   Physical Impairment Decreased endurance; Impaired balance;Decreased coordination;Orthopedic restrictions;Pain   Functional Limitation Car transfers;Stair negotiation;Standing;Transfers; Walking   Plan   Treatment/Interventions LE strengthening/ROM; Functional transfer training;Elevations; Therapeutic exercise; Endurance training;Patient/family training;Gait training;Bed mobility   Progress Progressing toward goals   Recommendation   PT Discharge Recommendation Home with home health rehabilitation   Equipment Recommended Walker; Wheelchair   PT Therapy Minutes   PT Time In 0830   PT Time Out 1000   PT Total Time (minutes) 90   PT Mode of treatment - Individual (minutes) 90 PT Mode of treatment - Concurrent (minutes) 0   PT Mode of treatment - Group (minutes) 0   PT Mode of treatment - Co-treat (minutes) 0   PT Mode of Treatment - Total time(minutes) 90 minutes   PT Cumulative Minutes 370   Therapy Time missed   Time missed?  No

## 2021-05-16 PROCEDURE — 97535 SELF CARE MNGMENT TRAINING: CPT

## 2021-05-16 RX ADMIN — ENOXAPARIN SODIUM 40 MG: 40 INJECTION SUBCUTANEOUS at 08:17

## 2021-05-16 RX ADMIN — CALCIUM 2 TABLET: 500 TABLET ORAL at 08:17

## 2021-05-16 RX ADMIN — ATORVASTATIN CALCIUM 40 MG: 40 TABLET, FILM COATED ORAL at 08:17

## 2021-05-16 RX ADMIN — OXYCODONE HYDROCHLORIDE 10 MG: 10 TABLET ORAL at 21:43

## 2021-05-16 RX ADMIN — GABAPENTIN 300 MG: 300 CAPSULE ORAL at 17:14

## 2021-05-16 RX ADMIN — OXYCODONE HYDROCHLORIDE 5 MG: 5 TABLET ORAL at 09:45

## 2021-05-16 RX ADMIN — DOCUSATE SODIUM 100 MG: 100 CAPSULE, LIQUID FILLED ORAL at 08:17

## 2021-05-16 RX ADMIN — PANTOPRAZOLE SODIUM 20 MG: 20 TABLET, DELAYED RELEASE ORAL at 06:48

## 2021-05-16 RX ADMIN — ASPIRIN 81 MG: 81 TABLET, CHEWABLE ORAL at 08:17

## 2021-05-16 RX ADMIN — TACROLIMUS 3 MG: 1 CAPSULE ORAL at 08:17

## 2021-05-16 RX ADMIN — Medication 1 TABLET: at 13:08

## 2021-05-16 RX ADMIN — LISINOPRIL 20 MG: 20 TABLET ORAL at 08:17

## 2021-05-16 RX ADMIN — TACROLIMUS 3 MG: 1 CAPSULE ORAL at 20:00

## 2021-05-16 RX ADMIN — POLYETHYLENE GLYCOL 3350 17 G: 17 POWDER, FOR SOLUTION ORAL at 08:18

## 2021-05-16 RX ADMIN — OXYCODONE HYDROCHLORIDE 10 MG: 10 TABLET ORAL at 02:48

## 2021-05-16 RX ADMIN — OXYCODONE HYDROCHLORIDE 5 MG: 5 TABLET ORAL at 17:15

## 2021-05-16 RX ADMIN — LIDOCAINE 1 PATCH: 50 PATCH TOPICAL at 08:18

## 2021-05-16 RX ADMIN — GABAPENTIN 300 MG: 300 CAPSULE ORAL at 08:17

## 2021-05-16 RX ADMIN — Medication 14 MG: at 08:20

## 2021-05-16 RX ADMIN — PANTOPRAZOLE SODIUM 20 MG: 20 TABLET, DELAYED RELEASE ORAL at 17:14

## 2021-05-16 NOTE — PROGRESS NOTES
Occupational Therapy Treatment Note:         05/16/21 1100   Pain Assessment   Pain Assessment Tool 0-10   Pain Score 3   Pain Location/Orientation Orientation: Left; Location: Knee   Restrictions/Precautions   Precautions Bed/chair alarms; Fall Risk;Pain   RLE Weight Bearing Per Order NWB  (w/ knee immobilizer)   Lifestyle   Autonomy "I'm really glad I did this I have learned alot and gotten more confident in how to do things"     Eating   Type of Assistance Needed Independent   Amount of Physical Assistance Provided No physical assistance   Eating CARE Score 6   Putting On/Taking Off Footwear   Type of Assistance Needed Supervision   Amount of Physical Assistance Provided Less than 25%   Comment educated and demonstrated [place]: trialed dressing stick, long handled sponge, reacher, leg , sockaid, and shoe horn w/ 100% carryover w/ S for LB dressing donning/doffing sock and shoe RLE seated on mat in OT gym   Putting On/Taking Off Footwear CARE Score 3   Roll Left and Right   Type of Assistance Needed Supervision   Amount of Physical Assistance Provided Less than 25%   Roll Left and Right CARE Score 3   Sit to Lying   Type of Assistance Needed Supervision   Amount of Physical Assistance Provided Less than 25%   Sit to Lying CARE Score 3   Lying to Sitting on Side of Bed   Type of Assistance Needed Supervision   Amount of Physical Assistance Provided Less than 25%   Lying to Sitting on Side of Bed CARE Score 3   Sit to Stand   Type of Assistance Needed Supervision   Amount of Physical Assistance Provided Less than 25%   Comment w/ RW on/off w/c and EOB   Sit to Stand CARE Score 3   Bed Mobility   Rolling R 5  Supervision   Rolling L 5  Supervision   Bed-Chair Transfer   Type of Assistance Needed Supervision   Amount of Physical Assistance Provided Less than 25%   Comment w/ RW w/ G ability to maintain NWB LLE w/ knee immobilizer   Chair/Bed-to-Chair Transfer CARE Score 3   Kitchen Mobility   Kitchen-Mobility Level Walker   Kitchen Activity Retrieve items;Transport items   Kitchen Mobility Comments Pt engaged in kitchen mobility activity w/ trial use of RW tray w/ RW as well as w/c level kitchen mobility to cook eggs stovetop  Pt able to complete task @ S level for all aspects including item retrieval high/low surfaces in refrigerator, high/low cabinets, G standing tolerance dynamically @ stovetop w/ G safety ~10 minutes prior to requiring min seated rest break due to mild pain LLE prior to completing remainder of task w/c level w/ S w/ G safety  This OTD showed pt walker basket vs walker tray, pt disliked basket though was interested in walker tray- will discuss w/ wife  Functional Standing Tolerance   Time 10 minutes   Activity in stance w/ RW and walker tray in kitch to cook egg sandwich w/ G dynamic standing balance/tolerance    Transfers   Sit to Stand 5  Supervision   Stand to Sit 5  Supervision   Exercise Tools   Other Exercise Tool 1 Pt tolerated UE strengthening w/ 3# dumbells B/lly for OH shoulder press x10 reps x3 sets as well as blue theraweb gross grasp strengthening x10 reps x3 sets w/ G tolerance to A w/ fx'l tfers and UB endurance   Activity Tolerance   Activity Tolerance Patient tolerated treatment well   Other Comments   Assessment Pt participated in skilled OT tx session focused on fx'l tfers, fx'l mobility, UB strengthening, standing dynamic balance t/o IADLs, kitchen mobility, AE/DME education and trial, high/low fx'l reaching in kitchen, cooking, LB dressing, and education/demo of LHAE  See above for further details on functional performance  Pt requires overall A for ADLS/self care and S for fx'l mobility w/ RW and LLE NWB w/ knee immobilizer w/ G safety awareness  Pt continues to demonstrate decreased endurance/activity tolerance, generalized weakness, deconditioning, SOB, LEONARD, fatigue, fall risk, impaired balance, LLE pain, limited fx'l LB reaching, NWB LLE   Pt will continue to benefit from skilled OT intervention to address UB strengthening, high level dynamic standing balance w/ RW t/o challenging IADLS, and review of needed DME in order to maximize functional independence in ADLS, functional mobility/transfers and prepare for d/c, improve QOL, while decreasing burden of care  Recommend RW, w/c, BSC, shower chair w/ seat, walker tray, and LHAE hip kit for home PRN  Discussed w/ pt, in agreement though wants to discuss w/ wife prior to purchasing 2* family members may have DME PRN  Assessment   Prognosis Excellent   Problem List Decreased strength; Impaired balance;Decreased endurance;Decreased mobility;Pain;Orthopedic restrictions   Plan   Treatment/Interventions ADL retraining;Functional transfer training;LE strengthening/ROM; Therapeutic exercise; Endurance training;Patient/family training;Equipment eval/education; Bed mobility; Compensatory technique education;Continued evaluation   Progress Progressing toward goals   Recommendation   OT Discharge Recommendation Home with home health rehabilitation   OT - OK to Discharge No  (pending progress)   OT Therapy Minutes   OT Time In 1100   OT Time Out 1230   OT Total Time (minutes) 90   OT Mode of treatment - Individual (minutes) 90   OT Mode of treatment - Concurrent (minutes) 0   OT Mode of treatment - Group (minutes) 0   OT Mode of treatment - Co-treat (minutes) 0   OT Mode of Treatment - Total time(minutes) 90 minutes   OT Cumulative Minutes 450   Therapy Time missed   Time missed? No     Thank you for the consult!   BARRETT James MBA, OTR/L, C-GCM, CSRS, CBIS

## 2021-05-16 NOTE — PLAN OF CARE
Problem: Potential for Falls  Goal: Patient will remain free of falls  Description: INTERVENTIONS:  - Assess patient frequently for physical needs  -  Identify cognitive and physical deficits and behaviors that affect risk of falls    -  [place] fall precautions as indicated by assessment   - Educate patient/family on patient safety including physical limitations  - Instruct patient to call for assistance with activity based on assessment  - Modify environment to reduce risk of injury  - Consider OT/PT consult to assist with strengthening/mobility  Outcome: Progressing

## 2021-05-17 PROBLEM — G47.00 INSOMNIA: Status: ACTIVE | Noted: 2019-03-25

## 2021-05-17 LAB
ANION GAP SERPL CALCULATED.3IONS-SCNC: 6 MMOL/L (ref 5–14)
BASOPHILS # BLD AUTO: 0 THOUSANDS/ΜL (ref 0–0.1)
BASOPHILS NFR BLD AUTO: 1 % (ref 0–1)
BUN SERPL-MCNC: 15 MG/DL (ref 5–25)
CALCIUM SERPL-MCNC: 9.8 MG/DL (ref 8.4–10.2)
CHLORIDE SERPL-SCNC: 101 MMOL/L (ref 97–108)
CO2 SERPL-SCNC: 30 MMOL/L (ref 22–30)
CREAT SERPL-MCNC: 1.07 MG/DL (ref 0.7–1.5)
EOSINOPHIL # BLD AUTO: 0.2 THOUSAND/ΜL (ref 0–0.4)
EOSINOPHIL NFR BLD AUTO: 2 % (ref 0–6)
ERYTHROCYTE [DISTWIDTH] IN BLOOD BY AUTOMATED COUNT: 12.1 %
GFR SERPL CREATININE-BSD FRML MDRD: 73 ML/MIN/1.73SQ M
GLUCOSE SERPL-MCNC: 98 MG/DL (ref 70–99)
HCT VFR BLD AUTO: 39 % (ref 41–53)
HGB BLD-MCNC: 13.7 G/DL (ref 13.5–17.5)
LYMPHOCYTES # BLD AUTO: 1.2 THOUSANDS/ΜL (ref 0.5–4)
LYMPHOCYTES NFR BLD AUTO: 14 % (ref 25–45)
MCH RBC QN AUTO: 35 PG (ref 26–34)
MCHC RBC AUTO-ENTMCNC: 35.1 G/DL (ref 31–36)
MCV RBC AUTO: 100 FL (ref 80–100)
MONOCYTES # BLD AUTO: 0.6 THOUSAND/ΜL (ref 0.2–0.9)
MONOCYTES NFR BLD AUTO: 7 % (ref 1–10)
NEUTROPHILS # BLD AUTO: 6.4 THOUSANDS/ΜL (ref 1.8–7.8)
NEUTS SEG NFR BLD AUTO: 76 % (ref 45–65)
PLATELET # BLD AUTO: 223 THOUSANDS/UL (ref 150–450)
PMV BLD AUTO: 10 FL (ref 8.9–12.7)
POTASSIUM SERPL-SCNC: 3.9 MMOL/L (ref 3.6–5)
RBC # BLD AUTO: 3.91 MILLION/UL (ref 4.5–5.9)
SODIUM SERPL-SCNC: 137 MMOL/L (ref 137–147)
WBC # BLD AUTO: 8.4 THOUSAND/UL (ref 4.5–11)

## 2021-05-17 PROCEDURE — 97530 THERAPEUTIC ACTIVITIES: CPT

## 2021-05-17 PROCEDURE — 80048 BASIC METABOLIC PNL TOTAL CA: CPT | Performed by: NURSE PRACTITIONER

## 2021-05-17 PROCEDURE — 85025 COMPLETE CBC W/AUTO DIFF WBC: CPT | Performed by: NURSE PRACTITIONER

## 2021-05-17 PROCEDURE — 97535 SELF CARE MNGMENT TRAINING: CPT

## 2021-05-17 PROCEDURE — 97110 THERAPEUTIC EXERCISES: CPT

## 2021-05-17 PROCEDURE — 99233 SBSQ HOSP IP/OBS HIGH 50: CPT | Performed by: PHYSICAL MEDICINE & REHABILITATION

## 2021-05-17 PROCEDURE — 99232 SBSQ HOSP IP/OBS MODERATE 35: CPT | Performed by: INTERNAL MEDICINE

## 2021-05-17 PROCEDURE — 97116 GAIT TRAINING THERAPY: CPT

## 2021-05-17 RX ORDER — GABAPENTIN 300 MG/1
300 CAPSULE ORAL 3 TIMES DAILY
Status: DISCONTINUED | OUTPATIENT
Start: 2021-05-17 | End: 2021-05-20 | Stop reason: HOSPADM

## 2021-05-17 RX ORDER — LANOLIN ALCOHOL/MO/W.PET/CERES
6 CREAM (GRAM) TOPICAL
Status: DISCONTINUED | OUTPATIENT
Start: 2021-05-17 | End: 2021-05-20 | Stop reason: HOSPADM

## 2021-05-17 RX ADMIN — OXYCODONE HYDROCHLORIDE 10 MG: 10 TABLET ORAL at 03:24

## 2021-05-17 RX ADMIN — CALCIUM 2 TABLET: 500 TABLET ORAL at 08:17

## 2021-05-17 RX ADMIN — OXYCODONE HYDROCHLORIDE 10 MG: 10 TABLET ORAL at 15:05

## 2021-05-17 RX ADMIN — LIDOCAINE 1 PATCH: 50 PATCH TOPICAL at 08:18

## 2021-05-17 RX ADMIN — GABAPENTIN 300 MG: 300 CAPSULE ORAL at 08:17

## 2021-05-17 RX ADMIN — DOCUSATE SODIUM 100 MG: 100 CAPSULE, LIQUID FILLED ORAL at 08:18

## 2021-05-17 RX ADMIN — GABAPENTIN 300 MG: 300 CAPSULE ORAL at 15:52

## 2021-05-17 RX ADMIN — LISINOPRIL 20 MG: 20 TABLET ORAL at 08:17

## 2021-05-17 RX ADMIN — ASPIRIN 81 MG: 81 TABLET, CHEWABLE ORAL at 08:17

## 2021-05-17 RX ADMIN — PANTOPRAZOLE SODIUM 20 MG: 20 TABLET, DELAYED RELEASE ORAL at 15:52

## 2021-05-17 RX ADMIN — ATORVASTATIN CALCIUM 40 MG: 40 TABLET, FILM COATED ORAL at 08:16

## 2021-05-17 RX ADMIN — PANTOPRAZOLE SODIUM 20 MG: 20 TABLET, DELAYED RELEASE ORAL at 06:08

## 2021-05-17 RX ADMIN — Medication 14 MG: at 08:19

## 2021-05-17 RX ADMIN — DOCUSATE SODIUM 100 MG: 100 CAPSULE, LIQUID FILLED ORAL at 18:09

## 2021-05-17 RX ADMIN — ENOXAPARIN SODIUM 40 MG: 40 INJECTION SUBCUTANEOUS at 08:17

## 2021-05-17 RX ADMIN — MELATONIN TAB 3 MG 6 MG: 3 TAB at 21:08

## 2021-05-17 RX ADMIN — OXYCODONE HYDROCHLORIDE 5 MG: 5 TABLET ORAL at 10:04

## 2021-05-17 RX ADMIN — TACROLIMUS 3 MG: 1 CAPSULE ORAL at 08:17

## 2021-05-17 RX ADMIN — OXYCODONE HYDROCHLORIDE 10 MG: 10 TABLET ORAL at 21:08

## 2021-05-17 RX ADMIN — Medication 1 TABLET: at 11:45

## 2021-05-17 RX ADMIN — GABAPENTIN 300 MG: 300 CAPSULE ORAL at 21:08

## 2021-05-17 RX ADMIN — TACROLIMUS 3 MG: 1 CAPSULE ORAL at 21:08

## 2021-05-17 NOTE — CASE MANAGEMENT
In preparation for d/c, received DME order from therapy  Order for w/c placed with [facility] via ecin  Awaiting determination  Following to assist w/ d/c planning needs

## 2021-05-17 NOTE — PROGRESS NOTES
[facility]  Progress Note - Jordon Ribeiro 1955, 63 y o  male MRN: [id number]  Unit/Bed#: -01 Encounter: [id number]  Primary Care Provider: Rojelio Rivera MD   Date and time admitted to hospital: 5/11/2021  4:55 PM    PAD (peripheral artery disease) [facility])  Assessment & Plan  Continue with aspirin 81mg daily and Lipitor 40mg daily  LEADS completed in 08/2020:  50-70% stenosis in the R distal superficial femoral artery  High grade stenosis/occlusion with trickle flow to the L distal superficial femoral artery  Neurovascular checks each shift  Recommend follow-up with Vascular Surgery  Hernia of abdominal cavity  Assessment & Plan  Stable  Repaired in 2011  Chronic low back pain  Assessment & Plan  Continue current pain regimen  Ordered gabapentin 300mg BID at home by Pain Management  Follows with Pain Management as outpatient for lumbar spinal stenosis and lumbar radiculopathy  Received steroid injections - last injection given on 11/4/2020  Ordered for next injection on 5/27/2021  Apply voltaren gel throughout the day as needed  Apply heating pad at HS  Carotid stenosis, asymptomatic, bilateral  Assessment & Plan  Last carotid ultrasound on 10/7/2020 showed:  >70% stenosis of the R ICA and 50-69% stenosis of the L ICA  Continue aspirin 81mg daily and Lipitor 40mg daily  Recommended for repeat US in 6 months - has order but did not complete yet  Recommend to follow-up with Vascular Surgery  Mixed hyperlipidemia  Assessment & Plan  Continue home dose of Lipitor 40mg daily  Last lipid panel on 10/26/2020 as follows: Cholesterol 159, Triglycerides 146, HDL 66, and LDL 64  Tobacco abuse  Assessment & Plan  Smokes cigarettes 1/2 pack a day for 40+ years  Interested in smoking cessation  Continue nicotine patch      History of liver transplant [facility])  Assessment & Plan  History of liver transplant in 6539 due to alcoholic cirrhosis and hepatocellular carcinoma  Follows with [facility] transplant clinic  Currently on Tacrolimus 3mg BID  PCP managing current dosage and has level checked every 3-6 months  Last Tacrolimus level 6 9 on 10/26/2020  Repeat Tacrolimus level 5 3 on , continue current dosing  Essential hypertension  Assessment & Plan  Continue with home dose of lisinopril 20mg daily  Monitor BP with routine VS   Avoid orthostasis  Chronic gastroesophageal reflux disease  Assessment & Plan  Stable  Takes omeprazole 20mg BID at home  Substitute with Protonix 20mg BID while inpatient  * Closed fracture of left distal femur [facility])  Assessment & Plan   - mechanical fall at home  Nondisplaced L medial femoral condyle fracture  Non-operative treatment  NWB to LLE with knee immobilizer  Pain control  DVT ppx with Lovenox  Follow-up with Ortho in 2 weeks (21)  PT/OT Therapies  Primary team following  VTE Pharmacologic Prophylaxis:   Pharmacologic: Enoxaparin (Lovenox)  Mechanical VTE Prophylaxis in Place: Yes - sequential compression devices  Current Length of Stay: 6 day(s)    Current Patient Status: Inpatient Rehab     Discharge Plan: As per primary team     Code Status: Level 1 - Full Code    Subjective:   Pt examined while pt sitting up in pt bed  Complaints of lower back and b/l hip pain, 6/10, aching, throbbing  Pt has history of sciatica and lumbar pain and is due for steroid injections later this month  Feels that his pain in the L knee is 1/10, aching, but the pain in his back and hips is worse than his leg  Will order voltaren gel to be applied and heating pad to be used while pt is lying in bed  Pt is agreeable to this  Denies neuropathy pain today  Therapy is going well  Plans for discharge later this week      Objective:     Vitals:   Temp (24hrs), Av 6 °F (36 4 °C), Min:96 7 °F (35 9 °C), Max:98 7 °F (37 1 °C)    Temp:  [96 7 °F (35 9 °C)-98 7 °F (37 1 °C)] 97 4 °F (36 3 °C)  HR:  [71-79] 71  Resp:  [18-20] 20  BP: (119-130)/(57-62) 130/57  SpO2:  [98 %-99 %] 99 %  Body mass index is 29 48 kg/m²  Review of Systems   Constitutional: Negative for chills, fatigue and fever  Respiratory: Negative for cough and shortness of breath  Cardiovascular: Negative for chest pain, palpitations and leg swelling  Gastrointestinal: Negative for abdominal distention, abdominal pain, constipation, diarrhea, nausea and vomiting  LBM 5/16   Genitourinary: Negative for difficulty urinating  Musculoskeletal: Positive for arthralgias (L knee pain, 1/10, aching, improves with rest) and back pain (Lower back pain/b/l hip pain, 6/10, aching/throbbing, improves with rest)  Neurological: Negative for dizziness, light-headedness, numbness and headaches  Psychiatric/Behavioral: Negative for sleep disturbance  Input and Output Summary (last 24 hours): Intake/Output Summary (Last 24 hours) at 5/17/2021 1237  Last data filed at 5/17/2021 1237  Gross per 24 hour   Intake 720 ml   Output 800 ml   Net -80 ml       Physical Exam:     Physical Exam  Vitals signs and nursing note reviewed  Constitutional:       Appearance: Normal appearance  HENT:      Head: Normocephalic and atraumatic  Cardiovascular:      Rate and Rhythm: Normal rate and regular rhythm  Pulses: Normal pulses  Heart sounds: Normal heart sounds  No murmur  No friction rub  Pulmonary:      Effort: Pulmonary effort is normal  No respiratory distress  Breath sounds: Normal breath sounds  No wheezing or rhonchi  Abdominal:      General: Abdomen is flat  Bowel sounds are normal  There is no distension  Palpations: Abdomen is soft  Tenderness: There is no abdominal tenderness  Musculoskeletal:      Right lower leg: No edema  Left lower leg: No edema  Comments: L knee immobilizer in place   Skin:     General: Skin is warm and dry     Neurological:      Mental Status: He is alert and oriented to person, place, and time     Psychiatric:         Mood and Affect: Mood normal          Behavior: Behavior normal          Additional Data:     Labs:    Results from last 7 days   Lab Units 05/17/21  0601   WBC Thousand/uL 8 40   HEMOGLOBIN g/dL 13 7   HEMATOCRIT % 39 0*   PLATELETS Thousands/uL 223   NEUTROS PCT % 76*   LYMPHS PCT % 14*   MONOS PCT % 7   EOS PCT % 2     Results from last 7 days   Lab Units 05/17/21  0601   SODIUM mmol/L 137   POTASSIUM mmol/L 3 9   CHLORIDE mmol/L 101   CO2 mmol/L 30   BUN mg/dL 15   CREATININE mg/dL 1 07   ANION GAP mmol/L 6   CALCIUM mg/dL 9 8   GLUCOSE RANDOM mg/dL 98                       Labs reviewed    Imaging:    Imaging reviewed    Recent Cultures (last 7 days):           Last 24 Hours Medication List:   Current Facility-Administered Medications   Medication Dose Route Frequency Provider Last Rate    acetaminophen  650 mg Oral Q6H PRN Fannie Holder MD      ALPRAZolam  0 25 mg Oral Daily PRN Fannie Holder MD      aspirin  81 mg Oral Daily ROSALINA Mccullough      atorvastatin  40 mg Oral Daily Fannie Holder MD      bisacodyl  10 mg Rectal Daily PRN Fannie Holder MD      calcium carbonate  2 tablet Oral Daily With Breakfast Fannie Holder MD      Diclofenac Sodium  2 g Topical 4x Daily PRN ROSALINA Mccullough      docusate sodium  100 mg Oral BID ROSALINA Mccullough      enoxaparin  40 mg Subcutaneous Q24H [address] Fannie Holder MD      gabapentin  300 mg Oral BID Fannie Holder MD      lidocaine  1 patch Topical Daily Fannie Holder MD      lisinopril  20 mg Oral Daily Fannie Holder MD      magnesium hydroxide  30 mL Oral Daily PRN Fannie Holder MD      multivitamin-minerals  1 tablet Oral Daily Fannie Holder MD      nicotine  14 mg Transdermal Daily ROSALINA Mccullough      ondansetron  4 mg Oral Q6H PRN Fannie Holder MD      oxyCODONE  10 mg Oral Q4H PRN Fannie Holder MD      oxyCODONE  5 mg Oral Q4H PRN MD Jimmy Lema pantoprazole  20 mg Oral BID AC Kayleigh Hinkle MD      polyethylene glycol  17 g Oral Daily Kayleigh Hinkle MD      tacrolimus  3 mg Oral Q12H Coral Timmons MD          M*Modal software was used to dictate this note  It may contain errors with dictating incorrect words or incorrect spelling  Please contact the provider directly with any questions

## 2021-05-17 NOTE — PROGRESS NOTES
05/17/21 1400   Pain Assessment   Pain Assessment Tool 0-10   Pain Score 8  (recived pain meds- also slightly lowered leg rest which help)   Pain Location/Orientation Orientation: Left; Location: Knee   Restrictions/Precautions   Precautions Pain   Weight Bearing Restrictions Yes   LLE Weight Bearing Per Order NWB   LLE ROM Restriction   (no L knee flex)   Braces or Orthoses LE Immobilizer   Sit to Stand   Type of Assistance Needed Supervision   Sit to Stand CARE Score 4   Bed-Chair Transfer   Type of Assistance Needed Supervision   Comment RW   Chair/Bed-to-Chair Transfer CARE Score 4   Transfer Bed/Chair/Wheelchair   Findings assess for in room independence   Car Transfer   Type of Assistance Needed Incidental touching   Comment slight A to get L foot in car compartment   Car Transfer CARE Score 4   Walk 10 Feet   Type of Assistance Needed Supervision   Comment RW   Walk 10 Feet CARE Score 4   Walk 50 Feet with Two Turns   Type of Assistance Needed Supervision   Comment RW   Walk 50 Feet with Two Turns CARE Score 4   Walk 150 Feet   Reason if not Attempted Activity not applicable   Walk 723 Feet CARE Score 9   Ambulation   Does the patient walk? 2  Yes   Wheel 50 Feet with Two Turns   Type of Assistance Needed Independent   Wheel 50 Feet with Two Turns CARE Score 6   Wheel 150 Feet   Type of Assistance Needed Independent   Wheel 150 Feet CARE Score 6   Wheelchair mobility   Does the patient use a wheelchair? 1  Yes   Type of Wheelchair Used 1   Manual   Findings Pt can manage leg rest to swing in and out    Curb or Single Stair   Style negotiated Curb   Type of Assistance Needed Incidental touching   Comment CG for 4'' curb retro step (wife said it is not even 3 inches high)    1 Step (Curb) CARE Score 4   Stairs   Findings Pt performed for wife pivot to sit into WC up on 8''step to simulate home step    Toilet Transfer   Type of Assistance Needed Supervision   Comment with RW   Toilet Transfer CARE Score 4 Equipment Use   NuStep 10 mins UE and RLE   Assessment   Treatment Assessment 90 min session started on Nustep for warm up and endurace  Pt amb 50feet to bed that is measured at 32'' which his home bed is 27inches  Pt was S for bed mobility but stated mattress is much softer, so set raised mat table to 27inches which again pt was S level but did require some inc effort  Pt did not use leg   Pt performed retro hop up 4'' curb maintaining NWB  At this point wife came to session and while she was here performed family training showing her how pt will pivot and she will place WC on 8'' step  Ed her that the more pt hops and if he were to do steps puts himself at risk for LOB and breaking NWB precautions  Then went to parking garage to perform car xfer,  pt did well getting in but getting out pt was hasty and got R leg out first while L was still in car- Ed to perform getting both legs over edge first then stand which appeared much safer  Assess for MI level in room [facility] to prepare for D/C this week on thursday  Plan   Progress Progressing toward goals   PT Therapy Minutes   PT Time In 1400   PT Time Out 1530   PT Total Time (minutes) 90   PT Mode of treatment - Individual (minutes) 90   PT Mode of treatment - Concurrent (minutes) 0   PT Mode of treatment - Group (minutes) 0   PT Mode of treatment - Co-treat (minutes) 0   PT Mode of Treatment - Total time(minutes) 90 minutes   PT Cumulative Minutes 460   Therapy Time missed   Time missed?  No

## 2021-05-17 NOTE — PHYSICIAN ADVISOR
Current patient class: Inpatient  The patient is currently on Hospital Day: 3 at [address]      The patient was admitted to the hospital at [id number] on 5/10/21 for the following diagnosis:  Femoral condyle fracture ([address] ) [S72 413A]  Femoral distal fracture ([address] ) Nataliia Yanes  Left knee pain [M25 [id number]]  Left knee injury Giuliano Guerrero  Fall, initial encounter [W19  XXXA]       There was documentation in the medical record of an expected length of stay of at least 2 midnights  The patient was therefore expected to satisfy the 2 midnight benchmark and given the 2 midnight presumption was appropriate for INPATIENT ADMISSION  Given this expectation of a satisfying stay, CMS instructs us that the patient is most often appropriate for inpatient admission under part A provided medical necessity is documented in the chart  After review of the relevant documentation, labs, vital signs and test results, the patient is appropriate for INPATIENT ADMISSION  Admission to the hospital as an inpatient is a complex decision making process which requires the practitioner to consider the patients presenting complaint, history and physical examination and all relevant testing  With this in mind, in this case, the patient was deemed appropriate for INPATIENT ADMISSION  After review of the documentation and testing available at the time of the admission, I concur with this clinical determination of medical necessity  For the reason noted, the patient was discharged before reaching 2 midnights as an inpatient  Rationale is as follows: The patient is a 63 yrs old Male who presented to the ED at 5/9/2021 12:17 PM with a chief complaint of Knee Injury (left knee pain, missed a step and fell on left knee  Denies hitting head/LOC, takes aspirin  Denies any other complaints)   Cipriano Hughes Patient was noted to have a lateral femoral condyle fracture from the mechanical fall    Patient was seen by Orthopedics the next day and maintained nonweightbearing with PT OT evaluation pending as well as multimodal pain medications  Given the above in a patient who had a fracture after fall in need of expert consultation as well as PT OT evaluations and safe disposition planning as well as requiring multimodal pain regimen the patient did cross the 2 midnight benchmark as set by Medicare and is inpatient status appropriate  The patients vitals on arrival were   ED Triage Vitals   Temperature Pulse Respirations Blood Pressure SpO2   05/09/21 1206 05/09/21 1206 05/09/21 1206 05/09/21 1206 05/09/21 1206   98 5 °F (36 9 °C) 101 18 139/80 98 %      Temp Source Heart Rate Source Patient Position - Orthostatic VS BP Location FiO2 (%)   05/09/21 1206 05/09/21 1206 05/10/21 2234 05/09/21 1206 --   Oral Monitor Lying Left arm       Pain Score       05/09/21 1206       2           Past Medical History:   Diagnosis Date    [facility] [address]     Arthritis     Bacterial peritonitis [facility]     Bowel disease     Cancer [facility]     liver    Cataract     Depression     Fracture     Gastroesophageal reflux     Hepatic disease     Hepatocellular carcinoma ([facility] [facility]     History of colon polyps     Hypertension     Liver cancer ([facility] [address] [facility]     Liver disease     Stomach disease      Past Surgical History:   Procedure Laterality Date    APPENDECTOMY      BACK SURGERY      CATARACT EXTRACTION      EXPLORATORY LAPAROTOMY  09/2011    EYE SURGERY      LIVER TRANSPLANTATION  08/20/2013    PARACENTESIS      Abdominal Paracentesis(Therapeutic) with Imaging Guidance    WI COLONOSCOPY FLX DX W/COLLJ SPEC WHEN PFRMD N/A 9/12/2016    Procedure: COLONOSCOPY;  Surgeon: Jackson Arriaga MD;  Location: [facility];   Service: General    ROTATOR CUFF REPAIR      x2           Consults have been placed to:   IP CONSULT TO ORTHOPEDIC SURGERY  IP CONSULT TO CASE MANAGEMENT  IP CONSULT TO PHYSICAL MEDICINE REHAB    Vitals:    05/10/21 1503 05/10/21 2234 05/11/21 0704 05/11/21 1430   BP: 132/80 127/66 167/89 137/78   BP Location:  Right arm     Pulse: 81 68 79 79   Resp: 18 16 16 18   Temp: 97 8 °F (36 6 °C) 98 5 °F (36 9 °C) 98 2 °F (36 8 °C) 98 8 °F (37 1 °C)   TempSrc:  Oral     SpO2: 92% 97% 96% 96%   Weight:       Height:           Most recent labs:    Recent Labs     05/17/21  0601   WBC 8 40   HGB 13 7   HCT 39 0*      K 3 9   CALCIUM 9 8   BUN 15   CREATININE 1 07       Scheduled Meds:  Facility-Administered Medications Ordered in Other Encounters   Medication Dose Route Frequency Provider Last Rate    acetaminophen  650 mg Oral Q6H PRN Brian Romero MD      ALPRAZolam  0 25 mg Oral Daily PRN Brian Romero MD      aspirin  81 mg Oral Daily ROSALINA Vann      atorvastatin  40 mg Oral Daily Brian Romero MD      bisacodyl  10 mg Rectal Daily PRHAMIDA Romero MD      calcium carbonate  2 tablet Oral Daily With Breakfast Brian Romero MD      docusate sodium  100 mg Oral BID ROSALINA Vann      enoxaparin  40 mg Subcutaneous Q24H [address] Brian Romero MD      gabapentin  300 mg Oral BID Brian Romero MD      lidocaine  1 patch Topical Daily Brian Romero MD      lisinopril  20 mg Oral Daily Brian Romero MD      magnesium hydroxide  30 mL Oral Daily PRN Brian Romero MD      multivitamin-minerals  1 tablet Oral Daily Brian Romero MD      nicotine  14 mg Transdermal Daily ROSALINA Vann      ondansetron  4 mg Oral Q6H PRN Brian Romero MD      oxyCODONE  10 mg Oral Q4H PRN Brian Romero MD      oxyCODONE  5 mg Oral Q4H PRN Brian Romero MD      pantoprazole  20 mg Oral BID AC Brian Romero MD      polyethylene glycol  17 g Oral Daily Brian Romero MD      tacrolimus  3 mg Oral Q12H [address] Brian Romero MD       Continuous Infusions:No current facility-administered medications for this encounter  PRN Meds:      Surgical procedures (if appropriate):

## 2021-05-17 NOTE — ASSESSMENT & PLAN NOTE
History of liver transplant in 0127 due to alcoholic cirrhosis and hepatocellular carcinoma  Follows with [facility] GI transplant clinic  Currently on Tacrolimus 3mg BID  PCP managing current dosage and has level checked every 3-6 months  Last Tacrolimus level 6 9 on 10/26/2020  Repeat Tacrolimus level 5 3 on 5/12, continue current dosing

## 2021-05-17 NOTE — PROGRESS NOTES
05/17/21 0830   Pain Assessment   Pain Assessment Tool 0-10   Pain Score 5   Pain Location/Orientation Orientation: Left; Location: Knee   Effect of Pain on Daily Activities Tolerated   Patient's Stated Pain Goal No pain   Hospital Pain Intervention(s) Shower/Bath   Multiple Pain Sites No   Restrictions/Precautions   Precautions Fall Risk;Pain   Weight Bearing Restrictions Yes   LLE Weight Bearing Per Order NWB   ROM Restrictions Yes   Braces or Orthoses LE Immobilizer  (Left knee immobilizer)   Oral Hygiene   Type of Assistance Needed Independent   Amount of Physical Assistance Provided No physical assistance   Comment Pt completed oral hygiene seated in w/c at sink  Pt has upper/lower dentures, completed rinsing of mouth and brushing tongue  Oral Hygiene CARE Score 6   Shower/Bathe Self   Type of Assistance Needed Supervision   Amount of Physical Assistance Provided No physical assistance   Comment Pt able to wash 10/10 parts  Pt req S from [facility] in stance when washing dago/buttock area demonstrating G balance and ability to maintain LLE NWB  Pt's LLE bagged for shower with focus on LLE immobilizer remaining dry  Shower/Bathe Self CARE Score 4   Bathing   Assessed Bath Style Shower   Anticipated D/C Bath Style Shower   Able to Narendra Jesus No   Able to [employer] Temperature No   Able to Wash/Rinse/Dry (body part) Left Arm;Right Arm;L Upper Leg;R Upper Leg;R Lower Leg/Foot;L Lower Leg/Foot;Chest;Abdomen;Perineal Area; Buttocks   Limitations Noted in Balance; Endurance;Strength   Positioning Seated;Standing   Adaptive Equipment Tub Bench; Shower [employer]   Limitations Noted In Balance; Endurance; Safety   Adaptive Equipment Grab Bars;Transfer Bench   Assessed Shower   Findings CGA SPT with RW to transfer bench, performing sit and swivel technique while maintaining LLE NWB     Upper Body Dressing   Type of Assistance Needed Supervision   Amount of Physical Assistance Provided No physical assistance   Comment Seated in w/c pt able to thread b/l UE and manage shirt down in back  Upper Body Dressing CARE Score 4   Lower Body Dressing   Type of Assistance Needed Physical assistance   Amount of Physical Assistance Provided Less than 25%   Comment Pt armida Garner to adjust LE immobilizer  Pt able to thread b/l LE and complete CM  Lower Body Dressing CARE Score 3   Putting On/Taking Off Footwear   Type of Assistance Needed Physical assistance   Amount of Physical Assistance Provided 25%-49%   Comment Pt armida Garner from [facility] to adjust socks over heel, able to don R shoe  Putting On/Taking Off Footwear CARE Score 3   Dressing/Undressing Clothing   Remove UB Clothes Pullover Shirt   Don UB Clothes Pullover Shirt   Remove LB Clothes Shorts; Undergarment;Socks   Don LB [employer]; Undergarment;Socks; Shoes   Limitations Noted In Balance; Endurance;Strength;ROM; Timeliness   Positioning Supported Sit;Standing   Sit to Lying   Type of Assistance Needed Supervision   Amount of Physical Assistance Provided No physical assistance   Comment Pt able to manage LLE with S from [facility]   Sit to Lying CARE Score 4   Lying to Sitting on Side of Bed   Type of Assistance Needed Supervision   Amount of Physical Assistance Provided No physical assistance   Comment Pt able to manage LLE, demonstrating G sitting balance  Lying to Sitting on Side of Bed CARE Score 4   Sit to Stand   Type of Assistance Needed Supervision; Adaptive equipment   Amount of Physical Assistance Provided No physical assistance   Comment Pt able to perform STS to RW demonstrating G balance, able to mantain LLE NWB    Sit to Stand CARE Score 4   Bed Mobility   Supine to Sit 5  Supervision   Additional items Increased time required;LE management   Sit to Supine 5  Supervision   Additional items Leg ;LE management   Bed-Chair Transfer   Type of Assistance Needed Supervision; Adaptive equipment   Amount of Physical Assistance Provided No physical assistance   Comment Pt req S from [facility] for SPT with RW, demonstrating G standing blaance and able to maintain LLE NWB  Chair/Bed-to-Chair Transfer CARE Score 4   Commmunity Re-entry   Community Re-entry Level Wheelchair   Community Re-entry Level of Assistance Close supervision   Community Re-entry Pt engaged in w/c propulsion around ARC and outside, up and down ramp using b/l UEs and R LE  Pt req occasional A going up hills/ramps 2* no anti-tippers on current w/c  Focus of activity to increase Ind with fxnl w/c mobility in preparation for upcoming MD appointments and leisure activities  Therapeutic Excerise-Strength   UE Strength Yes   Right Upper Extremity- Strength   R Shoulder Flexion;ABduction; Extension; External rotation; Internal rotation   R Elbow Elbow flexion;Elbow extension   R Position Seated   Equipment Dumbbell  (3#)   R Weight/Reps/Sets 3 x 15   RUE Strength Comment Pt engaged in seated UB TE utilizing 3# dumbbell focusing on inc BUE strength for ADL transfers and w/c mobility  Pt tolerated exercise well with inclusion of rest breaks in between  Left Upper Extremity-Strength   L Shoulder Flexion;ABduction; Extension; External rotation; Internal rotation   L Elbow Elbow flexion;Elbow extension   L Position Seated   Equipment Dumbell   L Weights/Reps/Sets 3 x 15   LUE Strength Comment See above note for details   Cognition   Overall Cognitive Status [facility]   Arousal/Participation Alert; Cooperative   Attention Within functional limits   Orientation Level Oriented X4   Memory Within functional limits   Following Commands Follows multistep commands with increased time or repetition   Activity Tolerance   Activity Tolerance Patient tolerated treatment well   Assessment   Treatment Assessment Pt engaged in 90 min skilled OT Tx session with focus on ADL fx, fxnl w/c mobility, and UB TE  See above for further Tx details  Pt completed shower routine this AM and req overall S from [facility]   LLE bagged with focus on LLE immobilizer remaining dry  PT reports plan to order additional immobilizer for showering at home  Pt able to wash 10/10 body parts  Pt req S for UB dressing and [place] for LB dressing, req A to adjust socks on feet and donning knee immobilizer  Pt engaged in fxnl w/c mobility around ARC and outside, safely navigating around obstacles in pathway  Pt req overall S from [facility] for w/c mobility while going up ramps and hills  Pt performing fnxl transfers at overall S range, demonstraing G sitting and standing balance and ability to maintain LLE NWB  Pt engaged in TE with 3# dumbbell, working on shoulder and elbow flexion, extension, abduction, and internal/external rotation, 3 sets of 15 each  Pt tolerated well with rest breaks in between  Pt communicated height of bed at home 27"  [facility] communicated to PTA Joaquin Cardona) for upcoming Tx session  Plan to assess for IRP's w/c level during upcoming Tx session  Pt would continue to benefit from skilled OT services to improve ADL transfers, ADL fx, fxnl standing balance, and overall inc I with ADL performance to progress towards OT goals and dec caregiver burden upon D/C  Prognosis Good   Problem List Decreased strength;Decreased range of motion;Decreased endurance; Impaired balance;Decreased mobility; Decreased safety awareness;Orthopedic restrictions;Pain   Barriers to Discharge Decreased caregiver support   Plan   Treatment/Interventions ADL retraining;Functional transfer training;LE strengthening/ROM; Therapeutic exercise; Endurance training;Bed mobility   Progress Progressing toward goals   Recommendation   OT Discharge Recommendation Home with home health rehabilitation  (Home PT)   OT Therapy Minutes   OT Time In 0830   OT Time Out 1000   OT Total Time (minutes) 90   OT Mode of treatment - Individual (minutes) 90   OT Mode of treatment - Concurrent (minutes) 0   OT Mode of treatment - Group (minutes) 0   OT Mode of treatment - Co-treat (minutes) 0   OT Mode of Treatment - Total time(minutes) 90 minutes   OT Cumulative Minutes 540   Therapy Time missed   Time missed?  No

## 2021-05-17 NOTE — PROGRESS NOTES
PM&R PROGRESS NOTE:  Isac Manley 63 y o  male MRN: [id number]  Unit/Bed#: -01 Encounter: [id number]           Rehab Diagnosis: Impairment of mobility, safety and Activities of Daily Living (ADLs) due to Orthopedic Disorders:  08 9  Other Orthopedic Left distal femur fracture of the medial femoral condyle     History of Present Illness:   Isac Manley is a 63 y o  male history of liver transplantation in August 2013 at [facility] related to alcoholic cirrhosis and [address]  on chronic Prograf, incisional abdominal hernia, hypertension, PAD, carotid stenosis, GERD, known lumbar radiculopathy who presented to the [address] on 5/9/21 status post mechanical fall on stairs  No LOC  Imaging revealed a large left knee joint effusion and a left distal femur fracture involving the medial femoral condyle  Patient seen by Orthopedics, Dr Nidia Merlos, and treated non operatively  Patient was deemed NWB left lower extremity and placed in a knee immobilizer at all times  Recommended to follow up in 2 weeks  Placed on Lovenox for DVT prophylaxis  Patient found to have acute functional deficits from his baseline, therefore was admitted to [facility] for acute inpatient rehabilitation  SUBJECTIVE:  Patient seen face to face today in physical therapy  Seen negotiating his wheelchair at a modified independent level  Patient reports increased "left sciatica pain" which is an acute on chronic issue for him  He is agreeable to increase gabapentin as long as his pain doctor also knows  Also requesting something for sleep  ASSESSMENT: Stable, progressing      PLAN:    Rehabilitation   Functional deficits:  Left lower extremity weakness, impaired endurance, impaired balance, impaired mobility, self care   Continue current rehabilitation plan of care to maximize function       Estimated Discharge: Discharge home Thursday 5/20/21 with home care PT      DVT prophylaxis  · Managed on subcutaneous Lovenox - patient is at high risk of DVT  Patient and wife are agreeable to Education for home  · SCDs     Pain  · Acute nociceptive pain located in left knee:  Managed on p r n  Tylenol, topical ice, adding topical Lidoderm patch, p r n  Oxycodone IR 5-10 mg q 4 hours p r n  for moderate to severe pain additionally  · Chronic neuropathic pain related to known lumbar radiculopathy:  gabapentin 300 mg t i d  Sees Dr Etienne Hearn as an outpatient  · Acute on chronic left-sided sciatica:  On exam patient with negative slump sit testing  Patient would benefit from myofascial stretching however difficult in the knee immobilizer  Patient is agreeable to increase gabapentin dose therefore increased to 300 mgs t i d        Bladder plan  · Continent     Bowel plan  · Continent      * Closed fracture of left distal femur (HCC)  Assessment & Plan  · Sustained after mechanical fall on 5/9/21  · Left distal femur fracture involving the medial femoral condyle  · Seen by Orthopedics and treated non operatively  · NWB LLE  · Knee immobilizer left knee at all times - with tubigrip to protect skin  · Lovenox for DVT prophylaxis  · Follow-up with orthopedics in 2 weeks - Dr Adeline Baird hypertension  Assessment & Plan  · Managed on lisinopril 20 mg daily (home dose)  · Internal medicine consultants managing    Carotid stenosis, asymptomatic, bilateral  Assessment & Plan  · Right (>70%)  Left (50-69%) carotid stenosis   · Managed on aspirin and Lipitor  · Due for repeat carotid ultrasound as outpatient  · Follow-up with vascular surgery recommended additionally    Electrolyte abnormality  Assessment & Plan  · Resolved    PAD (peripheral artery disease) ([address] )  Assessment & Plan  · Managed on aspirin and Lipitor  · Recommend repeat LEADS and vascular surgery follow-up as outpatient  · LEADS from August 2020:   · RIGHT LOWER LIMB:Evaluation shows a 50-75% stenosis in the distal superficial femoral artery    There is also evidence of tibio-peroneal arterial occlusive disease  LEFT LOWER LIMB:  This resting evaluation shows a high grade stenosis vs occlusion with trickle  flow in the distal superficial femoral artery  Hernia of abdominal cavity  Assessment & Plan  · Postoperative  · Stable    Insomnia  Assessment & Plan  · Order placed for melatonin (home dose)    Mixed hyperlipidemia  Assessment & Plan  · Managed on Lipitor 40 mg daily (home dose)    Tobacco abuse  Assessment & Plan  · Recommend cessation - patient is interested in quitting  · Nicotine patch ordered for cravings    History of liver transplant [facility])  Assessment & Plan  · History of OLT liver transplantation at [address] [place] [address] on 0/39/68 due to alcoholic cirrhosis and hepatocellular carcinoma  · Managed currently on Prograf 3 mg q 12 hours   · Prograf level of checked every 3-6 months - recent level 5 3, within range  · Patient follows yearly with [address] [place] [address] transplant clinic    Chronic gastroesophageal reflux disease  Assessment & Plan  · Managed on Protonix 20 mg b i d  (therapeutic exchange for home omeprazole)        Appreciate IM consultants medical co-management  Labs, medications, and imaging personally reviewed  ROS:  A ten point review of systems was completed on 05/17/21 and pertinent positives are listed in subjective section  All other systems reviewed were negative  OBJECTIVE:   /61 (BP Location: Right arm)   Pulse 78   Temp (!) 97 3 °F (36 3 °C) (Tympanic)   Resp 18   Ht 6' (1 829 m)   Wt 98 6 kg (217 lb 6 oz)   SpO2 94%   BMI 29 48 kg/m²       Physical Exam  Vitals signs and nursing note reviewed  Constitutional:       General: He is not in acute distress  HENT:      Head: Normocephalic and atraumatic  Nose: Nose normal       Mouth/Throat:      Mouth: Mucous membranes are moist    Eyes:      Conjunctiva/sclera: Conjunctivae normal    Neck:      Musculoskeletal: Neck supple     Cardiovascular:      Rate and Rhythm: Normal rate and regular rhythm  Pulses: Normal pulses  Pulmonary:      Effort: Pulmonary effort is normal       Breath sounds: Normal breath sounds  No wheezing or rales  Abdominal:      General: Bowel sounds are normal  There is no distension  Palpations: Abdomen is soft  Tenderness: There is no abdominal tenderness  Musculoskeletal:         General: No swelling  Comments: Improved left knee swelling  Slump sit testing negative for radicular pain down left leg  Patient with some palpable pain near the greater trochanteric bursa additionally on the left   Skin:     General: Skin is warm  Neurological:      Mental Status: He is alert and oriented to person, place, and time     Psychiatric:         Mood and Affect: Mood normal           Lab Results   Component Value Date    WBC 8 40 05/17/2021    HGB 13 7 05/17/2021    HCT 39 0 (L) 05/17/2021     05/17/2021     05/17/2021     Lab Results   Component Value Date    SODIUM 137 05/17/2021    K 3 9 05/17/2021     05/17/2021    CO2 30 05/17/2021    BUN 15 05/17/2021    CREATININE 1 07 05/17/2021    GLUC 98 05/17/2021    CALCIUM 9 8 05/17/2021     Lab Results   Component Value Date    INR 1 12 04/26/2019    INR 1 14 11/17/2018    INR 1 12 03/22/2018    PROTIME 14 5 (H) 04/26/2019    PROTIME 14 7 (H) 11/17/2018    PROTIME 14 4 03/22/2018         Current Facility-Administered Medications:     acetaminophen (TYLENOL) tablet 650 mg, 650 mg, Oral, Q6H PRN, Kayleigh Hinkle MD, 650 mg at 05/15/21 0612    ALPRAZolam (XANAX) tablet 0 25 mg, 0 25 mg, Oral, Daily PRN, Kayleigh Hinkle MD    aspirin chewable tablet 81 mg, 81 mg, Oral, Daily, ROSALINA Mariee, 81 mg at 05/17/21 0817    atorvastatin (LIPITOR) tablet 40 mg, 40 mg, Oral, Daily, Kayleigh Hinkle MD, 40 mg at 05/17/21 0816    bisacodyl (DULCOLAX) rectal suppository 10 mg, 10 mg, Rectal, Daily PRN, Kayleigh Hinkle MD, 10 mg at 05/14/21 0605    calcium carbonate (OYSTER SHELL,OSCAL) 500 mg tablet 2 tablet, 2 tablet, Oral, Daily With Breakfast, Riley Harris MD, 2 tablet at 05/17/21 7593    Diclofenac Sodium (VOLTAREN) 1 % topical gel 2 g, 2 g, Topical, 4x Daily PRN, Drew Rai, CRNP    docusate sodium (COLACE) capsule 100 mg, 100 mg, Oral, BID, Drew Rai, ROSALINA, 100 mg at 05/17/21 0818    enoxaparin (LOVENOX) subcutaneous injection 40 mg, 40 mg, Subcutaneous, Q24H [facility], Riley Harris MD, 40 mg at 05/17/21 0817    gabapentin (NEURONTIN) capsule 300 mg, 300 mg, Oral, TID, Riley Harris MD, 300 mg at 05/17/21 1552    lidocaine (LIDODERM) 5 % patch 1 patch, 1 patch, Topical, Daily, Riley Harris MD, 1 patch at 05/17/21 0818    lisinopril (ZESTRIL) tablet 20 mg, 20 mg, Oral, Daily, Riley Harris MD, 20 mg at 05/17/21 0817    magnesium hydroxide (MILK OF MAGNESIA) oral suspension 30 mL, 30 mL, Oral, Daily PRN, Riley Harris MD    melatonin tablet 6 mg, 6 mg, Oral, HS, Rliey Harris MD    multivitamin-minerals (CENTRUM) tablet 1 tablet, 1 tablet, Oral, Daily, Riley Harris MD, 1 tablet at 05/17/21 1145    nicotine (NICODERM CQ) 14 mg/24hr TD 24 hr patch 14 mg, 14 mg, Transdermal, Daily, ROSALINA Foster, 14 mg at 05/17/21 0819    ondansetron (ZOFRAN-ODT) dispersible tablet 4 mg, 4 mg, Oral, Q6H PRN, Riley Harris MD, 4 mg at 05/14/21 0840    oxyCODONE (ROXICODONE) immediate release tablet 10 mg, 10 mg, Oral, Q4H PRN, Riley Harris MD, 10 mg at 05/17/21 1505    oxyCODONE (ROXICODONE) IR tablet 5 mg, 5 mg, Oral, Q4H PRN, Riley Harris MD, 5 mg at 05/17/21 1004    pantoprazole (PROTONIX) EC tablet 20 mg, 20 mg, Oral, BID AC, Riley Harris MD, 20 mg at 05/17/21 1552    polyethylene glycol (MIRALAX) packet 17 g, 17 g, Oral, Daily, Riley Harris MD, 17 g at 05/16/21 0818    tacrolimus (PROGRAF) capsule 3 mg, 3 mg, Oral, Q12H [facility], Riley Harris MD, 3 mg at 05/17/21 0848    Past Medical History:   Diagnosis Date    [facility]ism [facility] [address] )     Arthritis     Bacterial peritonitis ([place] [address] )     Bowel disease     Cancer ([place] [address] )     liver    Cataract     Depression     Fracture     Gastroesophageal reflux     Hepatic disease     Hepatocellular carcinoma ([place] [address] )     History of colon polyps     Hypertension     Liver cancer ([place] [address] )     Liver disease     Stomach disease        Patient Active Problem List    Diagnosis Date Noted    Closed fracture of left distal femur ([place] [address] ) 05/09/2021     Priority: High    Essential hypertension 07/05/2016     Priority: Medium    Carotid stenosis, asymptomatic, bilateral 10/14/2020     Priority: Low    Hernia of abdominal cavity 05/12/2021    PAD (peripheral artery disease) ([place] [address] ) 05/12/2021    Electrolyte abnormality 05/12/2021    Fall 05/09/2021    Acute pain due to trauma 05/09/2021    Chronic low back pain 05/09/2021    Claudication in peripheral vascular disease ([place] [address] ) 08/26/2020    Leg cramps 07/15/2020    Medicare annual wellness visit, subsequent 12/09/2019    Insomnia 03/25/2019    Mixed hyperlipidemia 09/19/2018    Seasonal allergies 10/27/2017    Ringing in ears, bilateral 10/27/2017    Short-term memory loss 10/27/2017    Intervertebral disc disorder with radiculopathy of lumbar region 04/24/2017    Bursitis of both hips 02/17/2017    Sacroiliitis ([place] [address] ) 09/83/8155    Nonalcoholic liver disease, chronic 07/05/2016    Osteopenia 10/13/2015    Neck pain 06/27/2014    Right arm pain 06/27/2014    Lung mass 03/18/2014    Chronic gastroesophageal reflux disease 03/06/2014    History of liver transplant ([place] [address] ) 08/22/2013    Alcohol dependence in remission ([place] [address] ) 01/05/2012    Abnormal electrocardiogram 11/14/2011    PPD negative 11/14/2011    Tobacco abuse 11/14/2011          Alma Vidal MD    Total time spent:  30 minutes with more than 50% spent counseling/coordinating care   Counseling includes discussion with patient re: progress and discussion with patient of his/her current medical/functional state/information  Coordination of patient's care was performed in conjunction with consulting services  Time invested included review of patient's labs, vitals, and management of their comorbidities with continued monitoring  The care of the patient was extensively discussed and appropriate treatment plan was formulated unique for this patient  ** Please Note:  voice to text software may have been used in the creation of this document   Although proof errors in transcription or interpretation are a potential of such software**

## 2021-05-17 NOTE — ASSESSMENT & PLAN NOTE
Continue current pain regimen  Ordered gabapentin 300mg BID at home by Pain Management  Follows with Pain Management as outpatient for lumbar spinal stenosis and lumbar radiculopathy  Received steroid injections - last injection given on 11/4/2020  Ordered for next injection on 5/27/2021  Apply voltaren gel throughout the day as needed  Apply heating pad at HS

## 2021-05-18 ENCOUNTER — TELEPHONE (OUTPATIENT)
Dept: OBGYN CLINIC | Facility: HOSPITAL | Age: 66
End: 2021-05-18

## 2021-05-18 PROCEDURE — 97110 THERAPEUTIC EXERCISES: CPT

## 2021-05-18 PROCEDURE — 99232 SBSQ HOSP IP/OBS MODERATE 35: CPT | Performed by: INTERNAL MEDICINE

## 2021-05-18 PROCEDURE — 97530 THERAPEUTIC ACTIVITIES: CPT

## 2021-05-18 PROCEDURE — 97535 SELF CARE MNGMENT TRAINING: CPT

## 2021-05-18 PROCEDURE — 97116 GAIT TRAINING THERAPY: CPT

## 2021-05-18 PROCEDURE — 99233 SBSQ HOSP IP/OBS HIGH 50: CPT | Performed by: PHYSICAL MEDICINE & REHABILITATION

## 2021-05-18 RX ADMIN — TACROLIMUS 3 MG: 1 CAPSULE ORAL at 08:24

## 2021-05-18 RX ADMIN — OXYCODONE HYDROCHLORIDE 5 MG: 5 TABLET ORAL at 14:30

## 2021-05-18 RX ADMIN — DOCUSATE SODIUM 100 MG: 100 CAPSULE, LIQUID FILLED ORAL at 17:00

## 2021-05-18 RX ADMIN — Medication 14 MG: at 08:37

## 2021-05-18 RX ADMIN — MELATONIN TAB 3 MG 6 MG: 3 TAB at 21:02

## 2021-05-18 RX ADMIN — GABAPENTIN 300 MG: 300 CAPSULE ORAL at 21:02

## 2021-05-18 RX ADMIN — OXYCODONE HYDROCHLORIDE 5 MG: 5 TABLET ORAL at 18:36

## 2021-05-18 RX ADMIN — OXYCODONE HYDROCHLORIDE 10 MG: 10 TABLET ORAL at 22:44

## 2021-05-18 RX ADMIN — TACROLIMUS 3 MG: 1 CAPSULE ORAL at 21:02

## 2021-05-18 RX ADMIN — ASPIRIN 81 MG: 81 TABLET, CHEWABLE ORAL at 08:24

## 2021-05-18 RX ADMIN — OXYCODONE HYDROCHLORIDE 10 MG: 10 TABLET ORAL at 02:05

## 2021-05-18 RX ADMIN — ATORVASTATIN CALCIUM 40 MG: 40 TABLET, FILM COATED ORAL at 08:23

## 2021-05-18 RX ADMIN — OXYCODONE HYDROCHLORIDE 5 MG: 5 TABLET ORAL at 08:38

## 2021-05-18 RX ADMIN — DOCUSATE SODIUM 100 MG: 100 CAPSULE, LIQUID FILLED ORAL at 08:24

## 2021-05-18 RX ADMIN — GABAPENTIN 300 MG: 300 CAPSULE ORAL at 08:25

## 2021-05-18 RX ADMIN — Medication 1 TABLET: at 12:05

## 2021-05-18 RX ADMIN — CALCIUM 2 TABLET: 500 TABLET ORAL at 08:24

## 2021-05-18 RX ADMIN — LISINOPRIL 20 MG: 20 TABLET ORAL at 08:25

## 2021-05-18 RX ADMIN — LIDOCAINE 1 PATCH: 50 PATCH TOPICAL at 08:23

## 2021-05-18 RX ADMIN — PANTOPRAZOLE SODIUM 20 MG: 20 TABLET, DELAYED RELEASE ORAL at 06:16

## 2021-05-18 RX ADMIN — GABAPENTIN 300 MG: 300 CAPSULE ORAL at 17:00

## 2021-05-18 RX ADMIN — PANTOPRAZOLE SODIUM 20 MG: 20 TABLET, DELAYED RELEASE ORAL at 17:00

## 2021-05-18 RX ADMIN — ENOXAPARIN SODIUM 40 MG: 40 INJECTION SUBCUTANEOUS at 08:25

## 2021-05-18 NOTE — PLAN OF CARE
Problem: Potential for Falls  Goal: Patient will remain free of falls  Description: INTERVENTIONS:  - Assess patient frequently for physical needs  -  Identify cognitive and physical deficits and behaviors that affect risk of falls    -  [place] fall precautions as indicated by assessment   - Educate patient/family on patient safety including physical limitations  - Instruct patient to call for assistance with activity based on assessment  - Modify environment to reduce risk of injury  - Consider OT/PT consult to assist with strengthening/mobility  Outcome: Progressing     Problem: Prexisting or High Potential for Compromised Skin Integrity  Goal: Skin integrity is maintained or improved  Description: INTERVENTIONS:  - Identify patients at risk for skin breakdown  - Assess and monitor skin integrity  - Assess and monitor nutrition and hydration status  - Monitor labs   - Assess for incontinence   - Turn and reposition patient  - Assist with mobility/ambulation  - Relieve pressure over bony prominences  - Avoid friction and shearing  - Provide appropriate hygiene as needed including keeping skin clean and dry  - Evaluate need for skin moisturizer/barrier cream  - Collaborate with interdisciplinary team   - Patient/family teaching  - Consider wound care consult   Outcome: Progressing     Problem: PAIN - ADULT  Goal: Verbalizes/displays adequate comfort level or baseline comfort level  Description: Interventions:  - Encourage patient to monitor pain and request assistance  - Assess pain using appropriate pain scale  - Administer analgesics based on type and severity of pain and evaluate response  - Implement non-pharmacological measures as appropriate and evaluate response  - Consider cultural and social influences on pain and pain management  - Notify physician/advanced practitioner if interventions unsuccessful or patient reports new pain  Outcome: Progressing

## 2021-05-18 NOTE — PROGRESS NOTES
[address] [facility]  Progress Note - Shoshana Amato 1955, 63 y o  male MRN: [id number]  Unit/Bed#: -01 Encounter: [id number]  Primary Care Provider: Kristi Cisneros MD   Date and time admitted to hospital: 5/11/2021  4:55 PM    PAD (peripheral artery disease) [facility])  Assessment & Plan  Continue with aspirin 81mg daily and Lipitor 40mg daily  LEADS completed in 08/2020:  50-70% stenosis in the R distal superficial femoral artery  High grade stenosis/occlusion with trickle flow to the L distal superficial femoral artery  Neurovascular checks each shift  Recommend follow-up with Vascular Surgery  Hernia of abdominal cavity  Assessment & Plan  Stable  Repaired in 2011  Chronic low back pain  Assessment & Plan  Continue current pain regimen  Ordered gabapentin 300mg BID at home by Pain Management  Follows with Pain Management as outpatient for lumbar spinal stenosis and lumbar radiculopathy  Received steroid injections - last injection given on 11/4/2020  Ordered for next injection on 5/27/2021  Apply voltaren gel throughout the day as needed  Apply heating pad at HS  Carotid stenosis, asymptomatic, bilateral  Assessment & Plan  Last carotid ultrasound on 10/7/2020 showed:  >70% stenosis of the R ICA and 50-69% stenosis of the L ICA  Continue aspirin 81mg daily and Lipitor 40mg daily  Recommended for repeat US in 6 months - has order but did not complete yet  Recommend to follow-up with Vascular Surgery  Mixed hyperlipidemia  Assessment & Plan  Continue home dose of Lipitor 40mg daily  Last lipid panel on 10/26/2020 as follows: Cholesterol 159, Triglycerides 146, HDL 66, and LDL 64  Tobacco abuse  Assessment & Plan  Smokes cigarettes 1/2 pack a day for 40+ years  Interested in smoking cessation  Continue nicotine patch      History of liver transplant [facility])  Assessment & Plan  History of liver transplant in 9978 due to alcoholic cirrhosis and hepatocellular carcinoma  Follows with [facility] transplant clinic  Currently on Tacrolimus 3mg BID  PCP managing current dosage and has level checked every 3-6 months  Last Tacrolimus level 6 9 on 10/26/2020  Repeat Tacrolimus level 5 3 on , continue current dosing  Essential hypertension  Assessment & Plan  Continue with home dose of lisinopril 20mg daily  Monitor BP with routine VS   Avoid orthostasis  Chronic gastroesophageal reflux disease  Assessment & Plan  Stable  Takes omeprazole 20mg BID at home  Substitute with Protonix 20mg BID while inpatient  * Closed fracture of left distal femur [facility])  Assessment & Plan   - mechanical fall at home  Nondisplaced L medial femoral condyle fracture  Non-operative treatment  NWB to LLE with knee immobilizer  Pain control  DVT ppx with Lovenox  Follow-up with Ortho in 2 weeks (21)  PT/OT Therapies  Primary team following  VTE Pharmacologic Prophylaxis:   Pharmacologic: Enoxaparin (Lovenox)  Mechanical VTE Prophylaxis in Place: Yes - sequential compression devices  Current Length of Stay: 7 day(s)    Current Patient Status: Inpatient Rehab     Discharge Plan: As per primary team     Code Status: Level 1 - Full Code    Subjective:   Pt examined while pt sitting up in bed in pt room  Feels that his sciatica and lower back pain as improved  States that his L knee pain is just a mild ache and has noticed significant improvement since he first came in  Hopeful that he will be given in room privileges later today  Plans for discharge later this week and pt feels ready  Objective:     Vitals:   Temp (24hrs), Av 6 °F (36 4 °C), Min:97 3 °F (36 3 °C), Max:97 9 °F (36 6 °C)    Temp:  [97 3 °F (36 3 °C)-97 9 °F (36 6 °C)] 97 9 °F (36 6 °C)  HR:  [66-78] 77  Resp:  [18] 18  BP: (112-128)/(61-68) 128/68  SpO2:  [94 %-98 %] 96 %  Body mass index is 29 48 kg/m²       Review of Systems   Constitutional: Negative for chills, fatigue and fever  Respiratory: Negative for cough and shortness of breath  Cardiovascular: Negative for chest pain, palpitations and leg swelling  Gastrointestinal: Negative for abdominal distention, abdominal pain, constipation, diarrhea, nausea and vomiting  Genitourinary: Negative for difficulty urinating  Musculoskeletal: Positive for arthralgias (L knee pain, mild ache, improves with pain medication) and back pain (Mild lower backache today, improvement since yesterday, improved with heating pad)  Neurological: Negative for dizziness, weakness, light-headedness, numbness and headaches  Psychiatric/Behavioral: Negative for sleep disturbance  Input and Output Summary (last 24 hours): Intake/Output Summary (Last 24 hours) at 5/18/2021 0816  Last data filed at 5/17/2021 2001  Gross per 24 hour   Intake 1020 ml   Output 400 ml   Net 620 ml       Physical Exam:     Physical Exam  Vitals signs and nursing note reviewed  Constitutional:       Appearance: Normal appearance  HENT:      Head: Normocephalic and atraumatic  Cardiovascular:      Rate and Rhythm: Normal rate and regular rhythm  Pulses: Normal pulses  Heart sounds: Normal heart sounds  No murmur  No friction rub  Pulmonary:      Effort: Pulmonary effort is normal  No respiratory distress  Breath sounds: Normal breath sounds  No wheezing or rhonchi  Abdominal:      General: Abdomen is flat  Bowel sounds are normal  There is no distension  Palpations: Abdomen is soft  Tenderness: There is no abdominal tenderness  Musculoskeletal:      Right lower leg: No edema  Left lower leg: No edema  Comments: LLE knee immobilizer   Skin:     General: Skin is warm and dry  Neurological:      Mental Status: He is alert and oriented to person, place, and time     Psychiatric:         Mood and Affect: Mood normal          Behavior: Behavior normal          Additional Data:     Labs:    Results from last 7 days   Lab Units 05/17/21  0601   WBC Thousand/uL 8 40   HEMOGLOBIN g/dL 13 7   HEMATOCRIT % 39 0*   PLATELETS Thousands/uL 223   NEUTROS PCT % 76*   LYMPHS PCT % 14*   MONOS PCT % 7   EOS PCT % 2     Results from last 7 days   Lab Units 05/17/21  0601   SODIUM mmol/L 137   POTASSIUM mmol/L 3 9   CHLORIDE mmol/L 101   CO2 mmol/L 30   BUN mg/dL 15   CREATININE mg/dL 1 07   ANION GAP mmol/L 6   CALCIUM mg/dL 9 8   GLUCOSE RANDOM mg/dL 98                       Labs reviewed    Imaging:    Imaging reviewed    Recent Cultures (last 7 days):           Last 24 Hours Medication List:   Current Facility-Administered Medications   Medication Dose Route Frequency Provider Last Rate    acetaminophen  650 mg Oral Q6H PRN Mehrdad Julian MD      ALPRAZolam  0 25 mg Oral Daily PRN Mehrdad Julian MD      aspirin  81 mg Oral Daily ROSALINA Mixon      atorvastatin  40 mg Oral Daily Mehrdad Julian MD      bisacodyl  10 mg Rectal Daily PRN Mehrdad Julian MD      calcium carbonate  2 tablet Oral Daily With Breakfast Mehrdad Julian MD      Diclofenac Sodium  2 g Topical 4x Daily PRN ROSALINA Mixon      docusate sodium  100 mg Oral BID ROSALINA Mixon      enoxaparin  40 mg Subcutaneous Q24H [facility] Mehrdad Julian MD      gabapentin  300 mg Oral TID Mehrdad Julian MD      lidocaine  1 patch Topical Daily Mehrdad Julian MD      lisinopril  20 mg Oral Daily Mehrdad Julian MD      magnesium hydroxide  30 mL Oral Daily PRN Mehrdad Julian MD      melatonin  6 mg Oral HS Mehrdad Julian MD      multivitamin-minerals  1 tablet Oral Daily Mehrdad Julian MD      nicotine  14 mg Transdermal Daily ROSALINA Mixon      ondansetron  4 mg Oral Q6H PRN Mehrdad Julian MD      oxyCODONE  10 mg Oral Q4H PRN Mehrdad Julian MD      oxyCODONE  5 mg Oral Q4H PRN Mehrdad Julian MD      pantoprazole  20 mg Oral BID AC Mehrdad Julian MD      polyethylene glycol  17 g Oral Daily Mehrdad Julian MD      tacrolimus  3 mg Oral Q12H Raghav Smith MD          M*Modal software was used to dictate this note  It may contain errors with dictating incorrect words or incorrect spelling  Please contact the provider directly with any questions

## 2021-05-18 NOTE — NURSING NOTE
RN demonstrated giving lovenox injection to Yocasta Sousa and his wife  Area on abdomen was cleaned by  Yocasta Sousa with alcohol, and the wife injected the lovenox into the abdomen and also demonstrated retracting the needle guard  Will review again before discharge  Previously Declined (within the last year)

## 2021-05-18 NOTE — PLAN OF CARE
Problem: Potential for Falls  Goal: Patient will remain free of falls  Description: INTERVENTIONS:  - Assess patient frequently for physical needs  -  Identify cognitive and physical deficits and behaviors that affect risk of falls    -  [place] fall precautions as indicated by assessment   - Educate patient/family on patient safety including physical limitations  - Instruct patient to call for assistance with activity based on assessment  - Modify environment to reduce risk of injury  - Consider OT/PT consult to assist with strengthening/mobility  Outcome: Progressing     Problem: Prexisting or High Potential for Compromised Skin Integrity  Goal: Skin integrity is maintained or improved  Description: INTERVENTIONS:  - Identify patients at risk for skin breakdown  - Assess and monitor skin integrity  - Assess and monitor nutrition and hydration status  - Monitor labs   - Assess for incontinence   - Turn and reposition patient  - Assist with mobility/ambulation  - Relieve pressure over bony prominences  - Avoid friction and shearing  - Provide appropriate hygiene as needed including keeping skin clean and dry  - Evaluate need for skin moisturizer/barrier cream  - Collaborate with interdisciplinary team   - Patient/family teaching  - Consider wound care consult   Outcome: Progressing     Problem: PAIN - ADULT  Goal: Verbalizes/displays adequate comfort level or baseline comfort level  Description: Interventions:  - Encourage patient to monitor pain and request assistance  - Assess pain using appropriate pain scale  - Administer analgesics based on type and severity of pain and evaluate response  - Implement non-pharmacological measures as appropriate and evaluate response  - Consider cultural and social influences on pain and pain management  - Notify physician/advanced practitioner if interventions unsuccessful or patient reports new pain  Outcome: Progressing     Problem: INFECTION - ADULT  Goal: Absence or prevention of progression during hospitalization  Description: INTERVENTIONS:  - Assess and monitor for signs and symptoms of infection  - Monitor lab/diagnostic results  - Monitor all insertion sites, i e  indwelling lines, tubes, and drains  - Monitor endotracheal if appropriate and nasal secretions for changes in amount and color  - [place] appropriate cooling/warming therapies per order  - Administer medications as ordered  - Instruct and encourage patient and family to use good hand hygiene technique  - Identify and instruct in appropriate isolation precautions for identified infection/condition  Outcome: Progressing  Goal: Absence of fever/infection during neutropenic period  Description: INTERVENTIONS:  - Monitor WBC    Outcome: Progressing     Problem: SAFETY ADULT  Goal: Patient will remain free of falls  Description: INTERVENTIONS:  - Assess patient frequently for physical needs  -  Identify cognitive and physical deficits and behaviors that affect risk of falls    -  [place] fall precautions as indicated by assessment   - Educate patient/family on patient safety including physical limitations  - Instruct patient to call for assistance with activity based on assessment  - Modify environment to reduce risk of injury  - Consider OT/PT consult to assist with strengthening/mobility  Outcome: Progressing  Goal: Maintain or return to baseline ADL function  Description: INTERVENTIONS:  -  Assess patient's ability to carry out ADLs; assess patient's baseline for ADL function and identify physical deficits which impact ability to perform ADLs (bathing, care of mouth/teeth, toileting, grooming, dressing, etc )  - Assess/evaluate cause of self-care deficits   - Assess range of motion  - Assess patient's mobility; develop plan if impaired  - Assess patient's need for assistive devices and provide as appropriate  - Encourage maximum independence but intervene and supervise when necessary  - Involve family in performance of ADLs  - Assess for home care needs following discharge   - Consider OT consult to assist with ADL evaluation and planning for discharge  - Provide patient education as appropriate  Outcome: Progressing  Goal: Maintain or return mobility status to optimal level  Description: INTERVENTIONS:  - Assess patient's baseline mobility status (ambulation, transfers, stairs, etc )    - Identify cognitive and physical deficits and behaviors that affect mobility  - Identify mobility aids required to assist with transfers and/or ambulation (gait belt, sit-to-stand, lift, walker, cane, etc )  - [place] fall precautions as indicated by assessment  - Record patient progress and toleration of activity level on Mobility SBAR; progress patient to next Phase/Stage  - Instruct patient to call for assistance with activity based on assessment  - Consider rehabilitation consult to assist with strengthening/weightbearing, etc   Outcome: Progressing     Problem: DISCHARGE PLANNING  Goal: Discharge to home or other facility with appropriate resources  Description: INTERVENTIONS:  - Identify barriers to discharge w/patient and caregiver  - Arrange for needed discharge resources and transportation as appropriate  - Identify discharge learning needs (meds, wound care, etc )  - Arrange for interpretive services to assist at discharge as needed  - Refer to Case Management Department for coordinating discharge planning if the patient needs post-hospital services based on physician/advanced practitioner order or complex needs related to functional status, cognitive ability, or social support system  Outcome: Progressing     Problem: Nutrition/Hydration-ADULT  Goal: Nutrient/Hydration intake appropriate for improving, restoring or maintaining nutritional needs  Description: Monitor and assess patient's nutrition/hydration status for malnutrition  Collaborate with interdisciplinary team and initiate plan and interventions as ordered  Monitor patient's weight and dietary intake as ordered or per policy  Utilize nutrition screening tool and intervene as necessary  Determine patient's food preferences and provide high-protein, high-caloric foods as appropriate       INTERVENTIONS:  - Monitor oral intake, urinary output, labs, and treatment plans  - Assess nutrition and hydration status and recommend course of action  - Evaluate amount of meals eaten  - Assist patient with eating if necessary   - Allow adequate time for meals  - Recommend/ encourage appropriate diets, oral nutritional supplements, and vitamin/mineral supplements  - Order, calculate, and assess calorie counts as needed  - Recommend, monitor, and adjust tube feedings and TPN/PPN based on assessed needs  - Assess need for intravenous fluids  - Provide specific nutrition/hydration education as appropriate  - Include patient/family/caregiver in decisions related to nutrition  Outcome: Progressing

## 2021-05-18 NOTE — PROGRESS NOTES
PM&R PROGRESS NOTE:  Cecy Castaneda 63 y o  male MRN: [id number]  Unit/Bed#: -01 Encounter: [id number]           Rehab Diagnosis: Impairment of mobility, safety and Activities of Daily Living (ADLs) due to Orthopedic Disorders:  08 9  Other Orthopedic Left distal femur fracture of the medial femoral condyle     History of Present Illness:   Cecy Castaneda is a 63 y o  male history of liver transplantation in August 2013 at [facility] related to alcoholic cirrhosis and [address]  on chronic Prograf, incisional abdominal hernia, hypertension, PAD, carotid stenosis, GERD, known lumbar radiculopathy who presented to the [address] on 5/9/21 status post mechanical fall on stairs  No LOC  Imaging revealed a large left knee joint effusion and a left distal femur fracture involving the medial femoral condyle  Patient seen by Orthopedics, Dr Leny Hahn, and treated non operatively  Patient was deemed NWB left lower extremity and placed in a knee immobilizer at all times  Recommended to follow up in 2 weeks  Placed on Lovenox for DVT prophylaxis  Patient found to have acute functional deficits from his baseline, therefore was admitted to [facility] for acute inpatient rehabilitation  SUBJECTIVE:  Patient seen face to face on his way to therapy today  Reports better sleep last night  No other acute issues  Preparing for discharge later this week  Sciatica pain on the left leg about the same  ASSESSMENT: Stable, progressing      PLAN:    Rehabilitation   Functional deficits:  Left lower extremity weakness, impaired endurance, impaired balance, impaired mobility, self care   Continue current rehabilitation plan of care to maximize function       Function:  o OT: Supervision with UB ADLs, Min A for LB ADLs, Supervision with transfers using RW  o PT: Supervision with RW transfers and hopping short distances with RLE, Close supervision with wheelchair negotation   Estimated Discharge: Discharge home Thursday 5/20/21 with home care PT      DVT prophylaxis  · Managed on subcutaneous Lovenox - patient is at high risk of DVT  Patient and wife are agreeable to Education for home  · SCDs     Pain  · Acute nociceptive pain located in left knee:  Managed on p r n  Tylenol, topical ice, adding topical Lidoderm patch, p r n  Oxycodone IR 5-10 mg q 4 hours p r n  for moderate to severe pain additionally  · Chronic neuropathic pain related to known lumbar radiculopathy:  gabapentin 300 mg t i d  Sees Dr Livier Valerio as an outpatient  · Acute on chronic left-sided sciatica:  On exam patient with negative slump sit testing  Patient would benefit from myofascial stretching however difficult in the knee immobilizer    Patient is agreeable to increase gabapentin dose therefore increased to 300 mgs t i d on 5/17/21     Bladder plan  · Continent     Bowel plan  · Continent, Last BM 5/17/21      * Closed fracture of left distal femur (HCC)  Assessment & Plan  · Sustained after mechanical fall on 5/9/21  · Left distal femur fracture involving the medial femoral condyle  · Seen by Orthopedics and treated non operatively  · NWB LLE  · Knee immobilizer left knee at all times - with tubigrip to protect skin  · Lovenox for DVT prophylaxis  · Follow-up with orthopedics in 2 weeks - Dr Mike Husain hypertension  Assessment & Plan  · Managed on lisinopril 20 mg daily (home dose)  · Internal medicine consultants managing    Carotid stenosis, asymptomatic, bilateral  Assessment & Plan  · Right (>70%)  Left (50-69%) carotid stenosis   · Managed on aspirin and Lipitor  · Due for repeat carotid ultrasound as outpatient  · Follow-up with vascular surgery recommended additionally    Electrolyte abnormality  Assessment & Plan  · Resolved    PAD (peripheral artery disease) ([facility] [address] [address] )  Assessment & Plan  · Managed on aspirin and Lipitor  · Recommend repeat LEADS and vascular surgery follow-up as outpatient  · LEADS from August 2020:   · RIGHT LOWER LIMB:Evaluation shows a 50-75% stenosis in the distal superficial femoral artery  There is also evidence of tibio-peroneal arterial occlusive disease  LEFT LOWER LIMB:  This resting evaluation shows a high grade stenosis vs occlusion with trickle  flow in the distal superficial femoral artery  Hernia of abdominal cavity  Assessment & Plan  · Postoperative  · Stable    Insomnia  Assessment & Plan  · Continue Melatonin    Mixed hyperlipidemia  Assessment & Plan  · Managed on Lipitor 40 mg daily (home dose)    Tobacco abuse  Assessment & Plan  · Recommend cessation - patient is interested in quitting  · Nicotine patch ordered for cravings    History of liver transplant [facility])  Assessment & Plan  · History of OLT liver transplantation at [address] [place] [address] on 8/33/05 due to alcoholic cirrhosis and hepatocellular carcinoma  · Managed currently on Prograf 3 mg q 12 hours   · Prograf level of checked every 3-6 months - recent level 5 3, within range  · Patient follows yearly with [address] [place] [address] transplant clinic    Chronic gastroesophageal reflux disease  Assessment & Plan  · Managed on Protonix 20 mg b i d  (therapeutic exchange for home omeprazole)        Appreciate IM consultants medical co-management  Labs, medications, and imaging personally reviewed  ROS:  A ten point review of systems was completed on 05/18/21 and pertinent positives are listed in subjective section  All other systems reviewed were negative  OBJECTIVE:   /62   Pulse 77   Temp 97 9 °F (36 6 °C) (Tympanic)   Resp 18   Ht 6' (1 829 m)   Wt 98 6 kg (217 lb 6 oz)   SpO2 96%   BMI 29 48 kg/m²       Physical Exam  Vitals signs and nursing note reviewed  Constitutional:       General: He is not in acute distress  HENT:      Head: Normocephalic and atraumatic        Nose: Nose normal       Mouth/Throat:      Mouth: Mucous membranes are moist    Eyes:      Conjunctiva/sclera: Conjunctivae normal    Neck: Musculoskeletal: Neck supple  Cardiovascular:      Rate and Rhythm: Normal rate and regular rhythm  Pulses: Normal pulses  Pulmonary:      Effort: Pulmonary effort is normal       Breath sounds: Normal breath sounds  No wheezing or rales  Abdominal:      General: Bowel sounds are normal  There is no distension  Palpations: Abdomen is soft  Tenderness: There is no abdominal tenderness  Musculoskeletal:      Right lower leg: Edema present  Comments: Left knee immobilizer in place   Skin:     General: Skin is warm  Neurological:      Mental Status: He is alert and oriented to person, place, and time     Psychiatric:         Mood and Affect: Mood normal           Lab Results   Component Value Date    WBC 8 40 05/17/2021    HGB 13 7 05/17/2021    HCT 39 0 (L) 05/17/2021     05/17/2021     05/17/2021     Lab Results   Component Value Date    SODIUM 137 05/17/2021    K 3 9 05/17/2021     05/17/2021    CO2 30 05/17/2021    BUN 15 05/17/2021    CREATININE 1 07 05/17/2021    GLUC 98 05/17/2021    CALCIUM 9 8 05/17/2021     Lab Results   Component Value Date    INR 1 12 04/26/2019    INR 1 14 11/17/2018    INR 1 12 03/22/2018    PROTIME 14 5 (H) 04/26/2019    PROTIME 14 7 (H) 11/17/2018    PROTIME 14 4 03/22/2018         Current Facility-Administered Medications:     acetaminophen (TYLENOL) tablet 650 mg, 650 mg, Oral, Q6H PRN, Burt Vega MD, 650 mg at 05/15/21 0612    ALPRAZolam (XANAX) tablet 0 25 mg, 0 25 mg, Oral, Daily PRN, Burt Vega MD    aspirin chewable tablet 81 mg, 81 mg, Oral, Daily, ROSALINA Gupta, 81 mg at 05/18/21 0824    atorvastatin (LIPITOR) tablet 40 mg, 40 mg, Oral, Daily, Burt Vega MD, 40 mg at 05/18/21 8523    bisacodyl (DULCOLAX) rectal suppository 10 mg, 10 mg, Rectal, Daily PRN, Burt Vega MD, 10 mg at 05/14/21 0605    calcium carbonate (OYSTER SHELL,OSCAL) 500 mg tablet 2 tablet, 2 tablet, Oral, Daily With Breakfast, Pb Acosta Bethanie Ramsey MD, 2 tablet at 05/18/21 9668    Diclofenac Sodium (VOLTAREN) 1 % topical gel 2 g, 2 g, Topical, 4x Daily PRN, ROSALINA Foster    docusate sodium (COLACE) capsule 100 mg, 100 mg, Oral, BID, ROSALINA Foster, 100 mg at 05/18/21 0824    enoxaparin (LOVENOX) subcutaneous injection 40 mg, 40 mg, Subcutaneous, Q24H [address], Riley Harris MD, 40 mg at 05/18/21 0825    gabapentin (NEURONTIN) capsule 300 mg, 300 mg, Oral, TID, Riley Harris MD, 300 mg at 05/18/21 0825    lidocaine (LIDODERM) 5 % patch 1 patch, 1 patch, Topical, Daily, Riley Harris MD, 1 patch at 05/18/21 4439    lisinopril (ZESTRIL) tablet 20 mg, 20 mg, Oral, Daily, Riley Harris MD, 20 mg at 05/18/21 0825    magnesium hydroxide (MILK OF MAGNESIA) oral suspension 30 mL, 30 mL, Oral, Daily PRN, Riley Harris MD    melatonin tablet 6 mg, 6 mg, Oral, HS, Riley Harris MD, 6 mg at 05/17/21 2108    multivitamin-minerals (CENTRUM) tablet 1 tablet, 1 tablet, Oral, Daily, Riley Harris MD, 1 tablet at 05/17/21 1145    nicotine (NICODERM CQ) 14 mg/24hr TD 24 hr patch 14 mg, 14 mg, Transdermal, Daily, ROSALINA Foster, 14 mg at 05/18/21 0837    ondansetron (ZOFRAN-ODT) dispersible tablet 4 mg, 4 mg, Oral, Q6H PRN, Riley Harris MD, 4 mg at 05/14/21 0840    oxyCODONE (ROXICODONE) immediate release tablet 10 mg, 10 mg, Oral, Q4H PRN, Riley Harris MD, 10 mg at 05/18/21 0205    oxyCODONE (ROXICODONE) IR tablet 5 mg, 5 mg, Oral, Q4H PRN, Riley Harris MD, 5 mg at 05/18/21 0838    pantoprazole (PROTONIX) EC tablet 20 mg, 20 mg, Oral, BID AC, Riley Harris MD, 20 mg at 05/18/21 0616    polyethylene glycol (MIRALAX) packet 17 g, 17 g, Oral, Daily, Riley Harris MD, 17 g at 05/16/21 0818    tacrolimus (PROGRAF) capsule 3 mg, 3 mg, Oral, Q12H [address], Riley Harris MD, 3 mg at 05/18/21 7118    Past Medical History:   Diagnosis Date    Alcoholism ([facility] )     Arthritis     Bacterial peritonitis ([facility] )     Bowel disease     Cancer ([facility] ) liver    Cataract     Depression     Fracture     Gastroesophageal reflux     Hepatic disease     Hepatocellular carcinoma ([place] [address] )     History of colon polyps     Hypertension     Liver cancer ([place] [address] )     Liver disease     Stomach disease        Patient Active Problem List    Diagnosis Date Noted    Closed fracture of left distal femur ([place] [address] ) 05/09/2021     Priority: High    Essential hypertension 07/05/2016     Priority: Medium    Carotid stenosis, asymptomatic, bilateral 10/14/2020     Priority: Low    Hernia of abdominal cavity 05/12/2021    PAD (peripheral artery disease) ([place] [address] ) 05/12/2021    Electrolyte abnormality 05/12/2021    Fall 05/09/2021    Acute pain due to trauma 05/09/2021    Chronic low back pain 05/09/2021    Claudication in peripheral vascular disease ([place] [address] ) 08/26/2020    Leg cramps 07/15/2020    Medicare annual wellness visit, subsequent 12/09/2019    Insomnia 03/25/2019    Mixed hyperlipidemia 09/19/2018    Seasonal allergies 10/27/2017    Ringing in ears, bilateral 10/27/2017    Short-term memory loss 10/27/2017    Intervertebral disc disorder with radiculopathy of lumbar region 04/24/2017    Bursitis of both hips 02/17/2017    Sacroiliitis ([place] [address] ) 42/48/7030    Nonalcoholic liver disease, chronic 07/05/2016    Osteopenia 10/13/2015    Neck pain 06/27/2014    Right arm pain 06/27/2014    Lung mass 03/18/2014    Chronic gastroesophageal reflux disease 03/06/2014    History of liver transplant ([place] [address] ) 08/22/2013    Alcohol dependence in remission ([place] [address] ) 01/05/2012    Abnormal electrocardiogram 11/14/2011    PPD negative 11/14/2011    Tobacco abuse 11/14/2011          Mehrdad Julian MD    Total time spent:  30 minutes with more than 50% spent counseling/coordinating care  Counseling includes discussion with patient re: progress and discussion with patient of his/her current medical/functional state/information   Coordination of patient's care was performed in conjunction with consulting services  Time invested included review of patient's labs, vitals, and management of their comorbidities with continued monitoring  The care of the patient was extensively discussed and appropriate treatment plan was formulated unique for this patient  ** Please Note:  voice to text software may have been used in the creation of this document   Although proof errors in transcription or interpretation are a potential of such software**

## 2021-05-18 NOTE — TELEPHONE ENCOUNTER
Pt called today because he is scheduled for a Tfesi on 5/27/21 and fell on Mothers Day and broke his leg  He wants to know if he will still be able to get the injection since he may not be able to get on the table  Could you please reach ut for more details   Thanks

## 2021-05-18 NOTE — ASSESSMENT & PLAN NOTE
History of liver transplant in [address] due to alcoholic cirrhosis and hepatocellular carcinoma  [facility]  Currently on Tacrolimus 3mg BID  PCP managing current dosage and has level checked every 3-6 months  Last Tacrolimus level 6 9 on 10/26/2020  Repeat Tacrolimus level 5 3 on 5/12, continue current dosing

## 2021-05-18 NOTE — PROGRESS NOTES
05/18/21 0830   Pain Assessment   Pain Assessment Tool 0-10   Pain Score 6   Pain Location/Orientation Orientation: Left; Location: Knee   Restrictions/Precautions   LLE Weight Bearing Per Order NWB   Braces or Orthoses LE Immobilizer   Cognition   Overall Cognitive Status WFL   Arousal/Participation Alert; Cooperative   Attention Within functional limits   Orientation Level Oriented X4   Memory Within functional limits   Following Commands Follows multistep commands with increased time or repetition   Subjective   Subjective "When can I just move around on my own?" pt education for IRP assessment this PM with OT  Roll Left and Right   Type of Assistance Needed Independent   Amount of Physical Assistance Provided No physical assistance   Roll Left and Right CARE Score 6   Sit to Lying   Type of Assistance Needed Independent   Amount of Physical Assistance Provided No physical assistance   Sit to Lying CARE Score 6   Lying to Sitting on Side of Bed   Type of Assistance Needed Independent   Amount of Physical Assistance Provided No physical assistance   Comment self A for L LE lifting via brace strap  Lying to Sitting on Side of Bed CARE Score 6   Sit to Stand   Type of Assistance Needed Independent   Sit to Stand CARE Score 6   Bed-Chair Transfer   Type of Assistance Needed Independent   Comment w/ RW   Chair/Bed-to-Chair Transfer CARE Score 6   Walk 10 Feet   Type of Assistance Needed Independent   Amount of Physical Assistance Provided No physical assistance   Comment RW   Walk 10 Feet CARE Score 6   Walk 50 Feet with Two Turns   Type of Assistance Needed Independent   Amount of Physical Assistance Provided No physical assistance   Comment RW   Walk 50 Feet with Two Turns CARE Score 6   Walk 150 Feet   Comment plan for household amb only, WC level otherwise      Reason if not Attempted Safety concerns   Walk 150 Feet CARE Score 88   Walking 10 Feet on Uneven Surfaces   Reason if not Attempted Safety concerns Walking 10 Feet on Uneven Surfaces CARE Score 88   Ambulation   Does the patient walk? 2  Yes   Primary Mode of Locomotion Prior to Admission Walk   Assist Device Roller Walker   Gait Pattern Hopping   Walk Assist Level Modified Independent   Findings safely maintains NWB   Wheel 50 Feet with Two Turns   Type of Assistance Needed Independent   Wheel 50 Feet with Two Turns CARE Score 6   Wheel 150 Feet   Type of Assistance Needed Independent   Wheel 150 Feet CARE Score 6   Wheelchair mobility   Does the patient use a wheelchair? 1  Yes   Type of Wheelchair Used 1  Manual   Method Right upper extremity; Left upper extremity;Right lower extremity   Findings mod I with all WC mechanics and self propulsion  Therapeutic Interventions   Strengthening Supine L ankle 4 way with blue tband x30 each, quad sets x100, abd slides with slide board x30  AA SLR 3x10  Standing 4 way hip abd, add, ext, and flex with sink/RW support x30 each  Balance amb with RW over uneven ground: mat on floor 2x12 ft, then with items under mat to create more uneven surface 2x12 ft  Assessment   Treatment Assessment Pt participated well in 90 min skilled PT intervention with mod I prep for DC home in 2 days  OT to assess for IRPs this PM as pt demonstrates I with household amb, transfers, and RW use  HEP education provided for supine and standing TE, to provide written handout for DC home  Pt expresses concern about seeing a different ortho MD as pt would prefer to see Dr Patsy Magallon instead of Dr Lisa Ramsey, advised pt to discuss with Dr Nisha Justice as a f/u appointment needs to be scheduled  Pt very concerned about when he can drive and when he will be "normal" again  Prolonged explaination of status and education on recovery process provided  To review steps next day and possibly trial ramp with RW as well as review HEP handout  Pt cleared for IRPs from PT standpoint  OT aware   Pt with continued inability to SLR with L LE immobilizer, needs UE support for L LE mobilization  Problem List Decreased strength;Decreased range of motion;Decreased endurance; Impaired balance;Decreased mobility; Decreased safety awareness;Orthopedic restrictions;Pain   Barriers to Discharge Inaccessible home environment   PT Barriers   Physical Impairment Decreased endurance; Impaired balance;Decreased coordination;Orthopedic restrictions;Pain   Functional Limitation Car transfers;Stair negotiation;Standing;Transfers; Walking   Plan   Treatment/Interventions Functional transfer training;LE strengthening/ROM; Therapeutic exercise; Endurance training;Gait training;Equipment eval/education   Progress Improving as expected   Recommendation   PT Discharge Recommendation Home with home health rehabilitation   Equipment Recommended Wheelchair;Walker   PT Equipment ordered yes   PT Therapy Minutes   PT Time In 0830   PT Time Out 1000   PT Total Time (minutes) 90   PT Mode of treatment - Individual (minutes) 90   PT Mode of treatment - Concurrent (minutes) 0   PT Mode of treatment - Group (minutes) 0   PT Mode of treatment - Co-treat (minutes) 0   PT Mode of Treatment - Total time(minutes) 90 minutes   PT Cumulative Minutes 550   Therapy Time missed   Time missed?  No

## 2021-05-18 NOTE — PROGRESS NOTES
05/18/21 1230   Pain Assessment   Pain Assessment Tool 0-10   Pain Score 4   Pain Location/Orientation Orientation: Left; Location: Knee   Pain Onset/Description Onset: Ongoing; Descriptor: Aching   Effect of Pain on Daily Activities Tolerated   Patient's Stated Pain Goal No pain   Multiple Pain Sites No   Restrictions/Precautions   Precautions Fall Risk;Pain   Weight Bearing Restrictions Yes   LLE Weight Bearing Per Order NWB   ROM Restrictions Yes   LLE ROM Restriction Range Limitation  (no L knee flexion)   Braces or Orthoses LE Immobilizer   Sit to Lying   Type of Assistance Needed Independent   Amount of Physical Assistance Provided No physical assistance   Comment HOB flat   Sit to Lying CARE Score 6   Lying to Sitting on Side of Bed   Type of Assistance Needed Independent   Amount of Physical Assistance Provided No physical assistance   Comment Pt able to manage LLE and demonstrate G sitting balance   Lying to Sitting on Side of Bed CARE Score 6   Sit to Stand   Type of Assistance Needed Independent; Adaptive equipment   Amount of Physical Assistance Provided No physical assistance   Comment Pt independent with safe sit<>stand using RW   Sit to Stand CARE Score 6   Bed Mobility   Supine to Sit 7  Independent   Sit to Supine 7  Independent   Bed-Chair Transfer   Type of Assistance Needed Independent; Adaptive equipment   Amount of Physical Assistance Provided No physical assistance   Comment Pt able to perform safe SPT bed<>w/c transfer using RW   Chair/Bed-to-Chair Transfer CARE Score 6   Toilet Transfer   Type of Assistance Needed Independent; Adaptive equipment   Amount of Physical Assistance Provided No physical assistance   Comment Pt able to perform safe SPT with use of RW to raised toilet seat    Toilet Transfer CARE Score 6   Kitchen Mobility   Kitchen-Mobility Level Wheelchair   Kitchen Activity Retrieve items;Transport items   Kitchen Mobility Comments Pt engaged in fxnl kitchen mobility task from w/c level req pt to retrieve items from [facility] cabinet  Pt was independent with retrieving and transporting items back to kitchen table  OTAS edu pt on keeping frequently used items on a lower shelf or within reach  Pt reported that he does have a reacher to use at home  Transfers   Sit to Stand 7  Independent   Stand to Sit 7  Independent   Stand pivot 7  Independent   Toilet transfer 7  Independent   Therapeutic Excerise-Strength   UE Strength Yes   Right Upper Extremity- Strength   R Shoulder Flexion;ABduction; Extension;Horizontal ABduction; External rotation; Internal rotation   R Elbow Elbow flexion;Elbow extension   R Position Seated   Equipment Theraband  (Yellow)   R Weight/Reps/Sets 3 x 10   RUE Strength Comment Pt engaged in seated UB TE following HEP utilizing yellow theraband with focus of activity to inc pt's BUE strength for ADL fx and fnxl transfers and w/c mobility  Pt tolerated well with rest breaks in between and no reports of discomfort   Left Upper Extremity-Strength   L Shoulder Flexion;ABduction; Extension;Horizontal ABduction; External rotation; Internal rotation   L Elbow Elbow flexion;Elbow extension   L Position Seated   Equipment Theraband  (Yellow)   L Weights/Reps/Sets 3 x 10   LUE Strength Comment See above note   Cognition   Overall Cognitive Status WFL   Arousal/Participation Alert; Cooperative   Attention Within functional limits   Orientation Level Oriented X4   Memory Within functional limits   Following Commands Follows multistep commands with increased time or repetition   Additional Activities   Additional Activities Other (Comment)   Additional Activities Comments Pt engaged in fxnl w/c mobility around [facility] and outside the building  Pt was able to navigate around obstacles while avoiding hitting LLE  Pt reported that his wife rearranged furniture in house to create more open environment for him upon D/C      Activity Tolerance   Activity Tolerance Patient tolerated treatment well Assessment   Treatment Assessment Pt engaged in 98 min skilled OT Tx session with focus on progressing to IRP's, fxnl w/c mobility, kitchen mobility, and UB TE  See above for further Tx details  Pt was evaluated for IRP's this session, and was able to independently move from bed to wheelchair to the bathroom using RW  Pt was able to get in and out of bed independently showing G balance and ability to manage LLE  OTAS introduced and reviewed home exercise program, pt completed UB exercises using yellow theraband, 3 sets of 10 on each arm  OTAS edu pt on only using his R leg to hold theraband down  Pt tolerated well with rest breaks in between  Pt completed kitchen mobility activity independently from w/c level focusing on retrieving and transporting items from the fridge and overhead cabinet  OTAS edu pt on keeping frequently used items on the counter or in reach, and pt reported that he does have reacher to use at home for kitchen mobility tasks  Pt participated in w/c mobility around [facility] and outside, showing ability to safely navigate around obstacles  Pt reported that wife rearranged furniture in house to create more open environment  Pt would continue to benefit from skilled OT services to inc independence with IADL and ADL goals upon D/C  Prognosis Good   Problem List Decreased strength;Decreased endurance; Impaired balance;Decreased mobility;Pain   Barriers to Discharge Inaccessible home environment   Plan   Treatment/Interventions ADL retraining;Functional transfer training;LE strengthening/ROM; Therapeutic exercise; Endurance training;Patient/family training;Equipment eval/education; Bed mobility   Progress Progressing toward goals   Recommendation   OT Discharge Recommendation Home with home health rehabilitation  (Home PT)   OT Therapy Minutes   OT Time In 1230   OT Time Out 1408   OT Total Time (minutes) 98   OT Mode of treatment - Individual (minutes) 98   OT Mode of treatment - Concurrent (minutes) 0 OT Mode of treatment - Group (minutes) 0   OT Mode of treatment - Co-treat (minutes) 0   OT Mode of Treatment - Total time(minutes) 98 minutes   OT Cumulative Minutes 638   Therapy Time missed   Time missed?  No

## 2021-05-18 NOTE — TELEPHONE ENCOUNTER
Latonya's wife called in requesting an appt with Dr Hammond for a follow up sent an email to practice admin for more assistance  MRN: [id number]    Patient Name: Caryl TAYLOR B: [birth year]    Department & Location: ortho, [facility]    Appointment Notes:  CHIDI L knee fx, [email or web address], no sx, CS 5/18 ONLY WANTS LASHA     Visit Type: new patient     Provider Name: Silvia Delgadillo    Appointment Desired Day: as soon as possible      Best CB#: [phone] - wife     Please advise,

## 2021-05-18 NOTE — TELEPHONE ENCOUNTER
RANJIT  S/w pt, states he suffered a fall on mothers day and has been in rehab since, pt expecting to be discharged home on Thursday  Pt states he is completely non weight bearing on leg and is in an immobilizer  Pt expressed concern over getting on procedure table for TFESI  Pt informed staff will assist in any way needed, pt will be able to bring wheelchair into the procedure room then transfer directly to procedure table  Pt verbalized understanding and is comfortable with staff assisting  Pt states he should be okay to lay on stomach, but may need to have pillows to prop his body in order to tolerate positioning for procedure  Pt states he would really like to have procedure done if FQ agreeable as his back pain has worsened with physical therapy and using wheelchair  Advised pt that staff would only cb if appt needs to be cancelled or changed, otherwise proceed with appt as scheduled  Pt verbalized understanding

## 2021-05-19 PROCEDURE — 97535 SELF CARE MNGMENT TRAINING: CPT

## 2021-05-19 PROCEDURE — 97530 THERAPEUTIC ACTIVITIES: CPT

## 2021-05-19 PROCEDURE — 99232 SBSQ HOSP IP/OBS MODERATE 35: CPT | Performed by: INTERNAL MEDICINE

## 2021-05-19 PROCEDURE — 97116 GAIT TRAINING THERAPY: CPT

## 2021-05-19 PROCEDURE — 97110 THERAPEUTIC EXERCISES: CPT

## 2021-05-19 PROCEDURE — 99233 SBSQ HOSP IP/OBS HIGH 50: CPT | Performed by: PHYSICAL MEDICINE & REHABILITATION

## 2021-05-19 RX ADMIN — TACROLIMUS 3 MG: 1 CAPSULE ORAL at 21:17

## 2021-05-19 RX ADMIN — GABAPENTIN 300 MG: 300 CAPSULE ORAL at 08:29

## 2021-05-19 RX ADMIN — GABAPENTIN 300 MG: 300 CAPSULE ORAL at 21:17

## 2021-05-19 RX ADMIN — DOCUSATE SODIUM 100 MG: 100 CAPSULE, LIQUID FILLED ORAL at 17:12

## 2021-05-19 RX ADMIN — ASPIRIN 81 MG: 81 TABLET, CHEWABLE ORAL at 08:29

## 2021-05-19 RX ADMIN — CALCIUM 2 TABLET: 500 TABLET ORAL at 08:30

## 2021-05-19 RX ADMIN — ENOXAPARIN SODIUM 40 MG: 40 INJECTION SUBCUTANEOUS at 14:18

## 2021-05-19 RX ADMIN — OXYCODONE HYDROCHLORIDE 10 MG: 10 TABLET ORAL at 23:11

## 2021-05-19 RX ADMIN — OXYCODONE HYDROCHLORIDE 5 MG: 5 TABLET ORAL at 10:40

## 2021-05-19 RX ADMIN — Medication 14 MG: at 08:29

## 2021-05-19 RX ADMIN — TACROLIMUS 3 MG: 1 CAPSULE ORAL at 08:29

## 2021-05-19 RX ADMIN — DOCUSATE SODIUM 100 MG: 100 CAPSULE, LIQUID FILLED ORAL at 08:29

## 2021-05-19 RX ADMIN — Medication 1 TABLET: at 11:37

## 2021-05-19 RX ADMIN — GABAPENTIN 300 MG: 300 CAPSULE ORAL at 16:15

## 2021-05-19 RX ADMIN — PANTOPRAZOLE SODIUM 20 MG: 20 TABLET, DELAYED RELEASE ORAL at 06:12

## 2021-05-19 RX ADMIN — MELATONIN TAB 3 MG 6 MG: 3 TAB at 21:17

## 2021-05-19 RX ADMIN — OXYCODONE HYDROCHLORIDE 10 MG: 10 TABLET ORAL at 14:53

## 2021-05-19 RX ADMIN — PANTOPRAZOLE SODIUM 20 MG: 20 TABLET, DELAYED RELEASE ORAL at 16:15

## 2021-05-19 RX ADMIN — LISINOPRIL 20 MG: 20 TABLET ORAL at 08:29

## 2021-05-19 RX ADMIN — ATORVASTATIN CALCIUM 40 MG: 40 TABLET, FILM COATED ORAL at 08:29

## 2021-05-19 RX ADMIN — LIDOCAINE 1 PATCH: 50 PATCH TOPICAL at 08:28

## 2021-05-19 RX ADMIN — OXYCODONE HYDROCHLORIDE 10 MG: 10 TABLET ORAL at 03:36

## 2021-05-19 RX ADMIN — OXYCODONE HYDROCHLORIDE 5 MG: 5 TABLET ORAL at 19:11

## 2021-05-19 NOTE — PROGRESS NOTES
05/19/21 1000   Pain Assessment   Pain Assessment Tool 0-10   Pain Score 5   Pain Location/Orientation Orientation: Left; Location: Knee   Pain Onset/Description Onset: Ongoing; Descriptor: Aching   Effect of Pain on Daily Activities Tolerated   Patient's Stated Pain Goal No pain   Hospital Pain Intervention(s) Shower/Bath   Multiple Pain Sites No   Restrictions/Precautions   Precautions Fall Risk;Pain   Weight Bearing Restrictions Yes   LLE Weight Bearing Per Order NWB   ROM Restrictions Yes   LLE ROM Restriction Range Limitation  (No L knee flexion)   Braces or Orthoses LE Immobilizer   Oral Hygiene   Type of Assistance Needed Independent   Amount of Physical Assistance Provided No physical assistance   Comment Pt seated in w/c at sink for oral hygiene routine  Reports that he has upper/lower dentures so completed rinsing of mouth and brushing of tongue  Oral Hygiene CARE Score 6   Shower/Bathe Self   Type of Assistance Needed Independent   Amount of Physical Assistance Provided No physical assistance   Comment Pt completed shower with use of shower bench, able to wash 10/10 parts  Pt demonstrated G balance and ability to maintain LLE NWB  Pt utilized grab bar to stand, which he reported he does have at [employer] [facility] CARE Score 6   Bathing   Assessed Bath Style Shower   Anticipated D/C Bath Style Shower   Able to THUBIT No   Able to [employer] Temperature Yes   Able to Wash/Rinse/Dry (body part) Left Arm;Right Arm;L Upper Leg;R Upper Leg;L Lower Leg/Foot;R Lower Leg/Foot;Chest;Abdomen;Perineal Area; Buttocks   Limitations Noted in Balance; Endurance;Strength   Positioning Seated;Standing   Adaptive Equipment Shower Bars;Hand Held Shower; Tub Bench   Tub/Shower Transfer   Limitations Noted In Balance; Endurance; Safety   Adaptive Equipment Grab Bars;Transfer Bench   Assessed Shower   Upper Body Dressing   Type of Assistance Needed Independent   Amount of Physical Assistance Provided No physical assistance   Comment pt seated in w/c for UB dressing, able to thread b/l UE in shirt sleeves and adjust over body  Upper Body Dressing CARE Score 6   Lower Body Dressing   Type of Assistance Needed Independent   Amount of Physical Assistance Provided No physical assistance   Comment Pt able to don undergarment and shorts through LLE and complete CM demonstrating G balance in stance  Pt able to doff LE immobilizer and clean leg with wet wipe  Able to fasten immobilizer back on leg  Lower Body Dressing CARE Score 6   Putting On/Taking Off Footwear   Type of Assistance Needed Independent   Amount of Physical Assistance Provided No physical assistance   Comment Pt able to don/doff socks using a sock aide and reacher  Putting On/Taking Off Footwear CARE Score 6   Dressing/Undressing Clothing   Remove UB Clothes Pullover Shirt   Don UB Clothes Pullover Shirt   Remove LB Clothes Shorts;Socks; Undergarment   Don LB Clothes Shorts; Undergarment;Socks   Limitations Noted In Balance; Endurance;Strength;ROM; Timeliness   Adaptive Equipment Reacher;Sock Aide   Positioning Supported Sit;Standing   Sit to Lying   Type of Assistance Needed Independent   Amount of Physical Assistance Provided No physical assistance   Comment HOB flat   Sit to Lying CARE Score 6   Lying to Sitting on Side of Bed   Type of Assistance Needed Independent   Amount of Physical Assistance Provided No physical assistance   Comment Pt able to manage LLE and demonstrate G sitting balance   Lying to Sitting on Side of Bed CARE Score 6   Sit to Stand   Type of Assistance Needed Independent   Amount of Physical Assistance Provided No physical assistance   Comment Pt independent with safe sit<>stand using Rw   Sit to Stand CARE Score 6   Bed Mobility   Supine to Sit 7  Independent   Sit to Supine 7  Independent   Bed-Chair Transfer   Type of Assistance Needed Independent; Adaptive equipment   Amount of Physical Assistance Provided No physical assistance   Comment Pt able to perform safe SPT bed<>w/c transfer using Rw   Chair/Bed-to-Chair Transfer CARE Score 6   Functional Standing Tolerance   Time 3-4 minutes x 2   Activity Standing balance activity with balloon and ball focusing on alternating hands and unilateral hand release to dec risk of falls  First time pt stood was supported, second time was unsupported  Comments Pt had no LOB and was able to maintain LLE NWB status  Transfers   Sit to Stand 7  Independent   Stand to Sit 7  Independent   Stand pivot 7  Independent   Cognition   Overall Cognitive Status [facility]   Arousal/Participation Alert; Cooperative   Attention Within functional limits   Orientation Level Oriented X4   Memory Within functional limits   Following Commands Follows multistep commands with increased time or repetition   Additional Activities   Additional Activities Other (Comment)   Additional Activities Comments Pt engaged in fxnl w/c mobility around [facility] and outside the building  Pt was able to navigate around obstacles while avoiding hitting LLE  Pt able to get in and out of elevator avoiding hitting LLE  Activity Tolerance   Activity Tolerance Patient tolerated treatment well   Assessment   Treatment Assessment Pt engaged in 90 min skilled OT tx session with focus on ADL fx, fxnl w/c mobility, and fnxl standing balance  See above for further Tx details  Pt completed shower routine and with overall I, able to wash 10/10 parts  LLE bagged with focus on LLE immobilizer remaining dry  PT ordered additional immobilizer for showering at home, so pt will have that upon D/C  Pt was I with UB/LB dressing, and was able to thread b/l UE and LE into shirt and shorts, and able to use sock aide to don socks onto feet  Pt able to doff/don immobilizer with vc from OTAS and also able to clean under immobilizer with wet wipe  Pt will be receiving separate immobilizer to shower upon D/C   Pt reported having reacher, shower chair/built-in bench seat, grab bars, w/c, and walker at home  Pt engaged in fxnl w/c mobility while going up ramps and hills  Pt performed fnxl transfers with independence, demonstrating G standing balance and ability to maintain LLE NWB  Pt engaged in standing balance activity involving throwing and bouncing balloon and ball, 2 times each for about 3-4 minutes with focus on alternating hands and unilateral hand release  Pt tolerated well with rest breaks in between  Pt scheduled for D/C tomorrow  Prognosis Good   Problem List Decreased strength;Decreased endurance; Impaired balance;Decreased mobility;Pain   Barriers to Discharge None   Plan   Treatment/Interventions ADL retraining;Functional transfer training;LE strengthening/ROM; Therapeutic exercise; Endurance training;Patient/family training;Equipment eval/education; Bed mobility   Progress Progressing toward goals   Recommendation   OT Discharge Recommendation Home with home health rehabilitation  (Home PT)   OT Therapy Minutes   OT Time In 1000   OT Time Out 1130   OT Total Time (minutes) 90   OT Mode of treatment - Individual (minutes) 90   OT Mode of treatment - Concurrent (minutes) 0   OT Mode of treatment - Group (minutes) 0   OT Mode of treatment - Co-treat (minutes) 0   OT Mode of Treatment - Total time(minutes) 90 minutes   OT Cumulative Minutes 728   Therapy Time missed   Time missed?  No

## 2021-05-19 NOTE — PROGRESS NOTES
[facility]  Progress Note - Floyce Prudent 1955, 63 y o  male MRN: [id number]  Unit/Bed#: [facility] 262-01 Encounter: [id number]  Primary Care Provider: Cristy Jack MD   Date and time admitted to hospital: 5/11/2021  4:55 PM    PAD (peripheral artery disease) [facility])  Assessment & Plan  Continue with aspirin 81mg daily and Lipitor 40mg daily  LEADS completed in 08/2020:  50-70% stenosis in the R distal superficial femoral artery  High grade stenosis/occlusion with trickle flow to the L distal superficial femoral artery  Neurovascular checks each shift  Recommend follow-up with Vascular Surgery  Hernia of abdominal cavity  Assessment & Plan  Stable  Repaired in 2011  Chronic low back pain  Assessment & Plan  Continue current pain regimen  Ordered gabapentin 300mg BID at home by Pain Management  Follows with Pain Management as outpatient for lumbar spinal stenosis and lumbar radiculopathy  Received steroid injections - last injection given on 11/4/2020  Ordered for next injection on 5/27/2021  Apply voltaren gel throughout the day as needed  Apply heating pad at HS  Gabapentin increased to 300mg TID  Carotid stenosis, asymptomatic, bilateral  Assessment & Plan  Last carotid ultrasound on 10/7/2020 showed:  >70% stenosis of the R ICA and 50-69% stenosis of the L ICA  Continue aspirin 81mg daily and Lipitor 40mg daily  Recommended for repeat US in 6 months - has order but did not complete yet  Recommend to follow-up with Vascular Surgery  Mixed hyperlipidemia  Assessment & Plan  Continue home dose of Lipitor 40mg daily  Last lipid panel on 10/26/2020 as follows: Cholesterol 159, Triglycerides 146, HDL 66, and LDL 64  Tobacco abuse  Assessment & Plan  Smokes cigarettes 1/2 pack a day for 40+ years  Interested in smoking cessation  Continue nicotine patch      History of liver transplant [facility])  Assessment & Plan  History of liver transplant in 2013 due to alcoholic cirrhosis and hepatocellular carcinoma  Follows with [facility] transplant clinic  Currently on Tacrolimus 3mg BID  PCP managing current dosage and has level checked every 3-6 months  Last Tacrolimus level 6 9 on 10/26/2020  Repeat Tacrolimus level 5 3 on , continue current dosing  Essential hypertension  Assessment & Plan  Continue with home dose of lisinopril 20mg daily  Monitor BP with routine VS   Avoid orthostasis  Chronic gastroesophageal reflux disease  Assessment & Plan  Stable  Takes omeprazole 20mg BID at home  Substitute with Protonix 20mg BID while inpatient  * Closed fracture of left distal femur [facility])  Assessment & Plan   - mechanical fall at home  Nondisplaced L medial femoral condyle fracture  Non-operative treatment  NWB to LLE with knee immobilizer  Pain control  DVT ppx with Lovenox  Follow-up with Ortho in 2 weeks (21)  Scheduled for outpatient follow-up with Dr Isidro Barkley on   PT/OT Therapies  Primary team following  VTE Pharmacologic Prophylaxis:   Pharmacologic: Enoxaparin (Lovenox)  Mechanical VTE Prophylaxis in Place: Yes - sequential compression devices  Current Length of Stay: 8 day(s)    Current Patient Status: Inpatient Rehab     Discharge Plan: As per primary team     Code Status: Level 1 - Full Code    Subjective:   Pt examined while pt sitting up in bed in pt room  No complaints of pain currently  States that when he is experiencing pain it is very minimal and well-controlled  Ortho appointment follow-up scheduled for   Plans for discharge tomorrow  Objective:     Vitals:   Temp (24hrs), Av 2 °F (36 8 °C), Min:97 9 °F (36 6 °C), Max:98 7 °F (37 1 °C)    Temp:  [97 9 °F (36 6 °C)-98 7 °F (37 1 °C)] 98 7 °F (37 1 °C)  HR:  [71-85] 85  Resp:  [18] 18  BP: (126-143)/(66-69) 132/67  SpO2:  [97 %-99 %] 97 %  Body mass index is 29 48 kg/m²       Review of Systems   Constitutional: Negative for chills, fatigue and fever  Respiratory: Negative for cough and shortness of breath  Cardiovascular: Negative for chest pain, palpitations and leg swelling  Gastrointestinal: Negative for abdominal distention, abdominal pain, constipation, diarrhea, nausea and vomiting  LBM 5/18   Genitourinary: Negative for difficulty urinating  Musculoskeletal: Negative for arthralgias and back pain (currently denies pain but states that when he has pain it is now minimal ache)  Neurological: Negative for dizziness, light-headedness, numbness and headaches  Psychiatric/Behavioral: Negative for sleep disturbance  Input and Output Summary (last 24 hours): Intake/Output Summary (Last 24 hours) at 5/19/2021 0846  Last data filed at 5/19/2021 0301  Gross per 24 hour   Intake 1080 ml   Output 825 ml   Net 255 ml       Physical Exam:     Physical Exam  Vitals signs and nursing note reviewed  Constitutional:       Appearance: Normal appearance  HENT:      Head: Normocephalic and atraumatic  Neck:      Musculoskeletal: Normal range of motion and neck supple  Cardiovascular:      Rate and Rhythm: Normal rate and regular rhythm  Pulses: Normal pulses  Heart sounds: Normal heart sounds  No murmur  No friction rub  Pulmonary:      Effort: Pulmonary effort is normal  No respiratory distress  Breath sounds: Normal breath sounds  No wheezing or rhonchi  Abdominal:      General: Abdomen is flat  Bowel sounds are normal  There is no distension  Palpations: Abdomen is soft  Tenderness: There is no abdominal tenderness  Musculoskeletal:      Right lower leg: No edema  Left lower leg: No edema  Comments: LLE knee immobilizer in place  Skin:     General: Skin is warm and dry  Neurological:      Mental Status: He is alert and oriented to person, place, and time     Psychiatric:         Mood and Affect: Mood normal          Behavior: Behavior normal          Additional Data:     Labs:    Results from last 7 days   Lab Units 05/17/21  0601   WBC Thousand/uL 8 40   HEMOGLOBIN g/dL 13 7   HEMATOCRIT % 39 0*   PLATELETS Thousands/uL 223   NEUTROS PCT % 76*   LYMPHS PCT % 14*   MONOS PCT % 7   EOS PCT % 2     Results from last 7 days   Lab Units 05/17/21  0601   SODIUM mmol/L 137   POTASSIUM mmol/L 3 9   CHLORIDE mmol/L 101   CO2 mmol/L 30   BUN mg/dL 15   CREATININE mg/dL 1 07   ANION GAP mmol/L 6   CALCIUM mg/dL 9 8   GLUCOSE RANDOM mg/dL 98                       Labs reviewed    Imaging:    Imaging reviewed    Recent Cultures (last 7 days):           Last 24 Hours Medication List:   Current Facility-Administered Medications   Medication Dose Route Frequency Provider Last Rate    acetaminophen  650 mg Oral Q6H PRN Claudia Hampton MD      ALPRAZolam  0 25 mg Oral Daily PRN Claudia Hampton MD      aspirin  81 mg Oral Daily ROSALINA Ulloa      atorvastatin  40 mg Oral Daily Claudia Hampton MD      bisacodyl  10 mg Rectal Daily PRN Claudia Hampton MD      calcium carbonate  2 tablet Oral Daily With Breakfast Claudia Hampton MD      Diclofenac Sodium  2 g Topical 4x Daily PRN ROSALINA Ulloa      docusate sodium  100 mg Oral BID ROSALINA Ulloa      enoxaparin  40 mg Subcutaneous Q24H [address] Claudia Hampton MD      gabapentin  300 mg Oral TID Claudia Hampton MD      lidocaine  1 patch Topical Daily Claudia Hampton MD      lisinopril  20 mg Oral Daily Claudia Hampton MD      magnesium hydroxide  30 mL Oral Daily PRN Claudia Hampton MD      melatonin  6 mg Oral HS Claudia Hampton MD      multivitamin-minerals  1 tablet Oral Daily Claudia Hampton MD      nicotine  14 mg Transdermal Daily ROSALINA Ulloa      ondansetron  4 mg Oral Q6H PRN Claudia Hampton MD      oxyCODONE  10 mg Oral Q4H PRN Claudia Hampton MD      oxyCODONE  5 mg Oral Q4H PRN Claudia Hampton MD      pantoprazole  20 mg Oral BID AC Claudia Hampton MD      polyethylene glycol  17 g Oral Daily Claudia Hampton MD  tacrolimus  3 mg Oral Q12H Raghav Smith MD          M*Modal software was used to dictate this note  It may contain errors with dictating incorrect words or incorrect spelling  Please contact the provider directly with any questions

## 2021-05-19 NOTE — PLAN OF CARE
Problem: Potential for Falls  Goal: Patient will remain free of falls  Description: INTERVENTIONS:  - Assess patient frequently for physical needs  -  Identify cognitive and physical deficits and behaviors that affect risk of falls  -  [place] fall precautions as indicated by assessment   - Educate patient/family on patient safety including physical limitations  - Instruct patient to call for assistance with activity based on assessment  - Modify environment to reduce risk of injury  - Consider OT/PT consult to assist with strengthening/mobility  Outcome: Progressing     Problem: PAIN - ADULT  Goal: Verbalizes/displays adequate comfort level or baseline comfort level  Description: Interventions:  - Encourage patient to monitor pain and request assistance  - Assess pain using appropriate pain scale  - Administer analgesics based on type and severity of pain and evaluate response  - Implement non-pharmacological measures as appropriate and evaluate response  - Consider cultural and social influences on pain and pain management  - Notify physician/advanced practitioner if interventions unsuccessful or patient reports new pain  Outcome: Progressing     Problem: SAFETY ADULT  Goal: Patient will remain free of falls  Description: INTERVENTIONS:  - Assess patient frequently for physical needs  -  Identify cognitive and physical deficits and behaviors that affect risk of falls    -  [place] fall precautions as indicated by assessment   - Educate patient/family on patient safety including physical limitations  - Instruct patient to call for assistance with activity based on assessment  - Modify environment to reduce risk of injury  - Consider OT/PT consult to assist with strengthening/mobility  Outcome: Progressing  Goal: Maintain or return to baseline ADL function  Description: INTERVENTIONS:  -  Assess patient's ability to carry out ADLs; assess patient's baseline for ADL function and identify physical deficits which impact ability to perform ADLs (bathing, care of mouth/teeth, toileting, grooming, dressing, etc )  - Assess/evaluate cause of self-care deficits   - Assess range of motion  - Assess patient's mobility; develop plan if impaired  - Assess patient's need for assistive devices and provide as appropriate  - Encourage maximum independence but intervene and supervise when necessary  - Involve family in performance of ADLs  - Assess for home care needs following discharge   - Consider OT consult to assist with ADL evaluation and planning for discharge  - Provide patient education as appropriate  Outcome: Progressing  Goal: Maintain or return mobility status to optimal level  Description: INTERVENTIONS:  - Assess patient's baseline mobility status (ambulation, transfers, stairs, etc )    - Identify cognitive and physical deficits and behaviors that affect mobility  - Identify mobility aids required to assist with transfers and/or ambulation (gait belt, sit-to-stand, lift, walker, cane, etc )  - [place] fall precautions as indicated by assessment  - Record patient progress and toleration of activity level on Mobility SBAR; progress patient to next Phase/Stage  - Instruct patient to call for assistance with activity based on assessment  - Consider rehabilitation consult to assist with strengthening/weightbearing, etc   Outcome: Progressing

## 2021-05-19 NOTE — PLAN OF CARE
Problem: Potential for Falls  Goal: Patient will remain free of falls  Description: INTERVENTIONS:  - Assess patient frequently for physical needs  -  Identify cognitive and physical deficits and behaviors that affect risk of falls    -  [place] fall precautions as indicated by assessment   - Educate patient/family on patient safety including physical limitations  - Instruct patient to call for assistance with activity based on assessment  - Modify environment to reduce risk of injury  - Consider OT/PT consult to assist with strengthening/mobility  Outcome: Progressing     Problem: Prexisting or High Potential for Compromised Skin Integrity  Goal: Skin integrity is maintained or improved  Description: INTERVENTIONS:  - Identify patients at risk for skin breakdown  - Assess and monitor skin integrity  - Assess and monitor nutrition and hydration status  - Monitor labs   - Assess for incontinence   - Turn and reposition patient  - Assist with mobility/ambulation  - Relieve pressure over bony prominences  - Avoid friction and shearing  - Provide appropriate hygiene as needed including keeping skin clean and dry  - Evaluate need for skin moisturizer/barrier cream  - Collaborate with interdisciplinary team   - Patient/family teaching  - Consider wound care consult   Outcome: Progressing     Problem: PAIN - ADULT  Goal: Verbalizes/displays adequate comfort level or baseline comfort level  Description: Interventions:  - Encourage patient to monitor pain and request assistance  - Assess pain using appropriate pain scale  - Administer analgesics based on type and severity of pain and evaluate response  - Implement non-pharmacological measures as appropriate and evaluate response  - Consider cultural and social influences on pain and pain management  - Notify physician/advanced practitioner if interventions unsuccessful or patient reports new pain  Outcome: Progressing     Problem: INFECTION - ADULT  Goal: Absence or prevention of progression during hospitalization  Description: INTERVENTIONS:  - Assess and monitor for signs and symptoms of infection  - Monitor lab/diagnostic results  - Monitor all insertion sites, i e  indwelling lines, tubes, and drains  - Monitor endotracheal if appropriate and nasal secretions for changes in amount and color  - [place] appropriate cooling/warming therapies per order  - Administer medications as ordered  - Instruct and encourage patient and family to use good hand hygiene technique  - Identify and instruct in appropriate isolation precautions for identified infection/condition  Outcome: Progressing  Goal: Absence of fever/infection during neutropenic period  Description: INTERVENTIONS:  - Monitor WBC    Outcome: Progressing     Problem: SAFETY ADULT  Goal: Patient will remain free of falls  Description: INTERVENTIONS:  - Assess patient frequently for physical needs  -  Identify cognitive and physical deficits and behaviors that affect risk of falls    -  [place] fall precautions as indicated by assessment   - Educate patient/family on patient safety including physical limitations  - Instruct patient to call for assistance with activity based on assessment  - Modify environment to reduce risk of injury  - Consider OT/PT consult to assist with strengthening/mobility  Outcome: Progressing  Goal: Maintain or return to baseline ADL function  Description: INTERVENTIONS:  -  Assess patient's ability to carry out ADLs; assess patient's baseline for ADL function and identify physical deficits which impact ability to perform ADLs (bathing, care of mouth/teeth, toileting, grooming, dressing, etc )  - Assess/evaluate cause of self-care deficits   - Assess range of motion  - Assess patient's mobility; develop plan if impaired  - Assess patient's need for assistive devices and provide as appropriate  - Encourage maximum independence but intervene and supervise when necessary  - Involve family in performance of ADLs  - Assess for home care needs following discharge   - Consider OT consult to assist with ADL evaluation and planning for discharge  - Provide patient education as appropriate  Outcome: Progressing  Goal: Maintain or return mobility status to optimal level  Description: INTERVENTIONS:  - Assess patient's baseline mobility status (ambulation, transfers, stairs, etc )    - Identify cognitive and physical deficits and behaviors that affect mobility  - Identify mobility aids required to assist with transfers and/or ambulation (gait belt, sit-to-stand, lift, walker, cane, etc )  - [place] fall precautions as indicated by assessment  - Record patient progress and toleration of activity level on Mobility SBAR; progress patient to next Phase/Stage  - Instruct patient to call for assistance with activity based on assessment  - Consider rehabilitation consult to assist with strengthening/weightbearing, etc   Outcome: Progressing     Problem: DISCHARGE PLANNING  Goal: Discharge to home or other facility with appropriate resources  Description: INTERVENTIONS:  - Identify barriers to discharge w/patient and caregiver  - Arrange for needed discharge resources and transportation as appropriate  - Identify discharge learning needs (meds, wound care, etc )  - Arrange for interpretive services to assist at discharge as needed  - Refer to Case Management Department for coordinating discharge planning if the patient needs post-hospital services based on physician/advanced practitioner order or complex needs related to functional status, cognitive ability, or social support system  Outcome: Progressing     Problem: Nutrition/Hydration-ADULT  Goal: Nutrient/Hydration intake appropriate for improving, restoring or maintaining nutritional needs  Description: Monitor and assess patient's nutrition/hydration status for malnutrition  Collaborate with interdisciplinary team and initiate plan and interventions as ordered  Monitor patient's weight and dietary intake as ordered or per policy  Utilize nutrition screening tool and intervene as necessary  Determine patient's food preferences and provide high-protein, high-caloric foods as appropriate       INTERVENTIONS:  - Monitor oral intake, urinary output, labs, and treatment plans  - Assess nutrition and hydration status and recommend course of action  - Evaluate amount of meals eaten  - Assist patient with eating if necessary   - Allow adequate time for meals  - Recommend/ encourage appropriate diets, oral nutritional supplements, and vitamin/mineral supplements  - Order, calculate, and assess calorie counts as needed  - Recommend, monitor, and adjust tube feedings and TPN/PPN based on assessed needs  - Assess need for intravenous fluids  - Provide specific nutrition/hydration education as appropriate  - Include patient/family/caregiver in decisions related to nutrition  Outcome: Progressing

## 2021-05-19 NOTE — ASSESSMENT & PLAN NOTE
5/9 - mechanical fall at home  Nondisplaced L medial femoral condyle fracture  Non-operative treatment  NWB to LLE with knee immobilizer  Pain control  DVT ppx with Lovenox  Follow-up with Ortho in 2 weeks (5/24/21)  Scheduled for outpatient follow-up with Dr Carito Duran on 5/27  PT/OT Therapies  Primary team following

## 2021-05-19 NOTE — ASSESSMENT & PLAN NOTE
History of liver transplant in 3412 due to alcoholic cirrhosis and hepatocellular carcinoma  [facility]  Currently on Tacrolimus 3mg BID  PCP managing current dosage and has level checked every 3-6 months  Last Tacrolimus level 6 9 on 10/26/2020  Repeat Tacrolimus level 5 3 on 5/12, continue current dosing

## 2021-05-19 NOTE — PROGRESS NOTES
05/19/21 1230   Pain Assessment   Pain Assessment Tool 0-10   Pain Score 7   Pain Location/Orientation Orientation: Left; Location: Knee   Pain Onset/Description Onset: Ongoing   Hospital Pain Intervention(s) Repositioned   Restrictions/Precautions   Precautions Fall Risk;Pain   Weight Bearing Restrictions Yes   LLE Weight Bearing Per Order NWB   ROM Restrictions Yes   LLE ROM Restriction Range Limitation  (no L knee flex)   Braces or Orthoses LE Immobilizer   Subjective   Subjective "I feel ready to go home tomorrow"   Roll Left and Right   Type of Assistance Needed Independent   Amount of Physical Assistance Provided No physical assistance   Roll Left and Right CARE Score 6   Sit to Lying   Type of Assistance Needed Independent   Amount of Physical Assistance Provided No physical assistance   Sit to Lying CARE Score 6   Lying to Sitting on Side of Bed   Type of Assistance Needed Independent   Amount of Physical Assistance Provided No physical assistance   Lying to Sitting on Side of Bed CARE Score 6   Sit to Stand   Type of Assistance Needed Independent   Amount of Physical Assistance Provided No physical assistance   Comment RW   Sit to Stand CARE Score 6   Bed-Chair Transfer   Type of Assistance Needed Independent   Amount of Physical Assistance Provided No physical assistance   Comment SPT RW   Chair/Bed-to-Chair Transfer CARE Score 6   Walk 10 Feet   Type of Assistance Needed Independent   Amount of Physical Assistance Provided No physical assistance   Comment RW   Walk 10 Feet CARE Score 6   Walk 50 Feet with Two Turns   Type of Assistance Needed Independent   Amount of Physical Assistance Provided No physical assistance   Comment RW   Walk 50 Feet with Two Turns CARE Score 6   Walk 150 Feet   Reason if not Attempted Safety concerns   Walk 150 Feet CARE Score 88   Walking 10 Feet on Uneven Surfaces   Reason if not Attempted Safety concerns   Walking 10 Feet on Uneven Surfaces CARE Score 88   Ambulation Does the patient walk? 2  Yes   Primary Mode of Locomotion Prior to Admission Walk   Distance Walked (feet) 50 ft  (X1, 10'X2)   Assist Device Roller Walker   Gait Pattern Hopping   Walk Assist Level Modified Independent   Wheel 50 Feet with Two Turns   Type of Assistance Needed Independent   Amount of Physical Assistance Provided No physical assistance   Wheel 50 Feet with Two Turns CARE Score 6   Wheel 150 Feet   Type of Assistance Needed Independent   Amount of Physical Assistance Provided No physical assistance   Wheel 150 Feet CARE Score 6   Wheelchair mobility   Does the patient use a wheelchair? 1  Yes   Type of Wheelchair Used 1  Manual   Method Right upper extremity; Left upper extremity;Right lower extremity   Distance Level Surface (feet) 300 ft   Distance Wheeled 3% Grade 20 ft   Findings LIT with all w/c mobility   Curb or Single Stair   Style negotiated Curb   Type of Assistance Needed Independent   Amount of Physical Assistance Provided No physical assistance   Comment per pt report, will have w/c on top of 8" step to enter kitchen where pt will sit  Trialed and pt able to complete independently  Also trialed hopping backwards up 1 4" curb, pt able to complete Lit   1 Step (Curb) CARE Score 6   4 Steps   Reason if not Attempted Safety concerns   4 Steps CARE Score 88   12 Steps   Reason if not Attempted Safety concerns   12 Steps CARE Score 88   Picking Up Object   Comment w/c level upon d/c   Reason if not Attempted Safety concerns   Picking Up Object CARE Score 88   Therapeutic Interventions   Strengthening supine L ankle 4 way completed 30 times each way  L quad sets with 5 second hold 3x10 sets  AAROM L SLRs performed 3x10  Reviewed verbally standing hip 4 way pt states he feels confident wiith completing  Provided written handout to pt regarding HEP exercises      Flexibility seated L hamstring stretch x3 mins total, R hamstring/gastroc stretch 3x30 sec hold   Other trialed getting into prone on mat table as pt reports he has MD appt coming up for LBP and will need to get on stomach to receive injection  educated pt to lie first supine, scoot towards Lside of mat, the roll over, pt able to complete independently  Equipment Use   NuStep lvl 3 10 mins, no LLE, avg spm 74; performed for conditioning   Assessment   Treatment Assessment Pt engaged in skilled PT intervention focusing on capturing all care scores and assessing pt's safety with all mobilities as well as reviewing HEP, improving pt's endurance, setting up pt's personal w/c for d/c and RW, and speaking with wife Florencia Singh about concerns on d/c home  Pt currently Yaya for all transfers, w/c mobility, single curb step, bed mobility, and hopping up to 50' with RW  He reports no fears on d/c home but did ask to review how to get into prone position for MD appt, pt able to complete with vc's from therapist and after completing verbalized understanding  Pt verbalizing he understands all HEP exercises, no concerns about completion on d/c  Did setup pt's LLE leg rest to allow pt to be supported with immobilizer donned as well as assessed pt's fit of chair  Wife Florencia Singh presenting during last 15 mins of tx, reports concerns of pt going outside to smoke  Pt reports if he were to go outside would use front entrance with 2 seperate 5" steps  Did educate pt and wife that not recommending pt complete those steps upon d/c home for safety  Also educated pt on side effects of smoking including prolonged healing time  Wife Florencia Singh bringing in pt's personal RW, sized and trialed with pt, pt Yaya with hopping  Did educate Florencia Singh pt to only amb short distances to protect integrity of RLE, otherwise pt to utilize w/c  At this time pt okay to d/c home with recommendation for home PT  Family/Caregiver Present yes, wife Florencia Singh presented for last 15 mins   Problem List Decreased strength;Decreased endurance;Decreased mobility;Pain; Impaired balance   Barriers to Discharge None   PT Barriers   Functional Limitation Car transfers   Plan   Treatment/Interventions Functional transfer training;LE strengthening/ROM; Elevations; Therapeutic exercise; Endurance training;Bed mobility;Gait training;Patient/family training   Progress Improving as expected   Recommendation   PT Discharge Recommendation Home with home health rehabilitation   Equipment Recommended Wheelchair;Walker   PT - OK to Discharge Yes   PT Therapy Minutes   PT Time In 1230   PT Time Out 1400   PT Total Time (minutes) 90   PT Mode of treatment - Individual (minutes) 90   PT Mode of treatment - Concurrent (minutes) 0   PT Mode of treatment - Group (minutes) 0   PT Mode of treatment - Co-treat (minutes) 0   PT Mode of Treatment - Total time(minutes) 90 minutes   PT Cumulative Minutes 640   Therapy Time missed   Time missed?  No

## 2021-05-19 NOTE — PROGRESS NOTES
PM&R PROGRESS NOTE:  Jason Andrade 63 y o  male MRN: [id number]  Unit/Bed#: -01 Encounter: [id number]           Rehab Diagnosis: Impairment of mobility, safety and Activities of Daily Living (ADLs) due to Orthopedic Disorders:  08 9  Other Orthopedic Left distal femur fracture of the medial femoral condyle     History of Present Illness:   Jason Andrade is a 63 y o  male history of liver transplantation in August 2013 at [facility] related to alcoholic cirrhosis and [address]  on chronic Prograf, incisional abdominal hernia, hypertension, PAD, carotid stenosis, GERD, known lumbar radiculopathy who presented to the [facility] [address] on 5/9/21 status post mechanical fall on stairs  No LOC  Imaging revealed a large left knee joint effusion and a left distal femur fracture involving the medial femoral condyle  Patient seen by Orthopedics, Dr Veronica Campbell, and treated non operatively  Patient was deemed NWB left lower extremity and placed in a knee immobilizer at all times  Recommended to follow up in 2 weeks  Placed on Lovenox for DVT prophylaxis  Patient found to have acute functional deficits from his baseline, therefore was admitted to [facility] for acute inpatient rehabilitation  SUBJECTIVE:  Patient seen face to face  No acute issues  Preparing for discharge home tomorrow  ASSESSMENT: Stable, progressing      PLAN:    Rehabilitation   Functional deficits:  Left lower extremity weakness, impaired endurance, impaired balance, impaired mobility, self care   Continue current rehabilitation plan of care to maximize function       Function:  o OT: Supervision with UB ADLs, Min A for LB ADLs, Supervision with transfers using RW  o PT: Supervision with RW transfers and hopping short distances with RLE, Close supervision with wheelchair negotation   Estimated Discharge: Discharge home Thursday 5/20/21 with home care PT      DVT prophylaxis  · Managed on subcutaneous Lovenox - patient is at high risk of DVT  Patient and wife are agreeable to Education for home  · SCDs     Pain  · Acute nociceptive pain located in left knee:  Managed on p r n  Tylenol, topical ice, adding topical Lidoderm patch, p r n  Oxycodone IR 5-10 mg q 4 hours p r n  for moderate to severe pain additionally  · Chronic neuropathic pain related to known lumbar radiculopathy:  gabapentin 300 mg t i d  Sees Dr Jaz Cummings as an outpatient  · Acute on chronic left-sided sciatica:  On exam patient with negative slump sit testing  Patient would benefit from myofascial stretching however difficult in the knee immobilizer  Patient is agreeable to increase gabapentin dose therefore increased to 300 mgs t i d on 5/17/21     Bladder plan  · Continent     Bowel plan  · Continent, Last BM 5/17/21      * Closed fracture of left distal femur (HCC)  Assessment & Plan  · Sustained after mechanical fall on 5/9/21  · Left distal femur fracture involving the medial femoral condyle  · Seen by Orthopedics and treated non operatively  · NWB LLE  · Knee immobilizer left knee at all times - with tubigrip to protect skin  · Lovenox for DVT prophylaxis  · Follow-up with orthopedics in 2 weeks - Dr Sade Arriaga hypertension  Assessment & Plan  · Managed on lisinopril 20 mg daily (home dose)  · Internal medicine consultants managing    Carotid stenosis, asymptomatic, bilateral  Assessment & Plan  · Right (>70%)  Left (50-69%) carotid stenosis   · Managed on aspirin and Lipitor  · Due for repeat carotid ultrasound as outpatient  · Follow-up with vascular surgery recommended additionally    Electrolyte abnormality  Assessment & Plan  · Resolved    PAD (peripheral artery disease) ([address] )  Assessment & Plan  · Managed on aspirin and Lipitor  · Recommend repeat LEADS and vascular surgery follow-up as outpatient  · LEADS from August 2020:   · RIGHT LOWER LIMB:Evaluation shows a 50-75% stenosis in the distal superficial femoral artery    There is also evidence of tibio-peroneal arterial occlusive disease  LEFT LOWER LIMB:  This resting evaluation shows a high grade stenosis vs occlusion with trickle  flow in the distal superficial femoral artery  Hernia of abdominal cavity  Assessment & Plan  · Postoperative  · Stable    Insomnia  Assessment & Plan  · Continue Melatonin    Mixed hyperlipidemia  Assessment & Plan  · Managed on Lipitor 40 mg daily (home dose)    Tobacco abuse  Assessment & Plan  · Recommend cessation - patient is interested in quitting  · Nicotine patch ordered for cravings    History of liver transplant [facility])  Assessment & Plan  · History of OLT liver transplantation at [address] [place] [address] on 6/96/47 due to alcoholic cirrhosis and hepatocellular carcinoma  · Managed currently on Prograf 3 mg q 12 hours   · Prograf level of checked every 3-6 months - recent level 5 3, within range  · Patient follows yearly with [address] [place] [address] transplant clinic    Chronic gastroesophageal reflux disease  Assessment & Plan  · Managed on Protonix 20 mg b i d  (therapeutic exchange for home omeprazole)        Appreciate IM consultants medical co-management  Labs, medications, and imaging personally reviewed  ROS:  A ten point review of systems was completed on 05/19/21 and pertinent positives are listed in subjective section  All other systems reviewed were negative  OBJECTIVE:   /67 (BP Location: Right arm)   Pulse 85   Temp 98 7 °F (37 1 °C) (Tympanic)   Resp 18   Ht 6' (1 829 m)   Wt 98 6 kg (217 lb 6 oz)   SpO2 97%   BMI 29 48 kg/m²       Physical Exam  Vitals signs and nursing note reviewed  Constitutional:       General: He is not in acute distress  HENT:      Head: Normocephalic and atraumatic  Nose: Nose normal       Mouth/Throat:      Mouth: Mucous membranes are moist    Eyes:      Conjunctiva/sclera: Conjunctivae normal    Neck:      Musculoskeletal: Neck supple     Cardiovascular:      Rate and Rhythm: Normal rate and regular rhythm  Pulses: Normal pulses  Pulmonary:      Effort: Pulmonary effort is normal       Breath sounds: Normal breath sounds  No wheezing or rales  Abdominal:      General: Bowel sounds are normal  There is no distension  Palpations: Abdomen is soft  Tenderness: There is no abdominal tenderness  Musculoskeletal:         General: No swelling  Comments: Knee immobilizer in place  Knee swelling and bruising improved   Skin:     General: Skin is warm  Neurological:      Mental Status: He is alert and oriented to person, place, and time     Psychiatric:         Mood and Affect: Mood normal           Lab Results   Component Value Date    WBC 8 40 05/17/2021    HGB 13 7 05/17/2021    HCT 39 0 (L) 05/17/2021     05/17/2021     05/17/2021     Lab Results   Component Value Date    SODIUM 137 05/17/2021    K 3 9 05/17/2021     05/17/2021    CO2 30 05/17/2021    BUN 15 05/17/2021    CREATININE 1 07 05/17/2021    GLUC 98 05/17/2021    CALCIUM 9 8 05/17/2021     Lab Results   Component Value Date    INR 1 12 04/26/2019    INR 1 14 11/17/2018    INR 1 12 03/22/2018    PROTIME 14 5 (H) 04/26/2019    PROTIME 14 7 (H) 11/17/2018    PROTIME 14 4 03/22/2018         Current Facility-Administered Medications:     acetaminophen (TYLENOL) tablet 650 mg, 650 mg, Oral, Q6H PRN, Michael Carrion MD, 650 mg at 05/15/21 0612    ALPRAZolam (XANAX) tablet 0 25 mg, 0 25 mg, Oral, Daily PRN, Michael Carrion MD    aspirin chewable tablet 81 mg, 81 mg, Oral, Daily, ROSALINA Gramajo, 81 mg at 05/19/21 0829    atorvastatin (LIPITOR) tablet 40 mg, 40 mg, Oral, Daily, Michael Carrion MD, 40 mg at 05/19/21 6403    bisacodyl (DULCOLAX) rectal suppository 10 mg, 10 mg, Rectal, Daily PRN, Michael Carrion MD, 10 mg at 05/14/21 0605    Diclofenac Sodium (VOLTAREN) 1 % topical gel 2 g, 2 g, Topical, 4x Daily PRN, Hodan End, CRNP    docusate sodium (COLACE) capsule 100 mg, 100 mg, Oral, BID, Price Amble Peola Macleod, 100 mg at 05/19/21 0829    enoxaparin (LOVENOX) subcutaneous injection 40 mg, 40 mg, Subcutaneous, Q24H [facility], Daryl Serrano MD, 40 mg at 05/18/21 0825    gabapentin (NEURONTIN) capsule 300 mg, 300 mg, Oral, TID, Daryl Serrano MD, 300 mg at 05/19/21 0829    lidocaine (LIDODERM) 5 % patch 1 patch, 1 patch, Topical, Daily, Daryl Serrano MD, 1 patch at 05/19/21 0828    lisinopril (ZESTRIL) tablet 20 mg, 20 mg, Oral, Daily, Daryl Serrano MD, 20 mg at 05/19/21 9519    magnesium hydroxide (MILK OF MAGNESIA) oral suspension 30 mL, 30 mL, Oral, Daily PRN, Daryl Serrano MD    melatonin tablet 6 mg, 6 mg, Oral, HS, Daryl Serrano MD, 6 mg at 05/18/21 2102    multivitamin-minerals (CENTRUM) tablet 1 tablet, 1 tablet, Oral, Daily, Daryl Serrano MD, 1 tablet at 05/18/21 1205    nicotine (NICODERM CQ) 14 mg/24hr TD 24 hr patch 14 mg, 14 mg, Transdermal, Daily, Amaya Barr, ROSALINA, 14 mg at 05/19/21 0829    ondansetron (ZOFRAN-ODT) dispersible tablet 4 mg, 4 mg, Oral, Q6H PRN, Daryl Serrano MD, 4 mg at 05/14/21 0840    oxyCODONE (ROXICODONE) immediate release tablet 10 mg, 10 mg, Oral, Q4H PRN, Daryl Serrano MD, 10 mg at 05/19/21 0336    oxyCODONE (ROXICODONE) IR tablet 5 mg, 5 mg, Oral, Q4H PRN, Daryl Serrano MD, 5 mg at 05/19/21 1040    pantoprazole (PROTONIX) EC tablet 20 mg, 20 mg, Oral, BID AC, Daryl Serrano MD, 20 mg at 05/19/21 0612    polyethylene glycol (MIRALAX) packet 17 g, 17 g, Oral, Daily, Daryl Serrano MD, 17 g at 05/16/21 0818    tacrolimus (PROGRAF) capsule 3 mg, 3 mg, Oral, Q12H [facility], Daryl Serrano MD, 3 mg at 05/19/21 6270    Past Medical History:   Diagnosis Date    Alcoholism ([facility] )     Arthritis     Bacterial peritonitis ([facility] )     Bowel disease     Cancer ([facility] )     liver    Cataract     Depression     Fracture     Gastroesophageal reflux     Hepatic disease     Hepatocellular carcinoma ([place] [address] )     History of colon polyps     Hypertension     Liver cancer ([place] [address] )     Liver disease     Stomach disease        Patient Active Problem List    Diagnosis Date Noted    Closed fracture of left distal femur ([place] [address] ) 05/09/2021     Priority: High    Essential hypertension 07/05/2016     Priority: Medium    Carotid stenosis, asymptomatic, bilateral 10/14/2020     Priority: Low    Hernia of abdominal cavity 05/12/2021    PAD (peripheral artery disease) ([place] [address] ) 05/12/2021    Electrolyte abnormality 05/12/2021    Fall 05/09/2021    Acute pain due to trauma 05/09/2021    Chronic low back pain 05/09/2021    Claudication in peripheral vascular disease ([place] [address] ) 08/26/2020    Leg cramps 07/15/2020    Medicare annual wellness visit, subsequent 12/09/2019    Insomnia 03/25/2019    Mixed hyperlipidemia 09/19/2018    Seasonal allergies 10/27/2017    Ringing in ears, bilateral 10/27/2017    Short-term memory loss 10/27/2017    Intervertebral disc disorder with radiculopathy of lumbar region 04/24/2017    Bursitis of both hips 02/17/2017    Sacroiliitis ([place] [address] ) 39/94/7097    Nonalcoholic liver disease, chronic 07/05/2016    Osteopenia 10/13/2015    Neck pain 06/27/2014    Right arm pain 06/27/2014    Lung mass 03/18/2014    Chronic gastroesophageal reflux disease 03/06/2014    History of liver transplant ([place] [address] ) 08/22/2013    Alcohol dependence in remission ([place] [address] ) 01/05/2012    Abnormal electrocardiogram 11/14/2011    PPD negative 11/14/2011    Tobacco abuse 11/14/2011          Brian Romero MD    Total time spent:  30 minutes with more than 50% spent counseling/coordinating care  Counseling includes discussion with patient re: progress and discussion with patient of his/her current medical/functional state/information  Coordination of patient's care was performed in conjunction with consulting services  Time invested included review of patient's labs, vitals, and management of their comorbidities with continued monitoring   The care of the patient was extensively discussed and appropriate treatment plan was formulated unique for this patient  ** Please Note:  voice to text software may have been used in the creation of this document   Although proof errors in transcription or interpretation are a potential of such software**

## 2021-05-19 NOTE — ASSESSMENT & PLAN NOTE
Continue current pain regimen  Ordered gabapentin 300mg BID at home by Pain Management  Follows with Pain Management as outpatient for lumbar spinal stenosis and lumbar radiculopathy  Received steroid injections - last injection given on 11/4/2020  Ordered for next injection on 5/27/2021  Apply voltaren gel throughout the day as needed  Apply heating pad at HS  Gabapentin increased to 300mg TID

## 2021-05-20 ENCOUNTER — PATIENT OUTREACH (OUTPATIENT)
Dept: CASE MANAGEMENT | Facility: OTHER | Age: 66
End: 2021-05-20

## 2021-05-20 ENCOUNTER — TRANSITIONAL CARE MANAGEMENT (OUTPATIENT)
Dept: INTERNAL MEDICINE CLINIC | Facility: CLINIC | Age: 66
End: 2021-05-20

## 2021-05-20 VITALS
DIASTOLIC BLOOD PRESSURE: 60 MMHG | SYSTOLIC BLOOD PRESSURE: 137 MMHG | BODY MASS INDEX: 29.44 KG/M2 | HEART RATE: 78 BPM | RESPIRATION RATE: 18 BRPM | OXYGEN SATURATION: 98 % | HEIGHT: 72 IN | TEMPERATURE: 98.3 F | WEIGHT: 217.37 LBS

## 2021-05-20 DIAGNOSIS — Z09 FRACTURE FOLLOW-UP: Primary | ICD-10-CM

## 2021-05-20 PROCEDURE — 97530 THERAPEUTIC ACTIVITIES: CPT

## 2021-05-20 PROCEDURE — 99239 HOSP IP/OBS DSCHRG MGMT >30: CPT | Performed by: PHYSICAL MEDICINE & REHABILITATION

## 2021-05-20 PROCEDURE — 99232 SBSQ HOSP IP/OBS MODERATE 35: CPT | Performed by: INTERNAL MEDICINE

## 2021-05-20 RX ORDER — OXYCODONE HYDROCHLORIDE 10 MG/1
TABLET ORAL
Qty: 45 TABLET | Refills: 0 | Status: SHIPPED | OUTPATIENT
Start: 2021-05-20 | End: 2021-06-08

## 2021-05-20 RX ADMIN — PANTOPRAZOLE SODIUM 20 MG: 20 TABLET, DELAYED RELEASE ORAL at 06:33

## 2021-05-20 RX ADMIN — ATORVASTATIN CALCIUM 40 MG: 40 TABLET, FILM COATED ORAL at 09:36

## 2021-05-20 RX ADMIN — DOCUSATE SODIUM 100 MG: 100 CAPSULE, LIQUID FILLED ORAL at 09:36

## 2021-05-20 RX ADMIN — Medication 14 MG: at 09:35

## 2021-05-20 RX ADMIN — ASPIRIN 81 MG: 81 TABLET, CHEWABLE ORAL at 09:36

## 2021-05-20 RX ADMIN — OXYCODONE HYDROCHLORIDE 10 MG: 10 TABLET ORAL at 09:36

## 2021-05-20 RX ADMIN — GABAPENTIN 300 MG: 300 CAPSULE ORAL at 09:36

## 2021-05-20 RX ADMIN — LISINOPRIL 20 MG: 20 TABLET ORAL at 10:57

## 2021-05-20 RX ADMIN — TACROLIMUS 3 MG: 1 CAPSULE ORAL at 09:36

## 2021-05-20 RX ADMIN — OXYCODONE HYDROCHLORIDE 10 MG: 10 TABLET ORAL at 04:11

## 2021-05-20 NOTE — PROGRESS NOTES
05/20/21 1130   Car Transfer   Type of Assistance Needed Supervision   Amount of Physical Assistance Provided No physical assistance   Comment S with RW, pt able to manage LLE into car w/o A   Car Transfer CARE Score 4   Assessment   Treatment Assessment Pt seen for 10 min skilled PT session to assist pt into car for d/c home  Pt able to utilize RW, hop to car, and perform car xfer at S level  Did not require A for LLE management this session  At this time pt okay to d/c home with recommendations for home PT  PT Therapy Minutes   PT Time In 1130   PT Time Out 1140   PT Total Time (minutes) 10   PT Mode of treatment - Individual (minutes) 10   PT Mode of treatment - Concurrent (minutes) 0   PT Mode of treatment - Group (minutes) 0   PT Mode of treatment - Co-treat (minutes) 0   PT Mode of Treatment - Total time(minutes) 10 minutes   PT Cumulative Minutes 650   Therapy Time missed   Time missed?  No

## 2021-05-20 NOTE — CASE MANAGEMENT
Team d/c summary:    Pt made good rehab progress and returned home with family  He will received continued home PT through [facility]  He received a wheelchair through [employer]  Pt's wife was present for d/c and is aware of functional ability

## 2021-05-20 NOTE — PLAN OF CARE
Problem: Potential for Falls  Goal: Patient will remain free of falls  Description: INTERVENTIONS:  - Assess patient frequently for physical needs  -  Identify cognitive and physical deficits and behaviors that affect risk of falls  -  [place] fall precautions as indicated by assessment   - Educate patient/family on patient safety including physical limitations  - Instruct patient to call for assistance with activity based on assessment  - Modify environment to reduce risk of injury  - Consider OT/PT consult to assist with strengthening/mobility  Outcome: Progressing     Problem: PAIN - ADULT  Goal: Verbalizes/displays adequate comfort level or baseline comfort level  Description: Interventions:  - Encourage patient to monitor pain and request assistance  - Assess pain using appropriate pain scale  - Administer analgesics based on type and severity of pain and evaluate response  - Implement non-pharmacological measures as appropriate and evaluate response  - Consider cultural and social influences on pain and pain management  - Notify physician/advanced practitioner if interventions unsuccessful or patient reports new pain  Outcome: Progressing     Problem: SAFETY ADULT  Goal: Patient will remain free of falls  Description: INTERVENTIONS:  - Assess patient frequently for physical needs  -  Identify cognitive and physical deficits and behaviors that affect risk of falls    -  [place] fall precautions as indicated by assessment   - Educate patient/family on patient safety including physical limitations  - Instruct patient to call for assistance with activity based on assessment  - Modify environment to reduce risk of injury  - Consider OT/PT consult to assist with strengthening/mobility  Outcome: Progressing  Goal: Maintain or return to baseline ADL function  Description: INTERVENTIONS:  -  Assess patient's ability to carry out ADLs; assess patient's baseline for ADL function and identify physical deficits which impact ability to perform ADLs (bathing, care of mouth/teeth, toileting, grooming, dressing, etc )  - Assess/evaluate cause of self-care deficits   - Assess range of motion  - Assess patient's mobility; develop plan if impaired  - Assess patient's need for assistive devices and provide as appropriate  - Encourage maximum independence but intervene and supervise when necessary  - Involve family in performance of ADLs  - Assess for home care needs following discharge   - Consider OT consult to assist with ADL evaluation and planning for discharge  - Provide patient education as appropriate  Outcome: Progressing  Goal: Maintain or return mobility status to optimal level  Description: INTERVENTIONS:  - Assess patient's baseline mobility status (ambulation, transfers, stairs, etc )    - Identify cognitive and physical deficits and behaviors that affect mobility  - Identify mobility aids required to assist with transfers and/or ambulation (gait belt, sit-to-stand, lift, walker, cane, etc )  - [place] fall precautions as indicated by assessment  - Record patient progress and toleration of activity level on Mobility SBAR; progress patient to next Phase/Stage  - Instruct patient to call for assistance with activity based on assessment  - Consider rehabilitation consult to assist with strengthening/weightbearing, etc   Outcome: Progressing

## 2021-05-20 NOTE — PROGRESS NOTES
[facility]  Progress Note - Mariela Maldonado 1955, 63 y o  male MRN: [id number]  Unit/Bed#: -01 Encounter: [id number]  Primary Care Provider: Leesa Gaffney MD   Date and time admitted to hospital: 5/11/2021  4:55 PM    PAD (peripheral artery disease) [facility])  Assessment & Plan  Continue with aspirin 81mg daily and Lipitor 40mg daily  LEADS completed in 08/2020:  50-70% stenosis in the R distal superficial femoral artery  High grade stenosis/occlusion with trickle flow to the L distal superficial femoral artery  Neurovascular checks each shift  Recommend follow-up with Vascular Surgery  Hernia of abdominal cavity  Assessment & Plan  Stable  Repaired in 2011  Chronic low back pain  Assessment & Plan  Continue current pain regimen  Ordered gabapentin 300mg BID at home by Pain Management  Follows with Pain Management as outpatient for lumbar spinal stenosis and lumbar radiculopathy  Received steroid injections - last injection given on 11/4/2020  Ordered for next injection on 5/27/2021  Apply voltaren gel throughout the day as needed  Apply heating pad at HS  Gabapentin increased to 300mg TID  Carotid stenosis, asymptomatic, bilateral  Assessment & Plan  Last carotid ultrasound on 10/7/2020 showed:  >70% stenosis of the R ICA and 50-69% stenosis of the L ICA  Continue aspirin 81mg daily and Lipitor 40mg daily  Recommended for repeat US in 6 months - has order but did not complete yet  Recommend to follow-up with Vascular Surgery  Mixed hyperlipidemia  Assessment & Plan  Continue home dose of Lipitor 40mg daily  Last lipid panel on 10/26/2020 as follows: Cholesterol 159, Triglycerides 146, HDL 66, and LDL 64  Tobacco abuse  Assessment & Plan  Smokes cigarettes 1/2 pack a day for 40+ years  Interested in smoking cessation  Continue nicotine patch      History of liver transplant [facility])  Assessment & Plan  History of liver transplant in 2013 due to alcoholic cirrhosis and hepatocellular carcinoma  Follows with [facility] GI transplant clinic  Currently on Tacrolimus 3mg BID  PCP managing current dosage and has level checked every 3-6 months  Last Tacrolimus level 6 9 on 10/26/2020  Repeat Tacrolimus level 5 3 on , continue current dosing  Essential hypertension  Assessment & Plan  Continue with home dose of lisinopril 20mg daily  Monitor BP with routine VS   Avoid orthostasis  Chronic gastroesophageal reflux disease  Assessment & Plan  Stable  Takes omeprazole 20mg BID at home  Substitute with Protonix 20mg BID while inpatient  * Closed fracture of left distal femur [facility])  Assessment & Plan   - mechanical fall at home  Nondisplaced L medial femoral condyle fracture  Non-operative treatment  NWB to LLE with knee immobilizer  Pain control  DVT ppx with Lovenox  Follow-up with Ortho in 2 weeks (21)  Scheduled for outpatient follow-up with Dr Katie Xavier on   PT/OT Therapies  Primary team following  VTE Pharmacologic Prophylaxis:   Pharmacologic: Enoxaparin (Lovenox)  Mechanical VTE Prophylaxis in Place: Yes - sequential compression devices  Current Length of Stay: 9 day(s)    Current Patient Status: Inpatient Rehab     Discharge Plan: As per primary team     Code Status: Level 1 - Full Code    Subjective:   Pt examined while pt sitting up in bed in pt room  No complaints currently  States that his pain is very minimal, would not even rate the pain at this point  Plans for discharge later today  Has no questions or concerns at this time  Follow-up with Ortho and Pain management scheduled for next week  Objective:     Vitals:   Temp (24hrs), Av 6 °F (36 4 °C), Min:97 1 °F (36 2 °C), Max:98 3 °F (36 8 °C)    Temp:  [97 1 °F (36 2 °C)-98 3 °F (36 8 °C)] 98 3 °F (36 8 °C)  HR:  [72-78] 78  Resp:  [18] 18  BP: (108-128)/(58-77) 124/69  SpO2:  [98 %-99 %] 98 %  Body mass index is 29 48 kg/m²  Review of Systems   Constitutional: Negative for chills, fatigue and fever  Respiratory: Negative for cough and shortness of breath  Cardiovascular: Negative for chest pain, palpitations and leg swelling  Gastrointestinal: Negative for abdominal distention, abdominal pain, constipation, diarrhea, nausea and vomiting  LBM 5/18   Genitourinary: Negative for difficulty urinating  Musculoskeletal: Negative for arthralgias  Neurological: Negative for dizziness, light-headedness, numbness and headaches  Psychiatric/Behavioral: Negative for sleep disturbance  Input and Output Summary (last 24 hours): Intake/Output Summary (Last 24 hours) at 5/20/2021 0901  Last data filed at 5/20/2021 0411  Gross per 24 hour   Intake 420 ml   Output 500 ml   Net -80 ml       Physical Exam:     Physical Exam  Vitals signs and nursing note reviewed  Constitutional:       Appearance: Normal appearance  HENT:      Head: Normocephalic and atraumatic  Cardiovascular:      Rate and Rhythm: Normal rate and regular rhythm  Pulses: Normal pulses  Heart sounds: Normal heart sounds  No murmur  No friction rub  Pulmonary:      Effort: Pulmonary effort is normal  No respiratory distress  Breath sounds: Normal breath sounds  No wheezing or rhonchi  Abdominal:      General: Abdomen is flat  Bowel sounds are normal  There is no distension  Palpations: Abdomen is soft  Tenderness: There is no abdominal tenderness  Musculoskeletal:      Right lower leg: No edema  Left lower leg: No edema  Comments: LLE in knee immobilizer   Skin:     General: Skin is warm and dry  Neurological:      Mental Status: He is alert and oriented to person, place, and time     Psychiatric:         Mood and Affect: Mood normal          Behavior: Behavior normal          Additional Data:     Labs:    Results from last 7 days   Lab Units 05/17/21  0601   WBC Thousand/uL 8 40   HEMOGLOBIN g/dL 13 7 HEMATOCRIT % 39 0*   PLATELETS Thousands/uL 223   NEUTROS PCT % 76*   LYMPHS PCT % 14*   MONOS PCT % 7   EOS PCT % 2     Results from last 7 days   Lab Units 05/17/21  0601   SODIUM mmol/L 137   POTASSIUM mmol/L 3 9   CHLORIDE mmol/L 101   CO2 mmol/L 30   BUN mg/dL 15   CREATININE mg/dL 1 07   ANION GAP mmol/L 6   CALCIUM mg/dL 9 8   GLUCOSE RANDOM mg/dL 98                       Labs reviewed    Imaging:    Imaging reviewed    Recent Cultures (last 7 days):           Last 24 Hours Medication List:   Current Facility-Administered Medications   Medication Dose Route Frequency Provider Last Rate    acetaminophen  650 mg Oral Q6H PRN Jorden Jaeger MD      ALPRAZolam  0 25 mg Oral Daily PRN Jorden Jaeger MD      aspirin  81 mg Oral Daily ROSALINA Lay      atorvastatin  40 mg Oral Daily Jorden Jaeger MD      bisacodyl  10 mg Rectal Daily PRN Jorden Jaeger MD      Diclofenac Sodium  2 g Topical 4x Daily PRN ROSALINA Lay      docusate sodium  100 mg Oral BID ROSALINA Lay      enoxaparin  40 mg Subcutaneous Q24H [address] Jorden Jaeger MD      gabapentin  300 mg Oral TID Jorden Jaeger MD      lidocaine  1 patch Topical Daily Jorden Jaeger MD      lisinopril  20 mg Oral Daily Jorden Jaeger MD      magnesium hydroxide  30 mL Oral Daily PRN Jorden Jaeger MD      melatonin  6 mg Oral HS Jorden Jaeger MD      multivitamin-minerals  1 tablet Oral Daily Jorden Jaeger MD      nicotine  14 mg Transdermal Daily ROSALINA Lay      ondansetron  4 mg Oral Q6H PRN Jorden Jaeger MD      oxyCODONE  10 mg Oral Q4H PRN Jorden Jaeger MD      oxyCODONE  5 mg Oral Q4H PRN Jorden Jaeger MD      pantoprazole  20 mg Oral BID AC Jorden Jaeger MD      polyethylene glycol  17 g Oral Daily Jorden Jaeger MD      tacrolimus  3 mg Oral Q12H [address] Jorden Jaeger MD          M*Modal software was used to dictate this note  It may contain errors with dictating incorrect words or incorrect spelling   Please contact the provider directly with any questions

## 2021-05-20 NOTE — NURSING NOTE
Patient discharged to home with home PT  Patient accompanied by his spouse, Rosalino Burgess  Reviewed follow up appointments, medications and instructions with patient and spouse, no questions or concerns at the time of discharge  Belongings at bedside, nothing locked with security or pharmacy  Patient transported off unit via wheelchair with Pt staff  Patient received his wheelchair and immobilizer for home use

## 2021-05-20 NOTE — PLAN OF CARE
Problem: Potential for Falls  Goal: Patient will remain free of falls  Description: INTERVENTIONS:  - Assess patient frequently for physical needs  -  Identify cognitive and physical deficits and behaviors that affect risk of falls    -  [place] fall precautions as indicated by assessment   - Educate patient/family on patient safety including physical limitations  - Instruct patient to call for assistance with activity based on assessment  - Modify environment to reduce risk of injury  - Consider OT/PT consult to assist with strengthening/mobility  5/20/2021 1026 by Rylan Thornton RN  Outcome: Adequate for Discharge  5/20/2021 0736 by Rylan Thornton RN  Outcome: Progressing     Problem: Prexisting or High Potential for Compromised Skin Integrity  Goal: Skin integrity is maintained or improved  Description: INTERVENTIONS:  - Identify patients at risk for skin breakdown  - Assess and monitor skin integrity  - Assess and monitor nutrition and hydration status  - Monitor labs   - Assess for incontinence   - Turn and reposition patient  - Assist with mobility/ambulation  - Relieve pressure over bony prominences  - Avoid friction and shearing  - Provide appropriate hygiene as needed including keeping skin clean and dry  - Evaluate need for skin moisturizer/barrier cream  - Collaborate with interdisciplinary team   - Patient/family teaching  - Consider wound care consult   5/20/2021 1026 by Rylan Thornton RN  Outcome: Adequate for Discharge  5/20/2021 0736 by Rylan Thornton RN  Outcome: Progressing     Problem: PAIN - ADULT  Goal: Verbalizes/displays adequate comfort level or baseline comfort level  Description: Interventions:  - Encourage patient to monitor pain and request assistance  - Assess pain using appropriate pain scale  - Administer analgesics based on type and severity of pain and evaluate response  - Implement non-pharmacological measures as appropriate and evaluate response  - Consider cultural and social influences on pain and pain management  - Notify physician/advanced practitioner if interventions unsuccessful or patient reports new pain  5/20/2021 1026 by Yvette Andrews RN  Outcome: Adequate for Discharge  5/20/2021 0736 by Yvette Andrews RN  Outcome: Progressing     Problem: INFECTION - ADULT  Goal: Absence or prevention of progression during hospitalization  Description: INTERVENTIONS:  - Assess and monitor for signs and symptoms of infection  - Monitor lab/diagnostic results  - Monitor all insertion sites, i e  indwelling lines, tubes, and drains  - Monitor endotracheal if appropriate and nasal secretions for changes in amount and color  - [place] appropriate cooling/warming therapies per order  - Administer medications as ordered  - Instruct and encourage patient and family to use good hand hygiene technique  - Identify and instruct in appropriate isolation precautions for identified infection/condition  5/20/2021 1026 by Yvette Andrews RN  Outcome: Adequate for Discharge  5/20/2021 0736 by Yvette Andrews RN  Outcome: Progressing  Goal: Absence of fever/infection during neutropenic period  Description: INTERVENTIONS:  - Monitor WBC    5/20/2021 1026 by Yvette Andrews RN  Outcome: Adequate for Discharge  5/20/2021 0736 by Yvette Andrews RN  Outcome: Progressing     Problem: DISCHARGE PLANNING  Goal: Discharge to home or other facility with appropriate resources  Description: INTERVENTIONS:  - Identify barriers to discharge w/patient and caregiver  - Arrange for needed discharge resources and transportation as appropriate  - Identify discharge learning needs (meds, wound care, etc )  - Arrange for interpretive services to assist at discharge as needed  - Refer to Case Management Department for coordinating discharge planning if the patient needs post-hospital services based on physician/advanced practitioner order or complex needs related to functional status, cognitive ability, or social support system  5/20/2021 1026 by Td Leblanc RN  Outcome: Adequate for Discharge  5/20/2021 0736 by Td Leblanc RN  Outcome: Progressing     Problem: Nutrition/Hydration-ADULT  Goal: Nutrient/Hydration intake appropriate for improving, restoring or maintaining nutritional needs  Description: Monitor and assess patient's nutrition/hydration status for malnutrition  Collaborate with interdisciplinary team and initiate plan and interventions as ordered  Monitor patient's weight and dietary intake as ordered or per policy  Utilize nutrition screening tool and intervene as necessary  Determine patient's food preferences and provide high-protein, high-caloric foods as appropriate       INTERVENTIONS:  - Monitor oral intake, urinary output, labs, and treatment plans  - Assess nutrition and hydration status and recommend course of action  - Evaluate amount of meals eaten  - Assist patient with eating if necessary   - Allow adequate time for meals  - Recommend/ encourage appropriate diets, oral nutritional supplements, and vitamin/mineral supplements  - Order, calculate, and assess calorie counts as needed  - Recommend, monitor, and adjust tube feedings and TPN/PPN based on assessed needs  - Assess need for intravenous fluids  - Provide specific nutrition/hydration education as appropriate  - Include patient/family/caregiver in decisions related to nutrition  5/20/2021 1026 by Td Leblanc RN  Outcome: Adequate for Discharge  5/20/2021 0736 by Td Leblanc RN  Outcome: Progressing

## 2021-05-20 NOTE — ASSESSMENT & PLAN NOTE
History of liver transplant in 7474 due to alcoholic cirrhosis and hepatocellular carcinoma  Follows with [facility] GI transplant clinic  Currently on Tacrolimus 3mg BID  PCP managing current dosage and has level checked every 3-6 months  Last Tacrolimus level 6 9 on 10/26/2020  Repeat Tacrolimus level 5 3 on 5/12, continue current dosing

## 2021-05-20 NOTE — PROGRESS NOTES
This CM Assistant received an ADT alert indicating the patient was discharged to Home on 5/20/21 with [facility]  I have removed myself off of the care team, added the CM to the care team who will follow the patient through the bundle episode, sent the care manager a inbasket notifying them of the bundle episode, updated the BPCI form, and updated the care coordination note

## 2021-05-20 NOTE — ASSESSMENT & PLAN NOTE
5/9 - mechanical fall at home  Nondisplaced L medial femoral condyle fracture  Non-operative treatment  NWB to LLE with knee immobilizer  Pain control  DVT ppx with Lovenox  Follow-up with Ortho in 2 weeks (5/24/21)  Scheduled for outpatient follow-up with Dr Sendy Godfrey on 5/27  PT/OT Therapies  Primary team following

## 2021-05-20 NOTE — DISCHARGE INSTRUCTIONS
DISCHARGE INSTRUCTIONS    ACTIVITY: Please follow mobility and self care instructions as your were taught at [facility] program   Please continue usage of your wheelchair with leg rest and walker  You will continue your rehabilitation with home PT  Your orthopedics physician will transition you to outpatient therapies with advanced weight bearing status  WEIGHT BEARING STATUS: NON WEIGHT BEARING LEFT LOWER EXTREMITY - MUST WEAR KNEE IMMOBILIZER AT ALL TIMES EXCEPT FOR DAILY SKIN CHECKS    SHOWER: You are allowed to shower with your second knee immobilizer for showering  RESTRICTIONS:  NO DRIVING OR WORK UNTIL CLEARED BY ORTHOPEDICS    MEDICATION CHANGES: New prescriptions provided for Oxycodone IR for severe pain  Use Tylenol for mild pain  Avoid NSAIDS  New prescription provided for Lovenox injections in order to prevent blood clots for 14 days  Please discuss with your Orthopedics physician if you can transition to a pill or need longer duration of injection       PLEASE MAKE APPOINTMENT WITH VASCULAR SURGERY TO FOLLOW UP YOUR CARTOIDS AND BILATERAL LOWER EXTREMITY ARTERIAL DISEASE - YOU ARE DUE FOR REPEAT TESTING    PLEASE SEE YOUR PCP OR ORTHOPEDICS FOR MEDICATION REFILLS

## 2021-05-20 NOTE — DISCHARGE SUMMARY
Discharge Summary - PMR   Ac [place] 63 y o  male MRN: [id number]  Unit/Bed#: -01 Encounter: [id number]    Admission Date: 5/11/2021     Discharge Date: 5/20/21    Etiologic/Rehab Diagnosis: Impairment of mobility, safety and Activities of Daily Living (ADLs) due to Orthopedic Disorders:  08 9  Other Orthopedic Left distal femur fracture of the medial femoral condyle       History of Present Illness:   Lady Myerser a 63 y  o  male history of liver transplantation in August 2013 at [facility] related to alcoholic cirrhosis and [address]  on chronic Prograf, incisional abdominal hernia, hypertension, PAD, carotid stenosis, GERD, known lumbar radiculopathy who presented to the [address] [place] [address] 5/9/21 status post mechanical fall on stairs   No LOC   Imaging revealed a large left knee joint effusion and a left distal femur fracture involving the medial femoral condyle   Patient seen by Orthopedics, Dr Wilman Singletary treated non operatively  Lynnann Dakin was deemed NWB left lower extremity and placed in a knee immobilizer at all times   Recommended to follow up in 2 weeks   Placed on Lovenox for DVT prophylaxis   Patient found to have acute functional deficits from his baseline, therefore was admitted [facility] for acute inpatient rehabilitation  Acute Rehabilitation Center Course:   Functional deficits: Left lower extremity weakness, NWB LLE, impaired endurance, impaired balance, impaired mobility, self care  All improving with [facility] program     Patient participated in a comprehensive interdisciplinary inpatient rehabilitation program which included involvment of MD, therapies (PT, OT, and/or SLP), RN, CM, SW, dietary  He was able to be advanced to a supervision - modified independent level of assist with RW, wheelchair with leg rest, and considered safe for discharge home  Please see below for patient's day of discharge function    Patient's wife did complete hands on training prior to his discharge  Patient will continue his rehabilitation with home care - home PT  Patient to be transitioned to eventual outpatient PT program by Orthopedics  Physical Therapy Occupational Therapy Speech Therapy   Weight Bearing Status: Non-weight bearing  Transfers: Incidental Touching  Bed Mobility: Minimal Assistance  Amulation Distance (ft): 40 feet  Ambulation: Incidental Touching  Assistive Device for Ambulation: Roller Walker  Wheelchair Mobility Distance: 200 ft  Wheelchair Mobility: Supervision  Number of Stairs: (pending)  Ramp: Supervision  Assistive Device for Ramp: Wheelchair  Discharge Recommendations: Home with:  [address] with[de-identified] Outpatient Physical Therapy   Eating: Independent  Grooming: Independent  Bathing: Independent  Bathing: Independent  Upper Body Dressing: Independent  Lower Body Dressing: Independent  Toileting: Independent  Tub/Shower Transfer: Independent  Toilet Transfer: Independent  Cognition: Within Defined Limits  Orientation: Person, Place, Time, Situation                 Medical Issues Addressed While at AR[facility]:     DVT prophylaxis  · Managed on subcutaneous Lovenox - patient or wife to inject  Patient to discuss duration with Orthopedics  Prescription for 14 days written       Pain  · Acute nociceptive pain located in left knee:  Managed on p r n  Tylenol, topical ice,p r n  Oxycodone IR 5-10 mg for discharge TID PRN  Checked the [place] PDMP; no red flags identified; safe to proceed with prescription #45  · Chronic neuropathic pain related to known lumbar radiculopathy:  gabapentin 300 mg bid at home  Sees Dr Maco Bacon as an outpatient  · Acute on chronic left-sided sciatica:  On exam patient with negative slump sit testing  Patient would benefit from myofascial stretching however difficult in the knee immobilizer    Patient is agreeable to increase gabapentin dose therefore increased to TID, however provided no major changes in relief, therefore for discharge return to BID dosing  Follows with Dr Maco Bacon      Bladder plan  · Continent     Bowel plan  · Continent    * Closed fracture of left distal femur ([facility] )  Assessment & Plan  · Sustained after mechanical fall on 5/9/21  · Left distal femur fracture involving the medial femoral condyle  · Seen by Orthopedics (Dr Jane Pate) and treated non operatively  · NWB LLE  · Knee immobilizer left knee at all times - with tubigrip to protect skin  · Lovenox for DVT prophylaxis  · Follow-up with orthopedics in 2 weeks - family prefers to see Dr Cantor Yefri - appt made on 5/27    Essential hypertension  Assessment & Plan  · Managed on lisinopril 20 mg daily (home dose)  · Internal medicine consultants managing    Carotid stenosis, asymptomatic, bilateral  Assessment & Plan  · Right (>70%)  Left (50-69%) carotid stenosis   · Managed on aspirin and Lipitor  · Due for repeat carotid ultrasound as outpatient  · Follow-up with vascular surgery recommended additionally    Electrolyte abnormality  Assessment & Plan  · Resolved    PAD (peripheral artery disease) ([address] )  Assessment & Plan  · Managed on aspirin and Lipitor  · Recommend repeat LEADS and vascular surgery follow-up as outpatient  · LEADS from August 2020:   · RIGHT LOWER LIMB:Evaluation shows a 50-75% stenosis in the distal superficial femoral artery  There is also evidence of tibio-peroneal arterial occlusive disease  LEFT LOWER LIMB:  This resting evaluation shows a high grade stenosis vs occlusion with trickle  flow in the distal superficial femoral artery        Hernia of abdominal cavity  Assessment & Plan  · Postoperative  · Stable    Insomnia  Assessment & Plan  · Continue Melatonin PRN    Mixed hyperlipidemia  Assessment & Plan  · Managed on Lipitor 40 mg daily (home dose)    Tobacco abuse  Assessment & Plan  · Recommend cessation - patient is interested in quitting  · Nicotine patch ordered for cravings - patient can obtain OTC    History of liver transplant [facility])  Assessment & Plan  · History of OLT liver transplantation at [address] [place] [address] on 2/28/32 due to alcoholic cirrhosis and hepatocellular carcinoma  · Managed currently on Prograf 3 mg q 12 hours   · Prograf level of checked every 3-6 months - recent level 5 3, within range  · Patient follows yearly with [address] [place] [address] transplant clinic    Chronic gastroesophageal reflux disease  Assessment & Plan  · Managed on Protonix 20 mg b i d  (therapeutic exchange for home omeprazole)        Discharge Physical Examination:  Physical Exam  Vitals signs and nursing note reviewed  Constitutional:       General: He is not in acute distress  HENT:      Head: Normocephalic and atraumatic  Nose: Nose normal       Mouth/Throat:      Mouth: Mucous membranes are moist    Eyes:      Conjunctiva/sclera: Conjunctivae normal    Neck:      Musculoskeletal: Neck supple  Cardiovascular:      Rate and Rhythm: Normal rate and regular rhythm  Pulses: Normal pulses  Pulmonary:      Effort: Pulmonary effort is normal       Breath sounds: Normal breath sounds  No wheezing or rales  Abdominal:      General: Bowel sounds are normal  There is no distension  Palpations: Abdomen is soft  Tenderness: There is no abdominal tenderness  Musculoskeletal:         General: No swelling  Comments: Knee immobilizer in place  Knee swelling and bruising improved   Skin:     General: Skin is warm  Neurological:      Mental Status: He is alert and oriented to person, place, and time  Psychiatric:         Mood and Affect: Mood normal         Condition at Discharge: good     Discharge instructions/Information to patient and family:   See after visit summary for information provided to patient and family  Provisions for Follow-Up Care:  See after visit summary for information related to follow-up care and any pertinent home health orders        Future Appointments   Date Time Provider Mark Guallpa   5/27/2021  1:00 PM Lorraine Styles MD [facility]   5/27/2021  3:10 PM AN S&P 1 AN Pain [facility]   6/21/2021  8:00 AM Guevara Owens MD [facility]       Disposition: Home      Planned Readmission: No    Discharge Statement   I spent more than 30 minutes discharging the patient  This time was spent on the day of discharge  I had direct contact with the patient on the day of discharge  Greater than 50% of the total time was spent examining patient, answering all patient questions, arranging and discussing plan of care with patient as well as directly providing post-discharge instructions  Additional time then spent on discharge activities  Discharge Medications:  See after visit summary for reconciled discharge medications provided to patient and family

## 2021-05-21 ENCOUNTER — PATIENT OUTREACH (OUTPATIENT)
Dept: INTERNAL MEDICINE CLINIC | Facility: CLINIC | Age: 66
End: 2021-05-21

## 2021-05-21 NOTE — PROGRESS NOTES
Spoke with Melita Martins he declines to participate in [place] program at this time  He has pcp appointment scheduled for Monday 5/24/21

## 2021-05-24 ENCOUNTER — TELEMEDICINE (OUTPATIENT)
Dept: INTERNAL MEDICINE CLINIC | Facility: CLINIC | Age: 66
End: 2021-05-24
Payer: MEDICARE

## 2021-05-24 DIAGNOSIS — Z11.4 SCREENING FOR HIV (HUMAN IMMUNODEFICIENCY VIRUS): ICD-10-CM

## 2021-05-24 DIAGNOSIS — S72.402A CLOSED FRACTURE OF DISTAL END OF LEFT FEMUR, UNSPECIFIED FRACTURE MORPHOLOGY, INITIAL ENCOUNTER (HCC): Primary | ICD-10-CM

## 2021-05-24 DIAGNOSIS — Z94.4 HISTORY OF LIVER TRANSPLANT (HCC): ICD-10-CM

## 2021-05-24 DIAGNOSIS — E78.2 MIXED HYPERLIPIDEMIA: ICD-10-CM

## 2021-05-24 DIAGNOSIS — I10 ESSENTIAL HYPERTENSION: ICD-10-CM

## 2021-05-24 DIAGNOSIS — Z11.59 NEED FOR HEPATITIS C SCREENING TEST: ICD-10-CM

## 2021-05-24 PROCEDURE — 99495 TRANSJ CARE MGMT MOD F2F 14D: CPT | Performed by: INTERNAL MEDICINE

## 2021-05-24 NOTE — PROGRESS NOTES
Assessment/Plan:        Problem List Items Addressed This Visit        Cardiovascular and Mediastinum    Essential hypertension     Continue med along with healthy diet            Musculoskeletal and Integument    Closed fracture of left distal femur ([facility] ) - Primary     Pt doing well, follow up ortho  Other    History of liver transplant ([facility] )     Follow up transplant team          Mixed hyperlipidemia      Continue statin along with healthy diet and exercise           Other Visit Diagnoses     Need for hepatitis C screening test        Screening for HIV (human immunodeficiency virus)                 Reason for visit is TCM    Encounter provider Ravindra Conley MD       Provider located at [address] [place] [id number]      Recent Visits  No visits were found meeting these conditions  Showing recent visits within past 7 days and meeting all other requirements     Today's Visits  Date Type Provider Dept   05/24/21 Telemedicine Ravindra Conley MD [id number] today's visits and meeting all other requirements     Future Appointments  No visits were found meeting these conditions  Showing future appointments within next 150 days and meeting all other requirements        After connecting through [employer], the patient was identified by name and date of birth  Eliot Nowak was informed that this is a telemedicine visit and that the visit is being conducted through [address] [place] [address] Now and patient was informed that this is a secure, HIPAA-compliant platform  He agrees to proceed     My office door was closed  No one else was in the room  He acknowledged consent and understanding of privacy and security of the video platform  The patient has agreed to participate and understands they can discontinue the visit at any time  Patient is aware this is a billable service  Subjective:     Patient ID: Eliot Nowak is a 63 y o  male      I reviewed patient's hospitalization for fall with femur fracture, he was seen by Ortho, he went to [facility] after the hospital  Patient able to transfer and get around to use the bathroom, using wheelchair to get around, and the walker to transfer  He is not bearing weight on the leg  Urine flow and bowel ok  No fevers, no chills, no chest pain, shortness of breath, no cough  He is trying to titrate down pain med, and uses half a pill when needed, 5 mg every 6 hours on average  Review of Systems   Constitutional: Negative for chills, fatigue and fever  HENT: Negative for congestion, nosebleeds, postnasal drip, sore throat and trouble swallowing  Eyes: Negative for pain  Respiratory: Negative for cough, chest tightness, shortness of breath and wheezing  Cardiovascular: Negative for chest pain, palpitations and leg swelling  Gastrointestinal: Negative for abdominal pain, constipation, diarrhea, nausea and vomiting  Endocrine: Negative for polydipsia and polyuria  Genitourinary: Negative for dysuria, flank pain and hematuria  Musculoskeletal: Positive for arthralgias and back pain  Skin: Negative for rash  Neurological: Negative for dizziness, tremors and headaches  Hematological: Does not bruise/bleed easily  Psychiatric/Behavioral: Negative for confusion and dysphoric mood  The patient is not nervous/anxious  Objective: There were no vitals filed for this visit  Physical Exam  Constitutional:       General: He is not in acute distress  Appearance: Normal appearance  He is well-developed  He is not diaphoretic  HENT:      Head: Normocephalic and atraumatic  Right Ear: External ear normal       Left Ear: External ear normal       Nose: Nose normal    Eyes:      General: No scleral icterus  Right eye: No discharge  Left eye: No discharge        Conjunctiva/sclera: Conjunctivae normal    Neck:      Musculoskeletal: Normal range of motion and neck supple  Trachea: No tracheal deviation  Pulmonary:      Effort: Pulmonary effort is normal  No respiratory distress  Abdominal:      Palpations: Abdomen is soft  Tenderness: There is no abdominal tenderness  Musculoskeletal: Normal range of motion  Skin:     Coloration: Skin is not jaundiced or pale  Findings: No erythema  Neurological:      Mental Status: He is alert and oriented to person, place, and time  Cranial Nerves: No cranial nerve deficit  Coordination: Coordination normal    Psychiatric:         Behavior: Behavior normal          Thought Content: Thought content normal          Judgment: Judgment normal              Transitional Care Management Review:  Janeth North is a 63 y o  male here for TCM follow up  During the TCM phone call patient stated:    TCM Call (since 4/23/2021)     Date and time call was made  5/20/2021  2:46 PM    Hospital care reviewed  Records not available    Patient was hospitialized at  [address] [place] [address]        Date of Admission  05/11/21    Date of discharge  05/20/21    Diagnosis  Closed fracture of left distal femur    Disposition  Home    Were the patients medications reviewed and updated  No    Current Symptoms  None      TCM Call (since 4/23/2021)     Post hospital issues  None    Should patient be enrolled in anticoag monitoring? No    Scheduled for follow up?   Yes    Not clinically warranted  discharged to [facility]    Did you obtain your prescribed medications  Yes    Do you need help managing your prescriptions or medications  No    Is transportation to your appointment needed  No    I have advised the patient to call PCP with any new or worsening symptoms  [address]    Are you recieving any outpatient services  Yes    Are you recieving home care services  Yes    Types of home care services  Nurse visit    Have you fallen in the last 12 months  Yes    How many times  1    Interperter language line needed  No          I spent 20 minutes with the patient during this visit      Diana Jimenez MD

## 2021-05-25 NOTE — PHYSICAL THERAPY NOTE
23.4 [facility] PT DC SUMMARY    Pt presents to [address]  on 5/11/21 s/p fall on 5/9/2021 resulting in a closed femoral condyle fracture of LLE, conservative treatment only and deemed NWB L LE with knee immobilizer  Other comorbidities include: liver transplant (2018), chronic immunosuppression, depression, abdominal hernia, B carotid stenosis, HTN, hyperlipidemia, PVD, GERD, and chronic low back pain with sciatica  Prior to admission, pt was independent with all ADLs and was an , did not amb with any AD  Pt met all set acute rehab goals with amb household distances only, NWB, otherwise WC level mobility  HEP handout provided for standing and Supine TE with knee immobilizer  Spouse participated in FT and in agreement to A with higher single step by placing WC on step for pt to turn and sit on  Pt able to hop up 3-4in step with RW otherwise  Suggest home PT followup and out pt PT when medically cleared for ROM and or wt bearing

## 2021-05-26 DIAGNOSIS — Z94.4 LIVER TRANSPLANT RECIPIENT (HCC): ICD-10-CM

## 2021-05-26 RX ORDER — TACROLIMUS 1 MG/1
CAPSULE ORAL
Qty: 540 CAPSULE | Refills: 3 | Status: SHIPPED | OUTPATIENT
Start: 2021-05-26 | End: 2022-06-15 | Stop reason: SDUPTHER

## 2021-05-27 ENCOUNTER — HOSPITAL ENCOUNTER (OUTPATIENT)
Dept: RADIOLOGY | Facility: HOSPITAL | Age: 66
Discharge: HOME | End: 2021-05-27
Attending: ORTHOPAEDIC SURGERY
Payer: MEDICARE

## 2021-05-27 ENCOUNTER — TELEPHONE (OUTPATIENT)
Dept: NEUROLOGY | Facility: CLINIC | Age: 66
End: 2021-05-27

## 2021-05-27 ENCOUNTER — OFFICE VISIT (OUTPATIENT)
Dept: OBGYN CLINIC | Facility: HOSPITAL | Age: 66
End: 2021-05-27
Payer: MEDICARE

## 2021-05-27 ENCOUNTER — HOSPITAL ENCOUNTER (OUTPATIENT)
Dept: RADIOLOGY | Facility: CLINIC | Age: 66
Discharge: HOME | End: 2021-05-27
Attending: ANESTHESIOLOGY
Payer: MEDICARE

## 2021-05-27 VITALS
HEART RATE: 91 BPM | DIASTOLIC BLOOD PRESSURE: 74 MMHG | HEIGHT: 72 IN | SYSTOLIC BLOOD PRESSURE: 133 MMHG | BODY MASS INDEX: 29.48 KG/M2

## 2021-05-27 VITALS
RESPIRATION RATE: 20 BRPM | TEMPERATURE: 97.4 F | HEART RATE: 97 BPM | SYSTOLIC BLOOD PRESSURE: 134 MMHG | DIASTOLIC BLOOD PRESSURE: 85 MMHG | OXYGEN SATURATION: 97 %

## 2021-05-27 DIAGNOSIS — S72.415A CLOSED NONDISPLACED FRACTURE OF CONDYLE OF LEFT FEMUR, INITIAL ENCOUNTER (HCC): Primary | ICD-10-CM

## 2021-05-27 DIAGNOSIS — Z09 FRACTURE FOLLOW-UP: ICD-10-CM

## 2021-05-27 DIAGNOSIS — M54.16 LUMBAR RADICULOPATHY: ICD-10-CM

## 2021-05-27 DIAGNOSIS — M48.061 SPINAL STENOSIS OF LUMBAR REGION, UNSPECIFIED WHETHER NEUROGENIC CLAUDICATION PRESENT: ICD-10-CM

## 2021-05-27 PROCEDURE — 73560 X-RAY EXAM OF KNEE 1 OR 2: CPT

## 2021-05-27 PROCEDURE — 64483 NJX AA&/STRD TFRM EPI L/S 1: CPT | Performed by: ANESTHESIOLOGY

## 2021-05-27 PROCEDURE — 99204 OFFICE O/P NEW MOD 45 MIN: CPT | Performed by: ORTHOPAEDIC SURGERY

## 2021-05-27 RX ORDER — 0.9 % SODIUM CHLORIDE 0.9 %
10 VIAL (ML) INJECTION ONCE
Status: COMPLETED | OUTPATIENT
Start: 2021-05-27 | End: 2021-05-27

## 2021-05-27 RX ORDER — METHYLPREDNISOLONE ACETATE 80 MG/ML
80 INJECTION, SUSPENSION INTRA-ARTICULAR; INTRALESIONAL; INTRAMUSCULAR; PARENTERAL; SOFT TISSUE ONCE
Status: COMPLETED | OUTPATIENT
Start: 2021-05-27 | End: 2021-05-27

## 2021-05-27 RX ORDER — BUPIVACAINE HCL/PF 2.5 MG/ML
10 VIAL (ML) INJECTION ONCE
Status: COMPLETED | OUTPATIENT
Start: 2021-05-27 | End: 2021-05-27

## 2021-05-27 RX ORDER — OXYCODONE HYDROCHLORIDE 5 MG/1
5 TABLET ORAL EVERY 4 HOURS PRN
Qty: 30 TABLET | Refills: 0 | Status: SHIPPED | OUTPATIENT
Start: 2021-05-27 | End: 2021-06-02 | Stop reason: SDUPTHER

## 2021-05-27 RX ADMIN — BUPIVACAINE HYDROCHLORIDE 2 ML: 2.5 INJECTION, SOLUTION EPIDURAL; INFILTRATION; INTRACAUDAL at 15:40

## 2021-05-27 RX ADMIN — SODIUM CHLORIDE 4 ML: 9 INJECTION, SOLUTION INTRAMUSCULAR; INTRAVENOUS; SUBCUTANEOUS at 15:38

## 2021-05-27 RX ADMIN — Medication 4 ML: at 15:38

## 2021-05-27 RX ADMIN — METHYLPREDNISOLONE ACETATE 80 MG: 80 INJECTION, SUSPENSION INTRA-ARTICULAR; INTRALESIONAL; INTRAMUSCULAR; PARENTERAL; SOFT TISSUE at 15:40

## 2021-05-27 RX ADMIN — IOHEXOL 1 ML: 300 INJECTION, SOLUTION INTRAVENOUS at 15:40

## 2021-05-27 NOTE — PROGRESS NOTES
OT DISCHARGE SUMMARY    Pt presented s/p fall and closed distal femur fx  Pt was treated conservatively with knee immobilizer and is NWB of LLE  Pt made good progress during acute rehab stay and achieved all LTGs  At time of d/c pt was IND with all ADL and IADL tasks and using RW for stand pivot transfers and w/c for mobility  Pt was d/c home with wife support  No further OT services required       María Elena Quiroz, OT

## 2021-05-27 NOTE — TELEPHONE ENCOUNTER
Received call from ramon martinez,  Regarding script for oxycodone sent by [employer] would only pay for 7 day supply, therefore dispensed 21 tablets  Instructions: Take 1/2 tablet (5 mg) - 1 tablet (10mg) three times daily as needed for moderate-severe pain   45 tablet    They are requesting a prescription for additional  21 tablets  if in agreement  She said she can't refill on the original script    Thanks for your help     [facility] [place]

## 2021-05-27 NOTE — TELEPHONE ENCOUNTER
Dr Lidia Blanca aware, recommending patient contact PCP or surgeon for oxycodone if needed  Patient aware of recommendation

## 2021-05-27 NOTE — H&P
History of Present Illness: The patient is a 63 y o  male who presents with complaints of lower back and leg pain secondary to lumbar degenerative disc disease and is here today for bilateral L4 transforaminal epidural steroid injection      Patient Active Problem List   Diagnosis    Alcohol dependence in remission ([facility] )    Bursitis of both hips    Chronic gastroesophageal reflux disease    Essential hypertension    Intervertebral disc disorder with radiculopathy of lumbar region    History of liver transplant ([facility] )    Sacroiliitis (HCC)    Seasonal allergies    Lung mass    Neck pain    Nonalcoholic liver disease, chronic    Abnormal electrocardiogram    Osteopenia    PPD negative    Right arm pain    Ringing in ears, bilateral    Short-term memory loss    Tobacco abuse    Mixed hyperlipidemia    Insomnia    Medicare annual wellness visit, subsequent    Leg cramps    Claudication in peripheral vascular disease ([facility] )    Carotid stenosis, asymptomatic, bilateral    Fall    Closed fracture of left distal femur (HCC)    Acute pain due to trauma    Chronic low back pain    Hernia of abdominal cavity    PAD (peripheral artery disease) (HCC)    Electrolyte abnormality    Closed nondisplaced fracture of condyle of left femur (HCC)       Past Medical History:   Diagnosis Date    Alcoholism ([facility] )     Arthritis     Bacterial peritonitis ([facility] )     Bowel disease     Cancer ([facility] )     liver    Cataract     Depression     Fracture     Gastroesophageal reflux     Hepatic disease     Hepatocellular carcinoma ([facility] )     History of colon polyps     Hypertension     Liver cancer ([facility] )     Liver disease     Stomach disease        Past Surgical History:   Procedure Laterality Date    APPENDECTOMY      BACK SURGERY      CATARACT EXTRACTION      EXPLORATORY LAPAROTOMY  09/2011    EYE SURGERY      LIVER TRANSPLANTATION  08/20/2013    PARACENTESIS      Abdominal Paracentesis(Therapeutic) with Imaging Guidance    NM COLONOSCOPY FLX DX W/COLLJ SPEC WHEN PFRMD N/A 9/12/2016    Procedure: COLONOSCOPY;  Surgeon: Hailee Soto MD;  [facility]: [facility];   Service: General    ROTATOR CUFF REPAIR      x2         Current Outpatient Medications:     acetaminophen (TYLENOL) 325 mg tablet, Take 650 mg by mouth every 6 (six) hours as needed for mild pain , Disp: , Rfl:     ALPRAZolam (XANAX) 0 25 mg tablet, TAKE 1 TABLET BY MOUTH EVERY DAY AS NEEDED FOR ANXIETY, Disp: 30 tablet, Rfl: 1    aspirin (ECOTRIN LOW STRENGTH) 81 mg EC tablet, Take 81 mg by mouth daily, Disp: , Rfl:     atorvastatin (LIPITOR) 40 mg tablet, Take 1 tablet (40 mg total) by mouth daily, Disp: 90 tablet, Rfl: 3    CALCIUM-MAGNESIUM-VITAMIN D ER PO, Take 1 tablet by mouth daily, Disp: , Rfl:     docusate sodium (COLACE) 100 mg capsule, Take 100 mg by mouth 2 (two) times a day , Disp: , Rfl:     enoxaparin (LOVENOX) 40 mg/0 4 mL, Inject 0 4 mL (40 mg total) under the skin every 24 hours, Disp: 5 6 mL, Rfl: 0    gabapentin (NEURONTIN) 300 mg capsule, Take 1 capsule (300 mg total) by mouth 2 (two) times a day, Disp: 60 capsule, Rfl: 5    lisinopril (ZESTRIL) 20 mg tablet, Take 1 tablet (20 mg total) by mouth daily The patient should contact his family doctor for further orders or set up an appointment before next  Refill , Disp: 90 tablet, Rfl: 0    omeprazole (PriLOSEC) 20 mg delayed release capsule, TAKE 1 CAPSULE BY MOUTH TWICE DAILY, Disp: 180 capsule, Rfl: 3    ondansetron (ZOFRAN-ODT) 4 mg disintegrating tablet, Take 1 tablet (4 mg total) by mouth every 6 (six) hours as needed for nausea or vomiting, Disp: 20 tablet, Rfl: 5    ONE DAILY MULTIPLE VITAMIN PO, Take by mouth , Disp: , Rfl:     oxyCODONE (ROXICODONE) 10 MG TABS, Take 1/2 tablet (5 mg) - 1 tablet (10mg) three times daily as needed for moderate-severe pain, Disp: 45 tablet, Rfl: 0    oxyCODONE (ROXICODONE) 5 mg immediate release tablet, Take 1 tablet (5 mg total) by mouth every 4 (four) hours as needed for moderate painMax Daily Amount: 30 mg, Disp: 30 tablet, Rfl: 0    tacrolimus (PROGRAF) 1 mg capsule, TAKE 3 CAPSULES(3 MG TOTAL) BY MOUTH TWICE DAILY, Disp: 540 capsule, Rfl: 3    Current Facility-Administered Medications:     bupivacaine (PF) (MARCAINE) 0 25 % injection 10 mL, 10 mL, Epidural, Once, Momo Swain MD    iohexol (OMNIPAQUE) 300 mg/mL injection 50 mL, 50 mL, Epidural, Once, Momo Swain MD    lidocaine (PF) (XYLOCAINE-MPF) 2 % injection 5 mL, 5 mL, Infiltration, Once, Momo Swain MD    methylPREDNISolone acetate (DEPO-MEDROL) injection 80 mg, 80 mg, Epidural, Once, Momo Swain MD    sodium chloride (PF) 0 9 % injection 10 mL, 10 mL, Infiltration, Once, Momo Swain MD    Allergies   Allergen Reactions    Macrolides And Ketolides      Annotation - [id number]: The patient is on Antirejection medications and they will reduce the effective ness of the medications  Physical Exam:   Vitals:    05/27/21 1524   BP: 112/76   Pulse: 96   Resp: 20   Temp: (!) 97 4 °F (36 3 °C)   SpO2: 96%     General: Awake, Alert, Oriented x 3, Mood and affect appropriate  Respiratory: Respirations even and unlabored  Cardiovascular: Peripheral pulses intact; no edema  Musculoskeletal Exam:  Lower back tenderness    ASA Score: 2    Patient/Chart Verification  Patient ID Verified: Verbal  Consents Confirmed: To be obtained in the Pre-Procedure area  H&P( within 30 days) Verified: To be obtained in the Pre-Procedure area  Allergies Reviewed: Yes  Anticoag/NSAID held?: Yes(continues on daily dose of lovenox, last dose 3 pm yest )  Currently on antibiotics?: No    Assessment:   1  Spinal stenosis of lumbar region, unspecified whether neurogenic claudication present    2   Lumbar radiculopathy        Plan: bilateral L4 TFESI

## 2021-05-27 NOTE — DISCHARGE INSTR - LAB
Epidural Steroid Injection   WHAT YOU NEED TO KNOW:   An epidural steroid injection (LAUREN) is a procedure to inject steroid medicine into the epidural space  The epidural space is between your spinal cord and vertebrae  Steroids reduce inflammation and fluid buildup in your spine that may be causing pain  You may be given pain medicine along with the steroids  ACTIVITY  · Do not drive or operate machinery today  · No strenuous activity today - bending, lifting, etc   · You may resume normal activites starting tomorrow - start slowly and as tolerated  · You may shower today, but no tub baths or hot tubs  · You may have numbness for several hours from the local anesthetic  Please use caution and common sense, especially with weight-bearing activities  CARE OF THE INJECTION SITE  · If you have soreness or pain, apply ice to the area today (20 minutes on/20 minutes off)  · Starting tomorrow, you may use warm, moist heat or ice if needed  · You may have an increase or change in your discomfort for 36-48 hours after your treatment  · Apply ice and continue with any pain medication you have been prescribed  · Notify [facility] if you have any of the following: redness, drainage, swelling, headache, stiff neck or fever above 100°F     SPECIAL INSTRUCTIONS  · Our office will contact you in approximately 7 days for a progress report  MEDICATIONS  · Continue to take all routine medications  · Our office may have instructed you to hold some medications  As no general anesthesia was used in today's procedure, you should not experience any side effects related to anesthesia  If you have a problem specifically related to your procedure, please call our office at [phone]  Problems not related to your procedure should be directed to your primary care physician

## 2021-05-27 NOTE — PROGRESS NOTES
Assessment:  1  Closed nondisplaced fracture of condyle of left femur, initial encounter [facility])  Brace    Ambulatory referral to Physical Therapy    oxyCODONE (ROXICODONE) 5 mg immediate release tablet       Plan:  The patient has images and exam consistent with left medial condyle ND fracture sustained 5/9/2021  The patient is provided with left hinged knee brace  He can partial weight bear as tolerated  He can use walker as needed  Patient to start physical therapy  Oxycodone 5mg is provided  He should follow up in 4 weeks  To do next visit:  Return in about 4 weeks (around 6/24/2021)  The above stated was discussed in layman's terms and the patient expressed understanding  All questions were answered to the patient's satisfaction  Scribe Attestation    I,:  Seamus Rothman am acting as a scribe while in the presence of the attending physician :       I,:  Mirella Awan MD personally performed the services described in this documentation    as scribed in my presence :             Subjective:   Leena Gentile is a 63 y o  male who presents for intial evaluation of left distal femur fracture  He did fall down a step and landed on his left knee  He was seen at the ED and placed in an immobilizer brace  Today he complains of left generalized knee pain  He rates his pain at 4-5/10  Any movement aggravates while rest alleviates  He does use oxycodone for pain since fall  He denies past treatment of knee; injections, physical therapy or surgery  He does work with pain management including lumbar injections with benefit          Review of systems negative unless otherwise specified in HPI    Past Medical History:   Diagnosis Date    [facility]ism [facility]     Arthritis     [facility]     Bowel disease     [facility]     liver    Cataract     Depression     Fracture     Gastroesophageal reflux     Hepatic disease     [facility]     History of colon polyps     Hypertension     [facility] cancer [facility]     Liver disease     Stomach disease        Past Surgical History:   Procedure Laterality Date    APPENDECTOMY      BACK SURGERY      CATARACT EXTRACTION      EXPLORATORY LAPAROTOMY  09/2011    EYE SURGERY      LIVER TRANSPLANTATION  08/20/2013    PARACENTESIS      Abdominal Paracentesis(Therapeutic) with Imaging Guidance    DE COLONOSCOPY FLX DX W/COLLJ SPEC WHEN PFRMD N/A 9/12/2016    Procedure: COLONOSCOPY;  Surgeon: Chantell Saenz MD;  Location: [facility]; Service: General    ROTATOR CUFF REPAIR      x2       Family History   Problem Relation Age of Onset    Coronary artery disease Mother         CABG    Prostate cancer Mother         Prostate Gland    Heart disease Mother         Cardiac Disorder    Vascular Disease Mother     Hypertension Mother         Benign    Diabetes Maternal Grandmother         Mellitus    Clotting disorder Maternal Grandfather         Embolism    Seizures Paternal Grandmother         Epilepsy    Other Paternal Grandfather         Accident    Liver cancer Family     Heart disease Family         Cardiac Disorder    Hypertension Family         Benign       Social History     Occupational History    Occupation:    Tobacco Use    Smoking status: Current Every Day Smoker     Packs/day: 0 25     Years: 35 00     Pack years: 8 75     Types: Cigarettes     Start date: 6/11/1978    Smokeless tobacco: Never Used   Substance and Sexual Activity    Alcohol use:  Yes     Alcohol/week: 2 0 standard drinks     Types: 1 Glasses of wine, 1 Standard drinks or equivalent per week     Frequency: 2-4 times a month     Drinks per session: 1 or 2     Binge frequency: Never    Drug use: No     Comment: Per [employer] use way back in college over 40 yrs ago    Sexual activity: Not on file         Current Outpatient Medications:     acetaminophen (TYLENOL) 325 mg tablet, Take 650 mg by mouth every 6 (six) hours as needed for mild pain , Disp: , Rfl:     ALPRAZolam (XANAX) 0 25 mg tablet, TAKE 1 TABLET BY MOUTH EVERY DAY AS NEEDED FOR ANXIETY, Disp: 30 tablet, Rfl: 1    aspirin (ECOTRIN LOW STRENGTH) 81 mg EC tablet, Take 81 mg by mouth daily, Disp: , Rfl:     atorvastatin (LIPITOR) 40 mg tablet, Take 1 tablet (40 mg total) by mouth daily, Disp: 90 tablet, Rfl: 3    CALCIUM-MAGNESIUM-VITAMIN D ER PO, Take 1 tablet by mouth daily, Disp: , Rfl:     docusate sodium (COLACE) 100 mg capsule, Take 100 mg by mouth 2 (two) times a day , Disp: , Rfl:     enoxaparin (LOVENOX) 40 mg/0 4 mL, Inject 0 4 mL (40 mg total) under the skin every 24 hours, Disp: 5 6 mL, Rfl: 0    gabapentin (NEURONTIN) 300 mg capsule, Take 1 capsule (300 mg total) by mouth 2 (two) times a day, Disp: 60 capsule, Rfl: 5    lisinopril (ZESTRIL) 20 mg tablet, Take 1 tablet (20 mg total) by mouth daily The patient should contact his family doctor for further orders or set up an appointment before next  Refill , Disp: 90 tablet, Rfl: 0    omeprazole (PriLOSEC) 20 mg delayed release capsule, TAKE 1 CAPSULE BY MOUTH TWICE DAILY, Disp: 180 capsule, Rfl: 3    ondansetron (ZOFRAN-ODT) 4 mg disintegrating tablet, Take 1 tablet (4 mg total) by mouth every 6 (six) hours as needed for nausea or vomiting, Disp: 20 tablet, Rfl: 5    ONE DAILY MULTIPLE VITAMIN PO, Take by mouth , Disp: , Rfl:     oxyCODONE (ROXICODONE) 10 MG TABS, Take 1/2 tablet (5 mg) - 1 tablet (10mg) three times daily as needed for moderate-severe pain, Disp: 45 tablet, Rfl: 0    tacrolimus (PROGRAF) 1 mg capsule, TAKE 3 CAPSULES(3 MG TOTAL) BY MOUTH TWICE DAILY, Disp: 540 capsule, Rfl: 3    Allergies   Allergen Reactions    Macrolides And Ketolides      Annotation - [id number]: The patient is on Antirejection medications and they will reduce the effective ness of the medications              Vitals:    05/27/21 1251   BP: 133/74   Pulse: 91       Objective:  Physical exam  · General: Awake, Alert, Oriented  · Eyes: Pupils equal, round and reactive to light  · Heart: regular rate and rhythm  · Lungs: No audible wheezing  · Abdomen: soft                    Ortho Exam  Left lower extremity:    Patient in wheel chair  Knee extensor mechanism intact   Mild ecchymotic staining of posterior knee   Calf compartments soft and supple  Sensation intact  Toes are warm sensate and mobile      Diagnostics, reviewed and taken today if performed as documented: The attending physician has personally reviewed the pertinent films in PACS and interpretation is as follows:  5/9/2021 left knee CT:  Intra-articular distal femoral medial condyle non-displace fracture    Procedures, if performed today:    Procedures    None performed      Portions of the record may have been created with voice recognition software  Occasional wrong word or "sound a like" substitutions may have occurred due to the inherent limitations of voice recognition software  Read the chart carefully and recognize, using context, where substitutions have occurred

## 2021-05-28 ENCOUNTER — EVALUATION (OUTPATIENT)
Dept: PHYSICAL THERAPY | Age: 66
End: 2021-05-28
Payer: MEDICARE

## 2021-05-28 DIAGNOSIS — M25.561 ACUTE PAIN OF RIGHT KNEE: Primary | ICD-10-CM

## 2021-05-28 DIAGNOSIS — S72.415A CLOSED NONDISPLACED FRACTURE OF CONDYLE OF LEFT FEMUR, INITIAL ENCOUNTER (HCC): ICD-10-CM

## 2021-05-28 PROCEDURE — 97161 PT EVAL LOW COMPLEX 20 MIN: CPT | Performed by: PHYSICAL THERAPIST

## 2021-05-28 PROCEDURE — 97110 THERAPEUTIC EXERCISES: CPT | Performed by: PHYSICAL THERAPIST

## 2021-05-28 NOTE — PROGRESS NOTES
PT Evaluation     Today's date: 2021  Patient name: Ruben Stoner  : 1955  MRN: [id number]  Referring provider: Chito Mccall MD  Dx:   Encounter Diagnosis     ICD-10-CM    1  Acute pain of right knee  M25 561    2  Closed nondisplaced fracture of condyle of left femur, initial encounter [facility])  [id number] Ambulatory referral to Physical Therapy                  Assessment  Assessment details: Patient s/p distal femur fracture - presents with decreased AROM, PROM, weakness, swelling, gait dysfunction and pain  Impairments: abnormal muscle tone, abnormal or restricted ROM, impaired physical strength, pain with function and weight-bearing intolerance  Understanding of Dx/Px/POC: good  Goals  Short Term goals - 4 weeks  1  Patient will be independent HEP  2   Patient will report a 50% decrease in pain complaints  3   Increase strength 1/2 grade  4   Increase ROM 5-10 degrees  Long Term goals - 8 weeks  1  Patient will report elimination of pain complaints  2   Patient will return to all work related activities without restriction  3   Patient will return to all recreational activities without restriction  4   ROM WFL  5   Strength 5/5  Plan  Planned therapy interventions: manual therapy, strengthening, stretching and therapeutic exercise  Frequency: 2x week  Duration in weeks: 8        Subjective Evaluation    History of Present Illness  Mechanism of injury: Patient suffered closed non-displaced distal femur fracture on 21    Patient currently in hinged knee, partial WB'ing per MD     Quality of life: good    Pain  Current pain ratin  At best pain rating: 3  At worst pain ratin  Quality: sharp, throbbing and tight  Progression: improved    Patient Goals  Patient goals for therapy: decreased pain, increased motion, increased strength, decreased edema and return to sport/leisure activities          Objective     Active Range of Motion     Right Knee   Flexion: 95 degrees with pain  Extensor la degrees with pain    Strength/Myotome Testing     Right Knee   Flexion: 2+  Extension: 2+    General Comments:      Knee Comments  Ambulating with rolling walker PWB'ing               Precautions: Partial WB'ing      Manuals                                                                 Neuro Re-Ed                                                                                                        Ther Ex                          TKE                          SLR flexion             SLR abd                          Seated hamstring stretch                          Heel slides                          LAQ             Standing knee flexion                                                    Ther Activity                                       Gait Training                                       Modalities

## 2021-06-02 ENCOUNTER — OFFICE VISIT (OUTPATIENT)
Dept: PHYSICAL THERAPY | Age: 66
End: 2021-06-02
Payer: MEDICARE

## 2021-06-02 ENCOUNTER — TELEPHONE (OUTPATIENT)
Dept: OBGYN CLINIC | Facility: CLINIC | Age: 66
End: 2021-06-02

## 2021-06-02 DIAGNOSIS — S72.415A CLOSED NONDISPLACED FRACTURE OF CONDYLE OF LEFT FEMUR, INITIAL ENCOUNTER (HCC): ICD-10-CM

## 2021-06-02 DIAGNOSIS — M25.562 ACUTE PAIN OF LEFT KNEE: Primary | ICD-10-CM

## 2021-06-02 PROCEDURE — 97110 THERAPEUTIC EXERCISES: CPT | Performed by: PHYSICAL THERAPIST

## 2021-06-02 RX ORDER — OXYCODONE HYDROCHLORIDE 5 MG/1
5 TABLET ORAL EVERY 4 HOURS PRN
Qty: 30 TABLET | Refills: 0 | Status: SHIPPED | OUTPATIENT
Start: 2021-06-02 | End: 2021-06-18 | Stop reason: SDUPTHER

## 2021-06-02 NOTE — TELEPHONE ENCOUNTER
Pt contacted Call Center requested refill of their medication  Medication Name: oxycodone      Dosage of Med: 5 mg      Frequency of Med: 4 x day      Remaining Medication: one day's worth      Pharmacy and Location: [address] [place] [address]        Pt  Preferred Callback Phone Number:  [phone]       Thank you

## 2021-06-03 ENCOUNTER — TELEPHONE (OUTPATIENT)
Dept: PAIN MEDICINE | Facility: CLINIC | Age: 66
End: 2021-06-03

## 2021-06-03 NOTE — PROGRESS NOTES
Daily Note     Today's date: 2021  Patient name: Sharri Lakhani  : 1955  MRN: [id number]  Referring provider: Kirsten Brand MD  Dx:   Encounter Diagnosis     ICD-10-CM    1  Acute pain of right knee  M25 561                   Subjective: Patient with increased L knee pain noted over the weekend  Objective: See treatment diary below  Advised patient to watch putting to much weight through LE      Assessment: Tolerated treatment well  Patient would benefit from continued PT      Plan: Continue per plan of care        Precautions: Partial WB'ing      Manuals                                                                 Neuro Re-Ed                                                                                                        Ther Ex                          TKE 2x15                         SLR flexion 3x5            SLR abd 3x5                         Seated hamstring stretch x5 reps hold 30 sec                         Heel slides 3x10                         LAQ 3x10            Standing knee flexion 3x10                                                   Ther Activity                                       Gait Training                                       Modalities

## 2021-06-04 ENCOUNTER — OFFICE VISIT (OUTPATIENT)
Dept: PHYSICAL THERAPY | Age: 66
End: 2021-06-04
Payer: MEDICARE

## 2021-06-04 DIAGNOSIS — M25.562 ACUTE PAIN OF LEFT KNEE: Primary | ICD-10-CM

## 2021-06-04 PROCEDURE — 97110 THERAPEUTIC EXERCISES: CPT | Performed by: PHYSICAL THERAPIST

## 2021-06-04 NOTE — PROGRESS NOTES
Daily Note     Today's date: 2021  Patient name: Siena Webb  : 1955  MRN: [id number]  Referring provider: Binta Mota MD  Dx:   Encounter Diagnosis     ICD-10-CM    1  Acute pain of left knee  M25 562                   Subjective: Pain level static at this point      Objective: See treatment diary below      Assessment: Tolerated treatment well  Patient would benefit from continued PT      Plan: Continue per plan of care        Precautions: Partial WB'ing      Manuals                                                                Neuro Re-Ed                                                                                                        Ther Ex                          TKE 2x15 2x15                        SLR flexion 3x5 3x5           SLR abd 3x5 3x5                        Seated hamstring stretch x5 reps hold 30 sec x5                        Heel slides 3x10 3x10                        LAQ 3x10 3x10           Standing knee flexion 3x10 3x10                                                  Ther Activity                                       Gait Training                                       Modalities

## 2021-06-08 ENCOUNTER — TELEPHONE (OUTPATIENT)
Dept: OBGYN CLINIC | Facility: MEDICAL CENTER | Age: 66
End: 2021-06-08

## 2021-06-08 ENCOUNTER — OFFICE VISIT (OUTPATIENT)
Dept: PHYSICAL THERAPY | Age: 66
End: 2021-06-08
Payer: MEDICARE

## 2021-06-08 DIAGNOSIS — S72.402A CLOSED FRACTURE OF LEFT DISTAL FEMUR (HCC): ICD-10-CM

## 2021-06-08 DIAGNOSIS — M25.562 ACUTE PAIN OF LEFT KNEE: Primary | ICD-10-CM

## 2021-06-08 DIAGNOSIS — S72.415A CLOSED NONDISPLACED FRACTURE OF CONDYLE OF LEFT FEMUR, INITIAL ENCOUNTER (HCC): ICD-10-CM

## 2021-06-08 PROCEDURE — 97110 THERAPEUTIC EXERCISES: CPT | Performed by: PHYSICAL THERAPIST

## 2021-06-08 RX ORDER — OXYCODONE HYDROCHLORIDE 10 MG/1
TABLET ORAL
Qty: 20 TABLET | Refills: 0 | Status: SHIPPED | OUTPATIENT
Start: 2021-06-08 | End: 2021-07-30

## 2021-06-08 NOTE — PROGRESS NOTES
Daily Note     Today's date: 2021  Patient name: Lissette Castaneda  : 1955  MRN: [id number]  Referring provider: Jeffrey Shea MD  Dx:   Encounter Diagnosis     ICD-10-CM    1  Acute pain of left knee  M25 562                   Subjective: Patient feeling better today  Objective: See treatment diary below      Assessment: Tolerated treatment well  Patient would benefit from continued PT      Plan: Continue per plan of care        Precautions: Partial WB'ing      Manuals                                                               Neuro Re-Ed                                                                                                        Ther Ex                          TKE 2x15 2x15 2x15                       SLR flexion 3x5 3x5 3x5          SLR abd 3x5 3x5 2x10                       Seated hamstring stretch x5 reps hold 30 sec x5 x5                       Heel slides 3x10 3x10 3x10                       LAQ 3x10 3x10 3x10          Standing knee flexion 3x10 3x10 3x10                                    Prone knee flexion   2x15          Prone hang   x5 minutes                                                                                        Ther Activity                                       Gait Training                                       Modalities

## 2021-06-08 NOTE — TELEPHONE ENCOUNTER
Patient sees Dr Isidro Barkley  Patient requesting refill for oxyCODONE (ROXICODONE) 5 mg immediate release tablet , he has 2 left  Uses [facility] on file      CB # [phone]

## 2021-06-10 ENCOUNTER — APPOINTMENT (OUTPATIENT)
Dept: PHYSICAL THERAPY | Age: 66
End: 2021-06-10
Payer: MEDICARE

## 2021-06-14 ENCOUNTER — OFFICE VISIT (OUTPATIENT)
Dept: PHYSICAL THERAPY | Age: 66
End: 2021-06-14
Payer: MEDICARE

## 2021-06-14 DIAGNOSIS — M25.562 ACUTE PAIN OF LEFT KNEE: Primary | ICD-10-CM

## 2021-06-14 DIAGNOSIS — Z09 FRACTURE FOLLOW-UP: Primary | ICD-10-CM

## 2021-06-14 PROCEDURE — 97110 THERAPEUTIC EXERCISES: CPT | Performed by: PHYSICAL THERAPIST

## 2021-06-14 NOTE — PROGRESS NOTES
Daily Note     Today's date: 2021  Patient name: Jaden Cotto  : 1955  MRN: [id number]  Referring provider: Lucita Goss MD  Dx:   Encounter Diagnosis     ICD-10-CM    1  Acute pain of left knee  M25 562                   Subjective: Patient feeling better - decreased pain and swelling  Objective: See treatment diary below      Assessment: Tolerated treatment well  Patient would benefit from continued PT      Plan: Continue per plan of care        Precautions: Partial WB'ing      Manuals              LC x 10 minutes                                                Neuro Re-Ed                                                                                                        Ther Ex                          TKE 2x15 2x15 2x15 2x15                      SLR flexion 3x5 3x5 3x5 3x5         SLR abd 3x5 3x5 2x10 2x10                      Seated hamstring stretch x5 reps hold 30 sec x5 x5 x5                      Heel slides 3x10 3x10 3x10 3x10                      LAQ 3x10 3x10 3x10 3x10         Standing knee flexion 3x10 3x10 3x10 3x10                                   Prone knee flexion   2x15 2x15         Prone hang   x5 minutes x5 minutes                                                                                       Ther Activity                                       Gait Training                                       Modalities

## 2021-06-18 ENCOUNTER — TELEPHONE (OUTPATIENT)
Dept: OBGYN CLINIC | Facility: HOSPITAL | Age: 66
End: 2021-06-18

## 2021-06-18 ENCOUNTER — OFFICE VISIT (OUTPATIENT)
Dept: PHYSICAL THERAPY | Age: 66
End: 2021-06-18
Payer: MEDICARE

## 2021-06-18 DIAGNOSIS — S72.415A CLOSED NONDISPLACED FRACTURE OF CONDYLE OF LEFT FEMUR, INITIAL ENCOUNTER (HCC): ICD-10-CM

## 2021-06-18 DIAGNOSIS — M25.562 ACUTE PAIN OF LEFT KNEE: Primary | ICD-10-CM

## 2021-06-18 PROCEDURE — 97110 THERAPEUTIC EXERCISES: CPT | Performed by: PHYSICAL THERAPIST

## 2021-06-18 PROCEDURE — 97112 NEUROMUSCULAR REEDUCATION: CPT | Performed by: PHYSICAL THERAPIST

## 2021-06-18 RX ORDER — OXYCODONE HYDROCHLORIDE 5 MG/1
5 TABLET ORAL EVERY 4 HOURS PRN
Qty: 30 TABLET | Refills: 0 | Status: SHIPPED | OUTPATIENT
Start: 2021-06-18 | End: 2021-06-29 | Stop reason: SDUPTHER

## 2021-06-18 NOTE — PROGRESS NOTES
Daily Note     Today's date: 2021  Patient name: Soni Salcedo  : 1955  MRN: [id number]  Referring provider: Yoel Sheppard MD  Dx:   Encounter Diagnosis     ICD-10-CM    1  Acute pain of left knee  M25 562                   Subjective: Pain persists      Objective: See treatment diary below  WBAT per MD       Assessment: Tolerated treatment well  Patient would benefit from continued PT      Plan: Continue per plan of care        Precautions: Partial WB'ing      Manuals             LC x 10 minutes x10 minutes                                               Neuro Re-Ed                                                                                                        Ther Ex                          TKE 2x15 2x15 2x15 2x15 2x15                     SLR flexion 3x5 3x5 3x5 3x5 3x10        SLR abd 3x5 3x5 2x10 2x10 3x10                     Seated hamstring stretch x5 reps hold 30 sec x5 x5 x5 x5                     Heel slides 3x10 3x10 3x10 3x10 3x10                     LAQ 3x10 3x10 3x10 3x10 3x10        Standing knee flexion 3x10 3x10 3x10 3x10 3x10                                  Prone knee flexion   2x15 2x15 2x15        Prone hang   x5 minutes x5 minutes x5 minutes                     Cybex leg press - single     15# - 3x10        Walking in parallel bars     x10 fwd/bck                                               Ther Activity                                       Gait Training                                       Modalities

## 2021-06-21 ENCOUNTER — OFFICE VISIT (OUTPATIENT)
Dept: PHYSICAL THERAPY | Age: 66
End: 2021-06-21
Payer: MEDICARE

## 2021-06-21 DIAGNOSIS — M25.562 ACUTE PAIN OF LEFT KNEE: Primary | ICD-10-CM

## 2021-06-21 PROCEDURE — 97110 THERAPEUTIC EXERCISES: CPT | Performed by: PHYSICAL THERAPIST

## 2021-06-21 NOTE — PROGRESS NOTES
Daily Note     Today's date: 2021  Patient name: Colletta Needle  : 1955  MRN: [id number]  Referring provider: Donna Zee MD  Dx:   Encounter Diagnosis     ICD-10-CM    1  Acute pain of left knee  M25 562                   Subjective: Improved ambulation and ability to WB thru involved LE      Objective: See treatment diary below      Assessment: Tolerated treatment well  Patient would benefit from continued PT      Plan: Continue per plan of care        Precautions: Partial WB'ing      Manuals            LC x 10 minutes x10 minutes x10 minutes                                              Neuro Re-Ed                                                                                                        Ther Ex                          TKE 2x15 2x15 2x15 2x15 2x15 5# - 3x10                    SLR flexion 3x5 3x5 3x5 3x5 3x10 2# - 3x10       SLR abd 3x5 3x5 2x10 2x10 3x10 2# - 3x10                    Seated hamstring stretch x5 reps hold 30 sec x5 x5 x5 x5 x5                    Heel slides 3x10 3x10 3x10 3x10 3x10 3x10                    LAQ 3x10 3x10 3x10 3x10 3x10 3x10       Standing knee flexion 3x10 3x10 3x10 3x10 3x10 3x10                                 Prone knee flexion   2x15 2x15 2x15 2x15 - 2#       Prone hang   x5 minutes x5 minutes x5 minutes x5 - 2#                    Cybex leg press - single     15# - 3x10 25#       Walking in parallel bars     x10 fwd/bck x10       Cybex knee flexion      5# - 3x10                                 Ther Activity                                       Gait Training                                       Modalities

## 2021-06-22 ENCOUNTER — HOSPITAL ENCOUNTER (OUTPATIENT)
Dept: RADIOLOGY | Facility: HOSPITAL | Age: 66
Discharge: HOME | End: 2021-06-22
Attending: ORTHOPAEDIC SURGERY
Payer: MEDICARE

## 2021-06-22 ENCOUNTER — OFFICE VISIT (OUTPATIENT)
Dept: OBGYN CLINIC | Facility: HOSPITAL | Age: 66
End: 2021-06-22
Payer: MEDICARE

## 2021-06-22 VITALS
SYSTOLIC BLOOD PRESSURE: 140 MMHG | DIASTOLIC BLOOD PRESSURE: 99 MMHG | HEART RATE: 103 BPM | BODY MASS INDEX: 29.48 KG/M2 | HEIGHT: 72 IN

## 2021-06-22 DIAGNOSIS — Z09 FRACTURE FOLLOW-UP: ICD-10-CM

## 2021-06-22 DIAGNOSIS — S72.415A CLOSED NONDISPLACED FRACTURE OF CONDYLE OF LEFT FEMUR, INITIAL ENCOUNTER (HCC): Primary | ICD-10-CM

## 2021-06-22 PROCEDURE — 99213 OFFICE O/P EST LOW 20 MIN: CPT | Performed by: ORTHOPAEDIC SURGERY

## 2021-06-22 PROCEDURE — 73560 X-RAY EXAM OF KNEE 1 OR 2: CPT

## 2021-06-22 NOTE — PROGRESS NOTES
Assessment:  1  Closed nondisplaced fracture of condyle of left femur, initial encounter ([facility] 75 )     2  Fracture follow-up         Plan:  The patient is doing well  He should continue to participate in physical therapy  He can full weight bear as tolerated  He can transition from walker to cane or no device  He can use the hinged knee brace as needed  He should follow up in 6 weeks  To do next visit:  Return in about 6 weeks (around 8/3/2021)  The above stated was discussed in layman's terms and the patient expressed understanding  All questions were answered to the patient's satisfaction  Scribe Attestation    I,:  Missy Vaughan am acting as a scribe while in the presence of the attending physician :       I,:  Alexandria Ramey MD personally performed the services described in this documentation    as scribed in my presence :             Subjective:   Aletta Lundborg is a 63 y o  male who presents for 4 week follow up of left medial ND femoral condyle fracture  Today he complains of mild medial left knee pain  He is full weight bearing  He had used a hinged knee brace with benefit  He does participate in physical therapy with benefit  He does use a walker with benefit          Review of systems negative unless otherwise specified in HPI    Past Medical History:   Diagnosis Date    Alcoholism ([facility] )     Arthritis     Bacterial peritonitis ([facility] )     Bowel disease     Cancer ([facility] )     liver    Cataract     Depression     Fracture     Gastroesophageal reflux     Hepatic disease     Hepatocellular carcinoma ([facility]     History of colon polyps     Hypertension     Liver cancer ([facility] )     Liver disease     Stomach disease        Past Surgical History:   Procedure Laterality Date    APPENDECTOMY      BACK SURGERY      CATARACT EXTRACTION      EXPLORATORY LAPAROTOMY  09/2011    EYE SURGERY      LIVER TRANSPLANTATION  08/20/2013    PARACENTESIS      Abdominal Paracentesis(Therapeutic) with Imaging Guidance    RI COLONOSCOPY FLX DX W/COLLJ SPEC WHEN PFRMD N/A 9/12/2016    Procedure: COLONOSCOPY;  Surgeon: Lalo Talamantes MD;  Location: [facility]; Service: General    ROTATOR CUFF REPAIR      x2       Family History   Problem Relation Age of Onset    Coronary artery disease Mother         CABG    Prostate cancer Mother         Prostate Gland    Heart disease Mother         Cardiac Disorder    Vascular Disease Mother     Hypertension Mother         Benign    Diabetes Maternal Grandmother         Mellitus    Clotting disorder Maternal Grandfather         Embolism    Seizures Paternal Grandmother         Epilepsy    Other Paternal Grandfather         Accident    Liver cancer Family     Heart disease Family         Cardiac Disorder    Hypertension Family         Benign       Social History     Occupational History    Occupation:    Tobacco Use    Smoking status: Current Every Day Smoker     Packs/day: 0 25     Years: 35 00     Pack years: 8 75     Types: Cigarettes     Start date: 6/11/1978    Smokeless tobacco: Never Used   Vaping Use    Vaping Use: Never used   Substance and Sexual Activity    Alcohol use:  Yes     Alcohol/week: 2 0 standard drinks     Types: 1 Glasses of wine, 1 Standard drinks or equivalent per week    Drug use: No     Comment: Per [employer] Drug use way back in college over 40 yrs ago    Sexual activity: Not on file         Current Outpatient Medications:     acetaminophen (TYLENOL) 325 mg tablet, Take 650 mg by mouth every 6 (six) hours as needed for mild pain , Disp: , Rfl:     ALPRAZolam (XANAX) 0 25 mg tablet, TAKE 1 TABLET BY MOUTH EVERY DAY AS NEEDED FOR ANXIETY, Disp: 30 tablet, Rfl: 1    aspirin (ECOTRIN LOW STRENGTH) 81 mg EC tablet, Take 81 mg by mouth daily, Disp: , Rfl:     atorvastatin (LIPITOR) 40 mg tablet, Take 1 tablet (40 mg total) by mouth daily, Disp: 90 tablet, Rfl: 3    CALCIUM-MAGNESIUM-VITAMIN D ER PO, Take 1 tablet by mouth daily, Disp: , Rfl:     docusate sodium (COLACE) 100 mg capsule, Take 100 mg by mouth 2 (two) times a day , Disp: , Rfl:     gabapentin (NEURONTIN) 300 mg capsule, Take 1 capsule (300 mg total) by mouth 2 (two) times a day, Disp: 60 capsule, Rfl: 5    lisinopril (ZESTRIL) 20 mg tablet, Take 1 tablet (20 mg total) by mouth daily The patient should contact his family doctor for further orders or set up an appointment before next  Refill , Disp: 90 tablet, Rfl: 0    omeprazole (PriLOSEC) 20 mg delayed release capsule, TAKE 1 CAPSULE BY MOUTH TWICE DAILY, Disp: 180 capsule, Rfl: 3    ondansetron (ZOFRAN-ODT) 4 mg disintegrating tablet, Take 1 tablet (4 mg total) by mouth every 6 (six) hours as needed for nausea or vomiting, Disp: 20 tablet, Rfl: 5    ONE DAILY MULTIPLE VITAMIN PO, Take by mouth , Disp: , Rfl:     oxyCODONE (ROXICODONE) 10 MG TABS, Take 1/2 tablet (5 mg) - 1 tablet (10mg) three times daily as needed for moderate-severe pain, Disp: 20 tablet, Rfl: 0    oxyCODONE (ROXICODONE) 5 mg immediate release tablet, Take 1 tablet (5 mg total) by mouth every 4 (four) hours as needed for moderate painMax Daily Amount: 30 mg, Disp: 30 tablet, Rfl: 0    tacrolimus (PROGRAF) 1 mg capsule, TAKE 3 CAPSULES(3 MG TOTAL) BY MOUTH TWICE DAILY, Disp: 540 capsule, Rfl: 3    Allergies   Allergen Reactions    Macrolides And Ketolides      [facility] [id number]: The patient is on Antirejection medications and they will reduce the effective ness of the medications              Vitals:    06/22/21 0755   BP: 140/99   Pulse: 103       Objective:  Physical exam  · General: Awake, Alert, Oriented  · Eyes: Pupils equal, round and reactive to light  · Heart: regular rate and rhythm  · Lungs: No audible wheezing  · Abdomen: soft                    Ortho Exam  Left knee:  Mild TTP over medial joint line   No erythema or ecchymosis  Very mild effusion  Mild medial swelling  Normal strength  Good ROM   Calf compartments soft and supple  Sensation intact  Toes are warm sensate and mobile        Diagnostics, reviewed and taken today if performed as documented: The attending physician has personally reviewed the pertinent films in PACS and interpretation is as follows:  Left knee x-ray:  Healing fracture site  Procedures, if performed today:    Procedures    None performed      Portions of the record may have been created with voice recognition software  Occasional wrong word or "sound a like" substitutions may have occurred due to the inherent limitations of voice recognition software  Read the chart carefully and recognize, using context, where substitutions have occurred

## 2021-06-24 ENCOUNTER — OFFICE VISIT (OUTPATIENT)
Dept: PHYSICAL THERAPY | Age: 66
End: 2021-06-24
Payer: MEDICARE

## 2021-06-24 DIAGNOSIS — M25.562 ACUTE PAIN OF LEFT KNEE: Primary | ICD-10-CM

## 2021-06-24 PROCEDURE — 97110 THERAPEUTIC EXERCISES: CPT | Performed by: PHYSICAL THERAPIST

## 2021-06-24 NOTE — PROGRESS NOTES
Daily Note     Today's date: 2021  Patient name: Adilene Barclay  : 1955  MRN: [id number]  Referring provider: Ross Garcia MD  Dx:   Encounter Diagnosis     ICD-10-CM    1  Acute pain of left knee  M25 562                   Subjective: Saw MD - progress away from walker as able  Objective: See treatment diary below      Assessment: Tolerated treatment well  Patient would benefit from continued PT      Plan: Continue per plan of care        Precautions: Partial WB'ing      Manuals           LC x 10 minutes x10 minutes x10 minutes x10 minutes                                             Neuro Re-Ed                                                                                                        Ther Ex                          TKE 2x15 2x15 2x15 2x15 2x15 5# - 3x10 5#                   SLR flexion 3x5 3x5 3x5 3x5 3x10 2# - 3x10 2#      SLR abd 3x5 3x5 2x10 2x10 3x10 2# - 3x10 2#                   Seated hamstring stretch x5 reps hold 30 sec x5 x5 x5 x5 x5 x5                   Heel slides 3x10 3x10 3x10 3x10 3x10 3x10 3x10                   LAQ 3x10 3x10 3x10 3x10 3x10 3x10 nt      Standing knee flexion 3x10 3x10 3x10 3x10 3x10 3x10 nt                                Prone knee flexion   2x15 2x15 2x15 2x15 - 2# 2#      Prone hang   x5 minutes x5 minutes x5 minutes x5 - 2# 2#                   Cybex leg press - single     15# - 3x10 25# 3x10      Walking in parallel bars     x10 fwd/bck x10 x10      Cybex knee flexion      5# - 3x10 5#                                Ther Activity                                       Gait Training                                       Modalities

## 2021-06-25 ENCOUNTER — TELEPHONE (OUTPATIENT)
Dept: INTERNAL MEDICINE CLINIC | Facility: CLINIC | Age: 66
End: 2021-06-25

## 2021-06-25 DIAGNOSIS — J06.9 UPPER RESPIRATORY TRACT INFECTION, UNSPECIFIED TYPE: Primary | ICD-10-CM

## 2021-06-25 RX ORDER — AZITHROMYCIN 250 MG/1
250 TABLET, FILM COATED ORAL EVERY 24 HOURS
Qty: 6 TABLET | Refills: 0 | Status: SHIPPED | OUTPATIENT
Start: 2021-06-25 | End: 2021-06-30

## 2021-06-25 NOTE — TELEPHONE ENCOUNTER
Patient has a cough with bright yellow mucous  It has been lingering for 3-4 days  It gets worse as the day goes on  Especially in the evenings when he lays down for bed  Asking if you can call in a zpak  Patient states this happens every year around this time    [id number]      Please advise

## 2021-06-29 ENCOUNTER — OFFICE VISIT (OUTPATIENT)
Dept: PHYSICAL THERAPY | Age: 66
End: 2021-06-29
Payer: MEDICARE

## 2021-06-29 DIAGNOSIS — M25.562 ACUTE PAIN OF LEFT KNEE: Primary | ICD-10-CM

## 2021-06-29 PROCEDURE — 97110 THERAPEUTIC EXERCISES: CPT | Performed by: PHYSICAL THERAPIST

## 2021-06-29 NOTE — PROGRESS NOTES
Daily Note     Today's date: 2021  Patient name: Santana Tran  : 1955  MRN: [id number]  Referring provider: Earlene Zhao MD  Dx:   Encounter Diagnosis     ICD-10-CM    1  Acute pain of left knee  M25 562                   Subjective: No significant change noted  Objective: See treatment diary below      Assessment: Tolerated treatment well  Patient would benefit from continued PT      Plan: Continue per plan of care        Precautions: Partial WB'ing      Manuals          LC x 10 minutes x10 minutes x10 minutes x10 minutes x10 minutes                                            Neuro Re-Ed                                                                                                        Ther Ex                          TKE 2x15 2x15 2x15 2x15 2x15 5# - 3x10 5# 7 5#                  SLR flexion 3x5 3x5 3x5 3x5 3x10 2# - 3x10 2# 2 5#     SLR abd 3x5 3x5 2x10 2x10 3x10 2# - 3x10 2# 2 5#                  Seated hamstring stretch x5 reps hold 30 sec x5 x5 x5 x5 x5 x5 x5                                                                                                Prone knee flexion   2x15 2x15 2x15 2x15 - 2# 2# 2 5#     Prone hang   x5 minutes x5 minutes x5 minutes x5 - 2# 2# 2 5#                  Cybex leg press - single     15# - 3x10 25# 3x10 40#     Walking in parallel bars     x10 fwd/bck x10 x10 x10     Cybex knee flexion      5# - 3x10 5# 5#                  Step ups        4" - 2x10     Gait training with parallel bars        x10 - FWD/BCK     Heel raises        2x15                                                         Ther Activity                                       Gait Training                                       Modalities

## 2021-07-02 ENCOUNTER — OFFICE VISIT (OUTPATIENT)
Dept: PHYSICAL THERAPY | Age: 66
End: 2021-07-02
Payer: MEDICARE

## 2021-07-02 DIAGNOSIS — M25.562 ACUTE PAIN OF LEFT KNEE: Primary | ICD-10-CM

## 2021-07-02 PROCEDURE — 97110 THERAPEUTIC EXERCISES: CPT | Performed by: PHYSICAL THERAPIST

## 2021-07-02 NOTE — PROGRESS NOTES
Daily Note     Today's date: 2021  Patient name: Mary Russell  : 1955  MRN: [id number]  Referring provider: Maykel Schafer MD  Dx:   Encounter Diagnosis     ICD-10-CM    1  Acute pain of left knee  M25 562 PT plan of care cert/re-cert                  Subjective: Feeling better - using SPC      Objective: See treatment diary below      Assessment: Tolerated treatment well  Patient would benefit from continued PT      Plan: Continue per plan of care        Precautions: Partial WB'ing      Manuals  7        LC x 10 minutes x10 minutes x10 minutes x10 minutes x10 minutes x10 minutes                                           Neuro Re-Ed                                                                                                        Ther Ex                          TKE 2x15 2x15 2x15 2x15 2x15 5# - 3x10 5# 7 5# 7 5#                 SLR flexion 3x5 3x5 3x5 3x5 3x10 2# - 3x10 2# 2 5# 2 5#    SLR abd 3x5 3x5 2x10 2x10 3x10 2# - 3x10 2# 2 5# 2 5#                 Seated hamstring stretch x5 reps hold 30 sec x5 x5 x5 x5 x5 x5 x5 x5                                                                                               Prone knee flexion   2x15 2x15 2x15 2x15 - 2# 2# 2 5# 2 5#    Prone hang   x5 minutes x5 minutes x5 minutes x5 - 2# 2# 2 5# 2 5#                 Cybex leg press - single     15# - 3x10 25# 3x10 40# 40#    Walking in parallel bars     x10 fwd/bck x10 x10 x10 x10    Cybex knee flexion      5# - 3x10 5# 5# 10#                 Step ups        4" - 2x10 4"    Gait training with parallel bars        x10 - FWD/BCK nt    Heel raises        2x15 2x15                                                        Ther Activity                                       Gait Training                                       Modalities

## 2021-07-07 ENCOUNTER — OFFICE VISIT (OUTPATIENT)
Dept: PHYSICAL THERAPY | Age: 66
End: 2021-07-07
Payer: MEDICARE

## 2021-07-07 DIAGNOSIS — M25.562 ACUTE PAIN OF LEFT KNEE: Primary | ICD-10-CM

## 2021-07-07 PROCEDURE — 97530 THERAPEUTIC ACTIVITIES: CPT | Performed by: PHYSICAL MEDICINE & REHABILITATION

## 2021-07-07 PROCEDURE — 97112 NEUROMUSCULAR REEDUCATION: CPT | Performed by: PHYSICAL MEDICINE & REHABILITATION

## 2021-07-07 PROCEDURE — 97110 THERAPEUTIC EXERCISES: CPT | Performed by: PHYSICAL MEDICINE & REHABILITATION

## 2021-07-07 NOTE — PROGRESS NOTES
Daily Note     Today's date: 2021  Patient name: Peterson Negrete  : 1955  MRN: [id number]  Referring provider: Nena Domingo MD  Dx:   Encounter Diagnosis     ICD-10-CM    1  Acute pain of left knee  M25 562                   Subjective: Patient notes some soreness as he was doing more walking yesterday  Patient notes he was able to ambulate in and outside of his home without his [facility]  Objective: See treatment diary below      Assessment: Tolerated treatment well  Challenged with retro walking this date  Patient would benefit from continued PT      Plan: Continue per plan of care        Precautions: Partial WB'ing      Manuals        LC x 10 minutes x10 minutes x10 minutes x10 minutes x10 minutes x10 minutes 10'                                          Neuro Re-Ed                                                                                                        Ther Ex                          TKE 2x15 2x15 2x15 2x15 2x15 5# - 3x10 5# 7 5# 7 5# SAQ 7 5# 3x10                SLR flexion 3x5 3x5 3x5 3x5 3x10 2# - 3x10 2# 2 5# 2 5#    SLR abd 3x5 3x5 2x10 2x10 3x10 2# - 3x10 2# 2 5# 2 5#                 Seated hamstring stretch x5 reps hold 30 sec x5 x5 x5 x5 x5 x5 x5 x5 5x                                                                                              Prone knee flexion   2x15 2x15 2x15 2x15 - 2# 2# 2 5# 2 5# 2 5# 3x10   Prone hang   x5 minutes x5 minutes x5 minutes x5 - 2# 2# 2 5# 2 5# 2 5# 5'                Cybex leg press - single     15# - 3x10 25# 3x10 40# 40# 40# 3x10   Walking in parallel bars     x10 fwd/bck x10 x10 x10 x10    Cybex knee flexion      5# - 3x10 5# 5# 10# 10# 3x10                Step ups        4" - 2x10 4" 6" 15x   Gait training with parallel bars        x10 - FWD/BCK nt 10 laps   Heel raises        2x15 2x15 home                                                       Ther Activity Gait Training                                       Modalities

## 2021-07-08 ENCOUNTER — OFFICE VISIT (OUTPATIENT)
Dept: PHYSICAL THERAPY | Age: 66
End: 2021-07-08
Payer: MEDICARE

## 2021-07-08 DIAGNOSIS — M25.562 ACUTE PAIN OF LEFT KNEE: Primary | ICD-10-CM

## 2021-07-08 PROCEDURE — 97110 THERAPEUTIC EXERCISES: CPT | Performed by: PHYSICAL THERAPY ASSISTANT

## 2021-07-08 PROCEDURE — 97112 NEUROMUSCULAR REEDUCATION: CPT | Performed by: PHYSICAL THERAPY ASSISTANT

## 2021-07-08 NOTE — PROGRESS NOTES
Daily Note     Today's date: 2021  Patient name: Araceli Lei  : 1955  MRN: [id number]  Referring provider: Nica Alvarez MD  Dx:   Encounter Diagnosis     ICD-10-CM    1  Acute pain of left knee  M25 562                   Subjective: no new complaints  Objective: See treatment diary below      Assessment: Tolerated treatment well  Improved retro walking this session without UE support  Patient demonstrated fatigue post treatment, exhibited good technique with therapeutic exercises and would benefit from continued PT      Plan: Continue per plan of care        Precautions: Partial WB'ing      Manuals  7/2 7/7 7/8      x10 minutes x10 minutes x10 minutes x10 minutes x10 minutes 10' 10'                                         Neuro Re-Ed                                                                                                Ther Ex                       TKE 2x15 5# - 3x10 5# 7 5# 7 5# SAQ 7 5# 3x10 saq 7 5# 3x10                 SLR flexion 3x10 2# - 3x10 2# 2 5# 2 5#  2 5# 3x10     SLR abd 3x10 2# - 3x10 2# 2 5# 2 5#  2 5# 3x10                 Seated hamstring stretch x5 x5 x5 x5 x5 5x 5x                                                                                         Prone knee flexion 2x15 2x15 - 2# 2# 2 5# 2 5# 2 5# 3x10 2 5# 3x10     Prone hang x5 minutes x5 - 2# 2# 2 5# 2 5# 2 5# 5' 2 5# 3x10                 Cybex leg press - single 15# - 3x10 25# 3x10 40# 40# 40# 3x10 40# 3x10     Walking in parallel bars x10 fwd/bck x10 x10 x10 x10       Cybex knee flexion  5# - 3x10 5# 5# 10# 10# 3x10 10# 3x10                 Step ups    4" - 2x10 4" 6" 15x 6"x25     Gait training with parallel bars    x10 - FWD/BCK nt 10 laps 10 laps     Heel raises    2x15 2x15 home 2x15                                                     Ther Activity                                    Gait Training                                    Modalities

## 2021-07-12 ENCOUNTER — TELEPHONE (OUTPATIENT)
Dept: OBGYN CLINIC | Facility: HOSPITAL | Age: 66
End: 2021-07-12

## 2021-07-12 ENCOUNTER — OFFICE VISIT (OUTPATIENT)
Dept: PHYSICAL THERAPY | Age: 66
End: 2021-07-12
Payer: MEDICARE

## 2021-07-12 DIAGNOSIS — M25.562 ACUTE PAIN OF LEFT KNEE: Primary | ICD-10-CM

## 2021-07-12 PROCEDURE — 97110 THERAPEUTIC EXERCISES: CPT | Performed by: PHYSICAL THERAPIST

## 2021-07-12 NOTE — TELEPHONE ENCOUNTER
Pt contacted Call Center requested refill of their medication  Medication Name:oxyCODONE (ROXICODONE) 5 mg immediate release tablet       Dosage of Med:      Frequency of Med:      Remaining Medication: 2 tablets      Pharmacy and Location: [facility] on file        Pt  Preferred Callback Phone Number: [phone]      Thank you

## 2021-07-12 NOTE — PROGRESS NOTES
Daily Note     Today's date: 2021  Patient name: Emmy Balderas  : 1955  MRN: [id number]  Referring provider: Samantha Owens MD  Dx:   Encounter Diagnosis     ICD-10-CM    1  Acute pain of left knee  M25 562                   Subjective: Progress has slowed - medial knee pain persists      Objective: See treatment diary below      Assessment: Tolerated treatment well  Patient would benefit from continued PT      Plan: Continue per plan of care        Precautions: Partial WB'ing      Manuals  7/2 7/7 7/8 7/12     x10 minutes x10 minutes x10 minutes x10 minutes x10 minutes 10' 10' x10 minutes                                        Neuro Re-Ed                                                                                                Ther Ex                       TKE 2x15 5# - 3x10 5# 7 5# 7 5# SAQ 7 5# 3x10 saq 7 5# 3x10 7 5#                SLR flexion 3x10 2# - 3x10 2# 2 5# 2 5#  2 5# 3x10 2 5#    SLR abd 3x10 2# - 3x10 2# 2 5# 2 5#  2 5# 3x10 2 5#                Seated hamstring stretch x5 x5 x5 x5 x5 5x 5x x5                                                                                        Prone knee flexion 2x15 2x15 - 2# 2# 2 5# 2 5# 2 5# 3x10 2 5# 3x10 2 5#    Prone hang x5 minutes x5 - 2# 2# 2 5# 2 5# 2 5# 5' 2 5# 3x10 2 5#                Cybex leg press - single 15# - 3x10 25# 3x10 40# 40# 40# 3x10 40# 3x10 completed    Walking in parallel bars x10 fwd/bck x10 x10 x10 x10       Cybex knee flexion  5# - 3x10 5# 5# 10# 10# 3x10 10# 3x10 completed                Step ups    4" - 2x10 4" 6" 15x 6"x25 3x10    Gait training with parallel bars    x10 - FWD/BCK nt 10 laps 10 laps completed    Heel raises    2x15 2x15 home 2x15 completed                                                    Ther Activity                                    Gait Training                                    Modalities

## 2021-07-19 ENCOUNTER — OFFICE VISIT (OUTPATIENT)
Dept: PHYSICAL THERAPY | Age: 66
End: 2021-07-19
Payer: MEDICARE

## 2021-07-19 DIAGNOSIS — M25.562 ACUTE PAIN OF LEFT KNEE: Primary | ICD-10-CM

## 2021-07-19 PROCEDURE — 97110 THERAPEUTIC EXERCISES: CPT | Performed by: PHYSICAL THERAPIST

## 2021-07-19 NOTE — PROGRESS NOTES
Daily Note     Today's date: 2021  Patient name: Martha Bustillos  : 1955  MRN: [id number]  Referring provider: Teodoro Castillo MD  Dx:   Encounter Diagnosis     ICD-10-CM    1  Acute pain of left knee  M25 562                   Subjective: Feeling better - going without SPC currently  Objective: See treatment diary below      Assessment: Tolerated treatment well  Patient would benefit from continued PT      Plan: Continue per plan of care        Precautions: Partial WB'ing      Manuals  7/2 7/7 7/8 7/12 7/19    x10 minutes x10 minutes x10 minutes x10 minutes x10 minutes 10' 10' x10 minutes x10 minutes                                       Neuro Re-Ed                                                                                                Ther Ex                       TKE 2x15 5# - 3x10 5# 7 5# 7 5# SAQ 7 5# 3x10 saq 7 5# 3x10 7 5# 7 5#               SLR flexion 3x10 2# - 3x10 2# 2 5# 2 5#  2 5# 3x10 2 5# 2 5#   SLR abd 3x10 2# - 3x10 2# 2 5# 2 5#  2 5# 3x10 2 5# 2 5#               Seated hamstring stretch x5 x5 x5 x5 x5 5x 5x x5 x5 reps                                                                                       Prone knee flexion 2x15 2x15 - 2# 2# 2 5# 2 5# 2 5# 3x10 2 5# 3x10 2 5# 2 5#   Prone hang x5 minutes x5 - 2# 2# 2 5# 2 5# 2 5# 5' 2 5# 3x10 2 5# 2 5#               Cybex leg press - single 15# - 3x10 25# 3x10 40# 40# 40# 3x10 40# 3x10 completed 3x0   Walking in parallel bars x10 fwd/bck x10 x10 x10 x10       Cybex knee flexion  5# - 3x10 5# 5# 10# 10# 3x10 10# 3x10 completed 3x10               Step ups    4" - 2x10 4" 6" 15x 6"x25 3x10 completed                                                                           Ther Activity                                    Gait Training                                    Modalities

## 2021-07-22 ENCOUNTER — OFFICE VISIT (OUTPATIENT)
Dept: PHYSICAL THERAPY | Age: 66
End: 2021-07-22
Payer: MEDICARE

## 2021-07-22 DIAGNOSIS — M25.562 ACUTE PAIN OF LEFT KNEE: Primary | ICD-10-CM

## 2021-07-22 PROCEDURE — 97140 MANUAL THERAPY 1/> REGIONS: CPT | Performed by: PHYSICAL THERAPIST

## 2021-07-22 PROCEDURE — 97110 THERAPEUTIC EXERCISES: CPT | Performed by: PHYSICAL THERAPIST

## 2021-07-22 NOTE — PROGRESS NOTES
Daily Note     Today's date: 2021  Patient name: Pilar Moore  : 1955  MRN: [id number]  Referring provider: Gabriel Kwan MD  Dx:   Encounter Diagnosis     ICD-10-CM    1  Acute pain of left knee  M25 562                   Subjective: Steady progress      Objective: See treatment diary below      Assessment: Tolerated treatment well  Patient would benefit from continued PT      Plan: Continue per plan of care        Precautions: Partial WB'ing      Manuals  7   LC x10 minutes x10 minutes x10 minutes x10 minutes x10 minutes 10' 10' x10 minutes x10 minutes                                       Neuro Re-Ed                                                                                                Ther Ex                       TKE 2x15 - 7 5# 5# - 3x10 5# 7 5# 7 5# SAQ 7 5# 3x10 saq 7 5# 3x10 7 5# 7 5#               SLR flexion 3x10 -2 5# 2# - 3x10 2# 2 5# 2 5#  2 5# 3x10 2 5# 2 5#   SLR abd 3x10 - 2 5# 2# - 3x10 2# 2 5# 2 5#  2 5# 3x10 2 5# 2 5#               Seated hamstring stretch x5 x5 x5 x5 x5 5x 5x x5 x5 reps                                                                                       Prone knee flexion 2x15 - 2 5# 2x15 - 2# 2# 2 5# 2 5# 2 5# 3x10 2 5# 3x10 2 5# 2 5#   Prone hang x5 minutes x5 - 2# 2# 2 5# 2 5# 2 5# 5' 2 5# 3x10 2 5# 2 5#               Cybex leg press - single 15# - 3x10 25# 3x10 40# 40# 40# 3x10 40# 3x10 completed 3x0   Walking in parallel bars x10 fwd/bck x10 x10 x10 x10       Cybex knee flexion 15# - 3x10 5# - 3x10 5# 5# 10# 10# 3x10 10# 3x10 completed 3x10   Cybex knee ext 10# - 3x10                                                                                   Step ups    4" - 2x10 4" 6" 15x 6"x25 3x10 completed                                                                           Ther Activity                                    Gait Training                                    Modalities

## 2021-07-23 DIAGNOSIS — I10 ESSENTIAL HYPERTENSION: ICD-10-CM

## 2021-07-23 RX ORDER — LISINOPRIL 20 MG/1
TABLET ORAL
Qty: 90 TABLET | Refills: 0 | Status: SHIPPED | OUTPATIENT
Start: 2021-07-23 | End: 2021-07-31

## 2021-07-24 DIAGNOSIS — I10 ESSENTIAL HYPERTENSION: ICD-10-CM

## 2021-07-26 ENCOUNTER — OFFICE VISIT (OUTPATIENT)
Dept: PHYSICAL THERAPY | Age: 66
End: 2021-07-26
Payer: MEDICARE

## 2021-07-26 DIAGNOSIS — M25.562 ACUTE PAIN OF LEFT KNEE: Primary | ICD-10-CM

## 2021-07-26 PROCEDURE — 97110 THERAPEUTIC EXERCISES: CPT | Performed by: PHYSICAL THERAPIST

## 2021-07-26 NOTE — PROGRESS NOTES
Daily Note     Today's date: 2021  Patient name: Opal Rider  : 1955  MRN: [id number]  Referring provider: Temitope Damon MD  Dx:   Encounter Diagnosis     ICD-10-CM    1  Acute pain of left knee  M25 562                   Subjective: Sore over the past couple of days  Objective: See treatment diary below      Assessment: Tolerated treatment well  Patient would benefit from continued PT      Plan: Continue per plan of care        Precautions: Partial WB'ing      Manuals  7   LC x10 minutes x10 minutes x10 minutes x10 minutes x10 minutes 10' 10' x10 minutes x10 minutes                                       Neuro Re-Ed                                                                                                Ther Ex                       TKE 2x15 - 7 5# 5# - 3x10 5# 7 5# 7 5# SAQ 7 5# 3x10 saq 7 5# 3x10 7 5# 7 5#               SLR flexion 3x10 -2 5# 2# - 3x10 2# 2 5# 2 5#  2 5# 3x10 2 5# 2 5#   SLR abd 3x10 - 2 5# 2# - 3x10 2# 2 5# 2 5#  2 5# 3x10 2 5# 2 5#               Seated hamstring stretch x5 x5 x5 x5 x5 5x 5x x5 x5 reps                                                                                       Prone knee flexion 2x15 - 2 5# 2x15 - 2# 2# 2 5# 2 5# 2 5# 3x10 2 5# 3x10 2 5# 2 5#   Prone hang x5 minutes x5 - 2# 2# 2 5# 2 5# 2 5# 5' 2 5# 3x10 2 5# 2 5#               Cybex leg press - single 15# - 3x10 25# 3x10 40# 40# 40# 3x10 40# 3x10 completed 3x0   Walking in parallel bars x10 fwd/bck x10 x10 x10 x10       Cybex knee flexion 15# - 3x10 15# - 3x10 5# 5# 10# 10# 3x10 10# 3x10 completed 3x10   Cybex knee ext 10# - 3x10 10#                                                                                  Step ups    4" - 2x10 4" 6" 15x 6"x25 3x10 completed                                                                           Ther Activity                                    Gait Training                                    Modalities

## 2021-07-29 ENCOUNTER — OFFICE VISIT (OUTPATIENT)
Dept: PHYSICAL THERAPY | Age: 66
End: 2021-07-29
Payer: MEDICARE

## 2021-07-29 DIAGNOSIS — M25.562 ACUTE PAIN OF LEFT KNEE: Primary | ICD-10-CM

## 2021-07-29 PROCEDURE — 97110 THERAPEUTIC EXERCISES: CPT | Performed by: PHYSICAL THERAPIST

## 2021-07-29 NOTE — PROGRESS NOTES
Daily Note     Today's date: 2021  Patient name: Bhavesh Jones  : 1955  MRN: [id number]  Referring provider: Fiordaliza Jane MD  Dx:   Encounter Diagnosis     ICD-10-CM    1  Acute pain of left knee  M25 562                   Subjective: Patient still with medial knee soreness/catching      Objective: See treatment diary below      Assessment: Tolerated treatment well  Patient would benefit from continued PT      Plan: Continue per plan of care  Precautions: Partial WB'ing      Manuals  7   LC x10 minutes x10 minutes x10 minutes x10 minutes x10 minutes 10' 10' x10 minutes x10 minutes               PROM into ext   x5 reps hold 30 sec                       Neuro Re-Ed                                                                                                Ther Ex                       TKE 2x15 - 7 5# 5# - 3x10 5# 7 5# 7 5# SAQ 7 5# 3x10 saq 7 5# 3x10 7 5# 7 5#               SLR flexion 3x10 -2 5# 2# - 3x10 2# 2 5# 2 5#  2 5# 3x10 2 5# 2 5#   SLR abd 3x10 - 2 5# 2# - 3x10 2# 2 5# 2 5#  2 5# 3x10 2 5# 2 5#               Seated hamstring stretch x5 x5 x5 x5 x5 5x 5x x5 x5 reps                                                                                       Prone knee flexion 2x15 - 2 5# 2x15 - 2# 2# 2 5# 2 5# 2 5# 3x10 2 5# 3x10 2 5# 2 5#   Prone hang x5 minutes x5 - 2# 2# 2 5# 2 5# 2 5# 5' 2 5# 3x10 2 5# 2 5#               Cybex leg press - single 15# - 3x10 25# 3x10 40# 40# 40# 3x10 40# 3x10 completed 3x0   Walking in parallel bars x10 fwd/bck x10 x10 x10 x10       Cybex knee flexion 15# - 3x10 15# - 3x10 5# 5# 10# 10# 3x10 10# 3x10 completed 3x10   Cybex knee ext 10# - 3x10 10#                                                                                  Step ups    4" - 2x10 4" 6" 15x 6"x25 3x10 completed                                                                           Ther Activity                                    Gait Training                                    Modalities

## 2021-07-30 ENCOUNTER — TELEPHONE (OUTPATIENT)
Dept: OBGYN CLINIC | Facility: HOSPITAL | Age: 66
End: 2021-07-30

## 2021-07-30 NOTE — TELEPHONE ENCOUNTER
Dr Sofia James    Patient is asking for a refill, has 1 pill left, leaving for vacation so needs this today  oxyCODONE (ROXICODONE) 5 mg immediate release tablet [[id number]]     [facility] in [place] is preferred pharmacy       CB # [phone]

## 2021-07-30 NOTE — TELEPHONE ENCOUNTER
Prescription refill granted an created electronically   Please inform patient   In addition you may inform patient this will be his last prescription for narcotic analgesia for his left knee injury

## 2021-07-31 RX ORDER — LISINOPRIL 20 MG/1
TABLET ORAL
Qty: 90 TABLET | Refills: 0 | Status: SHIPPED | OUTPATIENT
Start: 2021-07-31 | End: 2022-04-13 | Stop reason: SDUPTHER

## 2021-08-05 ENCOUNTER — OFFICE VISIT (OUTPATIENT)
Dept: PHYSICAL THERAPY | Age: 66
End: 2021-08-05
Payer: MEDICARE

## 2021-08-05 ENCOUNTER — HOSPITAL ENCOUNTER (OUTPATIENT)
Dept: RADIOLOGY | Facility: HOSPITAL | Age: 66
Discharge: HOME | End: 2021-08-05
Payer: MEDICARE

## 2021-08-05 ENCOUNTER — OFFICE VISIT (OUTPATIENT)
Dept: OBGYN CLINIC | Facility: HOSPITAL | Age: 66
End: 2021-08-05
Payer: MEDICARE

## 2021-08-05 VITALS
HEIGHT: 72 IN | HEART RATE: 94 BPM | WEIGHT: 216.3 LBS | DIASTOLIC BLOOD PRESSURE: 83 MMHG | BODY MASS INDEX: 29.3 KG/M2 | SYSTOLIC BLOOD PRESSURE: 124 MMHG

## 2021-08-05 DIAGNOSIS — M25.562 ACUTE PAIN OF LEFT KNEE: Primary | ICD-10-CM

## 2021-08-05 DIAGNOSIS — G89.29 CHRONIC PAIN OF LEFT KNEE: ICD-10-CM

## 2021-08-05 DIAGNOSIS — S72.415D CLOSED NONDISPLACED FRACTURE OF CONDYLE OF LEFT FEMUR WITH ROUTINE HEALING, SUBSEQUENT ENCOUNTER: Primary | ICD-10-CM

## 2021-08-05 DIAGNOSIS — M23.92 KNEE INTERNAL DERANGEMENT, LEFT: ICD-10-CM

## 2021-08-05 DIAGNOSIS — M25.562 CHRONIC PAIN OF LEFT KNEE: ICD-10-CM

## 2021-08-05 DIAGNOSIS — S72.415D CLOSED NONDISPLACED FRACTURE OF CONDYLE OF LEFT FEMUR WITH ROUTINE HEALING, SUBSEQUENT ENCOUNTER: ICD-10-CM

## 2021-08-05 PROCEDURE — 97110 THERAPEUTIC EXERCISES: CPT

## 2021-08-05 PROCEDURE — 97112 NEUROMUSCULAR REEDUCATION: CPT

## 2021-08-05 PROCEDURE — 73560 X-RAY EXAM OF KNEE 1 OR 2: CPT

## 2021-08-05 PROCEDURE — 99213 OFFICE O/P EST LOW 20 MIN: CPT | Performed by: PHYSICIAN ASSISTANT

## 2021-08-05 NOTE — PROGRESS NOTES
Daily Note     Today's date: 2021  Patient name: Poppy Galdamez  : 1955  MRN: [id number]  Referring provider: Jenel Mortimer, MD  Dx:   Encounter Diagnosis     ICD-10-CM    1  Acute pain of left knee  M25 562                   Subjective: Pt reports returning to MD today sending him for MRI for possible meniscal tear  Reports being away on vacation and walking on the beach feeling good  Objective: See treatment diary below      Assessment: Patient did well with all TE as listed reports no increased pain during or post tx  Plan: Continue per plan of care  Precautions: Partial WB'ing      Manuals  8   LC x10 minutes x10 minutes x10 minutes x10 min          PROM into ext   x5 reps hold 30 sec  x5 reps old 30 sec            Neuro Re-Ed                                                        Ther Ex              TKE 2x15 - 7 5# 5# - 3x10 5#           SLR flexion 3x10 -2 5# 2# - 3x10 2# 2# 3x10   SLR abd 3x10 - 2 5# 2# - 3x10 2# 2# 3x10          Seated hamstring stretch x5 x5 x5 x5                                                    Prone knee flexion 2x15 - 2 5# 2x15 - 2# 2# 2# 2x15   Prone hang x5 minutes x5 - 2# 2# 2# x5 min          Cybex leg press - single 15# - 3x10 25# 3x10 25# 3x10   Walking in parallel bars x10 fwd/bck x10 x10    Cybex knee flexion 15# - 3x10 15# - 3x10 5# 15# 3x10   Cybex knee ext 10# - 3x10 10#  10# 3x10                                             Step ups                                                 Ther Activity                     Gait Training                     Modalities

## 2021-08-05 NOTE — PROGRESS NOTES
Assessment:  1  Closed nondisplaced fracture of condyle of left femur with routine healing, subsequent encounter  XR knee 1 or 2 vw left   2  Chronic pain of left knee  MRI knee left  wo contrast   3  Knee internal derangement, left  MRI knee left  wo contrast       Plan:  3 months s/p left ND femoral condyle fracture sustained 5/9/2021  Radiograph displays well healed fracture site  The patient presents for continued left posteromedial knee pain  Due to nature of symptoms, findings on exam and lack of diagnostic findings on x-ray we will be ordering a left knee MRI to evaluate for meniscal pathology  The patient should complete the MRI and follow up afterward  To do next visit:  Return for left knee MRI review   The above stated was discussed in layman's terms and the patient expressed understanding  All questions were answered to the patient's satisfaction  Subjective:   Pilar Moore is a 61 y o  male who presents 3 months s/p left ND femoral condyle fracture sustained 5/9/2021  He is progressing  Today he complains of occasional left posteromedial knee pain  The knee can clunk, catch and feel unstable at times  Walking can aggravate while rest alleviates  He rates his pain at 0/10 and greater while walking  He does participate in physical therapy with benefit  He does use oxycodone 1/2 pill at night occasionally  He does use Tylenol as well          Review of systems negative unless otherwise specified in HPI    Past Medical History:   Diagnosis Date    [facility] [address] [facility]     Arthritis     B[facility] peritonitis [facility] [address] [facility]     Bowel disease     Cancer ([facility] [address] [facility]     liver    Cataract     Depression     Fracture     Gastroesophageal reflux     Hepatic disease     Hepatocellular carcinoma ([facility] [address] [facility]     History of colon polyps     Hypertension     Liver cancer ([facility] [address] [facility]     Liver disease     Stomach disease        Past Surgical History:   Procedure Laterality Date    APPENDECTOMY  BACK SURGERY      CATARACT EXTRACTION      EXPLORATORY LAPAROTOMY  09/2011    EYE SURGERY      LIVER TRANSPLANTATION  08/20/2013    PARACENTESIS      Abdominal Paracentesis(Therapeutic) with Imaging Guidance    CA COLONOSCOPY FLX DX W/COLLJ SPEC WHEN PFRMD N/A 9/12/2016    Procedure: COLONOSCOPY;  Surgeon: Roger Roman MD;  Location: [facility]; Service: General    ROTATOR CUFF REPAIR      x2       Family History   Problem Relation Age of Onset    Coronary artery disease Mother         CABG    Prostate cancer Mother         Prostate Gland    Heart disease Mother         Cardiac Disorder    Vascular Disease Mother     Hypertension Mother         Benign    Diabetes Maternal Grandmother         Mellitus    Clotting disorder Maternal Grandfather         Embolism    Seizures Paternal Grandmother         Epilepsy    Other Paternal Grandfather         Accident    Liver cancer Family     Heart disease Family         Cardiac Disorder    Hypertension Family         Benign       Social History     Occupational History    Occupation:    Tobacco Use    Smoking status: Current Every Day Smoker     Packs/day: 0 25     Years: 35 00     Pack years: 8 75     Types: Cigarettes     Start date: 6/11/1978    Smokeless tobacco: Never Used   Vaping Use    Vaping Use: Never used   Substance and Sexual Activity    Alcohol use:  Yes     Alcohol/week: 2 0 standard drinks     Types: 1 Glasses of wine, 1 Standard drinks or equivalent per week    Drug use: No     Comment: Per [employer] Drug use way back in college over 40 yrs ago    Sexual activity: Not on file         Current Outpatient Medications:     acetaminophen (TYLENOL) 325 mg tablet, Take 650 mg by mouth every 6 (six) hours as needed for mild pain , Disp: , Rfl:     ALPRAZolam (XANAX) 0 25 mg tablet, TAKE 1 TABLET BY MOUTH EVERY DAY AS NEEDED FOR ANXIETY, Disp: 30 tablet, Rfl: 1    aspirin (ECOTRIN LOW STRENGTH) 81 mg EC tablet, Take 81 mg by mouth daily, Disp: , Rfl:     atorvastatin (LIPITOR) 40 mg tablet, Take 1 tablet (40 mg total) by mouth daily, Disp: 90 tablet, Rfl: 3    CALCIUM-MAGNESIUM-VITAMIN D ER PO, Take 1 tablet by mouth daily, Disp: , Rfl:     docusate sodium (COLACE) 100 mg capsule, Take 100 mg by mouth 2 (two) times a day , Disp: , Rfl:     gabapentin (NEURONTIN) 300 mg capsule, Take 1 capsule (300 mg total) by mouth 2 (two) times a day, Disp: 60 capsule, Rfl: 5    lisinopril (ZESTRIL) 20 mg tablet, TAKE 1 TABLET(20 MG) BY MOUTH DAILY  CONTACT HIS FAMILY DOCTOR FOR FURTHER ORDERS OR SET UP AN APPOINTMENT BEFORE NEXT REFILL, Disp: 90 tablet, Rfl: 0    omeprazole (PriLOSEC) 20 mg delayed release capsule, TAKE 1 CAPSULE BY MOUTH TWICE DAILY, Disp: 180 capsule, Rfl: 3    ondansetron (ZOFRAN-ODT) 4 mg disintegrating tablet, Take 1 tablet (4 mg total) by mouth every 6 (six) hours as needed for nausea or vomiting, Disp: 20 tablet, Rfl: 5    ONE DAILY MULTIPLE VITAMIN PO, Take by mouth , Disp: , Rfl:     oxyCODONE (ROXICODONE) 5 mg immediate release tablet, 1 pill po Q4 Hrs prn, Disp: 30 tablet, Rfl: 0    oxyCODONE (ROXICODONE) 5 mg immediate release tablet, Take 1 tablet (5 mg total) by mouth every 4 (four) hours as needed for moderate painMax Daily Amount: 30 mg, Disp: 30 tablet, Rfl: 0    tacrolimus (PROGRAF) 1 mg capsule, TAKE 3 CAPSULES(3 MG TOTAL) BY MOUTH TWICE DAILY, Disp: 540 capsule, Rfl: 3    Allergies   Allergen Reactions    Macrolides And Ketolides      Annotation - [id number]: The patient is on Antirejection medications and they will reduce the effective ness of the medications              Vitals:    08/05/21 0859   BP: 124/83   Pulse: 94       Objective:  Physical exam  · General: Awake, Alert, Oriented  · Eyes: Pupils equal, round and reactive to light  · Heart: regular rate and rhythm  · Lungs: No audible wheezing  · Abdomen: soft                    Ortho Exam  Left knee:  TTP over medial joint line  No erythema or ecchymosis  No effusion or swelling  Normal strength  Good ROM   Positive Medial McMurrays  Positive Apleys  Calf compartments soft and supple  Sensation intact  Toes are warm sensate and mobile      Diagnostics, reviewed and taken today if performed as documented: The attending physician has personally reviewed the pertinent films in PACS and interpretation is as follows:  Left knee x-ray:  Healed medial femoral condyle fracture  Mild medial arthritic changes  Procedures, if performed today:    Procedures    None performed      Portions of the record may have been created with voice recognition software  Occasional wrong word or "sound a like" substitutions may have occurred due to the inherent limitations of voice recognition software  Read the chart carefully and recognize, using context, where substitutions have occurred

## 2021-08-09 ENCOUNTER — PATIENT OUTREACH (OUTPATIENT)
Dept: INTERNAL MEDICINE CLINIC | Facility: CLINIC | Age: 66
End: 2021-08-09

## 2021-08-10 ENCOUNTER — OFFICE VISIT (OUTPATIENT)
Dept: PHYSICAL THERAPY | Age: 66
End: 2021-08-10
Payer: MEDICARE

## 2021-08-10 DIAGNOSIS — M25.562 ACUTE PAIN OF LEFT KNEE: Primary | ICD-10-CM

## 2021-08-10 PROCEDURE — 97140 MANUAL THERAPY 1/> REGIONS: CPT | Performed by: PHYSICAL THERAPIST

## 2021-08-10 PROCEDURE — 97110 THERAPEUTIC EXERCISES: CPT | Performed by: PHYSICAL THERAPIST

## 2021-08-10 NOTE — PROGRESS NOTES
Daily Note     Today's date: 8/10/2021  Patient name: Pilar Moore  : 1955  MRN: [id number]  Referring provider: Gabriel Kwan MD  Dx:   Encounter Diagnosis     ICD-10-CM    1  Acute pain of left knee  M25 562                   Subjective: PA order MRI of knee to evaluate mensicus  Objective: See treatment diary below      Assessment: Tolerated treatment well  Patient would benefit from continued PT      Plan: Continue per plan of care  Precautions: Partial WB'ing      Manuals  8       LC x10 minutes x10 minutes x10 minutes x10 minutes                  PROM into ext   x5 reps hold 30 sec   x5                  Neuro Re-Ed                                                                                        Ther Ex                      TKE 2x15 - 7 5# 5# - 3x10 5# 5#                  SLR flexion 3x10 -2 5# 2# - 3x10 2# 2 5#       SLR abd 3x10 - 2 5# 2# - 3x10 2# 2 5#                  Seated hamstring stretch x5 x5 x5 x5                                                                                    Prone knee flexion 2x15 - 2 5# 2x15 - 2# 2# 2 5#       Prone hang x5 minutes x5 - 2# 2# 2 5#                  Cybex leg press - single 15# - 3x10 25# 3x10 3x10       Walking in parallel bars x10 fwd/bck x10 x10 x10       Cybex knee flexion 15# - 3x10 15# - 3x10 5# 10#       Cybex knee ext 10# - 3x10 10#  10#                                                                         Step ups                                                                             Ther Activity                                 Gait Training                                 Modalities

## 2021-08-12 ENCOUNTER — APPOINTMENT (OUTPATIENT)
Dept: PHYSICAL THERAPY | Age: 66
End: 2021-08-12
Payer: MEDICARE

## 2021-08-16 ENCOUNTER — TELEPHONE (OUTPATIENT)
Dept: VASCULAR SURGERY | Facility: CLINIC | Age: 66
End: 2021-08-16

## 2021-08-16 ENCOUNTER — OFFICE VISIT (OUTPATIENT)
Dept: PHYSICAL THERAPY | Age: 66
End: 2021-08-16
Payer: MEDICARE

## 2021-08-16 DIAGNOSIS — M25.562 ACUTE PAIN OF LEFT KNEE: Primary | ICD-10-CM

## 2021-08-16 PROCEDURE — 97110 THERAPEUTIC EXERCISES: CPT | Performed by: PHYSICAL THERAPIST

## 2021-08-16 NOTE — PROGRESS NOTES
Daily Note     Today's date: 2021  Patient name: Brittani Sánchez  : 1955  MRN: [id number]  Referring provider: Billy Meza MD  Dx:   Encounter Diagnosis     ICD-10-CM    1  Acute pain of left knee  M25 562                   Subjective: Continues to progress well  Objective: See treatment diary below      Assessment: Tolerated treatment well  Patient would benefit from continued PT      Plan: D/C to [facility] soon       Precautions: Partial WB'ing      Manuals       LC x10 minutes x10 minutes x10 minutes x10 minutes x10 minutes                 PROM into ext   x5 reps hold 30 sec  x5 x5                 Neuro Re-Ed                                                                                        Ther Ex                      TKE 2x15 - 7 5# 5# - 3x10 5# 5# 5#                 SLR flexion 3x10 -2 5# 2# - 3x10 2# 2 5# 2 5#      SLR abd 3x10 - 2 5# 2# - 3x10 2# 2 5# 2 5#                 Seated hamstring stretch x5 x5 x5 x5 x5                                                                                   Prone knee flexion 2x15 - 2 5# 2x15 - 2# 2# 2 5# 2 5#      Prone hang x5 minutes x5 - 2# 2# 2 5# 2 5#                 Cybex leg press - single 15# - 3x10 25# 3x10 3x10 3x10      Walking in parallel bars x10 fwd/bck x10 x10 x10 x10      Cybex knee flexion 15# - 3x10 15# - 3x10 5# 10# 10#      Cybex knee ext 10# - 3x10 10#  10# 10#                                                                        Step ups                                                                             Ther Activity                                 Gait Training                                 Modalities

## 2021-08-16 NOTE — TELEPHONE ENCOUNTER
Attempted to contact patient to schedule appointment(s) listed below  Requested patient call [phone] option 3 to schedule appointment(s)  Patient's appointment(s) are due now      Dopplers  [] Abdominal Aorta Iliac (AOIL)  [x] Carotid (CV)   [] Celiac and/or Mesenteric  [] Endovascular Aortic Repair (EVAR)   [] Hemodialysis Access (HD)   [] Lower Limb Arterial (KRISTEN)  [] Lower Limb Venous Duplex with Reflux (LEVDR)  [] Renal Artery  [] Upper Limb (UEA)    Advanced Imaging   [] CTA abdomen    [] CTA abdomen & pelvis    [] CT abdomen with/ without contrast  [] CT abdomen with contrast  [] CT abdomen without contrast    [] CT abdomen & pelvis with/ without contrast  [] CT abdomen & pelvis with contrast  [] CT abdomen & pelvis without contrast    Office Visit   [] New patient, patient last seen over 3 years ago  [x] Risk factor modification

## 2021-08-18 ENCOUNTER — APPOINTMENT (OUTPATIENT)
Dept: PHYSICAL THERAPY | Age: 66
End: 2021-08-18
Payer: MEDICARE

## 2021-08-18 ENCOUNTER — HOSPITAL ENCOUNTER (OUTPATIENT)
Dept: NON INVASIVE DIAGNOSTICS | Facility: CLINIC | Age: 66
Discharge: HOME | End: 2021-08-18
Payer: MEDICARE

## 2021-08-18 DIAGNOSIS — I65.23 CAROTID STENOSIS, ASYMPTOMATIC, BILATERAL: ICD-10-CM

## 2021-08-18 PROCEDURE — 93880 EXTRACRANIAL BILAT STUDY: CPT

## 2021-08-18 PROCEDURE — 93880 EXTRACRANIAL BILAT STUDY: CPT | Performed by: SURGERY

## 2021-08-19 NOTE — PROGRESS NOTES
08/19/21 1128   Hello, [Guardians Name / Dois Carlos, this is [Caller Tanmay Guerrero from [facility], and our clinical care team wanted to check on you / your child after your recent visit to the hospital  It will only take 3-5 minutes  Is this a good time? Discharge Call Type/ Specific Diagnosis: [facility] 90 Day Call   [facility] 90 Day Discharge Call   Assessment Source 1   Call Complete for 90 Days call back?   Complete

## 2021-08-20 ENCOUNTER — APPOINTMENT (OUTPATIENT)
Dept: PHYSICAL THERAPY | Age: 66
End: 2021-08-20
Payer: MEDICARE

## 2021-08-23 ENCOUNTER — OFFICE VISIT (OUTPATIENT)
Dept: PHYSICAL THERAPY | Age: 66
End: 2021-08-23
Payer: MEDICARE

## 2021-08-23 DIAGNOSIS — M25.562 ACUTE PAIN OF LEFT KNEE: Primary | ICD-10-CM

## 2021-08-23 PROCEDURE — 97110 THERAPEUTIC EXERCISES: CPT | Performed by: PHYSICAL THERAPIST

## 2021-08-23 NOTE — PROGRESS NOTES
Daily Note     Today's date: 2021  Patient name: Génesis Kimbel  : 1955  MRN: [id number]  Referring provider: Jose Arredondo MD  Dx:   Encounter Diagnosis     ICD-10-CM    1  Acute pain of left knee  M25 562                   Subjective: Continued steady progress - decreasing pain/soreness and improved function  Patient getting MRI tomorrow  Objective: See treatment diary below      Assessment: Tolerated treatment well  Patient would benefit from continued PT      Plan: Continue per plan of care        Precautions: Partial WB'ing      Manuals      LC x10 minutes x10 minutes x10 minutes x10 minutes x10 minutes x10 minutes random L2                PROM into ext   x5 reps hold 30 sec  x5 x5 x5                Neuro Re-Ed                                                                                        Ther Ex                      TKE 2x15 - 7 5# 5# - 3x10 5# 5# 5# 7 5#                SLR flexion 3x10 -2 5# 2# - 3x10 2# 2 5# 2 5# 2 5#     SLR abd 3x10 - 2 5# 2# - 3x10 2# 2 5# 2 5# 2 5#                Seated hamstring stretch x5 x5 x5 x5 x5 x5                                                                                  Prone knee flexion 2x15 - 2 5# 2x15 - 2# 2# 2 5# 2 5# 2 5#     Prone hang x5 minutes x5 - 2# 2# 2 5# 2 5# 2 5#                Cybex leg press - single 15# - 3x10 25# 3x10 3x10 3x10 3x10     Walking in parallel bars x10 fwd/bck x10 x10 x10 x10 x10     Cybex knee flexion 15# - 3x10 15# - 3x10 5# 10# 10# 10#     Cybex knee ext 10# - 3x10 10#  10# 10# 10#                                                                       Step ups                                                                             Ther Activity                                 Gait Training                                 Modalities

## 2021-08-24 ENCOUNTER — HOSPITAL ENCOUNTER (OUTPATIENT)
Dept: RADIOLOGY | Age: 66
Discharge: HOME | End: 2021-08-24
Payer: MEDICARE

## 2021-08-24 DIAGNOSIS — M25.562 CHRONIC PAIN OF LEFT KNEE: ICD-10-CM

## 2021-08-24 DIAGNOSIS — G89.29 CHRONIC PAIN OF LEFT KNEE: ICD-10-CM

## 2021-08-24 DIAGNOSIS — M23.92 KNEE INTERNAL DERANGEMENT, LEFT: ICD-10-CM

## 2021-08-24 PROCEDURE — 73721 MRI JNT OF LWR EXTRE W/O DYE: CPT

## 2021-08-24 PROCEDURE — G1004 CDSM NDSC: HCPCS

## 2021-08-26 ENCOUNTER — OFFICE VISIT (OUTPATIENT)
Dept: PHYSICAL THERAPY | Age: 66
End: 2021-08-26
Payer: MEDICARE

## 2021-08-26 DIAGNOSIS — M25.562 ACUTE PAIN OF LEFT KNEE: Primary | ICD-10-CM

## 2021-08-26 PROCEDURE — 97110 THERAPEUTIC EXERCISES: CPT | Performed by: PHYSICAL THERAPIST

## 2021-08-26 NOTE — PROGRESS NOTES
Daily Note     Today's date: 2021  Patient name: Bobby Hartinter  : 1955  MRN: [id number]  Referring provider: Bird Rowell MD  Dx:   Encounter Diagnosis     ICD-10-CM    1  Acute pain of left knee  M25 562                   Subjective: Had MRI           Objective: See treatment diary below      Assessment: Tolerated treatment well  Patient would benefit from continued PT  MRI ramirez snot show meniscal injury  Plan: Continue per plan of care        Precautions: Partial WB'ing      Manuals     LC x10 minutes x10 minutes x10 minutes x10 minutes x10 minutes x10 minutes random L2 x10 minutes               PROM into ext   x5 reps hold 30 sec  x5 x5 x5 x5               Neuro Re-Ed                                                                                        Ther Ex                      TKE 2x15 - 7 5# 5# - 3x10 5# 5# 5# 7 5# 7 5#               SLR flexion 3x10 -2 5# 2# - 3x10 2# 2 5# 2 5# 2 5# 2 5#    SLR abd 3x10 - 2 5# 2# - 3x10 2# 2 5# 2 5# 2 5# 2 5#               Seated hamstring stretch x5 x5 x5 x5 x5 x5 x5                                                                                 Prone knee flexion 2x15 - 2 5# 2x15 - 2# 2# 2 5# 2 5# 2 5# 2 5#    Prone hang x5 minutes x5 - 2# 2# 2 5# 2 5# 2 5# 2 5#               Cybex leg press - single 15# - 3x10 25# 3x10 3x10 3x10 3x10 3x10    Walking in parallel bars x10 fwd/bck x10 x10 x10 x10 x10 x10    Cybex knee flexion 15# - 3x10 15# - 3x10 5# 10# 10# 10# 10#    Cybex knee ext 10# - 3x10 10#  10# 10# 10# 10#                                                                      Step ups                                                                             Ther Activity                                 Gait Training                                 Modalities

## 2021-08-27 ENCOUNTER — OFFICE VISIT (OUTPATIENT)
Dept: OBGYN CLINIC | Facility: MEDICAL CENTER | Age: 66
End: 2021-08-27
Payer: MEDICARE

## 2021-08-27 ENCOUNTER — OFFICE VISIT (OUTPATIENT)
Dept: INTERNAL MEDICINE CLINIC | Facility: CLINIC | Age: 66
End: 2021-08-27
Payer: MEDICARE

## 2021-08-27 VITALS
WEIGHT: 207 LBS | HEIGHT: 72 IN | BODY MASS INDEX: 28.04 KG/M2 | DIASTOLIC BLOOD PRESSURE: 67 MMHG | HEART RATE: 109 BPM | SYSTOLIC BLOOD PRESSURE: 104 MMHG

## 2021-08-27 VITALS
HEART RATE: 89 BPM | RESPIRATION RATE: 16 BRPM | OXYGEN SATURATION: 98 % | HEIGHT: 72 IN | BODY MASS INDEX: 28.06 KG/M2 | SYSTOLIC BLOOD PRESSURE: 130 MMHG | WEIGHT: 207.2 LBS | DIASTOLIC BLOOD PRESSURE: 82 MMHG

## 2021-08-27 DIAGNOSIS — K21.9 CHRONIC GASTROESOPHAGEAL REFLUX DISEASE: ICD-10-CM

## 2021-08-27 DIAGNOSIS — Z11.4 SCREENING FOR HIV (HUMAN IMMUNODEFICIENCY VIRUS): ICD-10-CM

## 2021-08-27 DIAGNOSIS — Z23 ENCOUNTER FOR IMMUNIZATION: ICD-10-CM

## 2021-08-27 DIAGNOSIS — E78.2 MIXED HYPERLIPIDEMIA: Primary | ICD-10-CM

## 2021-08-27 DIAGNOSIS — S72.415D CLOSED NONDISPLACED FRACTURE OF CONDYLE OF LEFT FEMUR WITH ROUTINE HEALING, SUBSEQUENT ENCOUNTER: ICD-10-CM

## 2021-08-27 DIAGNOSIS — S80.02XD CONTUSION OF LEFT KNEE, SUBSEQUENT ENCOUNTER: Primary | ICD-10-CM

## 2021-08-27 DIAGNOSIS — M13.162 INFLAMMATION OF JOINT OF LEFT KNEE: ICD-10-CM

## 2021-08-27 DIAGNOSIS — Z11.59 NEED FOR HEPATITIS C SCREENING TEST: ICD-10-CM

## 2021-08-27 DIAGNOSIS — G47.00 INSOMNIA, UNSPECIFIED TYPE: ICD-10-CM

## 2021-08-27 DIAGNOSIS — Z94.4 HISTORY OF LIVER TRANSPLANT (HCC): ICD-10-CM

## 2021-08-27 PROCEDURE — 99214 OFFICE O/P EST MOD 30 MIN: CPT | Performed by: INTERNAL MEDICINE

## 2021-08-27 PROCEDURE — 99213 OFFICE O/P EST LOW 20 MIN: CPT | Performed by: ORTHOPAEDIC SURGERY

## 2021-08-27 RX ORDER — ZOLPIDEM TARTRATE 5 MG/1
5 TABLET ORAL
Qty: 30 TABLET | Refills: 1 | Status: SHIPPED | OUTPATIENT
Start: 2021-08-27 | End: 2022-07-26 | Stop reason: SINTOL

## 2021-08-27 NOTE — PATIENT INSTRUCTIONS
Problem List Items Addressed This Visit        Digestive    Chronic gastroesophageal reflux disease     Continue PPI and follow up GI if more problems with food getting stuck              Other    History of liver transplant ([facility] [address] )     Follow up transplant team, I recommend COVID booster vaccine, and pt getting this tomorrow         Mixed hyperlipidemia - Primary     Continue statin along with healthy diet and exercise         Insomnia    Relevant Medications    zolpidem (AMBIEN) 5 mg tablet      Other Visit Diagnoses     Need for hepatitis C screening test        Screening for HIV (human immunodeficiency virus)        Encounter for immunization

## 2021-08-27 NOTE — PROGRESS NOTES
Assessment:   Diagnosis ICD-10-CM Associated Orders   1  Contusion of left knee, subsequent encounter  S80  02XD    2  Inflammation of joint of left knee  M13 162    3  Closed nondisplaced fracture of condyle of left femur with routine healing, subsequent encounter  S72 415D        Plan:  Diagnostics reviewed and physical exam performed  Diagnosis, treatment options and associated risks were discussed with the patient including no treatment, nonsurgical treatment and potential for surgical intervention  The patient was given the opportunity to ask questions regarding each  No indication for surgical intervention  The bone contusions should continue to heal and improve  Activities as tolerated  Complete therapy as scheduled  To do next visit:  Return if symptoms worsen or fail to improve, for re-check on an as needed basis (PRN)  The above stated was discussed in layman's terms and the patient expressed understanding  All questions were answered to the patient's satisfaction  Scribe Attestation    I,:  Yurdiia Weems am acting as a scribe while in the presence of the attending physician :       I,:  Elba Mccord MD personally performed the services described in this documentation    as scribed in my presence :             Subjective:   Nadeem Burrows is a 61 y o  male who presents For repeat evaluation of his left knee and discussion of his recent MRI  Patient fell onto his left knee 05/09/2021 resulting in a nondisplaced lateral femoral condyle fracture  At his visit several weeks ago he was complaining of pain medially  He works part-time as a cooley and has the intent to assisting in building a deck coming up  He notes intermittent discomfort medially  Overall he is doing well  He is progressing with physical therapy and has 2 more scheduled sessions  He denies any mechanical symptoms such as locking or catching        Review of systems negative unless otherwise specified in HPI  Review of Systems    Past Medical History:   Diagnosis Date    [facility]ism [facility]     Arthritis     Bacterial peritonitis ([facility]     Bowel disease     [facility]     liver    Cataract     Depression     Fracture     Gastroesophageal reflux     Hepatic disease     Hepatocellular carcinoma ([facility]     History of colon polyps     Hypertension     Liver cancer ([facility]     Liver disease     Stomach disease        Past Surgical History:   Procedure Laterality Date    APPENDECTOMY      BACK SURGERY      CATARACT EXTRACTION      EXPLORATORY LAPAROTOMY  09/2011    EYE SURGERY      LIVER TRANSPLANTATION  08/20/2013    PARACENTESIS      Abdominal Paracentesis(Therapeutic) with Imaging Guidance    MD COLONOSCOPY FLX DX W/COLLJ SPEC WHEN PFRMD N/A 9/12/2016    Procedure: COLONOSCOPY;  Surgeon: Estrada Reeves MD;  Location: [facility]; Service: General    ROTATOR CUFF REPAIR      x2       Family History   Problem Relation Age of Onset    Coronary artery disease Mother         CABG    Prostate cancer Mother         Prostate Gland    Heart disease Mother         Cardiac Disorder    Vascular Disease Mother     Hypertension Mother         Benign    Diabetes Maternal Grandmother         Mellitus    Clotting disorder Maternal Grandfather         Embolism    Seizures Paternal Grandmother         Epilepsy    Other Paternal Grandfather         Accident    Liver cancer Family     Heart disease Family         Cardiac Disorder    Hypertension Family         Benign       Social History     Occupational History    Occupation:    Tobacco Use    Smoking status: Current Every Day Smoker     Packs/day: 0 25     Years: 35 00     Pack years: 8 75     Types: Cigarettes     Start date: 6/11/1978    Smokeless tobacco: Never Used   Vaping Use    Vaping Use: Never used   Substance and Sexual Activity    Alcohol use:  Yes     Alcohol/week: 2 0 standard drinks     Types: 1 Glasses of wine, 1 Standard drinks or equivalent per week    Drug use: No     Comment: Per [employer] Drug use way back in college over 40 yrs ago    Sexual activity: Not on file         Current Outpatient Medications:     acetaminophen (TYLENOL) 325 mg tablet, Take 650 mg by mouth every 6 (six) hours as needed for mild pain , Disp: , Rfl:     ALPRAZolam (XANAX) 0 25 mg tablet, TAKE 1 TABLET BY MOUTH EVERY DAY AS NEEDED FOR ANXIETY, Disp: 30 tablet, Rfl: 1    aspirin (ECOTRIN LOW STRENGTH) 81 mg EC tablet, Take 81 mg by mouth daily, Disp: , Rfl:     atorvastatin (LIPITOR) 40 mg tablet, Take 1 tablet (40 mg total) by mouth daily, Disp: 90 tablet, Rfl: 3    CALCIUM-MAGNESIUM-VITAMIN D ER PO, Take 1 tablet by mouth daily, Disp: , Rfl:     docusate sodium (COLACE) 100 mg capsule, Take 100 mg by mouth 2 (two) times a day , Disp: , Rfl:     gabapentin (NEURONTIN) 300 mg capsule, Take 1 capsule (300 mg total) by mouth 2 (two) times a day, Disp: 60 capsule, Rfl: 5    lisinopril (ZESTRIL) 20 mg tablet, TAKE 1 TABLET(20 MG) BY MOUTH DAILY  CONTACT HIS FAMILY DOCTOR FOR FURTHER ORDERS OR SET UP AN APPOINTMENT BEFORE NEXT REFILL, Disp: 90 tablet, Rfl: 0    omeprazole (PriLOSEC) 20 mg delayed release capsule, TAKE 1 CAPSULE BY MOUTH TWICE DAILY, Disp: 180 capsule, Rfl: 3    ondansetron (ZOFRAN-ODT) 4 mg disintegrating tablet, DISSOLVE 1 TABLET(4 MG) ON THE TONGUE EVERY 6 HOURS AS NEEDED FOR NAUSEA OR VOMITING, Disp: 20 tablet, Rfl: 5    ONE DAILY MULTIPLE VITAMIN PO, Take by mouth , Disp: , Rfl:     tacrolimus (PROGRAF) 1 mg capsule, TAKE 3 CAPSULES(3 MG TOTAL) BY MOUTH TWICE DAILY, Disp: 540 capsule, Rfl: 3    zolpidem (AMBIEN) 5 mg tablet, Take 1 tablet (5 mg total) by mouth daily at bedtime as needed for sleep, Disp: 30 tablet, Rfl: 1    Allergies   Allergen Reactions    Macrolides And Ketolides      Annotation - [id number]: The patient is on Antirejection medications and they will reduce the effective ness of the medications              Vitals: 08/27/21 1305   BP: 104/67   Pulse: (!) 109       Objective:                    Left Knee Exam     Tenderness   Left knee tenderness location: MFC, nontender laterally  Range of Motion   The patient has normal left knee ROM  Other   Erythema: absent  Sensation: normal  Swelling: none  Effusion: no effusion present    Comments:    No warmth  Intact extensor mechanism  Non-antalgic gait  Diagnostics, reviewed and taken today if performed as documented:    None performed but reviewed: The attending physician has personally reviewed the pertinent films in PACS and interpretation is as follows:    Left knee MRI from 8/24/2021 was reviewed today and shows: intact menisci, diffuse areas of edema consistent with contusions       Procedures, if performed today:    Procedures    None performed      Portions of the record may have been created with voice recognition software  Occasional wrong word or "sound a like" substitutions may have occurred due to the inherent limitations of voice recognition software  Read the chart carefully and recognize, using context, where substitutions have occurred

## 2021-08-27 NOTE — PROGRESS NOTES
Assessment/Plan:    Mixed hyperlipidemia  Continue statin along with healthy diet and exercise    History of liver transplant ([place] [id number] )  Follow up transplant team, I recommend COVID booster vaccine, and pt getting this tomorrow    Chronic gastroesophageal reflux disease  Continue PPI and follow up GI if more problems with food getting stuck  Diagnoses and all orders for this visit:    Mixed hyperlipidemia    Need for hepatitis C screening test    Screening for HIV (human immunodeficiency virus)    Encounter for immunization    History of liver transplant ([place] [id number] )    Chronic gastroesophageal reflux disease    Insomnia, unspecified type  -     zolpidem (AMBIEN) 5 mg tablet; Take 1 tablet (5 mg total) by mouth daily at bedtime as needed for sleep    Other orders  -     Cancel: Hepatitis C Antibody ([facility]); Future  -     Cancel: HIV 1/2 Antigen/Antibody (4th Generation) w Reflex [facility]; Future  -     Cancel: PNEUMOCOCCAL POLYSACCHARIDE VACCINE 23-VALENT =>3YO SQ IM          Subjective:      Patient ID: Sheryl Rebollar is a 61 y o  male  Liver transplant: pt follows with transplant team for this, pt getting COVID booster tomorrow    Hyperchol: pt tolerating statin    GERD: pt on PPI, he admits to occasional problems with food getting stuck      The following portions of the patient's history were reviewed and updated as appropriate: allergies, current medications, past family history, past medical history, past social history, past surgical history and problem list     Review of Systems   Constitutional: Positive for fatigue (mild)  Negative for chills and fever  HENT: Negative for congestion, nosebleeds, postnasal drip, sore throat and trouble swallowing  Eyes: Negative for pain  Respiratory: Positive for shortness of breath (with exertion)  Negative for cough, chest tightness and wheezing  Cardiovascular: Negative for chest pain, palpitations and leg swelling     Gastrointestinal: Negative for abdominal pain, constipation, diarrhea, nausea and vomiting  Endocrine: Negative for polydipsia and polyuria  Genitourinary: Negative for dysuria, flank pain and hematuria  Musculoskeletal: Negative for arthralgias  Skin: Negative for rash  Neurological: Negative for dizziness, tremors, light-headedness and headaches  Hematological: Does not bruise/bleed easily  Psychiatric/Behavioral: Negative for confusion and dysphoric mood  The patient is not nervous/anxious  Objective:      /82   Pulse 89   Resp 16   Ht 6' (1 829 m)   Wt 94 kg (207 lb 3 2 oz)   SpO2 98%   BMI 28 10 kg/m²          Physical Exam  Vitals reviewed  Constitutional:       General: He is not in acute distress  Appearance: Normal appearance  He is well-developed  HENT:      Head: Normocephalic and atraumatic  Right Ear: External ear normal       Left Ear: External ear normal       Nose: Nose normal    Eyes:      General: No scleral icterus  Conjunctiva/sclera: Conjunctivae normal    Neck:      Thyroid: No thyromegaly  Trachea: No tracheal deviation  Cardiovascular:      Rate and Rhythm: Normal rate and regular rhythm  Heart sounds: Normal heart sounds  No murmur heard  Pulmonary:      Effort: Pulmonary effort is normal  No respiratory distress  Breath sounds: Normal breath sounds  No wheezing or rales  Abdominal:      General: Bowel sounds are normal       Palpations: Abdomen is soft  Tenderness: There is no abdominal tenderness  There is no guarding or rebound  Musculoskeletal:      Cervical back: Normal range of motion and neck supple  Right lower leg: No edema  Left lower leg: No edema  Lymphadenopathy:      Cervical: No cervical adenopathy  Skin:     Coloration: Skin is not jaundiced or pale  Neurological:      General: No focal deficit present  Mental Status: He is alert and oriented to person, place, and time     Psychiatric:         Mood and Affect: Mood normal          Behavior: Behavior normal          Thought Content:  Thought content normal          Judgment: Judgment normal

## 2021-08-28 ENCOUNTER — IMMUNIZATIONS (OUTPATIENT)
Dept: FAMILY MEDICINE CLINIC | Facility: HOSPITAL | Age: 66
End: 2021-08-28

## 2021-08-28 DIAGNOSIS — Z23 ENCOUNTER FOR IMMUNIZATION: Primary | ICD-10-CM

## 2021-08-28 DIAGNOSIS — E78.2 MIXED HYPERLIPIDEMIA: ICD-10-CM

## 2021-08-28 PROCEDURE — 0001A SARS-COV-2 / COVID-19 MRNA VACCINE (PFIZER-BIONTECH) 30 MCG: CPT

## 2021-08-28 PROCEDURE — 91300 SARS-COV-2 / COVID-19 MRNA VACCINE (PFIZER-BIONTECH) 30 MCG: CPT

## 2021-08-30 ENCOUNTER — OFFICE VISIT (OUTPATIENT)
Dept: PHYSICAL THERAPY | Age: 66
End: 2021-08-30
Payer: MEDICARE

## 2021-08-30 DIAGNOSIS — M25.562 ACUTE PAIN OF LEFT KNEE: Primary | ICD-10-CM

## 2021-08-30 PROCEDURE — 97110 THERAPEUTIC EXERCISES: CPT | Performed by: PHYSICAL THERAPIST

## 2021-08-30 RX ORDER — ATORVASTATIN CALCIUM 40 MG/1
TABLET, FILM COATED ORAL
Qty: 90 TABLET | Refills: 3 | Status: SHIPPED | OUTPATIENT
Start: 2021-08-30

## 2021-08-30 NOTE — PROGRESS NOTES
Daily Note     Today's date: 2021  Patient name: Armida Merritt  : 1955  MRN: [id number]  Referring provider: Kirk Horta MD  Dx:   Encounter Diagnosis     ICD-10-CM    1  Acute pain of left knee  M25 562                   Subjective: Patient saw MD - MRI clear      Objective: See treatment diary below      Assessment: Tolerated treatment well  Patient would benefit from continued PT      Plan: Continue per plan of care        Precautions: Partial WB'ing      Manuals    LC x10 minutes x10 minutes x10 minutes x10 minutes x10 minutes x10 minutes random L2 x10 minutes x10 minutes              PROM into ext   x5 reps hold 30 sec  x5 x5 x5 x5 x5              Neuro Re-Ed                                                                                        Ther Ex                      TKE 2x15 - 7 5# 5# - 3x10 5# 5# 5# 7 5# 7 5# 3x10              SLR flexion 3x10 -2 5# 2# - 3x10 2# 2 5# 2 5# 2 5# 2 5# 3x10   SLR abd 3x10 - 2 5# 2# - 3x10 2# 2 5# 2 5# 2 5# 2 5# 3x10              Seated hamstring stretch x5 x5 x5 x5 x5 x5 x5 x5 reps                                                                                Prone knee flexion 2x15 - 2 5# 2x15 - 2# 2# 2 5# 2 5# 2 5# 2 5# 2 5#   Prone hang x5 minutes x5 - 2# 2# 2 5# 2 5# 2 5# 2 5# 2 5#              Cybex leg press - single 15# - 3x10 25# 3x10 3x10 3x10 3x10 3x10 3x10   Walking in parallel bars x10 fwd/bck x10 x10 x10 x10 x10 x10 x10   Cybex knee flexion 15# - 3x10 15# - 3x10 5# 10# 10# 10# 10# 10#   Cybex knee ext 10# - 3x10 10#  10# 10# 10# 10# 10#                                                                     Step ups                                                                             Ther Activity                                 Gait Training                                 Modalities

## 2021-09-02 ENCOUNTER — OFFICE VISIT (OUTPATIENT)
Dept: PHYSICAL THERAPY | Age: 66
End: 2021-09-02
Payer: MEDICARE

## 2021-09-02 DIAGNOSIS — M25.562 ACUTE PAIN OF LEFT KNEE: Primary | ICD-10-CM

## 2021-09-02 PROCEDURE — 97110 THERAPEUTIC EXERCISES: CPT | Performed by: PHYSICAL THERAPIST

## 2021-09-02 NOTE — PROGRESS NOTES
Daily Note     Today's date: 2021  Patient name: Lucas Najera  : 1955  MRN: [id number]  Referring provider: Jerica Mar MD  Dx:   Encounter Diagnosis     ICD-10-CM    1  Acute pain of left knee  M25 562                   Subjective: Patient feeling good  Objective: See treatment diary below      Assessment: Tolerated treatment well  Plan: Goals achieved - D/C to [facility]       Precautions: Partial WB'ing      Manuals  9/   LC x10 minutes x10 minutes x10 minutes x10 minutes x10 minutes x10 minutes random L2 x10 minutes x10 minutes              PROM into ext   x5 reps hold 30 sec  x5 x5 x5 x5 x5              Neuro Re-Ed                                                                                        Ther Ex                      TKE 2x15 - 7 5# 5# - 3x10 5# 5# 5# 7 5# 7 5# 3x10              SLR flexion 3x10 -2 5# 2# - 3x10 2# 2 5# 2 5# 2 5# 2 5# 3x10   SLR abd 3x10 - 2 5# 2# - 3x10 2# 2 5# 2 5# 2 5# 2 5# 3x10              Seated hamstring stretch x5 x5 x5 x5 x5 x5 x5 x5 reps                                                                                Prone knee flexion 2x15 - 2 5# 2x15 - 2# 2# 2 5# 2 5# 2 5# 2 5# 2 5#   Prone hang x5 minutes x5 - 2# 2# 2 5# 2 5# 2 5# 2 5# 2 5#              Cybex leg press - single 15# - 3x10 25# 3x10 3x10 3x10 3x10 3x10 3x10   Walking in parallel bars x10 fwd/bck x10 x10 x10 x10 x10 x10 x10   Cybex knee flexion 15# - 3x10 15# - 3x10 5# 10# 10# 10# 10# 10#   Cybex knee ext 10# - 3x10 10#  10# 10# 10# 10# 10#                                                                     Step ups                                                                             Ther Activity                                 Gait Training                                 Modalities

## 2021-09-15 ENCOUNTER — OFFICE VISIT (OUTPATIENT)
Dept: VASCULAR SURGERY | Facility: CLINIC | Age: 66
End: 2021-09-15
Payer: MEDICARE

## 2021-09-15 VITALS
WEIGHT: 211.4 LBS | DIASTOLIC BLOOD PRESSURE: 80 MMHG | HEIGHT: 72 IN | HEART RATE: 92 BPM | BODY MASS INDEX: 28.63 KG/M2 | SYSTOLIC BLOOD PRESSURE: 138 MMHG

## 2021-09-15 DIAGNOSIS — I73.9 PAD (PERIPHERAL ARTERY DISEASE) (HCC): ICD-10-CM

## 2021-09-15 DIAGNOSIS — I65.23 CAROTID STENOSIS, ASYMPTOMATIC, BILATERAL: Primary | ICD-10-CM

## 2021-09-15 DIAGNOSIS — I73.9 CLAUDICATION IN PERIPHERAL VASCULAR DISEASE (HCC): ICD-10-CM

## 2021-09-15 PROCEDURE — 99214 OFFICE O/P EST MOD 30 MIN: CPT | Performed by: SURGERY

## 2021-09-15 NOTE — LETTER
September 15, 2021       No Recipients    Patient: Wenceslao Nissen   YOB: 1955   Date of Visit: 9/15/2021       Dear Dr Sedrick Liang Recipients: Thank you for referring Sheron Menchaca to me for evaluation  Below are the relevant portions of my assessment and plan of care  Patient returns to review carotid duplex performed on 08/18/2021  Patient remains asymptomatic  Carotid duplex velocities are consistent with greater than 70% stenosis however are unchanged from carotid duplex of nearly 1 year ago  Velocities aremeasured at  266/80 with an ICA / CCA ratio 4 18  On 10/07/2020 velocities measured 271/93 centimeters/second with an ICA/ CCA ratio of 2 66  The left internal carotid artery velocities are consistent with 50-69% stenosis  CTA head and neck on 10/27/2020 stenosis of approximately 70-75% of the right internal carotid artery  The lesion is heavily calcified with circumferential stenosis extending for approximately 5-6 cm  There is a 2nd eccentric calcification approximately 1 cm more distal in the right ICA  The left internal carotid artery shows approximately 60% stenosis  Due to the circumferential nature of the lesion the best method of therapy would be endarterectomy  I have discussed this with the patient  We discussed the natural history of carotid stenosis and the risk of stroke in the face of continued medical management and carotid intervention  We have agreed to continue surveillance in the presence of this lesion  Patient will continue to make a strong effort regarding smoking cessation  Pt denies TIA or CVA symptoms  Pt is on ASA 81 mg and Atorvastatin  In addition, patient will continue a walking exercise program   Presently his claudication is not disabling  Pt continues to smoke 1/2 ppd  If you have questions, please do not hesitate to call me  I look forward to following Antonio Mccullough along with you           Sincerely,        Mj Condon MD        CC:   No Recipients

## 2021-09-15 NOTE — PROGRESS NOTES
Assessment/Plan:    Carotid stenosis, asymptomatic, bilateral  Severe calcified lesion of right ICA resulting in approximately 70-75% stenosis  The calcified lesion is circumferential and extensive extending approximately 5 cm  There is a 2nd calcified eccentric lesion approximately 1 cm distally  We have discussed the natural history of carotid occlusive disease and the potential for stroke with continued maximal medical therapy  We have discussed the importance of continuing statin and platelet therapy in addition to smoking cessation  Patient wishes to continue with maximal medical therapy and carotid surveillance  Patient will    continue with biannual surveillance carotid duplex  PAD (peripheral artery disease) ([facility])  Vascular claudication is non disabling  In addition to vascular claudication patient suffers from neurogenic claudication and bilateral bursitis of his hips  Patient will continue to undergo surveillance of his vascular occlusive disease  Patient is on aspirin, statin  Patient again encouraged for smoking cessation  Diagnoses and all orders for this visit:    Carotid stenosis, asymptomatic, bilateral  -     VAS carotid complete study; Future  -     VAS lower limb arterial duplex, complete bilateral; Future    Claudication in peripheral vascular disease ([facility])  -     VAS carotid complete study; Future  -     VAS lower limb arterial duplex, complete bilateral; Future    PAD (peripheral artery disease) ([facility])          Subjective:      Patient ID: Nadeem Burrows is a 61 y o  male  Patient presents today for review of CV done 8/18/21  Patient denies TIA/stoke symptoms  Patient is currently taking ASA 81mg and Atorvastatin  Patient is a current smoker  Patient returns to review carotid duplex performed on 08/18/2021  Patient remains asymptomatic    Carotid duplex velocities are consistent with greater than 70% stenosis however are unchanged from carotid duplex of nearly 1 year ago  Velocities aremeasured at  266/80 with an ICA / CCA ratio 4 18  On 10/07/2020 velocities measured 271/93 centimeters/second with an ICA/ CCA ratio of 2 66  The left internal carotid artery velocities are consistent with 50-69% stenosis  CTA head and neck on 10/27/2020 stenosis of approximately 70-75% of the right internal carotid artery  The lesion is heavily calcified with circumferential stenosis extending for approximately 5-6 cm  There is a 2nd eccentric calcification approximately 1 cm more distal in the right ICA  The left internal carotid artery shows approximately 60% stenosis  Due to the circumferential nature of the lesion the best method of therapy would be endarterectomy  I have discussed this with the patient  We discussed the natural history of carotid stenosis and the risk of stroke in the face of continued medical management and carotid intervention  We have agreed to continue surveillance in the presence of this lesion  Patient will continue to make a strong effort regarding smoking cessation  Pt denies TIA or CVA symptoms  Pt is on ASA 81 mg and Atorvastatin  In addition, patient will continue a walking exercise program   Presently his claudication is not disabling  Pt continues to smoke 1/2 ppd  The following portions of the patient's history were reviewed and updated as appropriate: allergies, current medications, past family history, past medical history, past social history, past surgical history and problem list     Review of Systems   Constitutional: Negative  HENT: Negative  Eyes: Negative  Respiratory: Negative  Cardiovascular: Negative  Gastrointestinal: Negative  Endocrine: Negative  Genitourinary: Negative  Musculoskeletal: Negative  Skin: Negative  Allergic/Immunologic: Negative  Neurological: Positive for headaches  Hematological: Negative  Psychiatric/Behavioral: Negative            Objective:      /80 (BP Location: Left arm, Patient Position: Sitting, Cuff Size: Standard)   Pulse 92   Ht 6' (1 829 m)   Wt 95 9 kg (211 lb 6 4 oz)   BMI 28 67 kg/m²          Physical Exam  Vitals and nursing note reviewed  Constitutional:       Appearance: He is well-developed  HENT:      Head: Normocephalic and atraumatic  Eyes:      Conjunctiva/sclera: Conjunctivae normal       Pupils: Pupils are equal, round, and reactive to light  Neck:      Vascular: No JVD  Pulmonary:      Effort: No respiratory distress  Breath sounds: No stridor  No wheezing or rales  Chest:      Chest wall: No tenderness  Abdominal:      General: There is no distension  Palpations: Abdomen is soft  There is no mass  Tenderness: There is no abdominal tenderness  There is no guarding or rebound  Musculoskeletal:         General: No tenderness or deformity  Normal range of motion  Cervical back: Normal range of motion and neck supple  Skin:     General: Skin is warm and dry  Findings: No erythema or rash  Neurological:      Mental Status: He is alert and oriented to person, place, and time  Sensory: No sensory deficit  Motor: No weakness  Gait: Gait normal    Psychiatric:         Behavior: Behavior normal          Thought Content:  Thought content normal

## 2021-09-15 NOTE — ASSESSMENT & PLAN NOTE
Vascular claudication is non disabling  In addition to vascular claudication patient suffers from neurogenic claudication and bilateral bursitis of his hips  Patient will continue to undergo surveillance of his vascular occlusive disease  Patient is on aspirin, statin  Patient again encouraged for smoking cessation

## 2021-09-22 ENCOUNTER — TELEPHONE (OUTPATIENT)
Dept: PAIN MEDICINE | Facility: CLINIC | Age: 66
End: 2021-09-22

## 2021-09-22 NOTE — TELEPHONE ENCOUNTER
----- Message from Robin Joshi RN sent at 9/22/2021  7:53 AM EDT -----  Regarding: FW: Visit Follow-Up Question  Contact: [phone]  FQ, pt's last ov was 5/3/21 w/ NM and he had B/L L4 TFESI on 5/27/21  Pt now requesting B/L bursa injections  Last B/L bursa inj were 11/4/20  Does he need ov or can bursa inj be scheduled?  ----- Message -----  From: Sheryl Rebollar  Sent: 9/21/2021   8:15 PM EDT  To: [facility]  Subject: Visit Follow-Up Question                         Now that my broken leg has healed, Im active again  My chronic bursitis in my hips need attention  I had this conversation with your PA, but at the time, it seemed like my back was more of a priority and you injected that  Back is still holding up  Do I need a visit prior to scheduling hip injections?

## 2021-09-29 ENCOUNTER — PROCEDURE VISIT (OUTPATIENT)
Dept: PAIN MEDICINE | Facility: CLINIC | Age: 66
End: 2021-09-29
Payer: MEDICARE

## 2021-09-29 VITALS
HEART RATE: 94 BPM | DIASTOLIC BLOOD PRESSURE: 88 MMHG | SYSTOLIC BLOOD PRESSURE: 134 MMHG | WEIGHT: 211 LBS | BODY MASS INDEX: 28.62 KG/M2

## 2021-09-29 DIAGNOSIS — M70.61 TROCHANTERIC BURSITIS OF BOTH HIPS: Primary | ICD-10-CM

## 2021-09-29 DIAGNOSIS — M70.62 TROCHANTERIC BURSITIS OF BOTH HIPS: Primary | ICD-10-CM

## 2021-09-29 PROCEDURE — 20611 DRAIN/INJ JOINT/BURSA W/US: CPT | Performed by: ANESTHESIOLOGY

## 2021-09-29 RX ORDER — BUPIVACAINE HYDROCHLORIDE 2.5 MG/ML
10 INJECTION, SOLUTION EPIDURAL; INFILTRATION; INTRACAUDAL ONCE
Status: COMPLETED | OUTPATIENT
Start: 2021-09-29 | End: 2021-09-29

## 2021-09-29 RX ORDER — METHYLPREDNISOLONE ACETATE 40 MG/ML
40 INJECTION, SUSPENSION INTRA-ARTICULAR; INTRALESIONAL; INTRAMUSCULAR; SOFT TISSUE ONCE
Status: COMPLETED | OUTPATIENT
Start: 2021-09-29 | End: 2021-09-29

## 2021-09-29 RX ADMIN — METHYLPREDNISOLONE ACETATE 40 MG: 40 INJECTION, SUSPENSION INTRA-ARTICULAR; INTRALESIONAL; INTRAMUSCULAR; SOFT TISSUE at 12:24

## 2021-09-29 RX ADMIN — METHYLPREDNISOLONE ACETATE 40 MG: 40 INJECTION, SUSPENSION INTRA-ARTICULAR; INTRALESIONAL; INTRAMUSCULAR; SOFT TISSUE at 12:23

## 2021-09-29 RX ADMIN — BUPIVACAINE HYDROCHLORIDE 10 ML: 2.5 INJECTION, SOLUTION EPIDURAL; INFILTRATION; INTRACAUDAL at 12:23

## 2021-09-29 NOTE — PROGRESS NOTES
Pre-procedure Diagnosis:       Bilateral trochanteric bursitis  Post-procedure Diagnosis:     Bilateral trochanteric bursitis  Operation Title(s):                   Bilateral trochanteric bursa injection under ultrasound guidance  Attending Surgeon:                 Dennie Banda, MD  Anesthesia:                             Local     Indications: The patient is a 61y o  year-old male with a diagnosis of trochanteric bursitis  The patient's history and physical exam were reviewed  The risks, benefits and alternatives to the procedure were discussed, and all questions were answered to the patient's satisfaction  The patient agreed to proceed, and written informed consent was obtained      Procedure in Detail: The patient was brought into the exam room and placed in the right lateral decubitus position on the exam room table  The left hip(s) was prepped with chloraprep and draped in a sterile manner       A sterile ultrasound probe cover and gel was placed over a 3 75 MHz curvilinear transducer probe  The probe was placed over the lateral thigh to visualize the peritrochanteric bursal region  Optimal needle path was determined and the skin and subcutaneous tissues in the area was anesthetized with 1% lidocaine  Then, under ultrasound guidance, a 2 inch ultrasound needle was advanced until the needle entered the peritrochanteric bursa region  After visualization of the tip in the target area and negative aspiration for blood, a solution consisting of 3 mL 0 25% bupivacaine and 1 mL Depo-Medrol (40mg/ml) was easily injected      Next, the patient was placed in the left lateral decubitus position on the exam room table  The right hip was prepped with chloraprep and draped in a sterile manner       A sterile ultrasound probe cover and gel was placed over a 3 75 MHz curvilinear transducer probe  The probe was placed over the lateral thigh to visualize the peritrochanteric bursal region   Optimal needle path was determined and the skin and subcutaneous tissues in the area was anesthetized with 1% lidocaine  Then, under ultrasound guidance, a 2 inch ultrasound needle was advanced until the needle entered the peritrochanteric bursa region  After visualization of the tip in the target area and negative aspiration for blood, a solution consisting of 3 mL 0 25% bupivacaine and 1 mL Depo-Medrol (40mg/ml) was easily injected      The patient's hips were cleaned and a bandage was placed over the sites of needle insertion      Disposition: The patient tolerated the procedure well and there were no apparent complications  The patient was given written discharge instructions for the procedure

## 2021-09-30 ENCOUNTER — TRANSCRIBE ORDERS (OUTPATIENT)
Dept: PAIN MEDICINE | Facility: CLINIC | Age: 66
End: 2021-09-30

## 2021-10-19 ENCOUNTER — CLINICAL SUPPORT (OUTPATIENT)
Dept: INTERNAL MEDICINE CLINIC | Facility: CLINIC | Age: 66
End: 2021-10-19
Payer: MEDICARE

## 2021-10-19 DIAGNOSIS — Z23 ENCOUNTER FOR IMMUNIZATION: Primary | ICD-10-CM

## 2021-10-19 PROCEDURE — G0008 ADMIN INFLUENZA VIRUS VAC: HCPCS

## 2021-10-19 PROCEDURE — 90682 RIV4 VACC RECOMBINANT DNA IM: CPT

## 2021-10-25 DIAGNOSIS — M54.16 LUMBAR RADICULOPATHY: ICD-10-CM

## 2021-10-25 RX ORDER — GABAPENTIN 300 MG/1
CAPSULE ORAL
Qty: 60 CAPSULE | Refills: 5 | OUTPATIENT
Start: 2021-10-25

## 2021-11-30 DIAGNOSIS — K21.9 CHRONIC GASTROESOPHAGEAL REFLUX DISEASE: ICD-10-CM

## 2021-11-30 RX ORDER — OMEPRAZOLE 20 MG/1
CAPSULE, DELAYED RELEASE ORAL
Qty: 180 CAPSULE | Refills: 3 | Status: SHIPPED | OUTPATIENT
Start: 2021-11-30

## 2022-03-15 ENCOUNTER — HOSPITAL ENCOUNTER (OUTPATIENT)
Dept: NON INVASIVE DIAGNOSTICS | Facility: CLINIC | Age: 67
Discharge: HOME | End: 2022-03-15
Payer: MEDICARE

## 2022-03-15 DIAGNOSIS — I65.23 CAROTID STENOSIS, ASYMPTOMATIC, BILATERAL: ICD-10-CM

## 2022-03-15 DIAGNOSIS — I73.9 CLAUDICATION IN PERIPHERAL VASCULAR DISEASE (HCC): ICD-10-CM

## 2022-03-15 PROCEDURE — 93925 LOWER EXTREMITY STUDY: CPT

## 2022-03-15 PROCEDURE — 93880 EXTRACRANIAL BILAT STUDY: CPT

## 2022-03-15 PROCEDURE — 93923 UPR/LXTR ART STDY 3+ LVLS: CPT

## 2022-03-16 DIAGNOSIS — I65.23 CAROTID STENOSIS, ASYMPTOMATIC, BILATERAL: ICD-10-CM

## 2022-03-16 DIAGNOSIS — I73.9 CLAUDICATION IN PERIPHERAL VASCULAR DISEASE (HCC): Primary | ICD-10-CM

## 2022-03-16 PROCEDURE — 93880 EXTRACRANIAL BILAT STUDY: CPT | Performed by: SURGERY

## 2022-03-16 PROCEDURE — 93925 LOWER EXTREMITY STUDY: CPT | Performed by: SURGERY

## 2022-03-16 PROCEDURE — 93922 UPR/L XTREMITY ART 2 LEVELS: CPT | Performed by: SURGERY

## 2022-04-07 ENCOUNTER — TELEPHONE (OUTPATIENT)
Dept: CARDIOLOGY CLINIC | Facility: CLINIC | Age: 67
End: 2022-04-07

## 2022-04-13 DIAGNOSIS — I10 ESSENTIAL HYPERTENSION: ICD-10-CM

## 2022-04-13 RX ORDER — LISINOPRIL 20 MG/1
20 TABLET ORAL DAILY
Qty: 30 TABLET | Refills: 0 | Status: SHIPPED | OUTPATIENT
Start: 2022-04-13 | End: 2022-04-18 | Stop reason: SDUPTHER

## 2022-04-15 ENCOUNTER — OFFICE VISIT (OUTPATIENT)
Dept: URGENT CARE | Age: 67
End: 2022-04-15
Payer: MEDICARE

## 2022-04-15 ENCOUNTER — APPOINTMENT (OUTPATIENT)
Dept: RADIOLOGY | Age: 67
End: 2022-04-15
Payer: MEDICARE

## 2022-04-15 VITALS
SYSTOLIC BLOOD PRESSURE: 153 MMHG | WEIGHT: 220 LBS | RESPIRATION RATE: 18 BRPM | BODY MASS INDEX: 29.8 KG/M2 | HEART RATE: 91 BPM | HEIGHT: 72 IN | TEMPERATURE: 96.9 F | OXYGEN SATURATION: 99 % | DIASTOLIC BLOOD PRESSURE: 81 MMHG

## 2022-04-15 DIAGNOSIS — S93.601A SPRAIN OF RIGHT FOOT, INITIAL ENCOUNTER: ICD-10-CM

## 2022-04-15 DIAGNOSIS — T14.90XA INJURY: Primary | ICD-10-CM

## 2022-04-15 DIAGNOSIS — T14.90XA INJURY: ICD-10-CM

## 2022-04-15 PROCEDURE — 73610 X-RAY EXAM OF ANKLE: CPT

## 2022-04-15 PROCEDURE — 73630 X-RAY EXAM OF FOOT: CPT

## 2022-04-15 PROCEDURE — G0463 HOSPITAL OUTPT CLINIC VISIT: HCPCS

## 2022-04-15 PROCEDURE — 99213 OFFICE O/P EST LOW 20 MIN: CPT

## 2022-04-15 NOTE — PROGRESS NOTES
[facility]        NAME: Jose Cruz Castaneda is a 61 y o  male  : 1955    MRN: [id number]  DATE: April 15, 2022  TIME: 2:30 PM    Assessment and Plan   Injury [T14 [id number]]  1  Injury  XR foot 3+ vw right    XR ankle 3+ vw right   2  Sprain of right foot, initial encounter  Ambulatory Referral to Orthopedic Surgery    CANCELED: Ambulatory Referral to Orthopedic Surgery   X-rays reviewed, no acute abnormality noted, awaiting official read  Initial impression is foot sprain, will consult orthopedics  Ace wrap applied, recommend Tylenol, rest, elevation  Patient Instructions       Follow up with PCP in 3-5 days  Proceed to  ER if symptoms worsen  Chief Complaint     Chief Complaint   Patient presents with   [address] [place] [address] Injury     Patient states he was fishing and he step on a rock hurting right fooy it happen 10 days ago  He is not getting any better  History of Present Illness       Patient is a 61 y o  male who presents for a ten-day history of right dorsal foot pain  He reports that he was fishing and his right foot slipped slightly inward on a rock  He iced his foot that same night, which he reports provided some improvement, but the pain has been unchanged since  He never experienced significant swelling of the foot  Denies ankle pain/swelling, numbness/tingling  Review of Systems   Review of Systems   Constitutional: Negative for chills and fever  HENT: Negative for ear pain and sore throat  Eyes: Negative for pain and visual disturbance  Respiratory: Negative for cough and shortness of breath  Cardiovascular: Negative for chest pain and palpitations  Gastrointestinal: Negative for abdominal pain and vomiting  Genitourinary: Negative for dysuria and hematuria  Musculoskeletal: Positive for arthralgias  Negative for back pain and joint swelling  Right foot   Skin: Negative for color change and rash     Neurological: Negative for dizziness, seizures, syncope, light-headedness and numbness  Hematological: Negative  Psychiatric/Behavioral: Negative  All other systems reviewed and are negative          Current Medications       Current Outpatient Medications:     acetaminophen (TYLENOL) 325 mg tablet, Take 650 mg by mouth every 6 (six) hours as needed for mild pain , Disp: , Rfl:     ALPRAZolam (XANAX) 0 25 mg tablet, TAKE 1 TABLET BY MOUTH EVERY DAY AS NEEDED FOR ANXIETY, Disp: 30 tablet, Rfl: 1    aspirin (ECOTRIN LOW STRENGTH) 81 mg EC tablet, Take 81 mg by mouth daily, Disp: , Rfl:     atorvastatin (LIPITOR) 40 mg tablet, TAKE 1 TABLET(40 MG) BY MOUTH DAILY, Disp: 90 tablet, Rfl: 3    CALCIUM-MAGNESIUM-VITAMIN D ER PO, Take 1 tablet by mouth daily, Disp: , Rfl:     docusate sodium (COLACE) 100 mg capsule, Take 100 mg by mouth 2 (two) times a day , Disp: , Rfl:     gabapentin (NEURONTIN) 300 mg capsule, Take 1 capsule (300 mg total) by mouth 2 (two) times a day, Disp: 60 capsule, Rfl: 5    lisinopril (ZESTRIL) 20 mg tablet, Take 1 tablet (20 mg total) by mouth daily, Disp: 30 tablet, Rfl: 0    omeprazole (PriLOSEC) 20 mg delayed release capsule, TAKE 1 CAPSULE BY MOUTH TWICE DAILY, Disp: 180 capsule, Rfl: 3    ondansetron (ZOFRAN-ODT) 4 mg disintegrating tablet, DISSOLVE 1 TABLET(4 MG) ON THE TONGUE EVERY 6 HOURS AS NEEDED FOR NAUSEA OR VOMITING, Disp: 20 tablet, Rfl: 5    ONE DAILY MULTIPLE VITAMIN PO, Take by mouth , Disp: , Rfl:     tacrolimus (PROGRAF) 1 mg capsule, TAKE 3 CAPSULES(3 MG TOTAL) BY MOUTH TWICE DAILY, Disp: 540 capsule, Rfl: 3    zolpidem (AMBIEN) 5 mg tablet, Take 1 tablet (5 mg total) by mouth daily at bedtime as needed for sleep, Disp: 30 tablet, Rfl: 1    Current Allergies     Allergies as of 04/15/2022 - Reviewed 04/15/2022   Allergen Reaction Noted    Macrolides and ketolides  01/18/2018            The following portions of the patient's history were reviewed and updated as appropriate: allergies, current medications, past family history, past medical history, past social history, past surgical history and problem list      Past Medical History:   Diagnosis Date    [facility]ism [facility]     Arthritis     Bacterial peritonitis ([facility]     Bowel disease     [facility]     liver    Cataract     Depression     Fracture     Gastroesophageal reflux     Hepatic disease     Hepatocellular carcinoma ([facility]     History of colon polyps     Hypertension     Liver cancer [facility]     Liver disease     Stomach disease        Past Surgical History:   Procedure Laterality Date    APPENDECTOMY      BACK SURGERY      CATARACT EXTRACTION      EXPLORATORY LAPAROTOMY  09/2011    EYE SURGERY      LIVER TRANSPLANTATION  08/20/2013    PARACENTESIS      Abdominal Paracentesis(Therapeutic) with Imaging Guidance    SD COLONOSCOPY FLX DX W/COLLJ SPEC WHEN PFRMD N/A 9/12/2016    Procedure: COLONOSCOPY;  Surgeon: Tawnya Belcher MD;  Location: [facility]; Service: General    ROTATOR CUFF REPAIR      x2       Family History   Problem Relation Age of Onset    Coronary artery disease Mother         CABG    Prostate cancer Mother         Prostate Gland    Heart disease Mother         Cardiac Disorder    Vascular Disease Mother     Hypertension Mother         Benign    Diabetes Maternal Grandmother         Mellitus    Clotting disorder Maternal Grandfather         Embolism    Seizures Paternal Grandmother         Epilepsy    Other Paternal Grandfather         Accident    Liver cancer Family     Heart disease Family         Cardiac Disorder    Hypertension Family         Benign         Medications have been verified  Objective   /81   Pulse 91   Temp (!) 96 9 °F (36 1 °C) (Temporal)   Resp 18   Ht 6' (1 829 m)   Wt 99 8 kg (220 lb)   SpO2 99%   BMI 29 84 kg/m²        Physical Exam     Physical Exam  Constitutional:       General: He is not in acute distress  Appearance: Normal appearance  He is normal weight   He is not ill-appearing  HENT:      Head: Normocephalic  Eyes:      Extraocular Movements: Extraocular movements intact  Pupils: Pupils are equal, round, and reactive to light  Cardiovascular:      Rate and Rhythm: Regular rhythm  Pulses: Normal pulses  Posterior tibial pulses are 2+ on the right side  Heart sounds: Normal heart sounds  Pulmonary:      Effort: Pulmonary effort is normal  No respiratory distress  Breath sounds: Normal breath sounds  No wheezing  Musculoskeletal:         General: Swelling and tenderness present  No signs of injury  Normal range of motion  Cervical back: Normal range of motion and neck supple  No rigidity or tenderness  Left lower leg: No edema  Feet:    Feet:      Right foot:      Skin integrity: Warmth present  Toenail Condition: Right toenails are normal       Comments: Mild warmth noted over area of pain  Capillary refill < 2 seconds, normal ROM  Minimal swelling noted over dorsum of right foot around tender area  Skin:     General: Skin is warm and dry  Capillary Refill: Capillary refill takes less than 2 seconds  Neurological:      General: No focal deficit present  Mental Status: He is alert and oriented to person, place, and time     Psychiatric:         Mood and Affect: Mood normal          Behavior: Behavior normal

## 2022-04-15 NOTE — PATIENT INSTRUCTIONS
Foot Sprain   WHAT YOU NEED TO KNOW:   A foot sprain is a stretched or torn ligament in the foot or toe  Ligaments are tough tissues that connect bones  DISCHARGE INSTRUCTIONS:   Return to the emergency department if:   · You have numbness or tingling below the injury, such as in your toes  · The skin on your injured foot is blue or pale  · You have increased pain, even after you take pain medicine  Call your doctor if:   · You have new weakness in your foot  · You have new or increased swelling in your foot  · You have new or increased stiffness when you move your injured foot  · You have questions or concerns about your condition or care  Medicines:   · NSAIDs , such as ibuprofen, help decrease swelling, pain, and fever  This medicine is available with or without a doctor's order  NSAIDs can cause stomach bleeding or kidney problems in certain people  If you take blood thinner medicine, always ask if NSAIDs are safe for you  Always read the medicine label and follow directions  Do not give these medicines to children under 10months of age without direction from your child's healthcare provider  · Take your medicine as directed  Contact your healthcare provider if you think your medicine is not helping or if you have side effects  Tell him of her if you are allergic to any medicine  Keep a list of the medicines, vitamins, and herbs you take  Include the amounts, and when and why you take them  Bring the list or the pill bottles to follow-up visits  Carry your medicine list with you in case of an emergency  Self-care:   · Rest your foot  Limit movement in your sprained foot for the first 2 to 3 days  You might need crutches to take weight off your injured foot as it heals  Use crutches as directed  · Apply ice  on your foot for 15 to 20 minutes every hour or as directed  Use an ice pack, or put crushed ice in a plastic bag  Cover it with a towel   Ice helps prevent tissue damage and decreases swelling and pain  · Compress your foot  You may need to use tape or an elastic bandage to support your foot if you have a mild sprain  You may need a splint on your foot for support if your sprain is severe  Wear your splint for as many days as directed  · Elevate your foot  above the level of your heart as often as you can  This will help decrease swelling and pain  Prop your foot on pillows or blankets to keep it elevated comfortably  Exercise your foot:  You may be given exercises to improve your strength and to help decrease stiffness  The exercises and physical therapy can help restore strength and increase the range of motion in your foot  Ask your healthcare provider when you can return to your normal activities or play sports  Prevent another foot sprain:   · Warm up and stretch before you exercise  · Do not exercise when you feel pain or are tired  · Wear equipment to protect yourself when you play sports  Follow up with your doctor as directed:  Write down your questions so you remember to ask them during your visits  © Copyright [employer] 2022 Information is for End User's use only and may not be sold, redistributed or otherwise used for commercial purposes  All illustrations and images included in CareNotes® are the copyrighted property of [employer]  or [employer]   The above information is an  only  It is not intended as medical advice for individual conditions or treatments  Talk to your doctor, nurse or pharmacist before following any medical regimen to see if it is safe and effective for you

## 2022-04-18 ENCOUNTER — OFFICE VISIT (OUTPATIENT)
Dept: CARDIOLOGY CLINIC | Facility: CLINIC | Age: 67
End: 2022-04-18
Payer: MEDICARE

## 2022-04-18 VITALS
WEIGHT: 220 LBS | OXYGEN SATURATION: 98 % | DIASTOLIC BLOOD PRESSURE: 70 MMHG | HEIGHT: 72 IN | BODY MASS INDEX: 29.8 KG/M2 | HEART RATE: 93 BPM | SYSTOLIC BLOOD PRESSURE: 136 MMHG

## 2022-04-18 DIAGNOSIS — I10 ESSENTIAL HYPERTENSION: Primary | ICD-10-CM

## 2022-04-18 DIAGNOSIS — I73.9 PAD (PERIPHERAL ARTERY DISEASE) (HCC): ICD-10-CM

## 2022-04-18 DIAGNOSIS — I65.23 CAROTID STENOSIS, ASYMPTOMATIC, BILATERAL: ICD-10-CM

## 2022-04-18 DIAGNOSIS — E78.2 MIXED HYPERLIPIDEMIA: ICD-10-CM

## 2022-04-18 PROCEDURE — 99214 OFFICE O/P EST MOD 30 MIN: CPT | Performed by: INTERNAL MEDICINE

## 2022-04-18 PROCEDURE — 93000 ELECTROCARDIOGRAM COMPLETE: CPT | Performed by: INTERNAL MEDICINE

## 2022-04-18 RX ORDER — LISINOPRIL 20 MG/1
20 TABLET ORAL DAILY
Qty: 90 TABLET | Refills: 3 | Status: SHIPPED | OUTPATIENT
Start: 2022-04-18 | End: 2022-05-16

## 2022-04-18 NOTE — PROGRESS NOTES
Assessment/Plan:    Essential hypertension    Hypertension, stable and adequately controlled  We will continue present regimen  PAD (peripheral artery disease) (HCC)    Peripheral arterial disease, stable  Patient has chronic stable claudication symptoms  Will follow up with vascular surgery  Carotid stenosis, asymptomatic, bilateral    F carotid artery stenosis, stable  The patient is asymptomatic  The patient will continue follow-up with surgery  Mixed hyperlipidemia   Hyperlipidemia, stable  Patient will continue atorvastatin at 40 mg daily  Diagnoses and all orders for this visit:    Essential hypertension  -     POCT ECG  -     lisinopril (ZESTRIL) 20 mg tablet; Take 1 tablet (20 mg total) by mouth daily    Carotid stenosis, asymptomatic, bilateral    PAD (peripheral artery disease) ([address] )    Mixed hyperlipidemia          Subjective:   Feels well from the cardiac standpoint  He has leg claudication  Patient ID: Lucas De La Fuente is a 61 y o  male  The patient presented to this office for the purpose of cardiac follow-up  The patient is known to have history of hypertension and hyperlipidemia as well as carotid artery stenosis and peripheral vascular disease  The patient has been feeling well from the cardiac standpoint denying any symptoms of chest pain, shortness of breath, palpitation, dizziness or lightheadedness  He has no leg edema but experiences leg claudication upon walking  The following portions of the patient's history were reviewed and updated as appropriate: allergies, current medications, past family history, past medical history, past social history, past surgical history and problem list     Review of Systems   Respiratory: Negative for apnea, cough, chest tightness, shortness of breath and wheezing  Cardiovascular: Negative for chest pain, palpitations and leg swelling  Gastrointestinal: Negative for abdominal pain     Neurological: Negative for dizziness and light-headedness  Psychiatric/Behavioral: Negative  Objective:  Stable cardiac-wise  /70 (BP Location: Left arm, Patient Position: Sitting, Cuff Size: Large)   Pulse 93   Ht 6' (1 829 m)   Wt 99 8 kg (220 lb)   SpO2 98%   BMI 29 84 kg/m²          Physical Exam  Vitals reviewed  Constitutional:       General: He is not in acute distress  Appearance: He is well-developed  He is not diaphoretic  HENT:      Head: Normocephalic  Eyes:      Pupils: Pupils are equal, round, and reactive to light  Neck:      Thyroid: No thyromegaly  Vascular: No JVD  Cardiovascular:      Rate and Rhythm: Normal rate and regular rhythm  Heart sounds: S1 normal and S2 normal  No murmur heard  No friction rub  No gallop  Pulmonary:      Effort: Pulmonary effort is normal  No respiratory distress  Breath sounds: Normal breath sounds  No wheezing or rales  Chest:      Chest wall: No tenderness  Abdominal:      Palpations: Abdomen is soft  Musculoskeletal:         General: No tenderness or deformity  Normal range of motion  Cervical back: Normal range of motion  Right lower leg: No edema  Left lower leg: No edema  Skin:     General: Skin is warm and dry  Neurological:      Mental Status: He is alert and oriented to person, place, and time     Psychiatric:         Mood and Affect: Mood normal          Behavior: Behavior normal

## 2022-04-18 NOTE — ASSESSMENT & PLAN NOTE
F carotid artery stenosis, stable  The patient is asymptomatic  The patient will continue follow-up with surgery

## 2022-04-18 NOTE — LETTER
April 18, 2022     Humberto Cardona, [address] [id number]    Patient: Kirsten Cotto   YOB: 1955   Date of Visit: 4/18/2022       Dear Dr Belem Sorensen: Thank you for referring Ofelia Marques to me for evaluation  Below are my notes for this consultation  If you have questions, please do not hesitate to call me  I look forward to following your patient along with you  Sincerely,        Aster Montana MD        CC: No Recipients  Aster Montana MD  4/18/2022 11:39 AM  Sign when Signing Visit  Assessment/Plan:    Essential hypertension    Hypertension, stable and adequately controlled  We will continue present regimen  PAD (peripheral artery disease) (HCC)    Peripheral arterial disease, stable  Patient has chronic stable claudication symptoms  Will follow up with vascular surgery  Carotid stenosis, asymptomatic, bilateral    F carotid artery stenosis, stable  The patient is asymptomatic  The patient will continue follow-up with surgery  Mixed hyperlipidemia   Hyperlipidemia, stable  Patient will continue atorvastatin at 40 mg daily  Diagnoses and all orders for this visit:    Essential hypertension  -     POCT ECG  -     lisinopril (ZESTRIL) 20 mg tablet; Take 1 tablet (20 mg total) by mouth daily    Carotid stenosis, asymptomatic, bilateral    PAD (peripheral artery disease) ([address] )    Mixed hyperlipidemia          Subjective:   Feels well from the cardiac standpoint  He has leg claudication  Patient ID: Kirsten Cotto is a 61 y o  male  The patient presented to this office for the purpose of cardiac follow-up  The patient is known to have history of hypertension and hyperlipidemia as well as carotid artery stenosis and peripheral vascular disease  The patient has been feeling well from the cardiac standpoint denying any symptoms of chest pain, shortness of breath, palpitation, dizziness or lightheadedness    He has no leg edema but experiences leg claudication upon walking  The following portions of the patient's history were reviewed and updated as appropriate: allergies, current medications, past family history, past medical history, past social history, past surgical history and problem list     Review of Systems   Respiratory: Negative for apnea, cough, chest tightness, shortness of breath and wheezing  Cardiovascular: Negative for chest pain, palpitations and leg swelling  Gastrointestinal: Negative for abdominal pain  Neurological: Negative for dizziness and light-headedness  Psychiatric/Behavioral: Negative  Objective:  Stable cardiac-wise  /70 (BP Location: Left arm, Patient Position: Sitting, Cuff Size: Large)   Pulse 93   Ht 6' (1 829 m)   Wt 99 8 kg (220 lb)   SpO2 98%   BMI 29 84 kg/m²          Physical Exam  Vitals reviewed  Constitutional:       General: He is not in acute distress  Appearance: He is well-developed  He is not diaphoretic  HENT:      Head: Normocephalic  Eyes:      Pupils: Pupils are equal, round, and reactive to light  Neck:      Thyroid: No thyromegaly  Vascular: No JVD  Cardiovascular:      Rate and Rhythm: Normal rate and regular rhythm  Heart sounds: S1 normal and S2 normal  No murmur heard  No friction rub  No gallop  Pulmonary:      Effort: Pulmonary effort is normal  No respiratory distress  Breath sounds: Normal breath sounds  No wheezing or rales  Chest:      Chest wall: No tenderness  Abdominal:      Palpations: Abdomen is soft  Musculoskeletal:         General: No tenderness or deformity  Normal range of motion  Cervical back: Normal range of motion  Right lower leg: No edema  Left lower leg: No edema  Skin:     General: Skin is warm and dry  Neurological:      Mental Status: He is alert and oriented to person, place, and time     Psychiatric:         Mood and Affect: Mood normal          Behavior: Behavior normal

## 2022-04-18 NOTE — ASSESSMENT & PLAN NOTE
Peripheral arterial disease, stable  Patient has chronic stable claudication symptoms  Will follow up with vascular surgery

## 2022-05-04 ENCOUNTER — OFFICE VISIT (OUTPATIENT)
Dept: INTERNAL MEDICINE CLINIC | Facility: CLINIC | Age: 67
End: 2022-05-04
Payer: MEDICARE

## 2022-05-04 VITALS
BODY MASS INDEX: 29.36 KG/M2 | HEART RATE: 91 BPM | WEIGHT: 216.8 LBS | SYSTOLIC BLOOD PRESSURE: 134 MMHG | HEIGHT: 72 IN | OXYGEN SATURATION: 97 % | DIASTOLIC BLOOD PRESSURE: 80 MMHG

## 2022-05-04 DIAGNOSIS — I10 ESSENTIAL HYPERTENSION: Primary | ICD-10-CM

## 2022-05-04 DIAGNOSIS — Z12.11 SCREENING FOR COLON CANCER: ICD-10-CM

## 2022-05-04 DIAGNOSIS — Z94.4 HISTORY OF LIVER TRANSPLANT (HCC): ICD-10-CM

## 2022-05-04 DIAGNOSIS — Z11.4 SCREENING FOR HIV (HUMAN IMMUNODEFICIENCY VIRUS): ICD-10-CM

## 2022-05-04 DIAGNOSIS — Z11.59 NEED FOR HEPATITIS C SCREENING TEST: ICD-10-CM

## 2022-05-04 DIAGNOSIS — Z23 ENCOUNTER FOR IMMUNIZATION: ICD-10-CM

## 2022-05-04 DIAGNOSIS — Z00.00 MEDICARE ANNUAL WELLNESS VISIT, SUBSEQUENT: ICD-10-CM

## 2022-05-04 DIAGNOSIS — Z12.5 SCREENING FOR PROSTATE CANCER: ICD-10-CM

## 2022-05-04 DIAGNOSIS — E55.9 VITAMIN D DEFICIENCY: ICD-10-CM

## 2022-05-04 DIAGNOSIS — E78.2 MIXED HYPERLIPIDEMIA: ICD-10-CM

## 2022-05-04 DIAGNOSIS — K21.9 CHRONIC GASTROESOPHAGEAL REFLUX DISEASE: ICD-10-CM

## 2022-05-04 PROCEDURE — 99214 OFFICE O/P EST MOD 30 MIN: CPT | Performed by: INTERNAL MEDICINE

## 2022-05-04 PROCEDURE — G0438 PPPS, INITIAL VISIT: HCPCS | Performed by: INTERNAL MEDICINE

## 2022-05-04 PROCEDURE — 1123F ACP DISCUSS/DSCN MKR DOCD: CPT | Performed by: INTERNAL MEDICINE

## 2022-05-04 PROCEDURE — G0009 ADMIN PNEUMOCOCCAL VACCINE: HCPCS

## 2022-05-04 PROCEDURE — 90677 PCV20 VACCINE IM: CPT

## 2022-05-04 NOTE — PROGRESS NOTES
Assessment and Plan:     Problem List Items Addressed This Visit        Digestive    Chronic gastroesophageal reflux disease     Continue PPI and follow up GI            Cardiovascular and Mediastinum    Essential hypertension - Primary     Controlled, continue med along with healthy diet and exercise            Other    History of liver transplant ([facility] [address] )     Follow up transplant team         Mixed hyperlipidemia     Continue atorvastatin along with healthy diet         Relevant Orders    CBC and differential    Comprehensive metabolic panel    Lipid Panel with Direct LDL reflex    TSH, 3rd generation with Free T4 reflex    Medicare annual wellness visit, subsequent     Discussed preventative health, cancer screening, immunizations, and safety issues  I recommend Prevnar 20 today  Patient's last colonoscopy was 2016, I recommend follow up colonoscopy with his history of polyps           Other Visit Diagnoses     Need for hepatitis C screening test        Relevant Orders    Hepatitis C Antibody ([facility])    Screening for HIV (human immunodeficiency virus)        Relevant Orders    HIV 1/2 Antigen/Antibody (4th Generation) w Reflex [facility]    Encounter for immunization        Relevant Orders    Pneumococcal Conjugate Vaccine 20-valent (PCV20)    Screening for colon cancer        Relevant Orders    Ambulatory referral to Gastroenterology    Screening for prostate cancer        Relevant Orders    PSA, Total Screen    Vitamin D deficiency        Relevant Orders    Vitamin D 25 hydroxy        BMI Counseling: Body mass index is 29 4 kg/m²  The BMI is above normal  Nutrition recommendations include encouraging healthy choices of fruits and vegetables and moderation in carbohydrate intake  Exercise recommendations include exercising 3-5 times per week  Rationale for BMI follow-up plan is due to patient being overweight or obese       Depression Screening and Follow-up Plan: Patient was screened for depression during today's encounter  They screened negative with a PHQ-2 score of 0  Preventive health issues were discussed with patient, and age appropriate screening tests were ordered as noted in patient's After Visit Summary  Personalized health advice and appropriate referrals for health education or preventive services given if needed, as noted in patient's After Visit Summary       History of Present Illness:     Patient presents for Medicare Annual Wellness visit    Patient Care Team:  Mary Mcgowan MD as PCP - MD Mary Bermudez MD Altha Arias, MD Lafonda Downs, MD Virgin Ide, MD as Endoscopist  Jimmy Hernandez MD (Vascular Surgery)     Problem List:     Patient Active Problem List   Diagnosis    Alcohol dependence in remission [facility])    Bursitis of both hips    Chronic gastroesophageal reflux disease    Essential hypertension    Intervertebral disc disorder with radiculopathy of lumbar region    History of liver transplant ([facility] [address] )    Sacroiliitis ([facility] [address] )    Seasonal allergies    Lung mass    Neck pain    Nonalcoholic liver disease, chronic    Abnormal electrocardiogram    Osteopenia    PPD negative    Right arm pain    Ringing in ears, bilateral    Short-term memory loss    Tobacco abuse    Mixed hyperlipidemia    Insomnia    Medicare annual wellness visit, subsequent    Leg cramps    Claudication in peripheral vascular disease ([facility] [address] )    Carotid stenosis, asymptomatic, bilateral    Fall    Closed fracture of left distal femur ([facility] [address] )    Acute pain due to trauma    Chronic low back pain    Hernia of abdominal cavity    PAD (peripheral artery disease) ([facility] [address] )    Electrolyte abnormality    Closed nondisplaced fracture of condyle of left femur ([facility] [address] )    Lumbar stenosis with neurogenic claudication      Past Medical and Surgical History:     Past Medical History:   Diagnosis Date    Alcoholism ([facility] [address] )     Arthritis     Bacterial peritonitis ([facility] [address] )     Bowel disease     Cancer ([facility] [address] )     liver    Cataract     Depression     Fracture     Gastroesophageal reflux     Hepatic disease     Hepatocellular carcinoma (HCC)     History of colon polyps     Hypertension     Liver cancer ([facility] [address] )     Liver disease     Stomach disease      Past Surgical History:   Procedure Laterality Date    APPENDECTOMY      BACK SURGERY      CATARACT EXTRACTION      EXPLORATORY LAPAROTOMY  09/2011    EYE SURGERY      LIVER TRANSPLANTATION  08/20/2013    PARACENTESIS      Abdominal Paracentesis(Therapeutic) with Imaging Guidance    AR COLONOSCOPY FLX DX W/COLLJ SPEC WHEN PFRMD N/A 9/12/2016    Procedure: COLONOSCOPY;  Surgeon: Candy Kumari MD;  [facility];   Service: General    ROTATOR CUFF REPAIR      x2      Family History:     Family History   Problem Relation Age of Onset    Coronary artery disease Mother         CABG    Prostate cancer Mother         Prostate Gland    Heart disease Mother         Cardiac Disorder    Vascular Disease Mother     Hypertension Mother         Benign    Diabetes Maternal Grandmother         Mellitus    Clotting disorder Maternal Grandfather         Embolism    Seizures Paternal Grandmother         Epilepsy    Other Paternal Grandfather         Accident    Liver cancer Family     Heart disease Family         Cardiac Disorder    Hypertension Family         Benign      Social History:     Social History     Socioeconomic History    Marital status: /Civil Union     Spouse name: None    Number of children: 3    Years of education: College, bachelors degree    Highest education level: None   Occupational History    Occupation:    Tobacco Use    Smoking status: Current Every Day Smoker     Packs/day: 0 25     Years: 35 00     Pack years: 8 75     Types: Cigarettes     Start date: 6/11/1978    Smokeless tobacco: Never Used   Vaping Use    Vaping Use: Never used   Substance and Sexual Activity    Alcohol use: Yes     Alcohol/week: 2 0 standard drinks     Types: 1 Glasses of wine, 1 Standard drinks or equivalent per week    Drug use: No     Comment: Per [employer] Drug use way back in college over 40 yrs ago    Sexual activity: None   Other Topics Concern    None   Social History Narrative    Daily coffee Consumption (2 Cups/Day)    Daily Cola Consumption (1 Cans/day)    Disabled    Lives with spouse     Social Determinants of Health     Financial Resource Strain: Not on file   Food Insecurity: Not on file   Transportation Needs: Not on file   Physical Activity: Not on file   Stress: Not on file   Social Connections: Not on file   Intimate Partner Violence: Not on file   Housing Stability: Not on file      Medications and Allergies:     Current Outpatient Medications   Medication Sig Dispense Refill    acetaminophen (TYLENOL) 325 mg tablet Take 650 mg by mouth every 6 (six) hours as needed for mild pain   ALPRAZolam (XANAX) 0 25 mg tablet TAKE 1 TABLET BY MOUTH EVERY DAY AS NEEDED FOR ANXIETY 30 tablet 1    aspirin (ECOTRIN LOW STRENGTH) 81 mg EC tablet Take 81 mg by mouth daily      atorvastatin (LIPITOR) 40 mg tablet TAKE 1 TABLET(40 MG) BY MOUTH DAILY 90 tablet 3    CALCIUM-MAGNESIUM-VITAMIN D ER PO Take 1 tablet by mouth daily      docusate sodium (COLACE) 100 mg capsule Take 100 mg by mouth 2 (two) times a day   gabapentin (NEURONTIN) 300 mg capsule Take 1 capsule (300 mg total) by mouth 2 (two) times a day 60 capsule 5    lisinopril (ZESTRIL) 20 mg tablet Take 1 tablet (20 mg total) by mouth daily 90 tablet 3    omeprazole (PriLOSEC) 20 mg delayed release capsule TAKE 1 CAPSULE BY MOUTH TWICE DAILY 180 capsule 3    ondansetron (ZOFRAN-ODT) 4 mg disintegrating tablet DISSOLVE 1 TABLET(4 MG) ON THE TONGUE EVERY 6 HOURS AS NEEDED FOR NAUSEA OR VOMITING 20 tablet 0    ONE DAILY MULTIPLE VITAMIN PO Take by mouth        tacrolimus (PROGRAF) 1 mg capsule TAKE 3 CAPSULES(3 MG TOTAL) BY MOUTH TWICE DAILY 540 capsule 3    zolpidem (AMBIEN) 5 mg tablet Take 1 tablet (5 mg total) by mouth daily at bedtime as needed for sleep 30 tablet 1     No current facility-administered medications for this visit  Allergies   Allergen Reactions    Macrolides And Ketolides      [facility] [id number]: The patient is on Antirejection medications and they will reduce the effective ness of the medications  Immunizations:     Immunization History   Administered Date(s) Administered    COVID-19 PFIZER VACCINE 0 3 ML IM 03/21/2021, 04/11/2021, 08/28/2021, 04/27/2022    INFLUENZA 10/14/2014, 11/01/2016, 11/20/2017    Influenza Quadrivalent, 6-35 Months IM 11/01/2016    Influenza, recombinant, quadrivalent,injectable, preservative free 09/19/2018, 10/12/2019, 10/20/2020, 10/19/2021    Influenza, seasonal, injectable 10/06/2010, 10/26/2011, 11/02/2012, 10/14/2014, 10/30/2015, 11/04/2016    Meningococcal MCV4P 11/20/2017    Pneumococcal Polysaccharide PPV23 04/29/2013, 09/19/2018    Tdap 10/08/2017    Zoster Vaccine Recombinant 10/04/2019, 12/06/2019      Health Maintenance:         Topic Date Due    Hepatitis C Screening  Never done    HIV Screening  Never done    Colorectal Cancer Screening  09/12/2026         Topic Date Due    Hepatitis A Vaccine (1 of 2 - Risk 2-dose series) Never done    Hepatitis B Vaccine (1 of 3 - Risk 3-dose series) Never done    Pneumococcal Vaccine: Pediatrics (0 to 5 Years) and At-Risk Patients (6 to 64 Years) (3 of 4 - PCV13) 09/19/2019      Medicare Health Risk Assessment:     /80 (BP Location: Left arm, Patient Position: Sitting, Cuff Size: Large)   Pulse 91   Ht 6' (1 829 m)   Wt 98 3 kg (216 lb 12 8 oz)   SpO2 97%   BMI 29 40 kg/m²      Regina Penn is here for his Subsequent Wellness visit  Health Risk Assessment:   Patient rates overall health as good  Patient feels that their physical health rating is same  Patient is satisfied with their life   Eyesight was rated as slightly worse  Hearing was rated as same  Patient feels that their emotional and mental health rating is same  Patients states they are sometimes angry  Patient states they are sometimes unusually tired/fatigued  Pain experienced in the last 7 days has been some  Patient's pain rating has been 4/10  Patient states that he has experienced weight loss or gain in last 6 months  Physical activity has declined    Depression Screening:   PHQ-2 Score: 0      Fall Risk Screening: In the past year, patient has experienced: history of falling in past year      Home Safety:  Patient does not have trouble with stairs inside or outside of their home  Patient has working smoke alarms and has no working carbon monoxide detector  Home safety hazards include: none  None    Nutrition:   Current diet is Regular  Medications:   Patient is not currently taking any over-the-counter supplements  Patient is able to manage medications  Activities of Daily Living (ADLs)/Instrumental Activities of Daily Living (IADLs):   Walk and transfer into and out of bed and chair?: Yes  Dress and groom yourself?: Yes    Bathe or shower yourself?: Yes    Feed yourself? Yes  Do your laundry/housekeeping?: Yes  Manage your money, pay your bills and track your expenses?: Yes  Make your own meals?: Yes    Do your own shopping?: Yes    Previous Hospitalizations:   Any hospitalizations or ED visits within the last 12 months?: Yes    How many hospitalizations have you had in the last year?: 1-2    Hospitalization Comments: Broke my leg on mothers day of 2021    Advance Care Planning:   Living will: Yes    Durable POA for healthcare:  Yes    Advanced directive: Yes    Advanced directive counseling given: Yes      Cognitive Screening:   Provider or family/friend/caregiver concerned regarding cognition?: No    PREVENTIVE SCREENINGS      Cardiovascular Screening:    General: Screening Not Indicated, History Lipid Disorder and Risks and Benefits Discussed    Due for: Lipid Panel      Diabetes Screening:     General: Screening Current and Risks and Benefits Discussed      Colorectal Cancer Screening:     General: Screening Current      Prostate Cancer Screening:    General: Risks and Benefits Discussed    Due for: PSA      Osteoporosis Screening:    General: Risks and Benefits Discussed      Abdominal Aortic Aneurysm (AAA) Screening:    Risk factors include: tobacco use        General: Risks and Benefits Discussed and Screening Current      Lung Cancer Screening:     General: Screening Not Indicated      Hepatitis C Screening:    General: Risks and Benefits Discussed and Screening Current    Screening, Brief Intervention, and Referral to Treatment (SBIRT)    Screening  Typical number of drinks in a day: 0  Typical number of drinks in a week: 3  Interpretation: Low risk drinking behavior  AUDIT-C Screenin) How often did you have a drink containing alcohol in the past year? 2 to 3 times a week  2) How many drinks did you have on a typical day when you were drinking in the past year? 1 to 2  3) How often did you have 6 or more drinks on one occasion in the past year? never    AUDIT-C Score: 3  Interpretation: Score 0-3 (male): Negative screen for alcohol misuse    Single Item Drug Screening:  How often have you used an illegal drug (including marijuana) or a prescription medication for non-medical reasons in the past year? never    Single Item Drug Screen Score: 0  Interpretation: Negative screen for possible drug use disorder    Brief Intervention  Alcohol & drug use screenings were reviewed  No concerns regarding substance use disorder identified  Other Counseling Topics:   Car/seat belt/driving safety, skin self-exam and sunscreen         Saloni Clifford MD

## 2022-05-04 NOTE — PATIENT INSTRUCTIONS
Medicare Preventive Visit Patient Instructions  Thank you for completing your Welcome to Medicare Visit or Medicare Annual Wellness Visit today  Your next wellness visit will be due in one year (5/5/2023)  The screening/preventive services that you may require over the next 5-10 years are detailed below  Some tests may not apply to you based off risk factors and/or age  Screening tests ordered at today's visit but not completed yet may show as past due  Also, please note that scanned in results may not display below  Preventive Screenings:  Service Recommendations Previous Testing/Comments   Colorectal Cancer Screening  · Colonoscopy    · Fecal Occult Blood Test (FOBT)/Fecal Immunochemical Test (FIT)  · Fecal DNA/Cologuard Test  · Flexible Sigmoidoscopy Age: 54-65 years old   Colonoscopy: every 10 years (May be performed more frequently if at higher risk)  OR  FOBT/FIT: every 1 year  OR  Cologuard: every 3 years  OR  Sigmoidoscopy: every 5 years  Screening may be recommended earlier than age 48 if at higher risk for colorectal cancer  Also, an individualized decision between you and your healthcare provider will decide whether screening between the ages of 74-80 would be appropriate  Colonoscopy: 09/12/2016  FOBT/FIT: Not on file  Cologuard: Not on file  Sigmoidoscopy: Not on file          Prostate Cancer Screening Individualized decision between patient and health care provider in men between ages of 53-78   Medicare will cover every 12 months beginning on the day after your 50th birthday PSA: 3 0 ng/mL           Hepatitis C Screening Once for adults born between 1945 and 1965  More frequently in patients at high risk for Hepatitis C Hep C Antibody: Not on file        Diabetes Screening 1-2 times per year if you're at risk for diabetes or have pre-diabetes Fasting glucose: 92 mg/dL   A1C: 5 9 %        Cholesterol Screening Once every 5 years if you don't have a lipid disorder   May order more often based on risk factors  Lipid panel: 10/26/2020           Other Preventive Screenings Covered by Medicare:  1  Abdominal Aortic Aneurysm (AAA) Screening: covered once if your at risk  You're considered to be at risk if you have a family history of AAA or a male between the age of 73-68 who smoking at least 100 cigarettes in your lifetime  2  Lung Cancer Screening: covers low dose CT scan once per year if you meet all of the following conditions: (1) Age 50-69; (2) No signs or symptoms of lung cancer; (3) Current smoker or have quit smoking within the last 15 years; (4) You have a tobacco smoking history of at least 30 pack years (packs per day x number of years you smoked); (5) You get a written order from a healthcare provider  3  Glaucoma Screening: covered annually if you're considered high risk: (1) You have diabetes OR (2) Family history of glaucoma OR (3)  aged 48 and older OR (3)  American aged 72 and older  3  Osteoporosis Screening: covered every 2 years if you meet one of the following conditions: (1) Have a vertebral abnormality; (2) On glucocorticoid therapy for more than 3 months; (3) Have primary hyperparathyroidism; (4) On osteoporosis medications and need to assess response to drug therapy  5  HIV Screening: covered annually if you're between the age of 12-76  Also covered annually if you are younger than 13 and older than 72 with risk factors for HIV infection  For pregnant patients, it is covered up to 3 times per pregnancy  Immunizations:  Immunization Recommendations   Influenza Vaccine Annual influenza vaccination during flu season is recommended for all persons aged >= 6 months who do not have contraindications   Pneumococcal Vaccine (Prevnar and Pneumovax)  * Prevnar = PCV13  * Pneumovax = PPSV23 Adults 25-60 years old: 1-3 doses may be recommended based on certain risk factors  Adults 72 years old: Prevnar (PCV13) vaccine recommended followed by Pneumovax (PPSV23) vaccine   If already received PPSV23 since turning 65, then PCV13 recommended at least one year after PPSV23 dose  Hepatitis B Vaccine 3 dose series if at intermediate or high risk (ex: diabetes, end stage renal disease, liver disease)   Tetanus (Td) Vaccine - COST NOT COVERED BY MEDICARE PART B Following completion of primary series, a booster dose should be given every 10 years to maintain immunity against tetanus  Td may also be given as tetanus wound prophylaxis  Tdap Vaccine - COST NOT COVERED BY MEDICARE PART B Recommended at least once for all adults  For pregnant patients, recommended with each pregnancy  Shingles Vaccine (Shingrix) - COST NOT COVERED BY MEDICARE PART B  2 shot series recommended in those aged 48 and above     Health Maintenance Due:      Topic Date Due    Hepatitis C Screening  Never done    HIV Screening  Never done    Colorectal Cancer Screening  09/12/2026     Immunizations Due:      Topic Date Due    Hepatitis A Vaccine (1 of 2 - Risk 2-dose series) Never done    Hepatitis B Vaccine (1 of 3 - Risk 3-dose series) Never done    Pneumococcal Vaccine: Pediatrics (0 to 5 Years) and At-Risk Patients (6 to 59 Years) (3 of 4 - PCV13) 09/19/2019     Advance Directives   What are advance directives? Advance directives are legal documents that state your wishes and plans for medical care  These plans are made ahead of time in case you lose your ability to make decisions for yourself  Advance directives can apply to any medical decision, such as the treatments you want, and if you want to donate organs  What are the types of advance directives? There are many types of advance directives, and each state has rules about how to use them  You may choose a combination of any of the following:  · Living will: This is a written record of the treatment you want  You can also choose which treatments you do not want, which to limit, and which to stop at a certain time   This includes surgery, medicine, IV fluid, and tube feedings  · Durable power of  for healthcare [facility]): This is a written record that states who you want to make healthcare choices for you when you are unable to make them for yourself  This person, called a proxy, is usually a family member or a friend  You may choose more than 1 proxy  · Do not resuscitate (DNR) order:  A DNR order is used in case your heart stops beating or you stop breathing  It is a request not to have certain forms of treatment, such as CPR  A DNR order may be included in other types of advance directives  · Medical directive: This covers the care that you want if you are in a coma, near death, or unable to make decisions for yourself  You can list the treatments you want for each condition  Treatment may include pain medicine, surgery, blood transfusions, dialysis, IV or tube feedings, and a ventilator (breathing machine)  · Values history: This document has questions about your views, beliefs, and how you feel and think about life  This information can help others choose the care that you would choose  Why are advance directives important? An advance directive helps you control your care  Although spoken wishes may be used, it is better to have your wishes written down  Spoken wishes can be misunderstood, or not followed  Treatments may be given even if you do not want them  An advance directive may make it easier for your family to make difficult choices about your care  Cigarette Smoking and Your Health   Risks to your health if you smoke:  Nicotine and other chemicals found in tobacco damage every cell in your body  Even if you are a light smoker, you have an increased risk for cancer, heart disease, and lung disease  If you are pregnant or have diabetes, smoking increases your risk for complications  Benefits to your health if you stop smoking:   · You decrease respiratory symptoms such as coughing, wheezing, and shortness of breath     · You reduce your risk for cancers of the lung, mouth, throat, kidney, bladder, pancreas, stomach, and cervix  If you already have cancer, you increase the benefits of chemotherapy  You also reduce your risk for cancer returning or a second cancer from developing  · You reduce your risk for heart disease, blood clots, heart attack, and stroke  · You reduce your risk for lung infections, and diseases such as pneumonia, asthma, chronic bronchitis, and emphysema  · Your circulation improves  More oxygen can be delivered to your body  If you have diabetes, you lower your risk for complications, such as kidney, artery, and eye diseases  You also lower your risk for nerve damage  Nerve damage can lead to amputations, poor vision, and blindness  · You improve your body's ability to heal and to fight infections  For more information and support to stop smoking:   · [employer]  Phone: 2- 579 - [phone]  Web Address: [place]  Weight Management   Why it is important to manage your weight:  Being overweight increases your risk of health conditions such as heart disease, high blood pressure, type 2 diabetes, and certain types of cancer  It can also increase your risk for osteoarthritis, sleep apnea, and other respiratory problems  Aim for a slow, steady weight loss  Even a small amount of weight loss can lower your risk of health problems  How to lose weight safely:  A safe and healthy way to lose weight is to eat fewer calories and get regular exercise  You can lose up about 1 pound a week by decreasing the number of calories you eat by 500 calories each day  Healthy meal plan for weight management:  A healthy meal plan includes a variety of foods, contains fewer calories, and helps you stay healthy  A healthy meal plan includes the following:  · Eat whole-grain foods more often  A healthy meal plan should contain fiber  Fiber is the part of grains, fruits, and vegetables that is not broken down by your body  Whole-grain foods are healthy and provide extra fiber in your diet  Some examples of whole-grain foods are whole-wheat breads and pastas, oatmeal, brown rice, and bulgur  · Eat a variety of vegetables every day  Include dark, leafy greens such as spinach, kale, cheyenne greens, and mustard greens  Eat yellow and orange vegetables such as carrots, sweet potatoes, and winter squash  · Eat a variety of fruits every day  Choose fresh or canned fruit (canned in its own juice or light syrup) instead of juice  Fruit juice has very little or no fiber  · Eat low-fat dairy foods  Drink fat-free (skim) milk or 1% milk  Eat fat-free yogurt and low-fat cottage cheese  Try low-fat cheeses such as mozzarella and other reduced-fat cheeses  · Choose meat and other protein foods that are low in fat  Choose beans or other legumes such as split peas or lentils  Choose fish, skinless poultry (chicken or turkey), or lean cuts of red meat (beef or pork)  Before you cook meat or poultry, cut off any visible fat  · Use less fat and oil  Try baking foods instead of frying them  Add less fat, such as margarine, sour cream, regular salad dressing and mayonnaise to foods  Eat fewer high-fat foods  Some examples of high-fat foods include french fries, doughnuts, ice cream, and cakes  · Eat fewer sweets  Limit foods and drinks that are high in sugar  This includes candy, cookies, regular soda, and sweetened drinks  Exercise:  Exercise at least 30 minutes per day on most days of the week  Some examples of exercise include walking, biking, dancing, and swimming  You can also fit in more physical activity by taking the stairs instead of the elevator or parking farther away from stores  Ask your healthcare provider about the best exercise plan for you  © Copyright [employer] 2018 Information is for End User's use only and may not be sold, redistributed or otherwise used for commercial purposes   All illustrations and images included in [facility] [address] are the copyrighted property of A D A M , Inc  or [facility] Matty Mcgowan

## 2022-05-04 NOTE — ASSESSMENT & PLAN NOTE
Continue PPI and follow up GI
Continue atorvastatin along with healthy diet
Controlled, continue med along with healthy diet and exercise
Discussed preventative health, cancer screening, immunizations, and safety issues  I recommend Prevnar 20 today    Patient's last colonoscopy was 2016, I recommend follow up colonoscopy with his history of polyps
Follow up transplant team
Is This A New Presentation, Or A Follow-Up?: Phototherapy Treatment

## 2022-05-04 NOTE — PROGRESS NOTES
Assessment/Plan:    Medicare annual wellness visit, subsequent  Discussed preventative health, cancer screening, immunizations, and safety issues  I recommend Prevnar 20 today  Patient's last colonoscopy was 2016, I recommend follow up colonoscopy with his history of polyps    Essential hypertension  Controlled, continue med along with healthy diet and exercise    History of liver transplant ([facility] )  Follow up transplant team    Mixed hyperlipidemia  Continue atorvastatin along with healthy diet    Chronic gastroesophageal reflux disease  Continue PPI and follow up GI       Diagnoses and all orders for this visit:    Essential hypertension    Need for hepatitis C screening test  -     Hepatitis C Antibody ([facility]); Future    Screening for HIV (human immunodeficiency virus)  -     HIV 1/2 Antigen/Antibody (4th Generation) w Reflex [facility]; Future    Encounter for immunization  -     Pneumococcal Conjugate Vaccine 20-valent (PCV20)    Medicare annual wellness visit, subsequent    Screening for colon cancer  -     Ambulatory referral to Gastroenterology; Future    History of liver transplant ([place] [id number] )    Mixed hyperlipidemia  -     CBC and differential; Future  -     Comprehensive metabolic panel; Future  -     Lipid Panel with Direct LDL reflex; Future  -     TSH, 3rd generation with Free T4 reflex; Future    Chronic gastroesophageal reflux disease    Screening for prostate cancer  -     PSA, Total Screen; Future    Vitamin D deficiency  -     Vitamin D 25 hydroxy; Future        Subjective:      Patient ID: Luis Leblanc is a 61 y o  male      Pt here for follow up chronic conditions and AWV      The following portions of the patient's history were reviewed and updated as appropriate: allergies, current medications, past family history, past medical history, past social history, past surgical history and problem list Answers for HPI/ROS submitted by the patient on 5/1/2022  How would you rate your overall health?: good  Compared to last year, how is your physical health?: same  In general, how satisfied are you with your life?: satisfied  Compared to last year, how is your eyesight?: slightly worse  Compared to last year, how is your hearing?: same  Compared to last year, how is your emotional/mental health?: same  How often is anger a problem for you?: sometimes  How often do you feel unusually tired/fatigued?: sometimes  In the past 7 days, how much pain have you experienced?: some  If you answered "some" or "a lot", please rate the severity of your pain on a scale of 1 to 10 (1 being the least severe pain and 10 being the most intense pain)  : 4/10  In the past 6 months, have you lost or gained 10 pounds without trying?: Yes  Additional Comments: Physical activity has declined  One or more falls in the last year: Yes  Do you have trouble with the stairs inside or outside your home?: No  Does your home have working smoke alarms?: Yes  Does your home have a carbon monoxide monitor?: No  Which safety hazards (if any) have you experienced in your home? Please select all that apply : none  Additional Comments: None  How would you describe your current diet?  Please select all that apply : Regular  In addition to prescription medications, are you taking any over-the-counter supplements?: No  Can you manage your medications?: Yes  Are you currently taking any opioid medications?: No  Can you walk and transfer into and out of your bed and chair?: Yes  Can you dress and groom yourself?: Yes  Can you bathe or shower yourself?: Yes  Can you feed yourself?: Yes  Can you do your laundry/ housekeeping?: Yes  Can you manage your money, pay your bills, and track your expenses?: Yes  Can you make your own meals?: Yes  Can you do your own shopping?: Yes  Within the last 12 months, have you had any hospitalizations or Emergency Department visits?: Yes  If yes, how many times have you been hospitalized within the past year?: 1-2  Additional Comments: Broke my leg on mothers day of 2021  Do you have a living will?: Yes  Do you have a Durable POA (Power of ) for healthcare decisions?: No  Do you have an Advanced Directive for end of life decisions?: Yes  How often have you used an illegal drug (including marijuana) or a prescription medication for non-medical reasons in the past year?: never  What is the typical number of drinks you consume in a day?: 0  What is the typical number of drinks you consume in a week?: 3  How often did you have a drink containing alcohol in the past year?: 2 to 3 times a week  How many drinks did you have on a typical day  when you were drinking in the past year?: 1 to 2  How often did you have 6 or more drinks on one occasion in the past year?: never      Review of Systems   Constitutional: Negative for chills, fatigue and fever  HENT: Negative for congestion, nosebleeds, postnasal drip, sore throat and trouble swallowing  Eyes: Negative for pain  Respiratory: Negative for cough, chest tightness, shortness of breath and wheezing  Cardiovascular: Negative for chest pain, palpitations and leg swelling  Gastrointestinal: Negative for abdominal pain, constipation, diarrhea, nausea and vomiting  Endocrine: Negative for polydipsia and polyuria  Genitourinary: Negative for dysuria, flank pain and hematuria  Musculoskeletal: Negative for arthralgias  Skin: Negative for rash  Neurological: Negative for dizziness, tremors, light-headedness and headaches  Hematological: Does not bruise/bleed easily  Psychiatric/Behavioral: Positive for sleep disturbance  Negative for confusion and dysphoric mood  The patient is not nervous/anxious  Objective:      /80 (BP Location: Left arm, Patient Position: Sitting, Cuff Size: Large)   Pulse 91   Ht 6' (1 829 m)   Wt 98 3 kg (216 lb 12 8 oz)   SpO2 97%   BMI 29 40 kg/m²          Physical Exam  Vitals reviewed     Constitutional: General: He is not in acute distress  Appearance: He is well-developed  HENT:      Head: Normocephalic and atraumatic  Right Ear: External ear normal       Left Ear: External ear normal    Eyes:      General: No scleral icterus  Conjunctiva/sclera: Conjunctivae normal    Neck:      Thyroid: No thyromegaly  Trachea: No tracheal deviation  Cardiovascular:      Rate and Rhythm: Normal rate and regular rhythm  Heart sounds: Normal heart sounds  Pulmonary:      Effort: Pulmonary effort is normal  No respiratory distress  Breath sounds: Normal breath sounds  No wheezing or rales  Abdominal:      General: Bowel sounds are normal       Palpations: Abdomen is soft  Tenderness: There is no abdominal tenderness  There is no guarding or rebound  Hernia: There is no hernia in the left inguinal area or right inguinal area  Genitourinary:     Testes: Normal    Musculoskeletal:      Cervical back: Normal range of motion and neck supple  Lymphadenopathy:      Cervical: No cervical adenopathy  Neurological:      Mental Status: He is alert and oriented to person, place, and time  Psychiatric:         Behavior: Behavior normal          Thought Content:  Thought content normal          Judgment: Judgment normal

## 2022-05-15 DIAGNOSIS — I10 ESSENTIAL HYPERTENSION: ICD-10-CM

## 2022-05-16 RX ORDER — LISINOPRIL 20 MG/1
TABLET ORAL
Qty: 30 TABLET | Refills: 6 | Status: SHIPPED | OUTPATIENT
Start: 2022-05-16

## 2022-05-25 ENCOUNTER — ESTABLISHED COMPREHENSIVE EXAM (OUTPATIENT)
Dept: URBAN - METROPOLITAN AREA CLINIC 6 | Facility: CLINIC | Age: 67
End: 2022-05-25

## 2022-05-25 DIAGNOSIS — Z96.1: ICD-10-CM

## 2022-05-25 DIAGNOSIS — H35.3131: ICD-10-CM

## 2022-05-25 DIAGNOSIS — H53.031: ICD-10-CM

## 2022-05-25 PROCEDURE — 92014 COMPRE OPH EXAM EST PT 1/>: CPT

## 2022-05-25 PROCEDURE — 92134 CPTRZ OPH DX IMG PST SGM RTA: CPT

## 2022-05-25 PROCEDURE — 92015 DETERMINE REFRACTIVE STATE: CPT

## 2022-05-25 ASSESSMENT — VISUAL ACUITY
OD_CC: 20/60+1
OD_PH: 20/30-2
OS_GLARE: 20/400
OD_GLARE: 20/400
OU_CC: J1+
OS_CC: 20/20-1
OD_SC: 20/25

## 2022-05-25 ASSESSMENT — TONOMETRY
OD_IOP_MMHG: 16
OS_IOP_MMHG: 18

## 2022-05-31 NOTE — PATIENT INSTRUCTIONS
Problem List Items Addressed This Visit        Cardiovascular and Mediastinum    Essential hypertension     Continue med along with healthy diet            Musculoskeletal and Integument    Closed fracture of left distal femur ([facility] ) - Primary     Pt doing well, follow up ortho              Other    [facility] )     Follow up transplant team          Mixed hyperlipidemia      Continue statin along with healthy diet and exercise           Other Visit Diagnoses     Need for hepatitis C screening test        Screening for HIV (human immunodeficiency virus)
right normal/left normal

## 2022-06-15 DIAGNOSIS — Z94.4 LIVER TRANSPLANT RECIPIENT (HCC): ICD-10-CM

## 2022-06-15 RX ORDER — TACROLIMUS 1 MG/1
3 CAPSULE ORAL EVERY 12 HOURS SCHEDULED
Qty: 540 CAPSULE | Refills: 0 | Status: SHIPPED | OUTPATIENT
Start: 2022-06-15

## 2022-06-15 NOTE — TELEPHONE ENCOUNTER
Medication Refill Request     Name Tacrolimus  Dose/Frequency 3/TWICE PER DAY  Quantity 540  Verified pharmacy   [x]  Verified ordering Provider   [x]  Verified enough for 3 days  []    PT IS ALL OUT

## 2022-06-16 NOTE — TELEPHONE ENCOUNTER
FYI  I refilled it because he is all out and you filled it last time but he has not had labs in >1 year

## 2022-06-17 ENCOUNTER — OFFICE VISIT (OUTPATIENT)
Dept: NEUROSURGERY | Facility: CLINIC | Age: 67
End: 2022-06-17
Payer: MEDICARE

## 2022-06-17 ENCOUNTER — TELEPHONE (OUTPATIENT)
Dept: RADIOLOGY | Facility: CLINIC | Age: 67
End: 2022-06-17

## 2022-06-17 VITALS
DIASTOLIC BLOOD PRESSURE: 76 MMHG | HEART RATE: 94 BPM | BODY MASS INDEX: 29.66 KG/M2 | WEIGHT: 219 LBS | SYSTOLIC BLOOD PRESSURE: 122 MMHG | HEIGHT: 72 IN | TEMPERATURE: 98.4 F | RESPIRATION RATE: 18 BRPM

## 2022-06-17 DIAGNOSIS — M54.2 NECK PAIN: ICD-10-CM

## 2022-06-17 DIAGNOSIS — M54.16 LUMBAR RADICULOPATHY: ICD-10-CM

## 2022-06-17 PROCEDURE — 99202 OFFICE O/P NEW SF 15 MIN: CPT | Performed by: PHYSICIAN ASSISTANT

## 2022-06-17 RX ORDER — GABAPENTIN 300 MG/1
300 CAPSULE ORAL 2 TIMES DAILY
Qty: 60 CAPSULE | Refills: 5 | Status: SHIPPED | OUTPATIENT
Start: 2022-06-17

## 2022-06-17 NOTE — TELEPHONE ENCOUNTER
Previous pt of Ursula's  Pt said he contacted his [facility] last weekend for more gabapentin and they told pt they would reach to our office  Pt said he ran out of his gabapentin 300 mg pills but his mother takes the 100 mg strength so he used some of hers yest  Told pt in the future to call the office directly in the future 48 hrs prior to running out rather than calling the pharmacy  Pt confirmed he's taking gabapentin 300 mg BID  Pls send today  Uses [facility]  Clerical gave pt appt for 6/21 w/ Kori brasher/eliezer he hasn't been seen since 5/2021

## 2022-06-17 NOTE — PROGRESS NOTES
Neurosurgery Office Note  Gordon Altman 61 y o  male MRN: [id number]      Assessment/Plan      Patient is a 61 yrs old gentleman with PMHx of Non alcoholic liver disease with alcohol dependence s/p liver transplant, tobacco abuse, insomnia, HTN, Carotid stenosis, bursiits, sacroiliitis, and lung mass  Patient is known to our service for his lower back and hip pain  He is  here self referred for left posterolateral neck pain  Patient reports Hx of  C5-6 discectomy at hospitals  about 20 yrs ago and felt better for sometime  About 6 months ago his posterior neck became worse, has difficulty  sleeping  Pain is localized in the left potero-lateral lower neck  No associated radiculopathy, arm weakness, numbness, paresthesia, fine motor function or dropping objects  Denies any agit issues or B/B dysfunction  Patient reports feeling better with injections about 15 yrs ago  Denies any recent PT/LAUREN  Patient is following up with Dr Katie Parnell for his sacroiliitis and lower back pain  Exam-A&OX3, communicates well  Jacinta  Strength 5/5 bilaterally, and sensation to LT intact throughout  DTR 2+ without clonus bilaterally  Negative Sr's test bilaterally  Slight tandem gait instability on heel to toes walking  Tenderness in the left lower cervical spine on palpation    Hx, PEx and images reviewed with the patient  Mx plan discussed  Patient with cervicalgia  Hx of  with previous cervical surgery, reports it was discectomy and no fusion, will order Flexion/Extension of Cervical spine x-rays to study dynamic instability  MRI of cervical spine wo contrast to evaluate any nerve compression or spinal cord involvement  In the meantime, he can follow up with Dr Katie Parnell for [facility] or trigger point injection  I Put order for pain Mx and also PT  Advised to quite or reduce amount of cigarettes smoking  Fall precaution  Questions and concerns were answered  Patient expressed his understandings and agreed with the plan  Plan:  1   MRI of Lumbar spine wo contrast  2  Flexion/Extension Cervical spine x-rays  3  Ambulatory referral to pain Mx  4  Ambulatory referral to PT  5  Follw up after images results    I spent 30 minutes with the patient today in which >50% of the time was spent counseling/coordination of care regarding diagnosis, imaging review, symptoms and treatment plan  C/C: " neck pain"/worse over the past 6 months    Chief Complaint   Patient presents with    Consult     Cspine             History of Present Illness     Patient is a 61 yrs old gentleman with PMHx of Non alcoholic liver disease with alcohol dependence s/p liver transplant, tobacco abuse, insomnia, HTN, Carotid stenosis, bursiits, sacroiliitis, and lung mass  Patient is known to our service for his lower back and hip pain  He is  here self referred for left posterolateral neck pain  Patient reports Hx of  C5-6 discectomy at [facility]  about 20 yrs ago and felt better for sometime  About 6 months ago his posterior neck became worse, has difficulty  sleeping  Pain is localized in the left potero-lateral lower neck  No associated radiculopathy, arm weakness, numbness, paresthesia, fine motor function or dropping objects  Denies any agit issues or B/B dysfunction  Patient reports feeling better with injections about 15 yrs ago  Denies any recent PT/LAUREN  Patient is following up with Dr Danny Pastor for his sacroiliitis and lower back pain  Patient denies Hx of fever, chills, rigors, cough or chest pain  Denies Hx of DM, CHF, stroke, seizures, bleeding disorder or taking anticoagulant medications, except baby ASA for his left 70% blockage  He smokes 1 Pack of cigarettes/3 days, denies drinking alcohol  He has no new images to review  REVIEW OF SYSTEMS  ROS reviewed personally and updated  Review of Systems   Gastrointestinal: Positive for constipation and nausea     Musculoskeletal: Positive for gait problem (due to hip problem ) and neck pain (NECK PAIN,  base of neck- mostly >L sometimes when looking up [facility])  PT LAST SEEN BY DR POSEY 2017 FOR LSPINE;  PT HAD CSPINE SX  20 YEARS AGO ( C6-C7 sx)  no recent PT  LAUREN with [facility] x 8 years ago ( which was helpful at the time)    Length of time symptoms present: NECK PAIN FOR 20 YEARS, BUT FOR LAST 2 YEARS AND GETTING WORSE     Pt see Dr Shawanda Ward for Hip & Sciatica Problem     gabapentin (not sure if its helpful) Tylenol ( minimun relief)   Neurological: Positive for numbness and headaches  Hematological:        Asa for preventative    Psychiatric/Behavioral: Positive for sleep disturbance  All other systems reviewed and are negative  Meds/Allergies     Current Outpatient Medications   Medication Sig Dispense Refill    acetaminophen (TYLENOL) 325 mg tablet Take 650 mg by mouth every 6 (six) hours as needed for mild pain   ALPRAZolam (XANAX) 0 25 mg tablet TAKE 1 TABLET BY MOUTH EVERY DAY AS NEEDED FOR ANXIETY 30 tablet 1    aspirin (ECOTRIN LOW STRENGTH) 81 mg EC tablet Take 81 mg by mouth daily      atorvastatin (LIPITOR) 40 mg tablet TAKE 1 TABLET(40 MG) BY MOUTH DAILY 90 tablet 3    CALCIUM-MAGNESIUM-VITAMIN D ER PO Take 1 tablet by mouth daily      docusate sodium (COLACE) 100 mg capsule Take 100 mg by mouth 2 (two) times a day   gabapentin (NEURONTIN) 300 mg capsule Take 1 capsule (300 mg total) by mouth 2 (two) times a day 60 capsule 5    lisinopril (ZESTRIL) 20 mg tablet TAKE 1 TABLET(20 MG) BY MOUTH DAILY 30 tablet 6    omeprazole (PriLOSEC) 20 mg delayed release capsule TAKE 1 CAPSULE BY MOUTH TWICE DAILY 180 capsule 3    ondansetron (ZOFRAN-ODT) 4 mg disintegrating tablet DISSOLVE 1 TABLET(4 MG) ON THE TONGUE EVERY 6 HOURS AS NEEDED FOR NAUSEA OR VOMITING 20 tablet 0    ONE DAILY MULTIPLE VITAMIN PO Take by mouth        tacrolimus (PROGRAF) 1 mg capsule Take 3 capsules (3 mg total) by mouth every 12 (twelve) hours 540 capsule 0    zolpidem (AMBIEN) 5 mg tablet Take 1 tablet (5 mg total) by mouth daily at bedtime as needed for sleep 30 tablet 1     No current facility-administered medications for this visit  Allergies   Allergen Reactions    Macrolides And Ketolides      Annotation - [id number]: The patient is on Antirejection medications and they will reduce the effective ness of the medications  PAST HISTORY    Past Medical History:   Diagnosis Date    Alcoholism ([facility] )     Arthritis     Bacterial peritonitis ([facility] )     Bowel disease     Cancer ([place] [address] )     liver    Cataract     Depression     Fracture     Gastroesophageal reflux     Hepatic disease     Hepatocellular carcinoma ([place] [address] )     History of colon polyps     Hypertension     Liver cancer ([place] [address] )     Liver disease     Stomach disease        Past Surgical History:   Procedure Laterality Date    APPENDECTOMY      BACK SURGERY      CATARACT EXTRACTION      EXPLORATORY LAPAROTOMY  09/2011    EYE SURGERY      LIVER TRANSPLANTATION  08/20/2013    PARACENTESIS      Abdominal Paracentesis(Therapeutic) with Imaging Guidance    [place] COLONOSCOPY FLX DX W/COLLJ SPEC WHEN PFRMD N/A 9/12/2016    Procedure: COLONOSCOPY;  Surgeon: Samella Najjar, MD;  [facility]; Service: General    ROTATOR CUFF REPAIR      x2       Social History     Tobacco Use    Smoking status: Current Every Day Smoker     Packs/day: 0 25     Years: 35 00     Pack years: 8 75     Types: Cigarettes     Start date: 6/11/1978    Smokeless tobacco: Never Used   Vaping Use    Vaping Use: Never used   Substance Use Topics    Alcohol use:  Yes     Alcohol/week: 2 0 standard drinks     Types: 1 Glasses of wine, 1 Standard drinks or equivalent per week    Drug use: No     Comment: Per [employer] Drug use way back in college over 40 yrs ago       Family History   Problem Relation Age of Onset    Coronary artery disease Mother         CABG    Prostate cancer Mother         Prostate Gland    Heart disease Mother         Cardiac Disorder    Vascular Disease Mother     Hypertension Mother         Benign    Diabetes Maternal Grandmother         Mellitus    Clotting disorder Maternal Grandfather         Embolism    Seizures Paternal Grandmother         Epilepsy    Other Paternal Grandfather         Accident    Liver cancer Family     Heart disease Family         Cardiac Disorder    Hypertension Family         Benign         Above history personally reviewed  EXAM    Vitals: There were no vitals taken for this visit  ,There is no height or weight on file to calculate BMI  Physical Exam  Constitutional:       Appearance: Normal appearance  HENT:      Head: Normocephalic and atraumatic  Eyes:      Extraocular Movements: Extraocular movements intact  Cardiovascular:      Rate and Rhythm: Normal rate and regular rhythm  Pulses: Normal pulses  Pulmonary:      Effort: Pulmonary effort is normal    Musculoskeletal:      Cervical back: Tenderness present  Neurological:      General: No focal deficit present  Mental Status: He is alert and oriented to person, place, and time  GCS: GCS eye subscore is 4  GCS verbal subscore is 5  GCS motor subscore is 6  Cranial Nerves: Cranial nerves are intact  Sensory: Sensation is intact  Motor: Motor function is intact  Coordination: Finger-Nose-Finger Test normal       Deep Tendon Reflexes: Reflexes are normal and symmetric  Reflex Scores:       Tricep reflexes are 2+ on the right side and 2+ on the left side  Bicep reflexes are 2+ on the right side and 2+ on the left side  Brachioradialis reflexes are 2+ on the right side and 2+ on the left side  Patellar reflexes are 2+ on the right side and 2+ on the left side  Achilles reflexes are 2+ on the right side and 2+ on the left side  Psychiatric:         Speech: Speech normal          Neurologic Exam     Mental Status   Oriented to person, place, and time     Speech: speech is normal   Level of consciousness: alert    Cranial Nerves     CN III, IV, VI   Nystagmus: none     CN XI   CN XI normal      Motor Exam   Muscle bulk: normal  Overall muscle tone: normal  Right arm tone: normal  Left arm tone: normal  Right arm pronator drift: absent  Left arm pronator drift: absent  Right leg tone: normal  Left leg tone: normal    Sensory Exam   Light touch normal      Gait, Coordination, and Reflexes     Coordination   Finger to nose coordination: normal    Reflexes   Right brachioradialis: 2+  Left brachioradialis: 2+  Right biceps: 2+  Left biceps: 2+  Right triceps: 2+  Left triceps: 2+  Right patellar: 2+  Left patellar: 2+  Right achilles: 2+  Left achilles: 2+  Right : 2+  Left : 2+  Right Sr: absent  Left Sr: absent  Right ankle clonus: absent  Left pendular knee jerk: absent        MEDICAL DECISION MAKING    Imaging Studies:     No results found      I have personally reviewed pertinent reports   , I have personally reviewed pertinent films in PACS and I have personally reviewed pertinent films in PACS with a Radiologist

## 2022-06-17 NOTE — TELEPHONE ENCOUNTER
Left vm for pt to c/b  C/B # and OH provided  *Last ov 5/2021 w/ NM, last procedure 5/27/21  Clerical staff gave pt for Tues 6/21 w/ Ar Suned  Gabpentin 300 mg BID w/ 5 Rf LP 10/25/21 by NM

## 2022-06-17 NOTE — TELEPHONE ENCOUNTER
----- Message from Mika Bates sent at 6/17/2022 10:45 AM EDT -----  Regarding: medication refill  Patient called and I scheduled an appointment for him for next Tuesday with Brian Solo  He stated he had been out of his Gabapentin for three days and wanted to know what to do? Please reach out to the patient as to how you would like to handle this  He has not been seen for quite awhile

## 2022-06-21 ENCOUNTER — OFFICE VISIT (OUTPATIENT)
Dept: PAIN MEDICINE | Facility: CLINIC | Age: 67
End: 2022-06-21
Payer: MEDICARE

## 2022-06-21 VITALS
RESPIRATION RATE: 18 BRPM | HEART RATE: 108 BPM | SYSTOLIC BLOOD PRESSURE: 125 MMHG | BODY MASS INDEX: 30.2 KG/M2 | HEIGHT: 72 IN | WEIGHT: 223 LBS | DIASTOLIC BLOOD PRESSURE: 79 MMHG

## 2022-06-21 DIAGNOSIS — M70.62 TROCHANTERIC BURSITIS OF BOTH HIPS: ICD-10-CM

## 2022-06-21 DIAGNOSIS — M70.61 TROCHANTERIC BURSITIS OF BOTH HIPS: ICD-10-CM

## 2022-06-21 DIAGNOSIS — M51.16 INTERVERTEBRAL DISC DISORDER WITH RADICULOPATHY OF LUMBAR REGION: ICD-10-CM

## 2022-06-21 DIAGNOSIS — M47.812 CERVICAL SPONDYLOSIS: ICD-10-CM

## 2022-06-21 DIAGNOSIS — G89.4 CHRONIC PAIN SYNDROME: Primary | ICD-10-CM

## 2022-06-21 DIAGNOSIS — M54.2 NECK PAIN: ICD-10-CM

## 2022-06-21 PROCEDURE — 99214 OFFICE O/P EST MOD 30 MIN: CPT

## 2022-06-21 NOTE — PROGRESS NOTES
Assessment:  1  Chronic pain syndrome    2  Trochanteric bursitis of both hips    3  Cervical spondylosis    4  Neck pain    5  Intervertebral disc disorder with radiculopathy of lumbar region        Plan:  The patient is a 24-year-old male with a history of chronic pain secondary to low back pain, bilateral hip pain, lumbar intervertebral disc disorder with radiculopathy and trochanteric bursitis of bilateral hips who presents to the office with worsening bilateral hip pain and worsening left-sided neck pain  The patients pain appears facet mediated on examination today  To help with the back pain, occurring from facet arthropathy,  we discussed medial branch block/radiofrequency ablation  This is a 2 step process, where the patient would first undergo a diagnostic test called a medial branch block injection  If successful, the  medial branch block injection  would provide 50% or more pain relief for a few hours  after the procedure  The next step would be a  radiofrequency ablation  Radiofrequency ablation decreases  pain by creating a nerve lesion to interrupt the pain signals, and weaken the pain perceived by the brain  This procedure typically provides 1-2 years of pain relief  The risks, including bleeding, infection, and tissue reaction were reviewed with the patient, and was scheduled for a left-sided C4-C6 medial branch block injection  I will also repeat bilateral trochanteric bursa injections since it did provide him moderate to significant relief of his bilateral hip pain when he had this procedure done on 09/29/2021  I instructed our  will call to schedule him  Complete risks and benefits including bleeding, infection, tissue reaction, nerve injury and allergic reaction were discussed  The approach was demonstrated using models and literature was provided  Verbal and written consent was obtained    He would like to move forward with this injection before the medial branch blocks  My impressions and treatment recommendations were discussed in detail with the patient who verbalized understanding and had no further questions  Discharge instructions were provided  I personally saw and examined the patient and I agree with the above discussed plan of care  Orders Placed This Encounter   Procedures    Large joint arthrocentesis     Scheduling Instructions:      Bilateral trochanteric bursa injections    [place] spine and pain procedure     Dr Ro Griffith     Standing Status:   Future     Standing Expiration Date:   6/21/2026     Order Specific Question:   Reason for Exam:     Answer:   Left C4-C6 MBB #1     Order Specific Question:   Anticoagulant hold needed? Answer:   No     No orders of the defined types were placed in this encounter  History of Present Illness:  Felicia Garces is a 61 y o  male with a history of chronic pain secondary to low back pain, bilateral hip pain, lumbar intervertebral disc disorder with radiculopathy and trochanteric bursitis of bilateral hips  He was last seen on 09/29/2021 where he underwent bilateral trochanteric bursa injections of bilateral hips and states he obtained moderate to significant relief of his hip pain following those injections  He presents to the office with worsening bilateral hip pain and states he is unable to lay on either hip and an increase in left-sided neck pain  He states his pain is worse since the last office visit and intermittent but worse at night  He rates the quality of his pain as dull/aching and is currently rating it a 6/10 on a numeric scale  Current pain medications include gabapentin 300 mg 1 tablet twice a day and Tylenol as needed which is providing mild relief  I have personally reviewed and/or updated the patient's past medical history, past surgical history, family history, social history, current medications, allergies, and vital signs today       Review of Systems   Respiratory: Negative for shortness of breath  Cardiovascular: Negative for chest pain  Gastrointestinal: Negative for constipation, diarrhea, nausea and vomiting  Musculoskeletal: Positive for back pain, gait problem and joint swelling  Negative for arthralgias and myalgias  Skin: Negative for rash  Neurological: Positive for weakness  Negative for dizziness and seizures  All other systems reviewed and are negative        Patient Active Problem List   Diagnosis    Alcohol dependence in remission ([place] [address] )    Bursitis of both hips    Chronic gastroesophageal reflux disease    Essential hypertension    Intervertebral disc disorder with radiculopathy of lumbar region    History of liver transplant ([place] [address] )    Sacroiliitis (HCC)    Seasonal allergies    Lung mass    Neck pain    Nonalcoholic liver disease, chronic    Abnormal electrocardiogram    Osteopenia    PPD negative    Right arm pain    Ringing in ears, bilateral    Short-term memory loss    Tobacco abuse    Mixed hyperlipidemia    Insomnia    Medicare annual wellness visit, subsequent    Leg cramps    Claudication in peripheral vascular disease ([place] [address] )    Carotid stenosis, asymptomatic, bilateral    Fall    Closed fracture of left distal femur (HCC)    Acute pain due to trauma    Chronic low back pain    Hernia of abdominal cavity    PAD (peripheral artery disease) (HCC)    Electrolyte abnormality    Closed nondisplaced fracture of condyle of left femur ([place] [address] )    Lumbar stenosis with neurogenic claudication       Past Medical History:   Diagnosis Date    Alcoholism ([place] [address] )     Arthritis     Bacterial peritonitis ([place] [address] )     Bowel disease     Cancer ([place] [address] )     liver    Cataract     Depression     Fracture     Gastroesophageal reflux     Hepatic disease     Hepatocellular carcinoma ([place] [address] )     History of colon polyps     Hypertension     Liver cancer ([place] [address] )     Liver disease     Stomach disease        Past Surgical History:   Procedure Laterality Date    APPENDECTOMY      BACK SURGERY      CATARACT EXTRACTION      EXPLORATORY LAPAROTOMY  09/2011    EYE SURGERY      LIVER TRANSPLANTATION  08/20/2013    PARACENTESIS      Abdominal Paracentesis(Therapeutic) with Imaging Guidance    [place] COLONOSCOPY FLX DX W/COLLJ SPEC WHEN PFRMD N/A 9/12/2016    Procedure: COLONOSCOPY;  Surgeon: Efren Knight MD;  [facility]; Service: General    ROTATOR CUFF REPAIR      x2       Family History   Problem Relation Age of Onset    Coronary artery disease Mother         CABG    Prostate cancer Mother         Prostate Gland    Heart disease Mother         Cardiac Disorder    Vascular Disease Mother     Hypertension Mother         Benign    Diabetes Maternal Grandmother         Mellitus    Clotting disorder Maternal Grandfather         Embolism    Seizures Paternal Grandmother         Epilepsy    Other Paternal Grandfather         Accident    Liver cancer Family     Heart disease Family         Cardiac Disorder    Hypertension Family         Benign       Social History     Occupational History    Occupation:    Tobacco Use    Smoking status: Current Every Day Smoker     Packs/day: 0 25     Years: 35 00     Pack years: 8 75     Types: Cigarettes     Start date: 6/11/1978    Smokeless tobacco: Never Used   Vaping Use    Vaping Use: Never used   Substance and Sexual Activity    Alcohol use: Yes     Alcohol/week: 2 0 standard drinks     Types: 1 Glasses of wine, 1 Standard drinks or equivalent per week    Drug use: No     Comment: Per [employer] Drug use way back in college over 40 yrs ago    Sexual activity: Not on file       Current Outpatient Medications on File Prior to Visit   Medication Sig    acetaminophen (TYLENOL) 325 mg tablet Take 650 mg by mouth every 6 (six) hours as needed for mild pain      ALPRAZolam (XANAX) 0 25 mg tablet TAKE 1 TABLET BY MOUTH EVERY DAY AS NEEDED FOR ANXIETY    aspirin (ECOTRIN LOW STRENGTH) 81 mg EC tablet Take 81 mg by mouth daily    atorvastatin (LIPITOR) 40 mg tablet TAKE 1 TABLET(40 MG) BY MOUTH DAILY    CALCIUM-MAGNESIUM-VITAMIN D ER PO Take 1 tablet by mouth daily    docusate sodium (COLACE) 100 mg capsule Take 100 mg by mouth 2 (two) times a day   gabapentin (NEURONTIN) 300 mg capsule Take 1 capsule (300 mg total) by mouth 2 (two) times a day    lisinopril (ZESTRIL) 20 mg tablet TAKE 1 TABLET(20 MG) BY MOUTH DAILY    omeprazole (PriLOSEC) 20 mg delayed release capsule TAKE 1 CAPSULE BY MOUTH TWICE DAILY    ondansetron (ZOFRAN-ODT) 4 mg disintegrating tablet DISSOLVE 1 TABLET(4 MG) ON THE TONGUE EVERY 6 HOURS AS NEEDED FOR NAUSEA OR VOMITING    ONE DAILY MULTIPLE VITAMIN PO Take by mouth   tacrolimus (PROGRAF) 1 mg capsule Take 3 capsules (3 mg total) by mouth every 12 (twelve) hours    zolpidem (AMBIEN) 5 mg tablet Take 1 tablet (5 mg total) by mouth daily at bedtime as needed for sleep     No current facility-administered medications on file prior to visit  Allergies   Allergen Reactions    Macrolides And Ketolides      [facility] [id number]: The patient is on Antirejection medications and they will reduce the effective ness of the medications  Physical Exam:    /79   Pulse (!) 108   Resp 18   Ht 6' (1 829 m)   Wt 101 kg (223 lb)   BMI 30 24 kg/m²     Constitutional:normal, well developed, well nourished, alert, in no distress and non-toxic and no overt pain behavior    Eyes:anicteric  HEENT:grossly intact  Neck:supple, symmetric, trachea midline and no masses   Pulmonary:even and unlabored  Cardiovascular:No edema or pitting edema present  Skin:Normal without rashes or lesions and well hydrated  Psychiatric:Mood and affect appropriate  Neurologic:Cranial Nerves II-XII grossly intact  Musculoskeletal:normal    Imaging

## 2022-07-05 ENCOUNTER — HOSPITAL ENCOUNTER (OUTPATIENT)
Dept: RADIOLOGY | Facility: HOSPITAL | Age: 67
Discharge: HOME | End: 2022-07-05
Payer: MEDICARE

## 2022-07-05 DIAGNOSIS — M54.2 NECK PAIN: ICD-10-CM

## 2022-07-05 PROCEDURE — 72052 X-RAY EXAM NECK SPINE 6/>VWS: CPT

## 2022-07-19 DIAGNOSIS — F41.9 ANXIETY: ICD-10-CM

## 2022-07-20 ENCOUNTER — PROCEDURE VISIT (OUTPATIENT)
Dept: PAIN MEDICINE | Facility: CLINIC | Age: 67
End: 2022-07-20
Payer: MEDICARE

## 2022-07-20 ENCOUNTER — TRANSCRIBE ORDERS (OUTPATIENT)
Dept: PAIN MEDICINE | Facility: CLINIC | Age: 67
End: 2022-07-20

## 2022-07-20 VITALS
HEART RATE: 91 BPM | WEIGHT: 221 LBS | BODY MASS INDEX: 29.93 KG/M2 | DIASTOLIC BLOOD PRESSURE: 93 MMHG | RESPIRATION RATE: 16 BRPM | SYSTOLIC BLOOD PRESSURE: 137 MMHG | HEIGHT: 72 IN

## 2022-07-20 DIAGNOSIS — M70.62 TROCHANTERIC BURSITIS OF BOTH HIPS: Primary | ICD-10-CM

## 2022-07-20 DIAGNOSIS — M70.61 TROCHANTERIC BURSITIS OF BOTH HIPS: Primary | ICD-10-CM

## 2022-07-20 PROCEDURE — 20611 DRAIN/INJ JOINT/BURSA W/US: CPT | Performed by: ANESTHESIOLOGY

## 2022-07-20 RX ORDER — ALPRAZOLAM 0.25 MG/1
TABLET ORAL
Qty: 30 TABLET | Refills: 0 | Status: SHIPPED | OUTPATIENT
Start: 2022-07-20 | End: 2022-09-02

## 2022-07-20 NOTE — PROGRESS NOTES
Pre-procedure Diagnosis:   1  Trochanteric bursitis of both hips      Post-procedure Diagnosis:   1  Trochanteric bursitis of both hips      Operation Title(s):  Bilateral trochanteric bursa injection under ultrasound guidance  Attending Surgeon:   Luana Pompa MD  Anesthesia:   Local    Indications: The patient is a 74y o  year-old male with a diagnosis of trochanteric bursitis  The patient's history and physical exam were reviewed  The risks, benefits and alternatives to the procedure were discussed, and all questions were answered to the patient's satisfaction  The patient agreed to proceed, and written informed consent was obtained  Procedure in Detail: The patient was brought into the exam room and placed in the right lateral decubitus position on the exam room table  The left hip(s) was prepped with chloraprep and draped in a sterile manner  A sterile ultrasound probe cover and gel was placed over a 3 75 MHz curvilinear transducer probe  The probe was placed over the lateral thigh to visualize the peritrochanteric bursal region  Optimal needle path was determined and the skin and subcutaneous tissues in the area was anesthetized with 1% lidocaine  Then, under ultrasound guidance, a 2 inch ultrasound needle was advanced until the needle entered the peritrochanteric bursa region  After visualization of the tip in the target area and negative aspiration for blood, a solution consisting of 3 mL 0 25% bupivacaine and 1 mL Depo-Medrol (40mg/ml) was easily injected  The patient's hip(s) was cleaned and a bandage was placed over the sites of needle insertion  The same procedure was then repeated for the right hip  Disposition: The patient tolerated the procedure well and there were no apparent complications  The patient was given written discharge instructions for the procedure

## 2022-07-25 RX ORDER — METHYLPREDNISOLONE ACETATE 40 MG/ML
40 INJECTION, SUSPENSION INTRA-ARTICULAR; INTRALESIONAL; INTRAMUSCULAR; SOFT TISSUE ONCE
Status: SHIPPED | OUTPATIENT
Start: 2022-07-25

## 2022-07-25 RX ORDER — BUPIVACAINE HYDROCHLORIDE 2.5 MG/ML
10 INJECTION, SOLUTION EPIDURAL; INFILTRATION; INTRACAUDAL ONCE
Status: SHIPPED | OUTPATIENT
Start: 2022-07-25

## 2022-07-26 ENCOUNTER — HOSPITAL ENCOUNTER (OUTPATIENT)
Dept: RADIOLOGY | Facility: CLINIC | Age: 67
Discharge: HOME | End: 2022-07-26
Admitting: ANESTHESIOLOGY
Payer: MEDICARE

## 2022-07-26 VITALS
SYSTOLIC BLOOD PRESSURE: 150 MMHG | HEART RATE: 76 BPM | DIASTOLIC BLOOD PRESSURE: 85 MMHG | OXYGEN SATURATION: 97 % | TEMPERATURE: 97.5 F | RESPIRATION RATE: 20 BRPM

## 2022-07-26 DIAGNOSIS — M47.812 CERVICAL SPONDYLOSIS: ICD-10-CM

## 2022-07-26 PROCEDURE — 64490 INJ PARAVERT F JNT C/T 1 LEV: CPT | Performed by: ANESTHESIOLOGY

## 2022-07-26 PROCEDURE — 64491 INJ PARAVERT F JNT C/T 2 LEV: CPT | Performed by: ANESTHESIOLOGY

## 2022-07-26 RX ORDER — BUPIVACAINE HCL/PF 2.5 MG/ML
10 VIAL (ML) INJECTION ONCE
Status: COMPLETED | OUTPATIENT
Start: 2022-07-26 | End: 2022-07-26

## 2022-07-26 RX ADMIN — BUPIVACAINE HYDROCHLORIDE 1.5 ML: 2.5 INJECTION, SOLUTION EPIDURAL; INFILTRATION; INTRACAUDAL at 11:01

## 2022-07-26 NOTE — H&P
History of Present Illness:  The patient is a 74 y o  male who presents with complaints of left-sided neck pain is here today for diagnostic left C4-6 medial branch blocks    Patient Active Problem List   Diagnosis    [facility] dependence in remission [facility]    Bursitis of both hips    Chronic gastroesophageal reflux disease    Essential hypertension    Intervertebral disc disorder with radiculopathy of lumbar region    History of liver transplant ([facility]    Sacroiliitis (HCC)    Seasonal allergies    Lung mass    Neck pain    Nonalcoholic liver disease, chronic    Abnormal electrocardiogram    Osteopenia    PPD negative    Right arm pain    Ringing in ears, bilateral    Short-term memory loss    Tobacco abuse    Mixed hyperlipidemia    Insomnia    Medicare annual wellness visit, subsequent    Leg cramps    Claudication in peripheral vascular disease ([facility]    Carotid stenosis, asymptomatic, bilateral    Fall    Closed fracture of left distal femur (HCC)    Acute pain due to trauma    Chronic low back pain    Hernia of abdominal cavity    PAD (peripheral artery disease) (HCC)    Electrolyte abnormality    Closed nondisplaced fracture of condyle of left femur ([facility]    Lumbar stenosis with neurogenic claudication       Past Medical History:   Diagnosis Date    [facility]     Arthritis     Bacterial peritonitis ([facility]     Bowel disease     Cancer ([facility]     liver    Cataract     Depression     Fracture     Gastroesophageal reflux     Hepatic disease     Hepatocellular carcinoma ([facility]     History of colon polyps     Hypertension     Liver cancer ([facility]     Liver disease     Stomach disease        Past Surgical History:   Procedure Laterality Date    APPENDECTOMY      BACK SURGERY      CATARACT EXTRACTION      EXPLORATORY LAPAROTOMY  09/2011    EYE SURGERY      LIVER TRANSPLANTATION  08/20/2013    PARACENTESIS      Abdominal Paracentesis(Therapeutic) with Imaging Guidance    MI COLONOSCOPY FLX DX W/COLLJ SPEC WHEN PFRMD N/A 9/12/2016    Procedure: COLONOSCOPY;  Surgeon: Mary Simmons MD;  [facility]; Service: General    ROTATOR CUFF REPAIR      x2         Current Outpatient Medications:     acetaminophen (TYLENOL) 325 mg tablet, Take 650 mg by mouth every 6 (six) hours as needed for mild pain , Disp: , Rfl:     ALPRAZolam (XANAX) 0 25 mg tablet, TAKE 1 TABLET BY MOUTH EVERY DAY AS NEEDED FOR ANXIETY, Disp: 30 tablet, Rfl: 0    aspirin (ECOTRIN LOW STRENGTH) 81 mg EC tablet, Take 81 mg by mouth daily, Disp: , Rfl:     atorvastatin (LIPITOR) 40 mg tablet, TAKE 1 TABLET(40 MG) BY MOUTH DAILY, Disp: 90 tablet, Rfl: 3    docusate sodium (COLACE) 100 mg capsule, Take 100 mg by mouth 2 (two) times a day , Disp: , Rfl:     gabapentin (NEURONTIN) 300 mg capsule, Take 1 capsule (300 mg total) by mouth 2 (two) times a day, Disp: 60 capsule, Rfl: 5    lisinopril (ZESTRIL) 20 mg tablet, TAKE 1 TABLET(20 MG) BY MOUTH DAILY, Disp: 30 tablet, Rfl: 6    omeprazole (PriLOSEC) 20 mg delayed release capsule, TAKE 1 CAPSULE BY MOUTH TWICE DAILY, Disp: 180 capsule, Rfl: 3    ondansetron (ZOFRAN-ODT) 4 mg disintegrating tablet, DISSOLVE 1 TABLET(4 MG) ON THE TONGUE EVERY 6 HOURS AS NEEDED FOR NAUSEA OR VOMITING, Disp: 20 tablet, Rfl: 0    ONE DAILY MULTIPLE VITAMIN PO, Take by mouth , Disp: , Rfl:     tacrolimus (PROGRAF) 1 mg capsule, Take 3 capsules (3 mg total) by mouth every 12 (twelve) hours, Disp: 540 capsule, Rfl: 0    Current Facility-Administered Medications:     bupivacaine (PF) (MARCAINE) 0 25 % injection 10 mL, 10 mL, Epidural, Once, Yi Grover MD    bupivacaine (PF) (MARCAINE) 0 25 % injection 10 mL, 10 mL, Perineural, Once, Yi Grover MD    methylPREDNISolone acetate (DEPO-MEDROL) injection 40 mg, 40 mg, Other, Once, Yi Grover MD    Allergies   Allergen Reactions    Macrolides And Ketolides      Annotation - [id number]:  The patient is on Antirejection medications and they will reduce the effective ness of the medications  Physical Exam:   Vitals:    07/26/22 1045   BP: 119/86   Pulse: 82   Resp: 20   Temp: 97 5 °F (36 4 °C)   SpO2: 98%     General: Awake, Alert, Oriented x 3, Mood and affect appropriate  Respiratory: Respirations even and unlabored  Cardiovascular: Peripheral pulses intact; no edema  Musculoskeletal Exam:  Left-sided neck pain    ASA Score: 3    Patient/Chart Verification  Patient ID Verified: Verbal  Consents Confirmed: To be obtained in the Pre-Procedure area  Interval H&P(within 24 hr) Complete (required for Outpatients and Surgery Admit only): To be obtained in the Pre-Procedure area  Allergies Reviewed: Yes  Anticoag/NSAID held?: NA  Currently on antibiotics?: No    Assessment:   1   Cervical spondylosis        Plan: Left C4-C6 MBB #1

## 2022-07-26 NOTE — DISCHARGE INSTR - LAB

## 2022-07-27 ENCOUNTER — TELEPHONE (OUTPATIENT)
Dept: PAIN MEDICINE | Facility: CLINIC | Age: 67
End: 2022-07-27

## 2022-07-27 NOTE — TELEPHONE ENCOUNTER
Pt called in with 50 % improvement and pain level 4/10  Please be advised thank you    Pt can be reached @ [phone]

## 2022-07-29 ENCOUNTER — TELEPHONE (OUTPATIENT)
Dept: PAIN MEDICINE | Facility: CLINIC | Age: 67
End: 2022-07-29

## 2022-07-29 NOTE — TELEPHONE ENCOUNTER
----- Message from Dayami Varma MD sent at 7/27/2022  9:45 PM EDT -----  Regarding: RE: Pain diary  Pain diary reviewed - ok to proceed with confirmatory MBB    ----- Message -----  From: Fany Paul  Sent: 7/27/2022   3:02 PM EDT  To: Dayami Varma MD  Subject: Pain diary                                       Hello,    Pt dropped off pain diary I have scanned it into his chart

## 2022-08-15 ENCOUNTER — HOSPITAL ENCOUNTER (OUTPATIENT)
Dept: MRI IMAGING | Facility: HOSPITAL | Age: 67
Discharge: HOME | End: 2022-08-15
Payer: MEDICARE

## 2022-08-15 DIAGNOSIS — M54.2 NECK PAIN: ICD-10-CM

## 2022-08-15 PROCEDURE — G1004 CDSM NDSC: HCPCS

## 2022-08-15 PROCEDURE — 72141 MRI NECK SPINE W/O DYE: CPT

## 2022-08-22 ENCOUNTER — TELEPHONE (OUTPATIENT)
Dept: RADIOLOGY | Facility: CLINIC | Age: 67
End: 2022-08-22

## 2022-08-22 DIAGNOSIS — F41.9 ANXIETY: Primary | ICD-10-CM

## 2022-08-22 RX ORDER — LORAZEPAM 0.5 MG/1
TABLET ORAL
Qty: 2 TABLET | Refills: 0 | Status: SHIPPED | OUTPATIENT
Start: 2022-08-22

## 2022-08-22 NOTE — TELEPHONE ENCOUNTER
----- Message from Rosie Henry sent at 8/22/2022  9:54 AM EDT -----  Hello again,    Mr Vicki Billings is having MMB #2 on 9/21 with Dr Queta Gonzales, he would like to have something called into his pharmacy to relax him a bit before the procedure  He uses [facility] on [address] in   [address]      Thank you,  Licha Benites

## 2022-09-02 ENCOUNTER — OFFICE VISIT (OUTPATIENT)
Dept: NEUROSURGERY | Facility: CLINIC | Age: 67
End: 2022-09-02
Payer: MEDICARE

## 2022-09-02 VITALS
TEMPERATURE: 98.3 F | SYSTOLIC BLOOD PRESSURE: 157 MMHG | WEIGHT: 215 LBS | BODY MASS INDEX: 29.12 KG/M2 | RESPIRATION RATE: 16 BRPM | HEART RATE: 90 BPM | DIASTOLIC BLOOD PRESSURE: 95 MMHG | HEIGHT: 72 IN

## 2022-09-02 DIAGNOSIS — M54.2 CERVICALGIA: Primary | ICD-10-CM

## 2022-09-02 PROCEDURE — 99213 OFFICE O/P EST LOW 20 MIN: CPT | Performed by: PHYSICIAN ASSISTANT

## 2022-09-02 NOTE — PROGRESS NOTES
Neurosurgery Office Note  Brandie Ling 74 y o  male MRN: [id number]      Assessment/Plan      Patient is a 61 yrs old gentleman with PMHx of Non alcoholic liver disease with alcohol dependence s/p liver transplant, tobacco abuse, insomnia, HTN, Carotid stenosis, bursiits, sacroiliitis, and lung mass  Patient is known to our service for his lower back and hip pain  He is status post C5-6 ACDF at [facility] about 20 years ago  However, about 6 months ago he started having left lateral neck pain, with limited turning to the left side and stiffness  No radicular pain in the upper extremities, numbness, weakness or paresthesia  Denies gait instability or tendency to fall  No bowel or bladder dysfunction  Patient is here today to go over his cervical spine MRI results and also flexion-extension cervical spine x-rays  He was referred to pain management and had Left L4-5 Medial block on 7/26/2022  He reports felt better for only 6 hours  Currently taking Tylenol & Parish  Exam-A&OX3, Jacinta,  and elbow flexion extension 5/5 including shoulder abduction  Sensation to LT intact bilaterally  DTR 2+ no clonus bilaterally  Stable gait  Tenderness in the left lateral lower cervical spine on palpation  Hx, PEx and images reviewed with the patient  Mx plan discussed  Patient's left lateral neck cervicalgia doesn't correlate with patient's right side images findings of Severe C5-6 NF stenosis  I would recommend continue follow up with Dr Miguel Ángel Conner for possible RFA or trigger point injection, encouraged to do gentle ROM exercise  All questions and concerns were answered to patient's satisfaction  Patient expressed his understandings and agreed with the plan  Plan:  1  Patient symptoms doesn't correlate with Cx MRI findings of severe R  NF stenosis at C5-6  No High grade CCS or cord signal  2  His Syx is more of cervicalgia without radiculopathy, recommend RFA & F/U with pain Mx  3   Fall precaution, avoid lifting heavy objects, excessive flexion/extension  4  F/U after 3 months, clinical eval  5  Call with question or concern      I spent  30  minutes with the patient today in which >50% of the time was spent counseling/coordination of care regarding diagnosis, imaging review, symptoms and treatment plan  Chief Complaint   Patient presents with    Follow-up     3 MO F/U AFTER C-MRI/ PT AND PAIN MAN       C/C: " Here to go over Images results'    History of Present Illness     Patient is a 61 yrs old gentleman with PMHx of Non alcoholic liver disease with alcohol dependence s/p liver transplant, tobacco abuse, insomnia, HTN, Carotid stenosis, bursiits, sacroiliitis, and lung mass  Patient is known to our service for his lower back and hip pain  He is status post C5-6 ACDF at [facility] about 20 years ago  However, about 6 months ago he started having left lateral neck pain, with limited turning to the left side and stiffness  No radicular pain in the upper extremities, numbness, weakness or paresthesia  Denies gait instability or tendency to fall  No bowel or bladder dysfunction  Patient is here today to go over his cervical spine MRI results and also flexion-extension cervical spine x-rays  He was referred to pain management and had Left L4-5 Medial block on 7/26/2022  He reports felt better for only 6 hours  Currently taking Tylenol & Parish  Patient denies history of fever, chills, rigors, cough or chest pain  REVIEW OF SYSTEMS  Review of system personally reviewed and updated  Review of Systems   Constitutional: Positive for activity change (slightly decreased)  HENT: Positive for hearing loss (b/l and uses hearing aids)  Negative for trouble swallowing and voice change  Eyes: Negative  Respiratory: Negative  Cardiovascular: Negative  Gastrointestinal: Negative  Negative for nausea and vomiting  Endocrine: Negative  Genitourinary: Negative      Musculoskeletal: Positive for arthralgias (h/o arthritis), neck pain (Constant b/l L>R neck pain, non-radiating) and neck stiffness (b/l L>R limited ROM)  Negative for myalgias  Previous anterior neck Sx (C6-C7) about 25 years ago    Patients reports having chronic neck pain and gradually got worse last year  Patient mentions having arthritis in his neck  Has been on gabapentin for years - unchanged    PM/INJ - 7/26/2022 Nerve block, pain relief for 5 hours    PT - No   Skin: Negative  Allergic/Immunologic: Negative  Neurological: Positive for weakness (occasionally drops objects) and numbness (and tingling in b/l arms/hands frequently especially when sleeping)  Negative for dizziness and headaches  Hematological: Bruises/bleeds easily  Psychiatric/Behavioral: Positive for sleep disturbance  Meds/Allergies     Current Outpatient Medications   Medication Sig Dispense Refill    acetaminophen (TYLENOL) 325 mg tablet Take 650 mg by mouth every 6 (six) hours as needed for mild pain   ALPRAZolam (XANAX) 0 25 mg tablet TAKE 1 TABLET BY MOUTH EVERY DAY AS NEEDED FOR ANXIETY 30 tablet 1    aspirin (ECOTRIN LOW STRENGTH) 81 mg EC tablet Take 81 mg by mouth daily      atorvastatin (LIPITOR) 40 mg tablet TAKE 1 TABLET(40 MG) BY MOUTH DAILY 90 tablet 3    docusate sodium (COLACE) 100 mg capsule Take 100 mg by mouth 2 (two) times a day   gabapentin (NEURONTIN) 300 mg capsule Take 1 capsule (300 mg total) by mouth 2 (two) times a day 60 capsule 5    lisinopril (ZESTRIL) 20 mg tablet TAKE 1 TABLET(20 MG) BY MOUTH DAILY 30 tablet 6    omeprazole (PriLOSEC) 20 mg delayed release capsule TAKE 1 CAPSULE BY MOUTH TWICE DAILY 180 capsule 3    ondansetron (ZOFRAN-ODT) 4 mg disintegrating tablet DISSOLVE 1 TABLET(4 MG) ON THE TONGUE EVERY 6 HOURS AS NEEDED FOR NAUSEA OR VOMITING 20 tablet 0    ONE DAILY MULTIPLE VITAMIN PO Take by mouth        tacrolimus (PROGRAF) 1 mg capsule Take 3 capsules (3 mg total) by mouth every 12 (twelve) hours 540 capsule 0    LORazepam (ATIVAN) 0 5 mg tablet Take 1 tablet 2 hours prior to procedure  May repeat after 1 hour (Patient not taking: Reported on 9/2/2022) 2 tablet 0     Current Facility-Administered Medications   Medication Dose Route Frequency Provider Last Rate Last Admin    bupivacaine (PF) (MARCAINE) 0 25 % injection 10 mL  10 mL Epidural Once Theda Cheadle, MD        methylPREDNISolone acetate (DEPO-MEDROL) injection 40 mg  40 mg Other Once Theda Cheadle, MD           Allergies   Allergen Reactions    Macrolides And Ketolides      Annotation - [id number]: The patient is on Antirejection medications and they will reduce the effective ness of the medications  PAST HISTORY    Past Medical History:   Diagnosis Date    [facility]ism [facility]     Arthritis     Bacterial peritonitis ([facility]     Bowel disease     [facility]     liver    Cataract     Depression     Fracture     Gastroesophageal reflux     Hepatic disease     Hepatocellular carcinoma ([facility]     History of colon polyps     Hypertension     [facility]     Liver disease     Stomach disease        Past Surgical History:   Procedure Laterality Date    APPENDECTOMY      BACK SURGERY      CATARACT EXTRACTION      EXPLORATORY LAPAROTOMY  09/2011    EYE SURGERY      LIVER TRANSPLANTATION  08/20/2013    PARACENTESIS      Abdominal Paracentesis(Therapeutic) with Imaging Guidance    MO COLONOSCOPY FLX DX W/COLLJ SPEC WHEN PFRMD N/A 9/12/2016    Procedure: COLONOSCOPY;  Surgeon: Lalo Talamantes MD;  [facility]; Service: General    ROTATOR CUFF REPAIR      x2       Social History     Tobacco Use    Smoking status: Current Every Day Smoker     Packs/day: 0 25     Years: 35 00     Pack years: 8 75     Types: Cigarettes     Start date: 6/11/1978    Smokeless tobacco: Never Used   Vaping Use    Vaping Use: Never used   Substance Use Topics    Alcohol use:  Yes     Alcohol/week: 2 0 standard drinks     Types: 1 Glasses of wine, 1 Standard drinks or equivalent per week    Drug use: No     Comment: Per [employer] use way back in college over 40 yrs ago       Family History   Problem Relation Age of Onset    Coronary artery disease Mother         CABG    Prostate cancer Mother         Prostate Gland    Heart disease Mother         Cardiac Disorder    Vascular Disease Mother     Hypertension Mother         Benign    Diabetes Maternal Grandmother         Mellitus    Clotting disorder Maternal Grandfather         Embolism    Seizures Paternal Grandmother         Epilepsy    Other Paternal Grandfather         Accident    Liver cancer Family     Heart disease Family         Cardiac Disorder    Hypertension Family         Benign         Above history personally reviewed  EXAM    Vitals:Blood pressure 157/95, pulse 90, temperature 98 3 °F (36 8 °C), temperature source Probe, resp  rate 16, height 6' (1 829 m), weight 97 5 kg (215 lb)  ,Body mass index is 29 16 kg/m²  Physical Exam  Constitutional:       Appearance: Normal appearance  HENT:      Head: Normocephalic and atraumatic  Eyes:      Extraocular Movements: Extraocular movements intact  Cardiovascular:      Rate and Rhythm: Normal rate and regular rhythm  Pulses: Normal pulses  Pulmonary:      Effort: Pulmonary effort is normal    Musculoskeletal:      Cervical back: Rigidity and tenderness present  Neurological:      General: No focal deficit present  Mental Status: He is alert and oriented to person, place, and time  GCS: GCS eye subscore is 4  GCS verbal subscore is 5  GCS motor subscore is 6  Cranial Nerves: Cranial nerves are intact  Sensory: Sensation is intact  Motor: Motor function is intact  Coordination: Finger-Nose-Finger Test normal       Deep Tendon Reflexes: Reflexes are normal and symmetric  Reflex Scores:       Tricep reflexes are 2+ on the right side and 2+ on the left side         Bicep reflexes are 2+ on the right side and 2+ on the left side  Brachioradialis reflexes are 2+ on the right side and 2+ on the left side  Patellar reflexes are 2+ on the right side and 2+ on the left side  Achilles reflexes are 2+ on the right side and 2+ on the left side  Psychiatric:         Speech: Speech normal          Neurologic Exam     Mental Status   Oriented to person, place, and time  Speech: speech is normal   Level of consciousness: alert    Cranial Nerves     CN III, IV, VI   Nystagmus: none     CN XI   CN XI normal      Motor Exam   Muscle bulk: normal  Overall muscle tone: normal  Right arm tone: normal  Left arm tone: normal  Right arm pronator drift: absent  Left arm pronator drift: absent  Right leg tone: normal  Left leg tone: normal    Sensory Exam   Light touch normal      Gait, Coordination, and Reflexes     Coordination   Finger to nose coordination: normal    Reflexes   Right brachioradialis: 2+  Left brachioradialis: 2+  Right biceps: 2+  Left biceps: 2+  Right triceps: 2+  Left triceps: 2+  Right patellar: 2+  Left patellar: 2+  Right achilles: 2+  Left achilles: 2+  Right : 2+  Left : 2+  Right Sr: absent  Left Sr: absent  Right ankle clonus: absent  Left pendular knee jerk: absent        MEDICAL DECISION MAKING    Imaging Studies:     MRI cervical spine without contrast    Result Date: 8/18/2022  Narrative: MRI CERVICAL SPINE WITHOUT CONTRAST INDICATION: M54 2: Cervicalgia  COMPARISON:  Cervical spine MRI 5/1/2014 TECHNIQUE:  Sagittal T1, sagittal T2, sagittal inversion recovery, axial T2, axial  2D merge IMAGE QUALITY:  Diagnostic FINDINGS: ALIGNMENT:  There is straightening of normal cervical lordosis  Grade 1 anterolisthesis of C3 on C4 and C4 on C5  Partial interbody ankylosis at level C6-7  MARROW SIGNAL:  Mild endplate degenerative signal change  No suspicious discrete lesion   CERVICAL AND VISUALIZED THORACIC CORD:  Normal signal within the visualized cord  PREVERTEBRAL AND PARASPINAL SOFT TISSUES:  Normal  VISUALIZED POSTERIOR FOSSA:  The visualized posterior fossa demonstrates no abnormal signal  CERVICAL DISC SPACES: C2-C3:  No disc bulge  Mild facet arthropathy and uncovertebral spurring  No canal or foraminal stenosis  C3-C4:  Small central disc protrusion  Left greater than right facet arthropathy  Mild uncovertebral spurring  Mild canal stenosis  Moderate left foraminal narrowing  C4-C5:  Grade 1 anterolisthesis uncovering posterior disc margin  Right greater than left facet arthropathy and uncovertebral spurring  Mild canal narrowing  Moderate right foraminal stenosis  C5-C6:  Right asymmetric disc osteophyte complex in conjunction with uncovertebral spurring and mild facet arthropathy resulting in right lateral recess stenosis and severe right foraminal narrowing  There is mild canal stenosis  C6-C7:  Mild disc osteophyte complex and uncovertebral spurring without significant canal stenosis  Minimal right foraminal narrowing  C7-T1:  No significant disc bulge  No canal or foraminal stenosis  UPPER THORACIC DISC SPACES:  Unremarkable     Impression: 1  Degenerative right lateral recess stenosis and severe right foraminal narrowing at level C5-6  No high-grade canal stenosis  Correlate for right C6 radiculopathy  2   Multilevel mild canal and mild to moderate degree foraminal narrowing in the remainder levels as detailed   Workstation performed: [id number]       I have personally reviewed pertinent reports   , I have personally reviewed pertinent films in PACS and I have personally reviewed pertinent films in PACS with a Radiologist

## 2022-09-12 NOTE — CASE MANAGEMENT
Cm met with pt and reviewed rehab routine and cm role  Pt lives in a single family rancher home with his wife  He has 1 JIGNA  He has a walk in shower with a seat and tub/shower  Pt reports have several walkers and some transport chairs at home  Pt has attended outpt therapy at [facility] and has had home services but cannot recall with what company  Pt denies any hx of STR  Pt uses [facility] [address] in [place] and has used [facility] in the past but wishes to use [facility] at d/c  Discussed team meeting process, potential LOS and IMM  Pt has his son's wedding on May 21 and wishes to discharge on Thursday May 20  Team is aware  Following to assist with d/c planning needs  LVM for patient to return call.

## 2022-09-16 ENCOUNTER — HOSPITAL ENCOUNTER (OUTPATIENT)
Dept: NON INVASIVE DIAGNOSTICS | Facility: CLINIC | Age: 67
Discharge: HOME | End: 2022-09-16
Payer: MEDICARE

## 2022-09-16 DIAGNOSIS — I65.23 CAROTID STENOSIS, ASYMPTOMATIC, BILATERAL: ICD-10-CM

## 2022-09-16 PROCEDURE — 93880 EXTRACRANIAL BILAT STUDY: CPT

## 2022-09-17 PROCEDURE — 93880 EXTRACRANIAL BILAT STUDY: CPT | Performed by: SURGERY

## 2022-09-19 ENCOUNTER — TRANSCRIBE ORDERS (OUTPATIENT)
Dept: VASCULAR SURGERY | Facility: CLINIC | Age: 67
End: 2022-09-19

## 2022-09-19 DIAGNOSIS — I65.23 CAROTID STENOSIS, ASYMPTOMATIC, BILATERAL: Primary | ICD-10-CM

## 2022-09-19 DIAGNOSIS — I65.23 ASYMPTOMATIC BILATERAL CAROTID ARTERY STENOSIS: Primary | ICD-10-CM

## 2022-09-21 ENCOUNTER — HOSPITAL ENCOUNTER (OUTPATIENT)
Dept: RADIOLOGY | Facility: CLINIC | Age: 67
Discharge: HOME | End: 2022-09-21
Payer: MEDICARE

## 2022-09-21 VITALS
OXYGEN SATURATION: 95 % | HEART RATE: 94 BPM | DIASTOLIC BLOOD PRESSURE: 81 MMHG | RESPIRATION RATE: 18 BRPM | TEMPERATURE: 98 F | SYSTOLIC BLOOD PRESSURE: 145 MMHG

## 2022-09-21 DIAGNOSIS — M47.812 CERVICAL SPONDYLOSIS: ICD-10-CM

## 2022-09-21 PROCEDURE — 64491 INJ PARAVERT F JNT C/T 2 LEV: CPT | Performed by: ANESTHESIOLOGY

## 2022-09-21 PROCEDURE — 64490 INJ PARAVERT F JNT C/T 1 LEV: CPT | Performed by: ANESTHESIOLOGY

## 2022-09-21 RX ORDER — BUPIVACAINE HYDROCHLORIDE 7.5 MG/ML
5 INJECTION, SOLUTION EPIDURAL; RETROBULBAR ONCE
Status: COMPLETED | OUTPATIENT
Start: 2022-09-21 | End: 2022-09-21

## 2022-09-21 RX ADMIN — BUPIVACAINE HYDROCHLORIDE 5 ML: 7.5 INJECTION, SOLUTION EPIDURAL; RETROBULBAR at 13:46

## 2022-09-21 NOTE — H&P
History of Present Illness:  The patient is a 74 y o  male who presents with complaints of left-sided neck pain is here today for confirmatory left C3-5 medial branch blocks    Patient Active Problem List   Diagnosis    Alcohol dependence in remission ([facility] )    Bursitis of both hips    Chronic gastroesophageal reflux disease    Essential hypertension    Intervertebral disc disorder with radiculopathy of lumbar region    History of liver transplant ([facility] )    Sacroiliitis (HCC)    Seasonal allergies    Lung mass    Neck pain    Nonalcoholic liver disease, chronic    Abnormal electrocardiogram    Osteopenia    PPD negative    Right arm pain    Ringing in ears, bilateral    Short-term memory loss    Tobacco abuse    Mixed hyperlipidemia    Insomnia    Medicare annual wellness visit, subsequent    Leg cramps    [facility]    Carotid stenosis, asymptomatic, bilateral    Fall    Closed fracture of left distal femur (HCC)    Acute pain due to trauma    Chronic low back pain    Hernia of abdominal cavity    PAD (peripheral artery disease) (HCC)    Electrolyte abnormality    Closed nondisplaced fracture of condyle of left femur ([facility] )    Lumbar stenosis with neurogenic claudication    Cervical spondylosis       Past Medical History:   Diagnosis Date    [facility]ism [facility]     Arthritis     Bacterial peritonitis ([facility] )     Bowel disease     Cancer ([facility] )     liver    Cataract     Depression     Fracture     Gastroesophageal reflux     Hepatic disease     Hepatocellular carcinoma ([facility] )     History of colon polyps     Hypertension     Liver cancer ([facility] )     Liver disease     Stomach disease        Past Surgical History:   Procedure Laterality Date    APPENDECTOMY      BACK SURGERY      CATARACT EXTRACTION      EXPLORATORY LAPAROTOMY  09/2011    EYE SURGERY      LIVER TRANSPLANTATION  08/20/2013    PARACENTESIS      Abdominal Paracentesis(Therapeutic) with Imaging Guidance    NC COLONOSCOPY FLX DX W/COLLJ SPEC WHEN PFRMD N/A 9/12/2016    Procedure: COLONOSCOPY;  Surgeon: Cristin Frost MD;  Location: [facility];   Service: General    ROTATOR CUFF REPAIR      x2         Current Outpatient Medications:     acetaminophen (TYLENOL) 325 mg tablet, Take 650 mg by mouth every 6 (six) hours as needed for mild pain , Disp: , Rfl:     ALPRAZolam (XANAX) 0 25 mg tablet, TAKE 1 TABLET BY MOUTH EVERY DAY AS NEEDED FOR ANXIETY, Disp: 30 tablet, Rfl: 1    aspirin (ECOTRIN LOW STRENGTH) 81 mg EC tablet, Take 81 mg by mouth daily, Disp: , Rfl:     atorvastatin (LIPITOR) 40 mg tablet, TAKE 1 TABLET(40 MG) BY MOUTH DAILY, Disp: 30 tablet, Rfl: 0    docusate sodium (COLACE) 100 mg capsule, Take 100 mg by mouth 2 (two) times a day , Disp: , Rfl:     gabapentin (NEURONTIN) 300 mg capsule, Take 1 capsule (300 mg total) by mouth 2 (two) times a day, Disp: 60 capsule, Rfl: 5    lisinopril (ZESTRIL) 20 mg tablet, TAKE 1 TABLET(20 MG) BY MOUTH DAILY, Disp: 30 tablet, Rfl: 6    LORazepam (ATIVAN) 0 5 mg tablet, Take 1 tablet 2 hours prior to procedure  May repeat after 1 hour (Patient not taking: Reported on 9/2/2022), Disp: 2 tablet, Rfl: 0    omeprazole (PriLOSEC) 20 mg delayed release capsule, TAKE 1 CAPSULE BY MOUTH TWICE DAILY, Disp: 180 capsule, Rfl: 3    ondansetron (ZOFRAN-ODT) 4 mg disintegrating tablet, DISSOLVE 1 TABLET(4 MG) ON THE TONGUE EVERY 6 HOURS AS NEEDED FOR NAUSEA OR VOMITING, Disp: 20 tablet, Rfl: 0    ONE DAILY MULTIPLE VITAMIN PO, Take by mouth , Disp: , Rfl:     tacrolimus (PROGRAF) 1 mg capsule, TAKE 3 CAPSULES(3 MG TOTAL) BY MOUTH EVERY 12 HOURS, Disp: 540 capsule, Rfl: 5    Current Facility-Administered Medications:     bupivacaine (PF) (MARCAINE) 0 25 % injection 10 mL, 10 mL, Epidural, Once, Emeterio Murphy MD    bupivacaine (PF) (MARCAINE) 0 75 % injection 5 mL, 5 mL, Other, Once, Emeterio Murphy MD    methylPREDNISolone acetate (DEPO-MEDROL) injection 40 mg, 40 mg, Other, Once, Adan Cordova MD    Allergies   Allergen Reactions    Macrolides And Ketolides      Annotation - [id number]: The patient is on Antirejection medications and they will reduce the effective ness of the medications  Physical Exam:   Vitals:    09/21/22 1325   BP: 128/83   Pulse: 98   Resp: 18   Temp: 98 °F (36 7 °C)   SpO2: 96%     General: Awake, Alert, Oriented x 3, Mood and affect appropriate  Respiratory: Respirations even and unlabored  Cardiovascular: Peripheral pulses intact; no edema  Musculoskeletal Exam:  Left-sided neck pain    ASA Score: 3    Patient/Chart Verification  Patient ID Verified: Verbal  ID Band Applied: No  Consents Confirmed: Procedural, To be obtained in the Pre-Procedure area  H&P( within 30 days) Verified: To be obtained in the Pre-Procedure area  Allergies Reviewed: Yes  Anticoag/NSAID held?: No  Currently on antibiotics?: No    Assessment:   1   Cervical spondylosis        Plan: Left C3-5 MBB #2

## 2022-09-21 NOTE — DISCHARGE INSTR - LAB

## 2022-09-22 ENCOUNTER — TRANSCRIBE ORDERS (OUTPATIENT)
Dept: PAIN MEDICINE | Facility: CLINIC | Age: 67
End: 2022-09-22

## 2022-09-23 ENCOUNTER — TELEPHONE (OUTPATIENT)
Dept: RADIOLOGY | Facility: CLINIC | Age: 67
End: 2022-09-23

## 2022-09-23 DIAGNOSIS — M47.812 CERVICAL SPONDYLOSIS: Primary | ICD-10-CM

## 2022-09-23 NOTE — TELEPHONE ENCOUNTER
----- Message from Ace Arrieta sent at 9/22/2022  3:28 PM EDT -----  Regarding: PAIN DIARY/9/21/22  Patient brought in pain diary for your review  Scanned in Media of chart    Thank you

## 2022-09-28 NOTE — TELEPHONE ENCOUNTER
Patient is scheduled for 10/27/22  Verbal instructions given to patient and [facility]  patient understood  Patient is not on any blood thinners or diabetic

## 2022-10-12 ENCOUNTER — OFFICE VISIT (OUTPATIENT)
Dept: VASCULAR SURGERY | Facility: CLINIC | Age: 67
End: 2022-10-12
Payer: MEDICARE

## 2022-10-12 VITALS
HEIGHT: 72 IN | WEIGHT: 221 LBS | BODY MASS INDEX: 29.93 KG/M2 | DIASTOLIC BLOOD PRESSURE: 88 MMHG | HEART RATE: 92 BPM | RESPIRATION RATE: 20 BRPM | SYSTOLIC BLOOD PRESSURE: 140 MMHG

## 2022-10-12 DIAGNOSIS — I73.9 PAD (PERIPHERAL ARTERY DISEASE) (HCC): ICD-10-CM

## 2022-10-12 DIAGNOSIS — I73.9 CLAUDICATION IN PERIPHERAL VASCULAR DISEASE (HCC): ICD-10-CM

## 2022-10-12 DIAGNOSIS — K76.9 NONALCOHOLIC LIVER DISEASE, CHRONIC: Primary | ICD-10-CM

## 2022-10-12 DIAGNOSIS — F10.21 ALCOHOL DEPENDENCE IN REMISSION (HCC): ICD-10-CM

## 2022-10-12 DIAGNOSIS — E78.2 MIXED HYPERLIPIDEMIA: ICD-10-CM

## 2022-10-12 DIAGNOSIS — I65.23 CAROTID STENOSIS, ASYMPTOMATIC, BILATERAL: ICD-10-CM

## 2022-10-12 DIAGNOSIS — Z72.0 TOBACCO ABUSE: ICD-10-CM

## 2022-10-12 PROCEDURE — 99214 OFFICE O/P EST MOD 30 MIN: CPT | Performed by: SURGERY

## 2022-10-12 NOTE — LETTER
October 12, 2022     Jen Carbajal U  51   [address] [address]    Patient: Rosanne Borrego   YOB: 1955   Date of Visit: 10/12/2022       Dear Dr Paul Bradshaw: Thank you for referring Verónicamitchell Matthews to me for evaluation  Below are the relevant portions of my assessment and plan of care  Pt denies TIA or CVA symptoms  The duplex shows no significant velocity changes  The velocities in the right ICA are measured at 260 1/96 with an ICA/CCA ratio of 3 48  Previously velocities were measured at 250 5/93 with an ICA CCA ratio of 3 06  This correlates to a carotid duplex performed at the time of CTA in October of 2020 at which time the velocities were 271/93 with an ICA/CCA ratio of 2 66  At that time the carotid stenosis was measured 70-75%, however the lesion is extensive, thus I would not recommend intervention unless the velocities were to significantly increase consistent with greater than 80% stenosis or symptoms arise  Patient continues on aspirin and statin  We have once again discussed the importance of smoking cessation  Pt is on ASA 81 mg and Atorvastatin  Pt smokes  1/2 ppd  If you have questions, please do not hesitate to call me  I look forward to following Marco Mcneill along with you           Sincerely,        Marlen Davidson MD        CC: No Recipients

## 2022-10-12 NOTE — ASSESSMENT & PLAN NOTE
Patient with known peripheral arterial occlusive disease resulting in non disabling claudication  Patient is scheduled for surveillance LEAD in March at the time of his surveillance carotid duplex  Patient instructed on the importance of smoking cessation and daily walking exercise in addition to aspirin and statin therapy

## 2022-10-12 NOTE — ASSESSMENT & PLAN NOTE
duplex shows no significant velocity changes  The velocities in the right ICA are measured at 260 1/96 with an ICA/CCA ratio of 3 48  Previously velocities were measured at 250 5/93 with an ICA CCA ratio of 3 06  This correlates to a carotid duplex performed at the time of CTA in October of 2020 at which time the velocities were 271/93 with an ICA/CCA ratio of 2 66  At that time the carotid stenosis was measured 70-75%, however the lesion is extensive, thus I would not recommend intervention unless the velocities were to significantly increase consistent with greater than 80% stenosis or symptoms arise  Patient continues on aspirin and statin  Continue biannual surveillance

## 2022-10-12 NOTE — PROGRESS NOTES
Assessment/Plan:    Carotid stenosis, asymptomatic, bilateral  duplex shows no significant velocity changes  The velocities in the right ICA are measured at 260 1/96 with an ICA/CCA ratio of 3 48  Previously velocities were measured at 250 5/93 with an ICA CCA ratio of 3 06  This correlates to a carotid duplex performed at the time of CTA in October of 2020 at which time the velocities were 271/93 with an ICA/CCA ratio of 2 66  At that time the carotid stenosis was measured 70-75%, however the lesion is extensive, thus I would not recommend intervention unless the velocities were to significantly increase consistent with greater than 80% stenosis or symptoms arise  Patient continues on aspirin and statin  Continue biannual surveillance  Claudication in peripheral vascular disease [facility])  Patient with known peripheral arterial occlusive disease resulting in non disabling claudication  Patient is scheduled for surveillance LEAD in March at the time of his surveillance carotid duplex  Patient instructed on the importance of smoking cessation and daily walking exercise in addition to aspirin and statin therapy  Diagnoses and all orders for this visit:    Nonalcoholic liver disease, chronic    Carotid stenosis, asymptomatic, bilateral    PAD (peripheral artery disease) ([facility]    Tobacco abuse    Mixed hyperlipidemia    Claudication in peripheral vascular disease ([facility] )    Alcohol dependence in remission ([facility] )          Subjective:      Patient ID: Shoshana Amato is a 74 y o  male  Patient had a CV on 9/16/22  Pt denies TIA or CVA symptoms  Pt is on ASA 81 mg and Atorvastatin  Pt smokes  1/2 ppd  Patient had a CV on 9/16/22  Pt denies TIA or CVA symptoms  The duplex shows no significant velocity changes  The velocities in the right ICA are measured at 260 1/96 with an ICA/CCA ratio of 3 48  Previously velocities were measured at 250 5/93 with an ICA CCA ratio of 3 06    This correlates to a carotid duplex performed at the time of CTA in October of 2020 at which time the velocities were 271/93 with an ICA/CCA ratio of 2 66  At that time the carotid stenosis was measured 70-75%, however the lesion is extensive, thus I would not recommend intervention unless the velocities were to significantly increase consistent with greater than 80% stenosis or symptoms arise  Patient continues on aspirin and statin  We have once again discussed the importance of smoking cessation  Pt is on ASA 81 mg and Atorvastatin  Pt smokes  1/2 ppd  Previous visit:  Patient returns to review carotid duplex performed on 08/18/2021  Patient remains asymptomatic  Carotid duplex velocities are consistent with greater than 70% stenosis however are unchanged from carotid duplex of nearly 1 year ago  Velocities aremeasured at  266/80 with an ICA / CCA ratio 4 18  On 10/07/2020 velocities measured 271/93 centimeters/second with an ICA/ CCA ratio of 2 66  The left internal carotid artery velocities are consistent with 50-69% stenosis  CTA head and neck on 10/27/2020 stenosis of approximately 70-75% of the right internal carotid artery  The lesion is heavily calcified with circumferential stenosis extending for approximately 5-6 cm  There is a 2nd eccentric calcification approximately 1 cm more distal in the right ICA  The left internal carotid artery shows approximately 60% stenosis  Due to the circumferential nature of the lesion the best method of therapy would be endarterectomy  I have discussed this with the patient  We discussed the natural history of carotid stenosis and the risk of stroke in the face of continued medical management and carotid intervention  We have agreed to continue surveillance in the presence of this lesion  Patient will continue to make a strong effort regarding smoking cessation  Pt denies TIA or CVA symptoms  Pt is on ASA 81 mg and Atorvastatin    In addition, patient will continue a walking exercise program   Presently his claudication is not disabling  Pt continues to smoke 1/2 ppd           The following portions of the patient's history were reviewed and updated as appropriate: allergies, current medications, past family history, past medical history, past social history, past surgical history and problem list     Review of Systems   Constitutional: Negative  HENT: Negative  Eyes: Negative for visual disturbance  Respiratory: Negative  Cardiovascular: Negative  Gastrointestinal: Negative  Endocrine: Negative  Genitourinary: Negative  Musculoskeletal: Positive for arthralgias  Skin: Negative  Allergic/Immunologic: Negative  Neurological: Negative for dizziness, facial asymmetry, speech difficulty, weakness and numbness  Hematological: Negative  Psychiatric/Behavioral: Negative  Objective:      /88 (BP Location: Right arm, Patient Position: Sitting)   Pulse 92   Resp 20   Ht 6' (1 829 m)   Wt 100 kg (221 lb)   BMI 29 97 kg/m²          Physical Exam  Vitals and nursing note reviewed  Constitutional:       Appearance: He is well-developed  HENT:      Head: Normocephalic and atraumatic  Eyes:      Conjunctiva/sclera: Conjunctivae normal       Pupils: Pupils are equal, round, and reactive to light  Neck:      Vascular: No JVD  Pulmonary:      Effort: No respiratory distress  Breath sounds: No stridor  No wheezing or rales  Chest:      Chest wall: No tenderness  Abdominal:      General: There is no distension  Palpations: There is no mass  Tenderness: There is no abdominal tenderness  There is no guarding or rebound  Musculoskeletal:         General: No tenderness or deformity  Normal range of motion  Cervical back: Normal range of motion and neck supple  Skin:     General: Skin is warm and dry  Findings: No erythema or rash  Neurological:      General: No focal deficit present        Mental Status: He is alert and oriented to person, place, and time  Cranial Nerves: No cranial nerve deficit  Sensory: No sensory deficit  Motor: No weakness  Psychiatric:         Behavior: Behavior normal          Thought Content:  Thought content normal

## 2022-10-18 ENCOUNTER — OFFICE VISIT (OUTPATIENT)
Dept: CARDIOLOGY CLINIC | Facility: CLINIC | Age: 67
End: 2022-10-18
Payer: MEDICARE

## 2022-10-18 VITALS
HEART RATE: 100 BPM | OXYGEN SATURATION: 94 % | BODY MASS INDEX: 30.2 KG/M2 | SYSTOLIC BLOOD PRESSURE: 118 MMHG | WEIGHT: 223 LBS | DIASTOLIC BLOOD PRESSURE: 78 MMHG | HEIGHT: 72 IN

## 2022-10-18 DIAGNOSIS — I10 ESSENTIAL HYPERTENSION: Primary | ICD-10-CM

## 2022-10-18 DIAGNOSIS — I73.9 PAD (PERIPHERAL ARTERY DISEASE) (HCC): ICD-10-CM

## 2022-10-18 DIAGNOSIS — I65.23 CAROTID STENOSIS, ASYMPTOMATIC, BILATERAL: ICD-10-CM

## 2022-10-18 DIAGNOSIS — E78.2 MIXED HYPERLIPIDEMIA: ICD-10-CM

## 2022-10-18 PROCEDURE — 99214 OFFICE O/P EST MOD 30 MIN: CPT | Performed by: INTERNAL MEDICINE

## 2022-10-18 NOTE — LETTER
October 18, 2022     Jen Urban U  51   [address] [address]    Patient: Jason Andrade   YOB: 1955   Date of Visit: 10/18/2022       Dear Dr Terry Lewis: Thank you for referring Sudheermisty Song to me for evaluation  Below are my notes for this consultation  If you have questions, please do not hesitate to call me  I look forward to following your patient along with you  Sincerely,        Alma Meyers MD        CC: No Recipients  Alma Meyers MD  10/18/2022  9:04 AM  Sign when Signing Visit  Assessment/Plan:    Essential hypertension    Hypertension, stable and adequately controlled  Carotid stenosis, asymptomatic, bilateral    Carotid artery stenosis, stable  The patient is asymptomatic  PAD (peripheral artery disease) (HCC)    Peripheral arterial disease, fairly stable  Mild claudication on walking  The patient is followed by vascular surgery  Mixed hyperlipidemia    Hyperlipidemia, stable  Diagnoses and all orders for this visit:    Essential hypertension    Carotid stenosis, asymptomatic, bilateral    PAD (peripheral artery disease) ([address] )    Mixed hyperlipidemia          Subjective:   Feels fairly well  Patient ID: Jason Andrade is a 74 y o  male  Patient presented to this office for the purpose cardiac follow-up  He is known to have a history of hypertension hyperlipidemia as well as carotid artery disease peripheral vascular disease  Patient has been feeling well from the cardiac standpoint denying any symptoms of chest pain or shortness of breath  He denies any symptoms palpitation, dizziness or lightheadedness  He has no leg edema  He does however experience mild lower extremity claudication upon walking        The following portions of the patient's history were reviewed and updated as appropriate: allergies, current medications, past family history, past medical history, past social history, past surgical history and problem list     Review of Systems   Respiratory: Negative for apnea, cough, chest tightness, shortness of breath and wheezing  Cardiovascular: Negative for chest pain, palpitations and leg swelling  Gastrointestinal: Negative for abdominal pain  Neurological: Negative for dizziness and light-headedness  Psychiatric/Behavioral: Negative  Objective:  Stable cardiac-wise  /78 (BP Location: Left arm, Patient Position: Sitting, Cuff Size: Large)   Pulse 100   Ht 6' (1 829 m)   Wt 101 kg (223 lb)   SpO2 94%   BMI 30 24 kg/m²          Physical Exam  Vitals reviewed  Constitutional:       General: He is not in acute distress  Appearance: He is well-developed  He is not diaphoretic  HENT:      Head: Normocephalic  Eyes:      Pupils: Pupils are equal, round, and reactive to light  Neck:      Thyroid: No thyromegaly  Vascular: No JVD  Cardiovascular:      Rate and Rhythm: Normal rate and regular rhythm  Heart sounds: S1 normal and S2 normal  No murmur heard  No friction rub  No gallop  Pulmonary:      Effort: Pulmonary effort is normal  No respiratory distress  Breath sounds: Normal breath sounds  No wheezing or rales  Chest:      Chest wall: No tenderness  Abdominal:      Palpations: Abdomen is soft  Musculoskeletal:         General: No tenderness or deformity  Normal range of motion  Cervical back: Normal range of motion  Right lower leg: No edema  Left lower leg: No edema  Skin:     General: Skin is warm and dry  Neurological:      Mental Status: He is alert and oriented to person, place, and time     Psychiatric:         Mood and Affect: Mood normal          Behavior: Behavior normal

## 2022-10-18 NOTE — ASSESSMENT & PLAN NOTE
Peripheral arterial disease, fairly stable  Mild claudication on walking  The patient is followed by vascular surgery

## 2022-10-18 NOTE — PROGRESS NOTES
Assessment/Plan:    Essential hypertension    Hypertension, stable and adequately controlled  Carotid stenosis, asymptomatic, bilateral    Carotid artery stenosis, stable  The patient is asymptomatic  PAD (peripheral artery disease) (HCC)    Peripheral arterial disease, fairly stable  Mild claudication on walking  The patient is followed by vascular surgery  Mixed hyperlipidemia    Hyperlipidemia, stable  Diagnoses and all orders for this visit:    Essential hypertension    Carotid stenosis, asymptomatic, bilateral    PAD (peripheral artery disease) ([address] )    Mixed hyperlipidemia          Subjective:   Feels fairly well  Patient ID: Shoshana Amato is a 74 y o  male  Patient presented to this office for the purpose cardiac follow-up  He is known to have a history of hypertension hyperlipidemia as well as carotid artery disease peripheral vascular disease  Patient has been feeling well from the cardiac standpoint denying any symptoms of chest pain or shortness of breath  He denies any symptoms palpitation, dizziness or lightheadedness  He has no leg edema  He does however experience mild lower extremity claudication upon walking  The following portions of the patient's history were reviewed and updated as appropriate: allergies, current medications, past family history, past medical history, past social history, past surgical history and problem list     Review of Systems   Respiratory: Negative for apnea, cough, chest tightness, shortness of breath and wheezing  Cardiovascular: Negative for chest pain, palpitations and leg swelling  Gastrointestinal: Negative for abdominal pain  Neurological: Negative for dizziness and light-headedness  Psychiatric/Behavioral: Negative  Objective:  Stable cardiac-wise        /78 (BP Location: Left arm, Patient Position: Sitting, Cuff Size: Large)   Pulse 100   Ht 6' (1 829 m)   Wt 101 kg (223 lb)   SpO2 94%   BMI 30 24 kg/m²          Physical Exam  Vitals reviewed  Constitutional:       General: He is not in acute distress  Appearance: He is well-developed  He is not diaphoretic  HENT:      Head: Normocephalic  Eyes:      Pupils: Pupils are equal, round, and reactive to light  Neck:      Thyroid: No thyromegaly  Vascular: No JVD  Cardiovascular:      Rate and Rhythm: Normal rate and regular rhythm  Heart sounds: S1 normal and S2 normal  No murmur heard  No friction rub  No gallop  Pulmonary:      Effort: Pulmonary effort is normal  No respiratory distress  Breath sounds: Normal breath sounds  No wheezing or rales  Chest:      Chest wall: No tenderness  Abdominal:      Palpations: Abdomen is soft  Musculoskeletal:         General: No tenderness or deformity  Normal range of motion  Cervical back: Normal range of motion  Right lower leg: No edema  Left lower leg: No edema  Skin:     General: Skin is warm and dry  Neurological:      Mental Status: He is alert and oriented to person, place, and time     Psychiatric:         Mood and Affect: Mood normal          Behavior: Behavior normal

## 2022-10-21 ENCOUNTER — TELEMEDICINE (OUTPATIENT)
Dept: INTERNAL MEDICINE CLINIC | Facility: CLINIC | Age: 67
End: 2022-10-21
Payer: MEDICARE

## 2022-10-21 DIAGNOSIS — U07.1 COVID-19 VIRUS INFECTION: Primary | ICD-10-CM

## 2022-10-21 PROCEDURE — 99214 OFFICE O/P EST MOD 30 MIN: CPT | Performed by: INTERNAL MEDICINE

## 2022-10-21 RX ORDER — NIRMATRELVIR AND RITONAVIR 300-100 MG
3 KIT ORAL 2 TIMES DAILY
Qty: 30 TABLET | Refills: 0 | Status: SHIPPED | OUTPATIENT
Start: 2022-10-21 | End: 2022-10-26

## 2022-10-21 NOTE — PROGRESS NOTES
COVID-19 Outpatient Progress Note    Assessment/Plan:    Problem List Items Addressed This Visit        Other    COVID-19 virus infection - Primary     Discussed treatment with oral antiviral Paxlovid  Pt instructed to take half his normal dose of Tacrolimus and half dose of alprazolam while on Paxlovid, and to hold atorvastatin  Patient should isolate for 5 days from onset of symptoms, then wear a mask when around others indoors for an additional 5 days  Patient instructed to reach out if developing shortness of breath         Relevant Medications    nirmatrelvir & ritonavir (Paxlovid, 300/100,) tablet therapy pack         Disposition:     I have spent 20 minutes directly with the patient  Greater than 50% of this time was spent in counseling/coordination of care regarding: prognosis, risks and benefits of treatment options, instructions for management, patient and family education and impressions  Encounter provider: Leesa Gaffney MD     Provider located at: [address] [place] [id number]     Recent Visits  No visits were found meeting these conditions  Showing recent visits within past 7 days and meeting all other requirements  Today's Visits  Date Type Provider Dept   10/21/22 Telemedicine Leesa Gaffney MD [address] today's visits and meeting all other requirements  Future Appointments  No visits were found meeting these conditions  Showing future appointments within next 150 days and meeting all other requirements     This virtual check-in was done via [employer] [address] and patient was informed that this is a secure, HIPAA-compliant platform  He agrees to proceed  Patient agrees to participate in a virtual check in via telephone or video visit instead of presenting to the office to address urgent/immediate medical needs  Patient is aware this is a billable service   He acknowledged consent and understanding of privacy and security of the video platform  The patient has agreed to participate and understands they can discontinue the visit at any time  After connecting through [facility], the patient was identified by name and date of birth  Chante Lopez was informed that this was a telemedicine visit and that the exam was being conducted confidentially over secure lines  My office door was closed  No one else was in the room  Chante Lopez acknowledged consent and understanding of privacy and security of the telemedicine visit  I informed the patient that I have reviewed his record in Epic and presented the opportunity for him to ask any questions regarding the visit today  The patient agreed to participate  Verification of patient location:  Patient is located in the following state in which I hold an active license: [place]    Subjective:   Chante Lopez is a 74 y o  male who is concerned about COVID-19  Patient's symptoms include rhinorrhea and headache  Patient denies fever, chills, fatigue, malaise, congestion, sore throat, cough, shortness of breath, chest tightness, abdominal pain, nausea, vomiting and diarrhea  - Date of symptom onset: 10/20/2022      COVID-19 vaccination status: Fully vaccinated with booster    Lab Results   Component Value Date    SARSCOV2 Negative 05/11/2021    [place]ing Not Detected 10/02/2020       Review of Systems   Constitutional: Negative for chills, fatigue and fever  HENT: Positive for rhinorrhea  Negative for congestion and sore throat  Respiratory: Negative for cough, chest tightness and shortness of breath  Gastrointestinal: Negative for abdominal pain, diarrhea, nausea and vomiting  Neurological: Positive for headaches       Current Outpatient Medications on File Prior to Visit   Medication Sig   • ALPRAZolam (XANAX) 0 25 mg tablet TAKE 1 TABLET BY MOUTH EVERY DAY AS NEEDED FOR ANXIETY   • aspirin (ECOTRIN LOW STRENGTH) 81 mg EC tablet Take 81 mg by mouth daily • atorvastatin (LIPITOR) 40 mg tablet TAKE 1 TABLET(40 MG) BY MOUTH DAILY   • gabapentin (NEURONTIN) 300 mg capsule Take 1 capsule (300 mg total) by mouth 2 (two) times a day   • lisinopril (ZESTRIL) 20 mg tablet TAKE 1 TABLET(20 MG) BY MOUTH DAILY   • ONE DAILY MULTIPLE VITAMIN PO Take by mouth  • tacrolimus (PROGRAF) 1 mg capsule TAKE 3 CAPSULES(3 MG TOTAL) BY MOUTH EVERY 12 HOURS   • zolpidem (AMBIEN) 5 mg tablet TAKE 1 TABLET(5 MG) BY MOUTH DAILY AT BEDTIME AS NEEDED FOR SLEEP   • acetaminophen (TYLENOL) 325 mg tablet Take 650 mg by mouth every 6 (six) hours as needed for mild pain  • docusate sodium (COLACE) 100 mg capsule Take 100 mg by mouth 2 (two) times a day  • LORazepam (ATIVAN) 0 5 mg tablet Take 1 tablet 2 hours prior to procedure  May repeat after 1 hour (Patient not taking: Reported on 10/18/2022)   • omeprazole (PriLOSEC) 20 mg delayed release capsule TAKE 1 CAPSULE BY MOUTH TWICE DAILY   • ondansetron (ZOFRAN-ODT) 4 mg disintegrating tablet DISSOLVE 1 TABLET(4 MG) ON THE TONGUE EVERY 6 HOURS AS NEEDED FOR NAUSEA OR VOMITING       Objective: There were no vitals taken for this visit  Physical Exam  Constitutional:       General: He is not in acute distress  Pulmonary:      Effort: Pulmonary effort is normal  No respiratory distress  Neurological:      Mental Status: He is alert         Geri Maxwell MD

## 2022-10-21 NOTE — ASSESSMENT & PLAN NOTE
Discussed treatment with oral antiviral Paxlovid  Pt instructed to take half his normal dose of Tacrolimus and half dose of alprazolam while on Paxlovid, and to hold atorvastatin  Patient should isolate for 5 days from onset of symptoms, then wear a mask when around others indoors for an additional 5 days    Patient instructed to reach out if developing shortness of breath

## 2022-10-21 NOTE — PATIENT INSTRUCTIONS
Problem List Items Addressed This Visit          Other    COVID-19 virus infection - Primary     Discussed treatment with oral antiviral Paxlovid  Pt instructed to take half his normal dose of Tacrolimus and half dose of alprazolam while on Paxlovid, and to hold atorvastatin  Patient should isolate for 5 days from onset of symptoms, then wear a mask when around others indoors for an additional 5 days    Patient instructed to reach out if developing shortness of breath           Relevant Medications    nirmatrelvir & ritonavir (Paxlovid, 300/100,) tablet therapy pack

## 2022-10-27 ENCOUNTER — TELEPHONE (OUTPATIENT)
Dept: PAIN MEDICINE | Facility: CLINIC | Age: 67
End: 2022-10-27

## 2022-10-27 ENCOUNTER — HOSPITAL ENCOUNTER (OUTPATIENT)
Dept: RADIOLOGY | Facility: CLINIC | Age: 67
Discharge: HOME | End: 2022-10-27

## 2022-10-27 DIAGNOSIS — R11.0 NAUSEA: ICD-10-CM

## 2022-10-27 RX ORDER — ONDANSETRON 4 MG/1
TABLET, ORALLY DISINTEGRATING ORAL
Qty: 20 TABLET | Refills: 0 | Status: SHIPPED | OUTPATIENT
Start: 2022-10-27

## 2022-10-27 NOTE — TELEPHONE ENCOUNTER
We are unable to perform procedures today 10/27, so pts LT C3-5 RFA has to be rescheduled  Called pt, BOBO for him to cb

## 2022-11-17 ENCOUNTER — HOSPITAL ENCOUNTER (OUTPATIENT)
Dept: RADIOLOGY | Facility: CLINIC | Age: 67
End: 2022-11-17

## 2022-11-17 ENCOUNTER — TELEPHONE (OUTPATIENT)
Dept: RADIOLOGY | Facility: CLINIC | Age: 67
End: 2022-11-17

## 2022-11-17 VITALS
RESPIRATION RATE: 20 BRPM | HEART RATE: 72 BPM | TEMPERATURE: 96.1 F | SYSTOLIC BLOOD PRESSURE: 143 MMHG | DIASTOLIC BLOOD PRESSURE: 82 MMHG | OXYGEN SATURATION: 97 %

## 2022-11-17 DIAGNOSIS — M54.16 LUMBAR RADICULOPATHY: ICD-10-CM

## 2022-11-17 DIAGNOSIS — M47.812 CERVICAL SPONDYLOSIS: ICD-10-CM

## 2022-11-17 RX ORDER — LIDOCAINE HYDROCHLORIDE 10 MG/ML
30 INJECTION, SOLUTION EPIDURAL; INFILTRATION; INTRACAUDAL; PERINEURAL ONCE
Status: COMPLETED | OUTPATIENT
Start: 2022-11-17 | End: 2022-11-17

## 2022-11-17 RX ORDER — GABAPENTIN 300 MG/1
300 CAPSULE ORAL 2 TIMES DAILY
Qty: 60 CAPSULE | Refills: 5 | Status: SHIPPED | OUTPATIENT
Start: 2022-11-17

## 2022-11-17 RX ORDER — BUPIVACAINE HCL/PF 2.5 MG/ML
10 VIAL (ML) INJECTION ONCE
Status: COMPLETED | OUTPATIENT
Start: 2022-11-17 | End: 2022-11-17

## 2022-11-17 RX ORDER — METHYLPREDNISOLONE ACETATE 80 MG/ML
80 INJECTION, SUSPENSION INTRA-ARTICULAR; INTRALESIONAL; INTRAMUSCULAR; PARENTERAL; SOFT TISSUE ONCE
Status: COMPLETED | OUTPATIENT
Start: 2022-11-17 | End: 2022-11-17

## 2022-11-17 RX ADMIN — METHYLPREDNISOLONE ACETATE 20 MG: 80 INJECTION, SUSPENSION INTRA-ARTICULAR; INTRALESIONAL; INTRAMUSCULAR; PARENTERAL; SOFT TISSUE at 09:29

## 2022-11-17 RX ADMIN — LIDOCAINE HYDROCHLORIDE 20 ML: 10 INJECTION, SOLUTION EPIDURAL; INFILTRATION; INTRACAUDAL at 09:16

## 2022-11-17 RX ADMIN — BUPIVACAINE HYDROCHLORIDE 1.5 ML: 2.5 INJECTION, SOLUTION EPIDURAL; INFILTRATION; INTRACAUDAL at 09:29

## 2022-11-17 NOTE — DISCHARGE INSTR - LAB
Medial Branch Radiofrequency Ablation     WHAT YOU NEED TO KNOW:   Medial branch radiofrequency ablation (RFA) is a procedure used to treat facet joint pain in your neck, mid back, or lower back  Facet joints are found at the back of each vertebra  A needle electrode is used to send electrical currents to the nerves in your facet joint  The electrical currents create heat that damages the nerve so it cannot send pain signals  ACTIVITY  Do not drive or operate machinery today  No strenuous activity today - bending, lifting, etc   You may shower today, but do not sit in a tub of water  Resume normal activities tomorrow as tolerated  CARE OF THE INJECTION SITE  If you have soreness or pain, apply ice to the area today (20 minutes on/20 minutes off)  Starting tomorrow, you may use warm, moist heat or ice if needed  Notify [facility] if you have any of the following: redness, drainage, swelling, or fever above 100°F     SPECIAL INSTRUCTIONS  Our office will call you tomorrow for a progress report and make an appointment for a follow up visit in 4 weeks  If you feel a sunburn-like sensation in the area of your procedure, call our office  MEDICATIONS  Continue to take all routine medications  Our office may have instructed you to hold some medications  As no general anesthesia was used in today's procedure, you should not experience any side effects related to anesthesia  If you have a problem specifically related to your procedure, please call our office at [phone]  Problems not related to your procedure should be directed to your primary care physician

## 2022-11-17 NOTE — H&P
History of Present Illness: The patient is a 74 y o  male who presents with complaints of left-sided neck pain is here today for left C3-5 ablation    Past Medical History:   Diagnosis Date   • Alcoholism ([facility] [address] )    • Arthritis    • [facility] )    • Bowel disease    • Cancer ([facility] )     liver   • Cataract    • Depression    • Fracture    • Gastroesophageal reflux    • Hepatic disease    • Hepatocellular carcinoma ([facility] )    • History of colon polyps    • Hypertension    • Liver cancer ([facility] )    • Liver disease    • Stomach disease        Past Surgical History:   Procedure Laterality Date   • APPENDECTOMY     • BACK SURGERY     • CATARACT EXTRACTION     • EXPLORATORY LAPAROTOMY  09/2011   • EYE SURGERY     • LIVER TRANSPLANTATION  08/20/2013   • PARACENTESIS      Abdominal Paracentesis(Therapeutic) with Imaging Guidance   • ID COLONOSCOPY FLX DX W/COLLJ SPEC WHEN PFRMD N/A 9/12/2016    Procedure: COLONOSCOPY;  Surgeon: El Zurita MD;  Location: [facility];   Service: General   • ROTATOR CUFF REPAIR      x2         Current Outpatient Medications:   •  acetaminophen (TYLENOL) 325 mg tablet, Take 650 mg by mouth every 6 (six) hours as needed for mild pain , Disp: , Rfl:   •  ALPRAZolam (XANAX) 0 25 mg tablet, TAKE 1 TABLET BY MOUTH EVERY DAY AS NEEDED FOR ANXIETY, Disp: 30 tablet, Rfl: 1  •  aspirin (ECOTRIN LOW STRENGTH) 81 mg EC tablet, Take 81 mg by mouth daily, Disp: , Rfl:   •  atorvastatin (LIPITOR) 40 mg tablet, TAKE 1 TABLET(40 MG) BY MOUTH DAILY, Disp: 90 tablet, Rfl: 3  •  docusate sodium (COLACE) 100 mg capsule, Take 100 mg by mouth 2 (two) times a day , Disp: , Rfl:   •  gabapentin (NEURONTIN) 300 mg capsule, Take 1 capsule (300 mg total) by mouth 2 (two) times a day, Disp: 60 capsule, Rfl: 5  •  lisinopril (ZESTRIL) 20 mg tablet, TAKE 1 TABLET(20 MG) BY MOUTH DAILY, Disp: 30 tablet, Rfl: 6  •  LORazepam (ATIVAN) 0 5 mg tablet, Take 1 tablet 2 hours prior to procedure  May repeat after 1 hour (Patient not taking: Reported on 10/18/2022), Disp: 2 tablet, Rfl: 0  •  omeprazole (PriLOSEC) 20 mg delayed release capsule, TAKE 1 CAPSULE BY MOUTH TWICE DAILY, Disp: 180 capsule, Rfl: 3  •  ondansetron (ZOFRAN-ODT) 4 mg disintegrating tablet, DISSOLVE 1 TABLET(4 MG) ON THE TONGUE EVERY 6 HOURS AS NEEDED FOR NAUSEA OR VOMITING, Disp: 20 tablet, Rfl: 0  •  ONE DAILY MULTIPLE VITAMIN PO, Take by mouth , Disp: , Rfl:   •  tacrolimus (PROGRAF) 1 mg capsule, TAKE 3 CAPSULES(3 MG TOTAL) BY MOUTH EVERY 12 HOURS, Disp: 540 capsule, Rfl: 5  •  zolpidem (AMBIEN) 5 mg tablet, TAKE 1 TABLET(5 MG) BY MOUTH DAILY AT BEDTIME AS NEEDED FOR SLEEP, Disp: 30 tablet, Rfl: 1    Current Facility-Administered Medications:   •  bupivacaine (PF) (MARCAINE) 0 25 % injection 10 mL, 10 mL, Epidural, Once, Theda Cheadle, MD  •  methylPREDNISolone acetate (DEPO-MEDROL) injection 40 mg, 40 mg, Other, Once, Theda Cheadle, MD    Allergies   Allergen Reactions   • Macrolides And Ketolides      Annotation - [id number]: The patient is on Antirejection medications and they will reduce the effective ness of the medications  Physical Exam:   Vitals:    11/17/22 0904   BP: 159/96   Pulse: 89   Resp: 20   Temp: (!) 96 1 °F (35 6 °C)   SpO2: 96%     General: Awake, Alert, Oriented x 3, Mood and affect appropriate  Respiratory: Respirations even and unlabored  Cardiovascular: Peripheral pulses intact; no edema  Musculoskeletal Exam: left sided neck pain    ASA Score: 3     Patient/Chart Verification  Patient ID Verified: Verbal  Consents Confirmed: To be obtained in the Pre-Procedure area  Allergies Reviewed: Yes  Anticoag/NSAID held?: NA  Currently on antibiotics?: No    Assessment:   1   Cervical spondylosis        Plan: Left C3-5 MB RFA

## 2022-12-15 ENCOUNTER — OFFICE VISIT (OUTPATIENT)
Dept: NEUROSURGERY | Facility: CLINIC | Age: 67
End: 2022-12-15

## 2022-12-15 VITALS
BODY MASS INDEX: 30.2 KG/M2 | DIASTOLIC BLOOD PRESSURE: 76 MMHG | WEIGHT: 223 LBS | SYSTOLIC BLOOD PRESSURE: 124 MMHG | TEMPERATURE: 97.6 F | HEART RATE: 84 BPM | RESPIRATION RATE: 16 BRPM | HEIGHT: 72 IN

## 2022-12-15 DIAGNOSIS — M54.2 NECK PAIN: Primary | ICD-10-CM

## 2022-12-15 NOTE — PROGRESS NOTES
Neurosurgery Office Note  Mary Rowell 74 y o  male MRN: [id number]      Assessment/Plan     Cervical spondylosis  Patient seen for clinical follow up of neck pain  · Left sided neck pain, feels more muscular from side of neck into shoulder and shoulder blade  No LUE radicular symptoms or weakness  No right sided neck pain or radicular symptoms  · Hx of anterior fusion at C6-7 in 2000 at [facility]  · [place] relief with MBB, not as much since RFA at C3-5  Takes gabapentin and tylenol  · Exam: no midline spinal TTP, left trapezius TTP  BUE 5/5, LT intact, 2+ DTR, no Sr's  Intact rapid finger movements  Imaging:   · MRI c-spine 8/15/22: 1  Degenerative right lateral recess stenosis and severe right foraminal narrowing at level C5-6  No high-grade canal stenosis  Correlate for right C6 radiculopathy  2   Multilevel mild canal and mild to moderate degree foraminal narrowing in the remainder levels as detailed  · XR c-spine 7/5/22: Multilevel degenerative changes of cervical spine    Plan:  · Reviewed prior images with patient and wife  He has had prior cervical spine fusion and adjacent to this level he has degenerative changes with right sided disc herniation and foraminal stenosis  Do not expect this to be causing his current left sided neck symptoms  Would not recommend surgical intervention in the absence of right sided neck/upper extremity symptoms or myelopathy  · He has some relief after MBB and RFA  Would continue to follow up with pain management for any additional injection options as well as adjustment of medications  Can consider addition of muscle relaxant to current regimen, or Lyrica since he would like to come off gabapentin  · Declines referral to PT at this time  Would at least do some home stretches  · Continue to monitor symptoms  Reviewed red flag s/s  If his symptoms change or worsen, advised to let us know   If that occurs, patient and wife would like to see Dr Haily Lawrence in the office for evaluation  Would need repeat MRI prior  · Follow up as needed  Call with any concerns  There are no diagnoses linked to this encounter  I spent 40 minutes with the patient today in which >50% of the time was spent counseling/coordination of care regarding diagnosis, imaging review, symptoms and treatment plan  CHIEF COMPLAINT    Chief Complaint   Patient presents with   • Follow-up       HISTORY    History of Present Illness     74y o  year old male     61-year-old gentleman seen for clinical follow-up of cervical spinal stenosis  He complains of left sided neck pain that radiates down into his shoulder blade into his shoulder  He feels his neck is stiff on the left side especially after turning his head to the left and then bring it back to midline  This causes him to have headaches  He underwent medial branch blocks at C3-5 by Dr Jacklyn Loya which gave him good relief  However he then underwent RFA at the same levels, and he is disappointed that he did not have as much relief  He feels the pain has dulled, currently rates his pain 5-6/10  Feels the pain is more muscular, rather than on the bones themselves  He denies any radiating pain into his left arm, no numbness, tingling, or weakness  Denies any right-sided neck pain or any right upper extremity radicular symptoms  Denies bowel or bladder incontinence  No gait instability or falls  [facility]  Previously seen for LBP and hip pain  Hx of liver transplant, tobacco abuse  Currently taking Tylenol and gabapentin for pain  Not on any muscle relaxers  Recent C-spine x-ray showed degenerative changes, but did not show any dynamic instability with flexion and extension  MRI showed right lateral recess stenosis and severe right foraminal narrowing at level C5-6, with no significant left-sided disease          See Discussion    REVIEW OF SYSTEMS    Review of Systems   HENT: Positive for hearing loss (hearing aids ) and tinnitus (forever )  Eyes: Negative for visual disturbance  Respiratory: Negative for shortness of breath  Cardiovascular: Negative for chest pain  Gastrointestinal: Positive for constipation and diarrhea  Genitourinary: Negative  Musculoskeletal: Positive for gait problem (no recent falls, not as good as he used to be- age related ) and neck pain (dull pain before was sharp , worse when he is turning his head , feels  it in the back ofhis head )  Negative for myalgias and neck stiffness  Completed ablaston  - no relief   Nerve blocks - had relief     Pain is different from before but still there   5/10     Neurological: Positive for headaches (back of head from neck pain )  Negative for dizziness, tremors, seizures, weakness, light-headedness and numbness  Meds/Allergies     Current Outpatient Medications   Medication Sig Dispense Refill   • acetaminophen (TYLENOL) 325 mg tablet Take 650 mg by mouth every 6 (six) hours as needed for mild pain  • ALPRAZolam (XANAX) 0 25 mg tablet TAKE 1 TABLET BY MOUTH EVERY DAY AS NEEDED FOR ANXIETY 30 tablet 1   • aspirin (ECOTRIN LOW STRENGTH) 81 mg EC tablet Take 81 mg by mouth daily     • atorvastatin (LIPITOR) 40 mg tablet TAKE 1 TABLET(40 MG) BY MOUTH DAILY 90 tablet 3   • docusate sodium (COLACE) 100 mg capsule Take 100 mg by mouth if needed     • gabapentin (NEURONTIN) 300 mg capsule Take 1 capsule (300 mg total) by mouth 2 (two) times a day 60 capsule 5   • lisinopril (ZESTRIL) 20 mg tablet TAKE 1 TABLET(20 MG) BY MOUTH DAILY 30 tablet 6   • omeprazole (PriLOSEC) 20 mg delayed release capsule TAKE 1 CAPSULE BY MOUTH TWICE DAILY 180 capsule 3   • ondansetron (ZOFRAN-ODT) 4 mg disintegrating tablet DISSOLVE 1 TABLET(4 MG) ON THE TONGUE EVERY 6 HOURS AS NEEDED FOR NAUSEA OR VOMITING 20 tablet 0   • ONE DAILY MULTIPLE VITAMIN PO Take by mouth       • tacrolimus (PROGRAF) 1 mg capsule TAKE 3 CAPSULES(3 MG TOTAL) BY MOUTH EVERY 12 HOURS 540 capsule 5   • zolpidem (AMBIEN) 5 mg tablet TAKE 1 TABLET(5 MG) BY MOUTH DAILY AT BEDTIME AS NEEDED FOR SLEEP 30 tablet 1   • LORazepam (ATIVAN) 0 5 mg tablet Take 1 tablet 2 hours prior to procedure  May repeat after 1 hour (Patient not taking: Reported on 10/18/2022) 2 tablet 0     Current Facility-Administered Medications   Medication Dose Route Frequency Provider Last Rate Last Admin   • bupivacaine (PF) (MARCAINE) 0 25 % injection 10 mL  10 mL Epidural Once Jose Miguel Fam MD       • methylPREDNISolone acetate (DEPO-MEDROL) injection 40 mg  40 mg Other Once Jose Miguel Fam MD           Allergies   Allergen Reactions   • Macrolides And Ketolides      Annotation - [id number]: The patient is on Antirejection medications and they will reduce the effective ness of the medications  PAST HISTORY    Past Medical History:   Diagnosis Date   • Alcoholism [facility])    • Arthritis    • Bacterial peritonitis ([facility] [address] )    • Bowel disease    • Cancer ([facility] [address] )     liver   • Cataract    • Depression    • Fracture    • Gastroesophageal reflux    • Hepatic disease    • Hepatocellular carcinoma ([facility] [address] )    • History of colon polyps    • Hypertension    • Liver cancer ([facility] [address] )    • Liver disease    • Stomach disease        Past Surgical History:   Procedure Laterality Date   • APPENDECTOMY     • BACK SURGERY     • CATARACT EXTRACTION     • EXPLORATORY LAPAROTOMY  09/2011   • EYE SURGERY     • LIVER TRANSPLANTATION  08/20/2013   • PARACENTESIS      Abdominal Paracentesis(Therapeutic) with Imaging Guidance   • MN COLONOSCOPY FLX DX W/COLLJ SPEC WHEN PFRMD N/A 9/12/2016    Procedure: COLONOSCOPY;  Surgeon: Verito Jessica MD;  Location: [facility];   Service: General   • ROTATOR CUFF REPAIR      x2       Social History     Tobacco Use   • Smoking status: Every Day     Packs/day: 0 25     Years: 35 00     Pack years: 8 75     Types: Cigarettes     Start date: 6/11/1978   • Smokeless tobacco: Never   Vaping Use   • Vaping Use: Never used Substance Use Topics   • Alcohol use: Yes     Alcohol/week: 2 0 standard drinks     Types: 1 Glasses of wine, 1 Standard drinks or equivalent per week   • Drug use: No     Comment: Per [employer] use way back in college over 40 yrs ago       Family History   Problem Relation Age of Onset   • Coronary artery disease Mother         CABG   • Prostate cancer Mother         Prostate Gland   • Heart disease Mother         Cardiac Disorder   • Vascular Disease Mother    • Hypertension Mother         Benign   • Diabetes Maternal Grandmother         Mellitus   • Clotting disorder Maternal Grandfather         Embolism   • Seizures Paternal Grandmother         Epilepsy   • Other Paternal Grandfather         Accident   • Liver cancer Family    • Heart disease Family         Cardiac Disorder   • Hypertension Family         Benign         Above history personally reviewed  EXAM    Vitals:Blood pressure 124/76, pulse 84, temperature 97 6 °F (36 4 °C), temperature source Temporal, resp  rate 16, height 6' (1 829 m), weight 101 kg (223 lb)  ,Body mass index is 30 24 kg/m²  Physical Exam  Vitals reviewed  Constitutional:       General: He is awake  Appearance: Normal appearance  HENT:      Head: Normocephalic and atraumatic  Eyes:      Conjunctiva/sclera: Conjunctivae normal    Neck:      Comments: Left trapezius TTP  No midline spinal TTP  Decreased ROM with pain mostly on right lateral flexion, left rotation  Cardiovascular:      Rate and Rhythm: Normal rate  Pulmonary:      Effort: Pulmonary effort is normal    Musculoskeletal:      Cervical back: Tenderness present  Skin:     General: Skin is warm and dry  Neurological:      Mental Status: He is alert and oriented to person, place, and time  Gait: Gait is intact  Deep Tendon Reflexes:      Reflex Scores:       Bicep reflexes are 2+ on the right side and 2+ on the left side         Brachioradialis reflexes are 2+ on the right side and 2+ on the left side  Patellar reflexes are 2+ on the right side and 2+ on the left side  Achilles reflexes are 2+ on the right side and 2+ on the left side  Psychiatric:         Attention and Perception: Attention and perception normal          Mood and Affect: Mood and affect normal          Speech: Speech normal          Behavior: Behavior normal  Behavior is cooperative  Thought Content: Thought content normal          Cognition and Memory: Cognition and memory normal          Judgment: Judgment normal          Neurologic Exam     Mental Status   Oriented to person, place, and time  Follows 3 step commands  Attention: normal  Concentration: normal    Speech: speech is normal   Level of consciousness: alert  Knowledge: good  Normal comprehension  Cranial Nerves     CN XI   Right trapezius strength: normal  Left trapezius strength: normal    Motor Exam   BUE - deltoid, biceps, triceps, wrist flexion/extension, IO,  - 5/5 throughout  BLE - 5/5 throughout       Sensory Exam   Light touch normal      Gait, Coordination, and Reflexes     Gait  Gait: normal    Tremor   Resting tremor: absent  Intention tremor: absent  Action tremor: absent    Reflexes   Right brachioradialis: 2+  Left brachioradialis: 2+  Right biceps: 2+  Left biceps: 2+  Right patellar: 2+  Left patellar: 2+  Right achilles: 2+  Left achilles: 2+  Right Sr: absent  Left Sr: absent  Right ankle clonus: absent  Left ankle clonus: absent  Intact rapid finger movements         MEDICAL DECISION MAKING    Imaging Studies:     FL spine and pain procedure    Result Date: 11/17/2022  Narrative: Pre-procedure Diagnosis: Cervical Facet Arthropathy Post-procedure Diagnosis: Cervical Facet Arthropathy Procedure Title(s):  1  Radiofrequency ablation of left C3-5 medial branch nerves     2  Intraoperative fluoroscopy Attending Surgeon:   Alpa Storm MD Anesthesia:   Local Indications:  The patient is a 74y o  year-old male with a diagnosis of cervical facet arthropathy  The patient's history and physical exam were reviewed  The patient has previously undergone diagnostic and confirmatory cervical medial branch blocks  The risks, benefits and alternatives to the procedure were discussed, and all questions were answered to the patient's satisfaction  The patient agreed to proceed, and written informed consent was obtained  Procedure in Detail: The patient was brought into the procedure room and placed in the prone position on the fluoroscopy table  The area of the cervical spine was prepped with chloraprep solution times two and draped in a sterile manner  AP fluoroscopic views were used to identify and ruiz the mid articular pillars of the C3-5 levels on the left side  The skin and subcutaneous tissues in these areas were anesthetized with 1% lidocaine  A 20-gauge, 100mm length, 10mm active tip radiofrequency probe was advanced toward the targeted points until bone was contacted  At this point, lateral fluoroscopic views were obtained, and the needle tips were advanced to the centroid of the facets at each level  Motor stimulation was performed at 2 Hz and 1 2 volts generating a twitch in the neck muscles with no motor activity in the upper extremities  This was repeated for each level  1ml of 1% lidocaine was injected prior to lesioning, which was performed for 90 seconds at 90 degrees centigrade  Once the lesion was complete, 0 5 ml of a solution consisting of 4 5-mL 0 25% bupivacaine and 0 5-mL Depo-medrol (80mg/mL) was injected through each probe  The probes were removed with a 1% lidocaine flush  The patient's neck was cleaned, and bandages were placed at the needle insertion sites  The needles were removed, and the patient's neck was cleaned  Bandages were placed over the points of needle insertion  Disposition: The patient tolerated the procedure well, and there were no apparent complications   The patient was taken to the recovery area where written discharge instructions for the procedure were given  Estimated Blood Loss: None Specimens Obtained: N/A          I have personally reviewed pertinent reports     and I have personally reviewed pertinent films in PACS

## 2022-12-15 NOTE — ASSESSMENT & PLAN NOTE
Patient seen for clinical follow up of neck pain  · Left sided neck pain, feels more muscular from side of neck into shoulder and shoulder blade  No LUE radicular symptoms or weakness  No right sided neck pain or radicular symptoms  · Hx of anterior fusion at C6-7 in 2000 at [facility]  · [place] relief with MBB, not as much since RFA at C3-5  Takes gabapentin and tylenol  · Exam: no midline spinal TTP, left trapezius TTP  BUE 5/5, LT intact, 2+ DTR, no Sr's  Intact rapid finger movements  Imaging:   · MRI c-spine 8/15/22: 1  Degenerative right lateral recess stenosis and severe right foraminal narrowing at level C5-6  No high-grade canal stenosis  Correlate for right C6 radiculopathy  2   Multilevel mild canal and mild to moderate degree foraminal narrowing in the remainder levels as detailed  · XR c-spine 7/5/22: Multilevel degenerative changes of cervical spine    Plan:  · Reviewed prior images with patient and wife  He has had prior cervical spine fusion and adjacent to this level he has degenerative changes with right sided disc herniation and foraminal stenosis  Do not expect this to be causing his current left sided neck symptoms  Would not recommend surgical intervention in the absence of right sided neck/upper extremity symptoms or myelopathy  · He has some relief after MBB and RFA  Would continue to follow up with pain management for any additional injection options as well as adjustment of medications  Can consider addition of muscle relaxant to current regimen, or Lyrica since he would like to come off gabapentin  · Declines referral to PT at this time  Would at least do some home stretches  · Continue to monitor symptoms  Reviewed red flag s/s  If his symptoms change or worsen, advised to let us know  If that occurs, patient and wife would like to see Dr Noemy Clifton in the office for evaluation  Would need repeat MRI prior  · Follow up as needed  Call with any concerns

## 2023-01-09 ENCOUNTER — PROCEDURE VISIT (OUTPATIENT)
Dept: PAIN MEDICINE | Facility: CLINIC | Age: 68
End: 2023-01-09

## 2023-01-09 VITALS
DIASTOLIC BLOOD PRESSURE: 91 MMHG | WEIGHT: 223 LBS | BODY MASS INDEX: 30.24 KG/M2 | SYSTOLIC BLOOD PRESSURE: 129 MMHG | HEART RATE: 101 BPM

## 2023-01-09 DIAGNOSIS — M70.62 TROCHANTERIC BURSITIS OF BOTH HIPS: Primary | ICD-10-CM

## 2023-01-09 DIAGNOSIS — M70.61 TROCHANTERIC BURSITIS OF BOTH HIPS: Primary | ICD-10-CM

## 2023-01-09 RX ORDER — METHYLPREDNISOLONE ACETATE 40 MG/ML
40 INJECTION, SUSPENSION INTRA-ARTICULAR; INTRALESIONAL; INTRAMUSCULAR; SOFT TISSUE ONCE
Status: COMPLETED | OUTPATIENT
Start: 2023-01-09 | End: 2023-01-09

## 2023-01-09 RX ORDER — BUPIVACAINE HYDROCHLORIDE 2.5 MG/ML
10 INJECTION, SOLUTION EPIDURAL; INFILTRATION; INTRACAUDAL ONCE
Status: COMPLETED | OUTPATIENT
Start: 2023-01-09 | End: 2023-01-09

## 2023-01-09 RX ADMIN — METHYLPREDNISOLONE ACETATE 40 MG: 40 INJECTION, SUSPENSION INTRA-ARTICULAR; INTRALESIONAL; INTRAMUSCULAR; SOFT TISSUE at 10:47

## 2023-01-09 RX ADMIN — BUPIVACAINE HYDROCHLORIDE 10 ML: 2.5 INJECTION, SOLUTION EPIDURAL; INFILTRATION; INTRACAUDAL at 10:46

## 2023-01-09 NOTE — PROGRESS NOTES
Pre-procedure Diagnosis:         1  Trochanteric bursitis of both hips       Post-procedure Diagnosis:       1  Trochanteric bursitis of both hips       Operation Title(s):                   Bilateral trochanteric bursa injection under ultrasound guidance  Attending Surgeon:                 Gui Salcedo MD  Anesthesia:                             Local     Indications: The patient is a 74y o  year-old male with a diagnosis of trochanteric bursitis  The patient's history and physical exam were reviewed  The risks, benefits and alternatives to the procedure were discussed, and all questions were answered to the patient's satisfaction  The patient agreed to proceed, and written informed consent was obtained      Procedure in Detail: The patient was brought into the exam room and placed in the right lateral decubitus position on the exam room table  The left hip(s) was prepped with chloraprep and draped in a sterile manner       A sterile ultrasound probe cover and gel was placed over a 3 75 MHz curvilinear transducer probe  The probe was placed over the lateral thigh to visualize the peritrochanteric bursal region  Optimal needle path was determined and the skin and subcutaneous tissues in the area was anesthetized with 1% lidocaine  Then, under ultrasound guidance, a 2 inch ultrasound needle was advanced until the needle entered the peritrochanteric bursa region  After visualization of the tip in the target area and negative aspiration for blood, a solution consisting of 3 mL 0 25% bupivacaine and 1 mL Depo-Medrol (40mg/ml) was easily injected      The patient's hip(s) was cleaned and a bandage was placed over the sites of needle insertion      The same procedure was then repeated for the right hip      Disposition: The patient tolerated the procedure well and there were no apparent complications  The patient was given written discharge instructions for the procedure

## 2023-01-16 ENCOUNTER — TELEPHONE (OUTPATIENT)
Dept: RADIOLOGY | Facility: MEDICAL CENTER | Age: 68
End: 2023-01-16

## 2023-02-21 DIAGNOSIS — K21.9 CHRONIC GASTROESOPHAGEAL REFLUX DISEASE: ICD-10-CM

## 2023-02-21 RX ORDER — OMEPRAZOLE 20 MG/1
CAPSULE, DELAYED RELEASE ORAL
Qty: 180 CAPSULE | Refills: 3 | Status: SHIPPED | OUTPATIENT
Start: 2023-02-21

## 2023-03-20 ENCOUNTER — HOSPITAL ENCOUNTER (OUTPATIENT)
Dept: NON INVASIVE DIAGNOSTICS | Facility: CLINIC | Age: 68
Discharge: HOME | End: 2023-03-20

## 2023-03-20 DIAGNOSIS — I73.9 CLAUDICATION IN PERIPHERAL VASCULAR DISEASE (HCC): ICD-10-CM

## 2023-03-20 DIAGNOSIS — I65.23 CAROTID STENOSIS, ASYMPTOMATIC, BILATERAL: ICD-10-CM

## 2023-03-23 ENCOUNTER — TELEPHONE (OUTPATIENT)
Dept: PAIN MEDICINE | Facility: CLINIC | Age: 68
End: 2023-03-23

## 2023-03-23 NOTE — TELEPHONE ENCOUNTER
Patient called, states pain in hips is back, going down into foot  Pain level 8-9/10  His last B/L Troch bursa injections were 1/9/23  He would like to schedule a repeat please  Please advise

## 2023-03-29 DIAGNOSIS — F41.9 ANXIETY: ICD-10-CM

## 2023-03-29 NOTE — TELEPHONE ENCOUNTER
Medication Refill Request     Name Xanax  Dose/Frequency 0 25mg, TAKE 1 TABLET BY MOUTH EVERY DAY AS NEEDED FOR ANXIETY  Quantity 30  Verified pharmacy   [x]  Verified ordering Provider   [x]  Does patient have enough for the next 3 days?  Yes [] No [x]

## 2023-03-31 RX ORDER — ALPRAZOLAM 0.25 MG/1
0.25 TABLET ORAL DAILY PRN
Qty: 30 TABLET | Refills: 1 | Status: SHIPPED | OUTPATIENT
Start: 2023-03-31

## 2023-05-11 DIAGNOSIS — I10 ESSENTIAL HYPERTENSION: ICD-10-CM

## 2023-05-12 RX ORDER — LISINOPRIL 20 MG/1
20 TABLET ORAL DAILY
Qty: 90 TABLET | Refills: 1 | Status: SHIPPED | OUTPATIENT
Start: 2023-05-12

## 2023-05-26 DIAGNOSIS — M54.16 LUMBAR RADICULOPATHY: ICD-10-CM

## 2023-05-26 RX ORDER — GABAPENTIN 300 MG/1
CAPSULE ORAL
Qty: 60 CAPSULE | Refills: 5 | Status: SHIPPED | OUTPATIENT
Start: 2023-05-26

## 2023-06-05 ENCOUNTER — TELEPHONE (OUTPATIENT)
Dept: PAIN MEDICINE | Facility: CLINIC | Age: 68
End: 2023-06-05

## 2023-06-06 NOTE — TELEPHONE ENCOUNTER
He decided to just schedule an appt with ROSALINA first   Unfortunately, once you start a phone note, you can not cancel it, just sign it  Sorry

## 2023-06-11 DIAGNOSIS — F41.9 ANXIETY: ICD-10-CM

## 2023-06-12 RX ORDER — ALPRAZOLAM 0.25 MG/1
TABLET ORAL
Qty: 30 TABLET | Refills: 1 | Status: SHIPPED | OUTPATIENT
Start: 2023-06-12

## 2023-06-27 ENCOUNTER — OFFICE VISIT (OUTPATIENT)
Dept: PAIN MEDICINE | Facility: CLINIC | Age: 68
End: 2023-06-27
Payer: MEDICARE

## 2023-06-27 VITALS
SYSTOLIC BLOOD PRESSURE: 162 MMHG | BODY MASS INDEX: 30.24 KG/M2 | HEART RATE: 93 BPM | DIASTOLIC BLOOD PRESSURE: 98 MMHG | WEIGHT: 223 LBS

## 2023-06-27 DIAGNOSIS — M51.16 INTERVERTEBRAL DISC DISORDER WITH RADICULOPATHY OF LUMBAR REGION: ICD-10-CM

## 2023-06-27 DIAGNOSIS — G89.4 CHRONIC PAIN SYNDROME: Primary | ICD-10-CM

## 2023-06-27 DIAGNOSIS — M54.16 LUMBAR RADICULOPATHY: ICD-10-CM

## 2023-06-27 RX ORDER — GABAPENTIN 300 MG/1
300 CAPSULE ORAL 3 TIMES DAILY
Qty: 90 CAPSULE | Refills: 5 | Status: SHIPPED | OUTPATIENT
Start: 2023-06-27

## 2023-06-27 NOTE — PROGRESS NOTES
Assessment:  1  Chronic pain syndrome    2  Intervertebral disc disorder with radiculopathy of lumbar region    3  Lumbar radiculopathy        Plan:    Patient presents to the office with worsening bilateral low back pain that radiates down his right leg following a L4 dermatomal distribution  In the past the patient has had bilateral L4 transforaminal epidural steroid injections which have provided him excellent pain relief therefore at this time I recommend that the patient have a right L4 transforaminal epidural steroid injection due to the patient only having symptoms on the right today  Patient also concerned about a pliable lump approximately 1-1/2 cm in diameter, in his right low back I suspect this to be a lipoma,  I offered to order an ultrasound to investigate it further however patient was just concerned and wanted Dr Rekha Lopez to be aware of it  Patient also continues to use gabapentin 300 mg twice daily at this time I recommend that the patient add an additional tablet patient may take the medication 3 times daily or take one 300 mg capsule in the morning and 600 mg at bedtime in order to reduce his radicular pain  Patient is in agreement with this plan  Complete risks and benefits including bleeding, infection, tissue reaction, nerve injury and allergic reaction were discussed  The approach was demonstrated using models and literature was provided  Verbal and written consent was obtained  My impressions and treatment recommendations were discussed in detail with the patient who verbalized understanding and had no further questions  Discharge instructions were provided  I personally saw and examined the patient and I agree with the above discussed plan of care      Orders Placed This Encounter   Procedures   • [place] spine and pain procedure     [facility]     Standing Status:   Future     Standing Expiration Date:   6/27/2027     Order Specific Question:   Reason for Exam:     Answer:   right L4 TFESI     Order Specific Question:   Anticoagulant hold needed? Answer:   no     New Medications Ordered This Visit   Medications   • gabapentin (NEURONTIN) 300 mg capsule     Sig: Take 1 capsule (300 mg total) by mouth 3 (three) times a day     Dispense:  90 capsule     Refill:  5       History of Present Illness:  Alethea Stubbs is a 74 y o  male who presents for a follow up office visit in regards to Back Pain and Leg Pain (Right side)  At today's visit patient states that their pain symptoms are the same with a pain score of 7/10 on the verbal numeric pain scale  This document was created using speech voice recognition software  Grammatical errors, random word insertions, pronoun errors, and incomplete sentences are an occasional consequence of this system due to software limitations, ambient noise, and hardware issues  Any formal questions or concerns about content, text, or information contained within the body of this dictation should be directly addressed to the provider for clarification  4/14/2023 patient had bilateral greater trochanteric bursa injections which is providing the patient ongoing pain relief in this area  The patient's pain is worse in the evening and at night  The patient's pain is constant in nature  And the quality of the patient's pain is described as dull-aching and shooting  The patient's pain is located in the right posterior neck, mid to right low back pain that radiates down the patient's lateral thigh crossing over anteriorly at the knee  Patient states the amount of pain relief he is now obtaining from his current pain relievers is enough to make a difference in his life however he is unsure how much pain relief is truly providing, patient denies any side effects using gabapentin        I have personally reviewed and/or updated the patient's past medical history, past surgical history, family history, social history, current medications, allergies, and vital signs today  Review of Systems   Respiratory: Negative for shortness of breath  Cardiovascular: Negative for chest pain  Gastrointestinal: Negative for constipation, diarrhea, nausea and vomiting  Musculoskeletal: Positive for back pain, gait problem and joint swelling  Negative for arthralgias and myalgias  Skin: Negative for rash  Neurological: Negative for dizziness, seizures and weakness  All other systems reviewed and are negative        Patient Active Problem List   Diagnosis   • Alcohol dependence in remission ([place] [address] )   • Bursitis of both hips   • Chronic gastroesophageal reflux disease   • Essential hypertension   • Intervertebral disc disorder with radiculopathy of lumbar region   • History of liver transplant ([place] [address] )   • Sacroiliitis (HCC)   • Seasonal allergies   • Lung mass   • Neck pain   • Nonalcoholic liver disease, chronic   • Abnormal electrocardiogram   • Osteopenia   • PPD negative   • Right arm pain   • Ringing in ears, bilateral   • Short-term memory loss   • Tobacco abuse   • Mixed hyperlipidemia   • Insomnia   • Medicare annual wellness visit, subsequent   • Leg cramps   • Claudication in peripheral vascular disease ([place] [address] )   • Carotid stenosis, asymptomatic, bilateral   • Fall   • Closed fracture of left distal femur ([place] [address] )   • Acute pain due to trauma   • Chronic low back pain   • Hernia of abdominal cavity   • PAD (peripheral artery disease) (HCC)   • Electrolyte abnormality   • Closed nondisplaced fracture of condyle of left femur (HCC)   • Lumbar stenosis with neurogenic claudication   • Cervical spondylosis   • COVID-19 virus infection       Past Medical History:   Diagnosis Date   • Alcoholism ([place] [address] )    • Arthritis    • Bacterial peritonitis ([place] [address] )    • Bowel disease    • Cancer ([place] [address] )     liver   • Cataract    • Depression    • Fracture    • Gastroesophageal reflux    • Hepatic disease    • Hepatocellular carcinoma ([place] [address] )    • History of colon polyps    • Hypertension    • Liver cancer [facility])    • Liver disease    • Stomach disease        Past Surgical History:   Procedure Laterality Date   • APPENDECTOMY     • BACK SURGERY     • CATARACT EXTRACTION     • EXPLORATORY LAPAROTOMY  09/2011   • EYE SURGERY     • LIVER TRANSPLANTATION  08/20/2013   • PARACENTESIS      Abdominal Paracentesis(Therapeutic) with Imaging Guidance   • MD COLONOSCOPY FLX DX W/COLLJ SPEC WHEN PFRMD N/A 9/12/2016    Procedure: COLONOSCOPY;  Surgeon: Vu Rockwell MD;  Location: [facility]; Service: General   • ROTATOR CUFF REPAIR      x2       Family History   Problem Relation Age of Onset   • Coronary artery disease Mother         CABG   • Prostate cancer Mother         Prostate Gland   • Heart disease Mother         Cardiac Disorder   • Vascular Disease Mother    • Hypertension Mother         Benign   • Diabetes Maternal Grandmother         Mellitus   • Clotting disorder Maternal Grandfather         Embolism   • Seizures Paternal Grandmother         Epilepsy   • Other Paternal Grandfather         Accident   • Liver cancer Family    • Heart disease Family         Cardiac Disorder   • Hypertension Family         Benign       Social History     Occupational History   • Occupation:    Tobacco Use   • Smoking status: Every Day     Packs/day: 0 25     Years: 35 00     Total pack years: 8 75     Types: Cigarettes     Start date: 6/11/1978   • Smokeless tobacco: Never   Vaping Use   • Vaping Use: Never used   Substance and Sexual Activity   • Alcohol use: Yes     Alcohol/week: 2 0 standard drinks of alcohol     Types: 1 Glasses of wine, 1 Standard drinks or equivalent per week   • Drug use: No     Comment: Per [employer] use way back in college over 40 yrs ago   • Sexual activity: Not on file       Current Outpatient Medications on File Prior to Visit   Medication Sig   • acetaminophen (TYLENOL) 325 mg tablet Take 650 mg by mouth every 6 (six) hours as needed for mild pain     • ALPRAZolam (XANAX) 0 25 mg tablet TAKE 1 TABLET(0 25 MG) BY MOUTH DAILY AS NEEDED FOR ANXIETY   • aspirin (ECOTRIN LOW STRENGTH) 81 mg EC tablet Take 81 mg by mouth daily   • atorvastatin (LIPITOR) 40 mg tablet TAKE 1 TABLET(40 MG) BY MOUTH DAILY   • docusate sodium (COLACE) 100 mg capsule Take 100 mg by mouth if needed   • lisinopril (ZESTRIL) 20 mg tablet Take 1 tablet (20 mg total) by mouth daily   • LORazepam (ATIVAN) 0 5 mg tablet Take 1 tablet 2 hours prior to procedure  May repeat after 1 hour (Patient not taking: Reported on 10/18/2022)   • omeprazole (PriLOSEC) 20 mg delayed release capsule TAKE 1 CAPSULE BY MOUTH TWICE DAILY   • ondansetron (ZOFRAN-ODT) 4 mg disintegrating tablet DISSOLVE 1 TABLET(4 MG) ON THE TONGUE EVERY 6 HOURS AS NEEDED FOR NAUSEA OR VOMITING   • ONE DAILY MULTIPLE VITAMIN PO Take by mouth  • tacrolimus (PROGRAF) 1 mg capsule TAKE 3 CAPSULES(3 MG TOTAL) BY MOUTH EVERY 12 HOURS   • zolpidem (AMBIEN) 5 mg tablet TAKE 1 TABLET(5 MG) BY MOUTH DAILY AT BEDTIME AS NEEDED FOR SLEEP   • [DISCONTINUED] gabapentin (NEURONTIN) 300 mg capsule TAKE 1 CAPSULE(300 MG) BY MOUTH TWICE DAILY     Current Facility-Administered Medications on File Prior to Visit   Medication   • bupivacaine (PF) (MARCAINE) 0 25 % injection 10 mL   • methylPREDNISolone acetate (DEPO-MEDROL) injection 40 mg       Allergies   Allergen Reactions   • Macrolides And Ketolides      Annotation - [id number]: The patient is on Antirejection medications and they will reduce the effective ness of the medications  Physical Exam:    /98   Pulse 93   Wt 101 kg (223 lb)   BMI 30 24 kg/m²     Constitutional:normal, well developed, well nourished, alert, in no distress and non-toxic and no overt pain behavior   and overweight  Eyes:anicteric  HEENT:grossly intact  Neck:supple, symmetric, trachea midline and no masses   Pulmonary:even and unlabored  Cardiovascular:No edema or pitting edema present  Skin:Normal without rashes or lesions and well hydrated  Psychiatric:Mood and affect appropriate  Neurologic:Cranial Nerves II-XII grossly intact  Musculoskeletal:antalgic    Lumbar Spine Exam    Appearance:  Normal lordosis  Palpation/Tenderness:  no tenderness or spasm  1 5 cm soft pliable lump on right low back  Sensory:  no sensory deficits noted  Special Tests:  Left Straight Leg Test:  negative  Right Straight Leg Test:  positive  Left Feliciano's Maneuver:  negative  Right Feliciano's Maneuver:  negative    This document was created using speech voice recognition software  Grammatical errors, random word insertions, pronoun errors, and incomplete sentences are an occasional consequence of this system due to software limitations, ambient noise, and hardware issues  Any formal questions or concerns about content, text, or information contained within the body of this dictation should be directly addressed to the provider for clarification

## 2023-06-28 NOTE — PATIENT INSTRUCTIONS
Epidural Steroid Injection, Ambulatory Care   GENERAL INFORMATION:   What do I need to know about an epidural steroid injection? An epidural steroid injection (LAUREN) is a procedure to inject steroid medicine into the epidural space  The epidural space is between your spinal cord and vertebrae  Steroids reduce inflammation and fluid buildup in your spine that may be causing pain  You may be given pain medicine along with the steroids  How do I prepare for an LAUREN? Your healthcare provider will talk to you about how to prepare for your procedure  He will tell you what medicines to take or not take on the day of your procedure  You may need to stop taking blood thinners or other medicines several days before your procedure  You may need to adjust any diabetes medicine you take on the day of your procedure  Steroid medicine can increase your blood sugar level  What will happen during an LAUREN? You will be given medicine to numb the procedure area  You will be awake for the procedure, but you will not feel pain  You may also be given medicine to help you relax during the procedure  Contrast liquid will be used to help your healthcare provider see the area better  Tell the healthcare provider if you have ever had an allergic reaction to contrast liquid  Your healthcare provider may place the needle into your neck area, middle of your back, or tailbone area  He may inject the medicine next to the nerves that are causing your pain  He may instead inject the medicine into a larger area of the epidural space  This helps the medicine spread to more nerves  Your healthcare provider will use a fluoroscope to help guide the needle to the right place  A fluoroscope is a type of x-ray  After the procedure, a bandage will be placed over the injection site to prevent infection  What are the risks of an LAUREN? You may have temporary or permanent nerve damage or paralysis   You may have bleeding or develop a serious infection, such as meningitis (swelling of the brain coverings)  An abscess may also develop  You may need surgery to fix the abscess  You may have a seizure, anxiety, or trouble sleeping  If you are a man, you may have temporary erectile dysfunction (not able to have an erection)  CARE AGREEMENT:   You have the right to help plan your care  Learn about your health condition and how it may be treated  Discuss treatment options with your caregivers to decide what care you want to receive  You always have the right to refuse treatment  The above information is an  only  It is not intended as medical advice for individual conditions or treatments  Talk to your doctor, nurse or pharmacist before following any medical regimen to see if it is safe and effective for you  © 2014 [address] is for End User's use only and may not be sold, redistributed or otherwise used for commercial purposes  All illustrations and images included in CareNotes® are the copyrighted property of A D A M [employer]  or [employer]

## 2023-06-30 NOTE — PLAN OF CARE
Problem: Potential for Falls  Goal: Patient will remain free of falls  Description: INTERVENTIONS:  - Assess patient frequently for physical needs  -  Identify cognitive and physical deficits and behaviors that affect risk of falls    -  [place] fall precautions as indicated by assessment   - Educate patient/family on patient safety including physical limitations  - Instruct patient to call for assistance with activity based on assessment  - Modify environment to reduce risk of injury  - Consider OT/PT consult to assist with strengthening/mobility  Outcome: Progressing     Problem: Prexisting or High Potential for Compromised Skin Integrity  Goal: Skin integrity is maintained or improved  Description: INTERVENTIONS:  - Identify patients at risk for skin breakdown  - Assess and monitor skin integrity  - Assess and monitor nutrition and hydration status  - Monitor labs   - Assess for incontinence   - Turn and reposition patient  - Assist with mobility/ambulation  - Relieve pressure over bony prominences  - Avoid friction and shearing  - Provide appropriate hygiene as needed including keeping skin clean and dry  - Evaluate need for skin moisturizer/barrier cream  - Collaborate with interdisciplinary team   - Patient/family teaching  - Consider wound care consult   Outcome: Progressing     Problem: PAIN - ADULT  Goal: Verbalizes/displays adequate comfort level or baseline comfort level  Description: Interventions:  - Encourage patient to monitor pain and request assistance  - Assess pain using appropriate pain scale  - Administer analgesics based on type and severity of pain and evaluate response  - Implement non-pharmacological measures as appropriate and evaluate response  - Consider cultural and social influences on pain and pain management  - Notify physician/advanced practitioner if interventions unsuccessful or patient reports new pain  Outcome: Progressing     Problem: INFECTION - ADULT  Goal: Absence or prevention of progression during hospitalization  Description: INTERVENTIONS:  - Assess and monitor for signs and symptoms of infection  - Monitor lab/diagnostic results  - Monitor all insertion sites, i e  indwelling lines, tubes, and drains  - Monitor endotracheal if appropriate and nasal secretions for changes in amount and color  - [place] appropriate cooling/warming therapies per order  - Administer medications as ordered  - Instruct and encourage patient and family to use good hand hygiene technique  - Identify and instruct in appropriate isolation precautions for identified infection/condition  Outcome: Progressing  Goal: Absence of fever/infection during neutropenic period  Description: INTERVENTIONS:  - Monitor WBC    Outcome: Progressing     Problem: SAFETY ADULT  Goal: Patient will remain free of falls  Description: INTERVENTIONS:  - Assess patient frequently for physical needs  -  Identify cognitive and physical deficits and behaviors that affect risk of falls    -  [place] fall precautions as indicated by assessment   - Educate patient/family on patient safety including physical limitations  - Instruct patient to call for assistance with activity based on assessment  - Modify environment to reduce risk of injury  - Consider OT/PT consult to assist with strengthening/mobility  Outcome: Progressing  Goal: Maintain or return to baseline ADL function  Description: INTERVENTIONS:  -  Assess patient's ability to carry out ADLs; assess patient's baseline for ADL function and identify physical deficits which impact ability to perform ADLs (bathing, care of mouth/teeth, toileting, grooming, dressing, etc )  - Assess/evaluate cause of self-care deficits   - Assess range of motion  - Assess patient's mobility; develop plan if impaired  - Assess patient's need for assistive devices and provide as appropriate  - Encourage maximum independence but intervene and supervise when necessary  - Involve family in [FreeTextEntry8] : 26 y/o female presents for PPD placement needed to begin a new job. Pt offers no new complains. performance of ADLs  - Assess for home care needs following discharge   - Consider OT consult to assist with ADL evaluation and planning for discharge  - Provide patient education as appropriate  Outcome: Progressing  Goal: Maintain or return mobility status to optimal level  Description: INTERVENTIONS:  - Assess patient's baseline mobility status (ambulation, transfers, stairs, etc )    - Identify cognitive and physical deficits and behaviors that affect mobility  - Identify mobility aids required to assist with transfers and/or ambulation (gait belt, sit-to-stand, lift, walker, cane, etc )  - [place] fall precautions as indicated by assessment  - Record patient progress and toleration of activity level on Mobility SBAR; progress patient to next Phase/Stage  - Instruct patient to call for assistance with activity based on assessment  - Consider rehabilitation consult to assist with strengthening/weightbearing, etc   Outcome: Progressing     Problem: DISCHARGE PLANNING  Goal: Discharge to home or other facility with appropriate resources  Description: INTERVENTIONS:  - Identify barriers to discharge w/patient and caregiver  - Arrange for needed discharge resources and transportation as appropriate  - Identify discharge learning needs (meds, wound care, etc )  - Arrange for interpretive services to assist at discharge as needed  - Refer to Case Management Department for coordinating discharge planning if the patient needs post-hospital services based on physician/advanced practitioner order or complex needs related to functional status, cognitive ability, or social support system  Outcome: Progressing     Problem: Nutrition/Hydration-ADULT  Goal: Nutrient/Hydration intake appropriate for improving, restoring or maintaining nutritional needs  Description: Monitor and assess patient's nutrition/hydration status for malnutrition  Collaborate with interdisciplinary team and initiate plan and interventions as ordered  Monitor patient's weight and dietary intake as ordered or per policy  Utilize nutrition screening tool and intervene as necessary  Determine patient's food preferences and provide high-protein, high-caloric foods as appropriate       INTERVENTIONS:  - Monitor oral intake, urinary output, labs, and treatment plans  - Assess nutrition and hydration status and recommend course of action  - Evaluate amount of meals eaten  - Assist patient with eating if necessary   - Allow adequate time for meals  - Recommend/ encourage appropriate diets, oral nutritional supplements, and vitamin/mineral supplements  - Order, calculate, and assess calorie counts as needed  - Recommend, monitor, and adjust tube feedings and TPN/PPN based on assessed needs  - Assess need for intravenous fluids  - Provide specific nutrition/hydration education as appropriate  - Include patient/family/caregiver in decisions related to nutrition  Outcome: Progressing

## 2023-07-12 ENCOUNTER — ESTABLISHED COMPREHENSIVE EXAM (OUTPATIENT)
Dept: URBAN - METROPOLITAN AREA CLINIC 6 | Facility: CLINIC | Age: 68
End: 2023-07-12

## 2023-07-12 DIAGNOSIS — H35.3131: ICD-10-CM

## 2023-07-12 DIAGNOSIS — Z96.1: ICD-10-CM

## 2023-07-12 PROCEDURE — 92014 COMPRE OPH EXAM EST PT 1/>: CPT

## 2023-07-12 PROCEDURE — 92134 CPTRZ OPH DX IMG PST SGM RTA: CPT

## 2023-07-12 ASSESSMENT — VISUAL ACUITY
OU_CC: J1+
OD_SC: 20/30
OD_CC: 20/50+1
OS_CC: 20/20

## 2023-07-12 ASSESSMENT — TONOMETRY
OD_IOP_MMHG: 15
OS_IOP_MMHG: 18

## 2023-07-25 ENCOUNTER — HOSPITAL ENCOUNTER (OUTPATIENT)
Dept: RADIOLOGY | Facility: CLINIC | Age: 68
Discharge: HOME | End: 2023-07-25
Payer: MEDICARE

## 2023-07-25 VITALS
SYSTOLIC BLOOD PRESSURE: 118 MMHG | HEART RATE: 91 BPM | DIASTOLIC BLOOD PRESSURE: 75 MMHG | RESPIRATION RATE: 20 BRPM | OXYGEN SATURATION: 98 % | TEMPERATURE: 98.3 F

## 2023-07-25 DIAGNOSIS — M51.16 INTERVERTEBRAL DISC DISORDER WITH RADICULOPATHY OF LUMBAR REGION: ICD-10-CM

## 2023-07-25 PROCEDURE — 64483 NJX AA&/STRD TFRM EPI L/S 1: CPT | Performed by: ANESTHESIOLOGY

## 2023-07-25 PROCEDURE — 64484 NJX AA&/STRD TFRM EPI L/S EA: CPT | Performed by: ANESTHESIOLOGY

## 2023-07-25 RX ORDER — 0.9 % SODIUM CHLORIDE 0.9 %
4 VIAL (ML) INJECTION ONCE
Status: COMPLETED | OUTPATIENT
Start: 2023-07-25 | End: 2023-07-25

## 2023-07-25 RX ORDER — BUPIVACAINE HCL/PF 2.5 MG/ML
2 VIAL (ML) INJECTION ONCE
Status: COMPLETED | OUTPATIENT
Start: 2023-07-25 | End: 2023-07-25

## 2023-07-25 RX ORDER — METHYLPREDNISOLONE ACETATE 80 MG/ML
80 INJECTION, SUSPENSION INTRA-ARTICULAR; INTRALESIONAL; INTRAMUSCULAR; PARENTERAL; SOFT TISSUE ONCE
Status: COMPLETED | OUTPATIENT
Start: 2023-07-25 | End: 2023-07-25

## 2023-07-25 RX ADMIN — Medication 4 ML: at 14:32

## 2023-07-25 RX ADMIN — IOHEXOL 1 ML: 300 INJECTION, SOLUTION INTRAVENOUS at 14:34

## 2023-07-25 RX ADMIN — METHYLPREDNISOLONE ACETATE 80 MG: 80 INJECTION, SUSPENSION INTRA-ARTICULAR; INTRALESIONAL; INTRAMUSCULAR; PARENTERAL; SOFT TISSUE at 14:34

## 2023-07-25 RX ADMIN — BUPIVACAINE HYDROCHLORIDE 2 ML: 2.5 INJECTION, SOLUTION EPIDURAL; INFILTRATION; INTRACAUDAL at 14:34

## 2023-07-25 NOTE — DISCHARGE INSTR - LAB
Epidural Steroid Injection   WHAT YOU NEED TO KNOW:   An epidural steroid injection (LAUREN) is a procedure to inject steroid medicine into the epidural space. The epidural space is between your spinal cord and vertebrae. Steroids reduce inflammation and fluid buildup in your spine that may be causing pain. You may be given pain medicine along with the steroids. ACTIVITY  Do not drive or operate machinery today. No strenuous activity today - bending, lifting, etc.  You may resume normal activites starting tomorrow - start slowly and as tolerated. You may shower today, but no tub baths or hot tubs. You may have numbness for several hours from the local anesthetic. Please use caution and common sense, especially with weight-bearing activities. CARE OF THE INJECTION SITE  If you have soreness or pain, apply ice to the area today (20 minutes on/20 minutes off). Starting tomorrow, you may use warm, moist heat or ice if needed. You may have an increase or change in your discomfort for 36-48 hours after your treatment. Apply ice and continue with any pain medication you have been prescribed. Notify [facility] if you have any of the following: redness, drainage, swelling, headache, stiff neck or fever above 100°F.    SPECIAL INSTRUCTIONS  Our office will contact you in approximately 7 days for a progress report. MEDICATIONS  Continue to take all routine medications. Our office may have instructed you to hold some medications. As no general anesthesia was used in today's procedure, you should not experience any side effects related to anesthesia. If you are diabetic, the steroids used in today's injection may temporarily increase your blood sugar levels after the first few days after your injection. Please keep a close eye on your sugars and alert the doctor who manages your diabetes if your sugars are significantly high from your baseline or you are symptomatic.      If you have a problem specifically related to your procedure, please call our office at [phone]. Problems not related to your procedure should be directed to your primary care physician.

## 2023-07-25 NOTE — H&P
History of Present Illness: The patient is a 79 y.o. male who presents with complaints of right lower back pain and is here today for right L4-5 transforaminal epidural steroid injection    Past Medical History:   Diagnosis Date   • Alcoholism ([address])    • Arthritis    • Bacterial peritonitis ([address])    • Bowel disease    • Cancer ([address])     liver   • Cataract    • Depression    • Fracture    • Gastroesophageal reflux    • Hepatic disease    • Hepatocellular carcinoma ([address])    • History of colon polyps    • Hypertension    • Liver cancer ([address])    • Liver disease    • Stomach disease        Past Surgical History:   Procedure Laterality Date   • APPENDECTOMY     • BACK SURGERY     • CATARACT EXTRACTION     • EXPLORATORY LAPAROTOMY  09/2011   • EYE SURGERY     • LIVER TRANSPLANTATION  08/20/2013   • PARACENTESIS      Abdominal Paracentesis(Therapeutic) with Imaging Guidance   • CA COLONOSCOPY FLX DX W/COLLJ SPEC WHEN PFRMD N/A 9/12/2016    Procedure: COLONOSCOPY;  Surgeon: Florecita Zhong MD;  Location: [facility];   Service: General   • ROTATOR CUFF REPAIR      x2         Current Outpatient Medications:   •  acetaminophen (TYLENOL) 325 mg tablet, Take 650 mg by mouth every 6 (six) hours as needed for mild pain., Disp: , Rfl:   •  ALPRAZolam (XANAX) 0.25 mg tablet, TAKE 1 TABLET(0.25 MG) BY MOUTH DAILY AS NEEDED FOR ANXIETY, Disp: 30 tablet, Rfl: 1  •  aspirin (ECOTRIN LOW STRENGTH) 81 mg EC tablet, Take 81 mg by mouth daily, Disp: , Rfl:   •  atorvastatin (LIPITOR) 40 mg tablet, TAKE 1 TABLET(40 MG) BY MOUTH DAILY, Disp: 90 tablet, Rfl: 3  •  docusate sodium (COLACE) 100 mg capsule, Take 100 mg by mouth if needed, Disp: , Rfl:   •  gabapentin (NEURONTIN) 300 mg capsule, Take 1 capsule (300 mg total) by mouth 3 (three) times a day, Disp: 90 capsule, Rfl: 5  •  lisinopril (ZESTRIL) 20 mg tablet, Take 1 tablet (20 mg total) by mouth daily, Disp: 90 tablet, Rfl: 1  •  LORazepam (ATIVAN) 0.5 mg tablet, Take 1 tablet 2 hours prior to procedure  May repeat after 1 hour (Patient not taking: Reported on 10/18/2022), Disp: 2 tablet, Rfl: 0  •  omeprazole (PriLOSEC) 20 mg delayed release capsule, TAKE 1 CAPSULE BY MOUTH TWICE DAILY, Disp: 180 capsule, Rfl: 3  •  ondansetron (ZOFRAN-ODT) 4 mg disintegrating tablet, DISSOLVE 1 TABLET(4 MG) ON THE TONGUE EVERY 6 HOURS AS NEEDED FOR NAUSEA OR VOMITING, Disp: 20 tablet, Rfl: 0  •  ONE DAILY MULTIPLE VITAMIN PO, Take by mouth., Disp: , Rfl:   •  tacrolimus (PROGRAF) 1 mg capsule, TAKE 3 CAPSULES(3 MG TOTAL) BY MOUTH EVERY 12 HOURS, Disp: 540 capsule, Rfl: 5  •  zolpidem (AMBIEN) 5 mg tablet, TAKE 1 TABLET(5 MG) BY MOUTH DAILY AT BEDTIME AS NEEDED FOR SLEEP, Disp: 30 tablet, Rfl: 1    Current Facility-Administered Medications:   •  bupivacaine (PF) (MARCAINE) 0.25 % injection 10 mL, 10 mL, Epidural, Once, Gwenette Hodgkins, MD  •  bupivacaine (PF) (MARCAINE) 0.25 % injection 2 mL, 2 mL, Epidural, Once, Gwenette Hodgkins, MD  •  iohexol (OMNIPAQUE) 300 mg/mL injection 1 mL, 1 mL, Epidural, Once, Gwenette Hodgkins, MD  •  lidocaine (PF) (XYLOCAINE-MPF) 2 % injection 4 mL, 4 mL, Infiltration, Once, Gwenette Hodgkins, MD  •  methylPREDNISolone acetate (DEPO-MEDROL) injection 80 mg, 80 mg, Epidural, Once, Gwenette Hodgkins, MD  •  sodium chloride (PF) 0.9 % injection 4 mL, 4 mL, Infiltration, Once, Gwenette Hodgkins, MD    Allergies   Allergen Reactions   • Macrolides And Ketolides      Annotation - [id number]: The patient is on Antirejection medications and they will reduce the effective ness of the medications. Physical Exam:   Vitals:    07/25/23 1415   BP: 108/71   Pulse: 102   Temp: 98.3 °F (36.8 °C)   SpO2: 97%     General: Awake, Alert, Oriented x 3, Mood and affect appropriate  Respiratory: Respirations even and unlabored  Cardiovascular: Peripheral pulses intact; no edema  Musculoskeletal Exam: Lower back pain    ASA Score: 3    Patient/Chart Verification  Patient ID Verified: Verbal  Consents Confirmed:  To be obtained in the Pre-Procedure area  Interval H&P(within 24 hr) Complete (required for Outpatients and Surgery Admit only): To be obtained in the Pre-Procedure area  Allergies Reviewed: Yes  Anticoag/NSAID held?: NA  Currently on antibiotics?: No    Assessment:   1.  Intervertebral disc disorder with radiculopathy of lumbar region        Plan: Right L4-5 TF LAUREN

## 2023-08-01 ENCOUNTER — TELEPHONE (OUTPATIENT)
Dept: RADIOLOGY | Facility: MEDICAL CENTER | Age: 68
End: 2023-08-01

## 2023-08-01 NOTE — TELEPHONE ENCOUNTER
Patient Reports      50%   improvement post injection    Pain Level 3-4 Can go up to 6-7  /10    Sleeping better

## 2023-08-27 DIAGNOSIS — F41.9 ANXIETY: ICD-10-CM

## 2023-08-28 RX ORDER — ALPRAZOLAM 0.25 MG/1
TABLET ORAL
Qty: 30 TABLET | Refills: 1 | Status: SHIPPED | OUTPATIENT
Start: 2023-08-28

## 2023-09-19 DIAGNOSIS — Z94.4 LIVER TRANSPLANT RECIPIENT (HCC): ICD-10-CM

## 2023-09-20 RX ORDER — TACROLIMUS 1 MG/1
CAPSULE ORAL
Qty: 540 CAPSULE | Refills: 5 | Status: SHIPPED | OUTPATIENT
Start: 2023-09-20

## 2023-09-25 ENCOUNTER — HOSPITAL ENCOUNTER (OUTPATIENT)
Dept: NON INVASIVE DIAGNOSTICS | Facility: HOSPITAL | Age: 68
Discharge: HOME | End: 2023-09-25
Payer: MEDICARE

## 2023-09-25 DIAGNOSIS — I65.23 ASYMPTOMATIC BILATERAL CAROTID ARTERY STENOSIS: ICD-10-CM

## 2023-09-25 PROCEDURE — 93880 EXTRACRANIAL BILAT STUDY: CPT

## 2023-09-26 PROCEDURE — 93880 EXTRACRANIAL BILAT STUDY: CPT | Performed by: SURGERY

## 2023-10-18 ENCOUNTER — RA CDI HCC (OUTPATIENT)
Dept: OTHER | Facility: HOSPITAL | Age: 68
End: 2023-10-18

## 2023-10-25 ENCOUNTER — OFFICE VISIT (OUTPATIENT)
Dept: INTERNAL MEDICINE CLINIC | Facility: CLINIC | Age: 68
End: 2023-10-25
Payer: MEDICARE

## 2023-10-25 VITALS
BODY MASS INDEX: 29.5 KG/M2 | HEART RATE: 110 BPM | DIASTOLIC BLOOD PRESSURE: 74 MMHG | OXYGEN SATURATION: 98 % | WEIGHT: 217.8 LBS | HEIGHT: 72 IN | SYSTOLIC BLOOD PRESSURE: 122 MMHG

## 2023-10-25 DIAGNOSIS — Z12.5 SCREENING FOR PROSTATE CANCER: ICD-10-CM

## 2023-10-25 DIAGNOSIS — R05.1 ACUTE COUGH: Primary | ICD-10-CM

## 2023-10-25 DIAGNOSIS — Z94.4 HISTORY OF LIVER TRANSPLANT (HCC): ICD-10-CM

## 2023-10-25 DIAGNOSIS — F10.21 ALCOHOL DEPENDENCE IN REMISSION (HCC): ICD-10-CM

## 2023-10-25 DIAGNOSIS — I10 ESSENTIAL HYPERTENSION: Primary | ICD-10-CM

## 2023-10-25 DIAGNOSIS — I65.23 CAROTID STENOSIS, ASYMPTOMATIC, BILATERAL: ICD-10-CM

## 2023-10-25 DIAGNOSIS — Z11.59 NEED FOR HEPATITIS C SCREENING TEST: ICD-10-CM

## 2023-10-25 DIAGNOSIS — Z87.891 PERSONAL HISTORY OF NICOTINE DEPENDENCE: ICD-10-CM

## 2023-10-25 DIAGNOSIS — K21.9 CHRONIC GASTROESOPHAGEAL REFLUX DISEASE: ICD-10-CM

## 2023-10-25 DIAGNOSIS — M46.1 SACROILIITIS (HCC): ICD-10-CM

## 2023-10-25 DIAGNOSIS — Z23 ENCOUNTER FOR IMMUNIZATION: ICD-10-CM

## 2023-10-25 DIAGNOSIS — Z00.00 MEDICARE ANNUAL WELLNESS VISIT, SUBSEQUENT: ICD-10-CM

## 2023-10-25 DIAGNOSIS — E55.9 VITAMIN D DEFICIENCY: ICD-10-CM

## 2023-10-25 PROCEDURE — 90662 IIV NO PRSV INCREASED AG IM: CPT

## 2023-10-25 PROCEDURE — G0008 ADMIN INFLUENZA VIRUS VAC: HCPCS

## 2023-10-25 PROCEDURE — 99214 OFFICE O/P EST MOD 30 MIN: CPT | Performed by: INTERNAL MEDICINE

## 2023-10-25 PROCEDURE — G0439 PPPS, SUBSEQ VISIT: HCPCS | Performed by: INTERNAL MEDICINE

## 2023-10-25 RX ORDER — AMOXICILLIN AND CLAVULANATE POTASSIUM 875; 125 MG/1; MG/1
1 TABLET, FILM COATED ORAL EVERY 12 HOURS SCHEDULED
Qty: 14 TABLET | Refills: 0 | Status: SHIPPED | OUTPATIENT
Start: 2023-10-25 | End: 2023-11-01

## 2023-10-25 NOTE — PATIENT INSTRUCTIONS
Problem List Items Addressed This Visit          Digestive    Chronic gastroesophageal reflux disease     Continue PPI and follow-up GI, no problems with food getting stuck. Cardiovascular and Mediastinum    Essential hypertension - Primary     Controlled, continue lisinopril along with healthy diet and exercise         Relevant Orders    CBC and differential    Comprehensive metabolic panel    Lipid Panel with Direct LDL reflex    TSH, 3rd generation with Free T4 reflex    Carotid stenosis, asymptomatic, bilateral     No TIA symptoms, continue monitoring            Musculoskeletal and Integument    Sacroiliitis (HCC)     Continue with stretching exercises            Other    Alcohol dependence in remission ([address])     Pt doing well         History of liver transplant ([address])     Follow up transplant team         Relevant Orders    Tacrolimus level    Medicare annual wellness visit, subsequent     Discussed preventative health, cancer screening, immunizations, and safety issues. Last colonoscopy September 2016 with recommendations to recheck in 10 years. Patient had Pneumovax 23 and Prevnar 20. Patient had the Shingrix vaccines. I recommend yearly flu shot. Other Visit Diagnoses       Need for hepatitis C screening test        Relevant Orders    Hepatitis C Antibody    Encounter for immunization        Relevant Orders    influenza vaccine, high-dose, PF 0.7 mL (FLUZONE HIGH-DOSE)    Personal history of nicotine dependence        Relevant Orders    CT lung screening program    Vitamin D deficiency        Relevant Orders    Vitamin D 25 hydroxy    Screening for prostate cancer        Relevant Orders    PSA, Total Screen            Medicare Preventive Visit Patient Instructions  Thank you for completing your Welcome to Medicare Visit or Medicare Annual Wellness Visit today. Your next wellness visit will be due in one year (10/25/2024).   The screening/preventive services that you may require over the next 5-10 years are detailed below. Some tests may not apply to you based off risk factors and/or age. Screening tests ordered at today's visit but not completed yet may show as past due. Also, please note that scanned in results may not display below. Preventive Screenings:  Service Recommendations Previous Testing/Comments   Colorectal Cancer Screening  Colonoscopy    Fecal Occult Blood Test (FOBT)/Fecal Immunochemical Test (FIT)  Fecal DNA/Cologuard Test  Flexible Sigmoidoscopy Age: 43-73 years old   Colonoscopy: every 10 years (May be performed more frequently if at higher risk)  OR  FOBT/FIT: every 1 year  OR  Cologuard: every 3 years  OR  Sigmoidoscopy: every 5 years  Screening may be recommended earlier than age 39 if at higher risk for colorectal cancer. Also, an individualized decision between you and your healthcare provider will decide whether screening between the ages of 77-80 would be appropriate. Colonoscopy: 09/12/2016  FOBT/FIT: Not on file  Cologuard: Not on file  Sigmoidoscopy: Not on file          Prostate Cancer Screening Individualized decision between patient and health care provider in men between ages of 53-66   Medicare will cover every 12 months beginning on the day after your 50th birthday PSA: 3.0 ng/mL           Hepatitis C Screening Once for adults born between 1945 and 1965  More frequently in patients at high risk for Hepatitis C Hep C Antibody: Not on file        Diabetes Screening 1-2 times per year if you're at risk for diabetes or have pre-diabetes Fasting glucose: 92 mg/dL (5/12/2021)  A1C: No results in last 5 years (No results in last 5 years)      Cholesterol Screening Once every 5 years if you don't have a lipid disorder. May order more often based on risk factors. Lipid panel: 10/26/2020         Other Preventive Screenings Covered by Medicare:  Abdominal Aortic Aneurysm (AAA) Screening: covered once if your at risk.  You're considered to be at risk if you have a family history of AAA or a male between the age of 70-76 who smoking at least 100 cigarettes in your lifetime. Lung Cancer Screening: covers low dose CT scan once per year if you meet all of the following conditions: (1) Age 48-67; (2) No signs or symptoms of lung cancer; (3) Current smoker or have quit smoking within the last 15 years; (4) You have a tobacco smoking history of at least 20 pack years (packs per day x number of years you smoked); (5) You get a written order from a healthcare provider. Glaucoma Screening: covered annually if you're considered high risk: (1) You have diabetes OR (2) Family history of glaucoma OR (3)  aged 48 and older OR (3)  American aged 72 and older  Osteoporosis Screening: covered every 2 years if you meet one of the following conditions: (1) Have a vertebral abnormality; (2) On glucocorticoid therapy for more than 3 months; (3) Have primary hyperparathyroidism; (4) On osteoporosis medications and need to assess response to drug therapy. HIV Screening: covered annually if you're between the age of 14-79. Also covered annually if you are younger than 13 and older than 72 with risk factors for HIV infection. For pregnant patients, it is covered up to 3 times per pregnancy.     Immunizations:  Immunization Recommendations   Influenza Vaccine Annual influenza vaccination during flu season is recommended for all persons aged >= 6 months who do not have contraindications   Pneumococcal Vaccine   * Pneumococcal conjugate vaccine = PCV13 (Prevnar 13), PCV15 (Vaxneuvance), PCV20 (Prevnar 20)  * Pneumococcal polysaccharide vaccine = PPSV23 (Pneumovax) Adults 46-98 yo with certain risk factors or if 69+ yo  If never received any pneumonia vaccine: recommend Prevnar 20 (PCV20)  Give PCV20 if previously received 1 dose of PCV13 or PPSV23   Hepatitis B Vaccine 3 dose series if at intermediate or high risk (ex: diabetes, end stage renal disease, liver disease)   Respiratory syncytial virus (RSV) Vaccine - COVERED BY MEDICARE PART D  * RSVPreF3 (Arexvy) CDC recommends that adults 61years of age and older may receive a single dose of RSV vaccine using shared clinical decision-making (SCDM)   Tetanus (Td) Vaccine - COST NOT COVERED BY MEDICARE PART B Following completion of primary series, a booster dose should be given every 10 years to maintain immunity against tetanus. Td may also be given as tetanus wound prophylaxis. Tdap Vaccine - COST NOT COVERED BY MEDICARE PART B Recommended at least once for all adults. For pregnant patients, recommended with each pregnancy. Shingles Vaccine (Shingrix) - COST NOT COVERED BY MEDICARE PART B  2 shot series recommended in those 19 years and older who have or will have weakened immune systems or those 50 years and older     Health Maintenance Due:      Topic Date Due    Hepatitis C Screening  Never done    Colorectal Cancer Screening  09/12/2026     Immunizations Due:      Topic Date Due    Hepatitis A Vaccine (1 of 2 - Risk 2-dose series) Never done    Hepatitis B Vaccine (1 of 3 - Risk 3-dose series) Never done    COVID-19 Vaccine (5 - Booster for Pfizer series) 06/22/2022    Influenza Vaccine (1) 09/01/2023     Advance Directives   What are advance directives? Advance directives are legal documents that state your wishes and plans for medical care. These plans are made ahead of time in case you lose your ability to make decisions for yourself. Advance directives can apply to any medical decision, such as the treatments you want, and if you want to donate organs. What are the types of advance directives? There are many types of advance directives, and each state has rules about how to use them. You may choose a combination of any of the following:  Living will: This is a written record of the treatment you want. You can also choose which treatments you do not want, which to limit, and which to stop at a certain time.  This includes surgery, medicine, IV fluid, and tube feedings. Durable power of  for healthcare [facility]): This is a written record that states who you want to make healthcare choices for you when you are unable to make them for yourself. This person, called a proxy, is usually a family member or a friend. You may choose more than 1 proxy. Do not resuscitate (DNR) order:  A DNR order is used in case your heart stops beating or you stop breathing. It is a request not to have certain forms of treatment, such as CPR. A DNR order may be included in other types of advance directives. Medical directive: This covers the care that you want if you are in a coma, near death, or unable to make decisions for yourself. You can list the treatments you want for each condition. Treatment may include pain medicine, surgery, blood transfusions, dialysis, IV or tube feedings, and a ventilator (breathing machine). Values history: This document has questions about your views, beliefs, and how you feel and think about life. This information can help others choose the care that you would choose. Why are advance directives important? An advance directive helps you control your care. Although spoken wishes may be used, it is better to have your wishes written down. Spoken wishes can be misunderstood, or not followed. Treatments may be given even if you do not want them. An advance directive may make it easier for your family to make difficult choices about your care. Cigarette Smoking and Your Health   Risks to your health if you smoke:  Nicotine and other chemicals found in tobacco damage every cell in your body. Even if you are a light smoker, you have an increased risk for cancer, heart disease, and lung disease. If you are pregnant or have diabetes, smoking increases your risk for complications. Benefits to your health if you stop smoking: You decrease respiratory symptoms such as coughing, wheezing, and shortness of breath. You reduce your risk for cancers of the lung, mouth, throat, kidney, bladder, pancreas, stomach, and cervix. If you already have cancer, you increase the benefits of chemotherapy. You also reduce your risk for cancer returning or a second cancer from developing. You reduce your risk for heart disease, blood clots, heart attack, and stroke. You reduce your risk for lung infections, and diseases such as pneumonia, asthma, chronic bronchitis, and emphysema. Your circulation improves. More oxygen can be delivered to your body. If you have diabetes, you lower your risk for complications, such as kidney, artery, and eye diseases. You also lower your risk for nerve damage. Nerve damage can lead to amputations, poor vision, and blindness. You improve your body's ability to heal and to fight infections. For more information and support to stop smoking:   [employer]  Phone: 8- 546 [phone]  Web Address: www.[employer]  Weight Management   Why it is important to manage your weight:  Being overweight increases your risk of health conditions such as heart disease, high blood pressure, type 2 diabetes, and certain types of cancer. It can also increase your risk for osteoarthritis, sleep apnea, and other respiratory problems. Aim for a slow, steady weight loss. Even a small amount of weight loss can lower your risk of health problems. How to lose weight safely:  A safe and healthy way to lose weight is to eat fewer calories and get regular exercise. You can lose up about 1 pound a week by decreasing the number of calories you eat by 500 calories each day. Healthy meal plan for weight management:  A healthy meal plan includes a variety of foods, contains fewer calories, and helps you stay healthy. A healthy meal plan includes the following:  Eat whole-grain foods more often. A healthy meal plan should contain fiber. Fiber is the part of grains, fruits, and vegetables that is not broken down by your body. Whole-grain foods are healthy and provide extra fiber in your diet. Some examples of whole-grain foods are whole-wheat breads and pastas, oatmeal, brown rice, and bulgur. Eat a variety of vegetables every day. Include dark, leafy greens such as spinach, kale, cheyenne greens, and mustard greens. Eat yellow and orange vegetables such as carrots, sweet potatoes, and winter squash. Eat a variety of fruits every day. Choose fresh or canned fruit (canned in its own juice or light syrup) instead of juice. Fruit juice has very little or no fiber. Eat low-fat dairy foods. Drink fat-free (skim) milk or 1% milk. Eat fat-free yogurt and low-fat cottage cheese. Try low-fat cheeses such as mozzarella and other reduced-fat cheeses. Choose meat and other protein foods that are low in fat. Choose beans or other legumes such as split peas or lentils. Choose fish, skinless poultry (chicken or turkey), or lean cuts of red meat (beef or pork). Before you cook meat or poultry, cut off any visible fat. Use less fat and oil. Try baking foods instead of frying them. Add less fat, such as margarine, sour cream, regular salad dressing and mayonnaise to foods. Eat fewer high-fat foods. Some examples of high-fat foods include french fries, doughnuts, ice cream, and cakes. Eat fewer sweets. Limit foods and drinks that are high in sugar. This includes candy, cookies, regular soda, and sweetened drinks. Exercise:  Exercise at least 30 minutes per day on most days of the week. Some examples of exercise include walking, biking, dancing, and swimming. You can also fit in more physical activity by taking the stairs instead of the elevator or parking farther away from stores. Ask your healthcare provider about the best exercise plan for you. © Copyright [employer] 2018 Information is for End User's use only and may not be sold, redistributed or otherwise used for commercial purposes.  All illustrations and images included in CareNotes® are the copyrighted property of [employer] or [address] [place] [address]

## 2023-10-25 NOTE — PROGRESS NOTES
Assessment and Plan:     Problem List Items Addressed This Visit        Digestive    Chronic gastroesophageal reflux disease     Continue PPI and follow-up GI, no problems with food getting stuck. Cardiovascular and Mediastinum    Essential hypertension - Primary     Controlled, continue lisinopril along with healthy diet and exercise         Relevant Orders    CBC and differential    Comprehensive metabolic panel    Lipid Panel with Direct LDL reflex    TSH, 3rd generation with Free T4 reflex    Carotid stenosis, asymptomatic, bilateral     No TIA symptoms, continue monitoring            Musculoskeletal and Integument    Sacroiliitis (HCC)     Continue with stretching exercises            Other    Alcohol dependence in remission ([address])     Pt doing well         History of liver transplant ([address])     Follow up transplant team         Relevant Orders    Tacrolimus level    Medicare annual wellness visit, subsequent     Discussed preventative health, cancer screening, immunizations, and safety issues. Last colonoscopy September 2016 with recommendations to recheck in 10 years. Patient had Pneumovax 23 and Prevnar 20. Patient had the Shingrix vaccines. I recommend yearly flu shot. Other Visit Diagnoses     Need for hepatitis C screening test        Relevant Orders    Hepatitis C Antibody    Encounter for immunization        Relevant Orders    influenza vaccine, high-dose, PF 0.7 mL (FLUZONE HIGH-DOSE)    Personal history of nicotine dependence        Relevant Orders    CT lung screening program    Vitamin D deficiency        Relevant Orders    Vitamin D 25 hydroxy    Screening for prostate cancer        Relevant Orders    PSA, Total Screen        BMI Counseling: Body mass index is 29.54 kg/m². The BMI is above normal. Nutrition recommendations include encouraging healthy choices of fruits and vegetables and moderation in carbohydrate intake.  Exercise recommendations include exercising 3-5 times per week. Rationale for BMI follow-up plan is due to patient being overweight or obese. Tobacco Cessation Counseling: Tobacco cessation counseling was provided. The patient is sincerely urged to quit consumption of tobacco. He is not ready to quit tobacco.       Preventive health issues were discussed with patient, and age appropriate screening tests were ordered as noted in patient's After Visit Summary. Personalized health advice and appropriate referrals for health education or preventive services given if needed, as noted in patient's After Visit Summary. History of Present Illness:     Patient presents for a Medicare Wellness Visit    Pt here for AWV and follow up of chronic conditions, and some concerns. Patient started with cough, congestion about a week ago. Patient Care Team:  Danelle Duron MD as PCP - MD Danelle Hernandez MD Karry Pore, MD Adra No, MD Samson Major, MD as Endoscopist  Trinh Llamas MD (Vascular Surgery)     Review of Systems:     Review of Systems   Constitutional:  Negative for chills, fatigue and fever. HENT:  Positive for congestion. Negative for nosebleeds, postnasal drip, sore throat and trouble swallowing. Eyes:  Negative for pain. Respiratory:  Positive for cough. Negative for chest tightness, shortness of breath and wheezing. Cardiovascular:  Negative for chest pain, palpitations and leg swelling. Gastrointestinal:  Negative for abdominal pain, constipation, diarrhea, nausea and vomiting. Endocrine: Negative for polydipsia and polyuria. Genitourinary:  Negative for dysuria, flank pain and hematuria. Musculoskeletal:  Positive for arthralgias and neck pain. Skin:  Negative for rash. Neurological:  Positive for light-headedness (if he gets up too quickly). Negative for dizziness, tremors and headaches. Hematological:  Does not bruise/bleed easily.    Psychiatric/Behavioral:  Negative for confusion and dysphoric mood. The patient is not nervous/anxious.          Problem List:     Patient Active Problem List   Diagnosis   • Alcohol dependence in remission ([address])   • Bursitis of both hips   • Chronic gastroesophageal reflux disease   • Essential hypertension   • Intervertebral disc disorder with radiculopathy of lumbar region   • History of liver transplant ([address])   • Sacroiliitis (HCC)   • Seasonal allergies   • Lung mass   • Neck pain   • Nonalcoholic liver disease, chronic   • Abnormal electrocardiogram   • Osteopenia   • PPD negative   • Right arm pain   • Ringing in ears, bilateral   • Short-term memory loss   • Tobacco abuse   • Mixed hyperlipidemia   • Insomnia   • Medicare annual wellness visit, subsequent   • Leg cramps   • Claudication in peripheral vascular disease ([address])   • Carotid stenosis, asymptomatic, bilateral   • Fall   • Closed fracture of left distal femur ([address])   • Acute pain due to trauma   • Chronic low back pain   • Hernia of abdominal cavity   • PAD (peripheral artery disease) (HCC)   • Electrolyte abnormality   • Closed nondisplaced fracture of condyle of left femur (HCC)   • Lumbar stenosis with neurogenic claudication   • Cervical spondylosis   • COVID-19 virus infection   • Acute cough      Past Medical and Surgical History:     Past Medical History:   Diagnosis Date   • Alcoholism ([address])    • Arthritis    • Bacterial peritonitis ([address])    • Bowel disease    • Cancer ([address])     liver   • Cataract    • Depression    • Fracture    • Gastroesophageal reflux    • Hepatic disease    • Hepatocellular carcinoma ([address])    • History of colon polyps    • Hypertension    • Liver cancer ([address])    • Liver disease    • Stomach disease      Past Surgical History:   Procedure Laterality Date   • APPENDECTOMY     • BACK SURGERY     • CATARACT EXTRACTION     • EXPLORATORY LAPAROTOMY  09/2011   • EYE SURGERY     • LIVER TRANSPLANTATION  08/20/2013   • PARACENTESIS      Abdominal Paracentesis(Therapeutic) with Imaging Guidance   • SD COLONOSCOPY FLX DX W/COLLJ SPEC WHEN PFRMD N/A 9/12/2016    Procedure: COLONOSCOPY;  Surgeon: Maya Carballo MD;  Location: [facility]; Service: General   • ROTATOR CUFF REPAIR      x2      Family History:     Family History   Problem Relation Age of Onset   • Coronary artery disease Mother         CABG   • Prostate cancer Mother         Prostate Gland   • Heart disease Mother         Cardiac Disorder   • Vascular Disease Mother    • Hypertension Mother         Benign   • Diabetes Maternal Grandmother         Mellitus   • Clotting disorder Maternal Grandfather         Embolism   • Seizures Paternal Grandmother         Epilepsy   • Other Paternal Grandfather         Accident   • Liver cancer Family    • Heart disease Family         Cardiac Disorder   • Hypertension Family         Benign      Social History:     Social History     Socioeconomic History   • Marital status: /Civil Union     Spouse name: None   • Number of children: 3   • Years of education: College, bachelors degree   • Highest education level: None   Occupational History   • Occupation:    Tobacco Use   • Smoking status: Every Day     Packs/day: 0.25     Years: 35.00     Total pack years: 8.75     Types: Cigarettes     Start date: 6/11/1978   • Smokeless tobacco: Never   Vaping Use   • Vaping Use: Never used   Substance and Sexual Activity   • Alcohol use:  Yes     Alcohol/week: 2.0 standard drinks of alcohol     Types: 1 Glasses of wine, 1 Standard drinks or equivalent per week   • Drug use: No     Comment: Per [employer] Drug use way back in college over 40 yrs ago   • Sexual activity: None   Other Topics Concern   • None   Social History Narrative    Daily coffee Consumption (2 Cups/Day)    Daily Cola Consumption (1 Cans/day)    Disabled    Lives with spouse     Social Determinants of Health     Financial Resource Strain: Medium Risk (10/24/2023)    Overall Financial Resource Strain (CARDIA)    • Difficulty of Paying Living Expenses: Somewhat hard   Food Insecurity: Not on file   Transportation Needs: No Transportation Needs (10/24/2023)    PRAPARE - Transportation    • Lack of Transportation (Medical): No    • Lack of Transportation (Non-Medical): No   Physical Activity: Not on file   Stress: Not on file   Social Connections: Not on file   Intimate Partner Violence: Not on file   Housing Stability: Not on file      Medications and Allergies:     Current Outpatient Medications   Medication Sig Dispense Refill   • acetaminophen (TYLENOL) 325 mg tablet Take 650 mg by mouth every 6 (six) hours as needed for mild pain. • ALPRAZolam (XANAX) 0.25 mg tablet TAKE 1 TABLET(0.25 MG) BY MOUTH DAILY AS NEEDED FOR ANXIETY 30 tablet 1   • aspirin (ECOTRIN LOW STRENGTH) 81 mg EC tablet Take 81 mg by mouth daily     • atorvastatin (LIPITOR) 40 mg tablet TAKE 1 TABLET(40 MG) BY MOUTH DAILY 90 tablet 3   • docusate sodium (COLACE) 100 mg capsule Take 100 mg by mouth if needed     • gabapentin (NEURONTIN) 300 mg capsule Take 1 capsule (300 mg total) by mouth 3 (three) times a day 90 capsule 5   • lisinopril (ZESTRIL) 20 mg tablet Take 1 tablet (20 mg total) by mouth daily 90 tablet 1   • omeprazole (PriLOSEC) 20 mg delayed release capsule TAKE 1 CAPSULE BY MOUTH TWICE DAILY 180 capsule 3   • ondansetron (ZOFRAN-ODT) 4 mg disintegrating tablet DISSOLVE 1 TABLET(4 MG) ON THE TONGUE EVERY 6 HOURS AS NEEDED FOR NAUSEA OR VOMITING 20 tablet 0   • ONE DAILY MULTIPLE VITAMIN PO Take by mouth.      • tacrolimus (PROGRAF) 1 mg capsule TAKE 3 CAPSULES BY MOUTH EVERY 12 HOURS 540 capsule 5   • LORazepam (ATIVAN) 0.5 mg tablet Take 1 tablet 2 hours prior to procedure  May repeat after 1 hour (Patient not taking: Reported on 10/18/2022) 2 tablet 0     Current Facility-Administered Medications   Medication Dose Route Frequency Provider Last Rate Last Admin   • bupivacaine (PF) (MARCAINE) 0.25 % injection 10 mL  10 mL Epidural Once Celso Sham Reggie Duarte MD         Allergies   Allergen Reactions   • Macrolides And Ketolides      [facility] - [id number]: The patient is on Antirejection medications and they will reduce the effective ness of the medications. Immunizations:     Immunization History   Administered Date(s) Administered   • COVID-19 PFIZER VACCINE 0.3 ML IM 03/21/2021, 04/11/2021, 08/28/2021, 04/27/2022   • INFLUENZA 10/14/2014, 11/01/2016, 11/20/2017, 12/09/2022   • Influenza Quadrivalent, 6-35 Months IM 11/01/2016   • Influenza, recombinant, quadrivalent,injectable, preservative free 09/19/2018, 10/12/2019, 10/20/2020, 10/19/2021   • Influenza, seasonal, injectable 10/06/2010, 10/26/2011, 11/02/2012, 10/14/2014, 10/30/2015, 11/04/2016   • Meningococcal MCV4P 11/20/2017   • Pneumococcal Conjugate Vaccine 20-valent (Pcv20), Polysace 05/04/2022   • Pneumococcal Polysaccharide PPV23 04/29/2013, 09/19/2018   • Tdap 10/08/2017   • Zoster Vaccine Recombinant 10/04/2019, 12/06/2019      Health Maintenance:         Topic Date Due   • Hepatitis C Screening  Never done   • Colorectal Cancer Screening  09/12/2026         Topic Date Due   • Hepatitis A Vaccine (1 of 2 - Risk 2-dose series) Never done   • Hepatitis B Vaccine (1 of 3 - Risk 3-dose series) Never done   • COVID-19 Vaccine (5 - Booster for Pfizer series) 06/22/2022   • Influenza Vaccine (1) 09/01/2023      Medicare Screening Tests and Risk Assessments:     Conchita Sandy is here for his Subsequent Wellness visit. Health Risk Assessment:   Patient rates overall health as fair. Patient feels that their physical health rating is same. Patient is satisfied with their life. Eyesight was rated as same. Hearing was rated as same. Patient feels that their emotional and mental health rating is same. Patients states they are never, rarely angry. Patient states they are sometimes unusually tired/fatigued. Pain experienced in the last 7 days has been some. Patient's pain rating has been 4/10.  Patient states that he has experienced no weight loss or gain in last 6 months. Depression Screening:   PHQ-2 Score: 1      Fall Risk Screening: In the past year, patient has experienced: no history of falling in past year      Home Safety:  Patient does not have trouble with stairs inside or outside of their home. Patient has working smoke alarms and has no working carbon monoxide detector. Home safety hazards include: none. Nutrition:   Current diet is Regular. Medications:   Patient is currently taking over-the-counter supplements. OTC medications include: see medication list. Patient is able to manage medications. Activities of Daily Living (ADLs)/Instrumental Activities of Daily Living (IADLs):   Walk and transfer into and out of bed and chair?: Yes  Dress and groom yourself?: Yes    Bathe or shower yourself?: Yes    Feed yourself?  Yes  Do your laundry/housekeeping?: Yes  Manage your money, pay your bills and track your expenses?: Yes  Make your own meals?: Yes    Do your own shopping?: Yes    Previous Hospitalizations:   Any hospitalizations or ED visits within the last 12 months?: No      Advance Care Planning:   Living will: Yes    Durable POA for healthcare: No    Advanced directive: Yes      Cognitive Screening:   Provider or family/friend/caregiver concerned regarding cognition?: No    PREVENTIVE SCREENINGS      Cardiovascular Screening:    General: Screening Not Indicated, History Lipid Disorder and Risks and Benefits Discussed    Due for: Lipid Panel      Diabetes Screening:     General: Risks and Benefits Discussed    Due for: Blood Glucose      Colorectal Cancer Screening:     General: Screening Current      Prostate Cancer Screening:    General: Risks and Benefits Discussed    Due for: PSA      Osteoporosis Screening:    General: Screening Not Indicated      Abdominal Aortic Aneurysm (AAA) Screening:    Risk factors include: age between 70-77 yo and tobacco use        General: Risks and Benefits Discussed and Screening Current      Lung Cancer Screening:     General: Risks and Benefits Discussed    Due for: Low Dose CT (LDCT)      Hepatitis C Screening:    General: Risks and Benefits Discussed    Screening, Brief Intervention, and Referral to Treatment (SBIRT)    Screening  Typical number of drinks in a day: 1  Typical number of drinks in a week: 4  Interpretation: Low risk drinking behavior. AUDIT-C Screenin) How often did you have a drink containing alcohol in the past year? 2 to 3 times a week  2) How many drinks did you have on a typical day when you were drinking in the past year? 0  3) How often did you have 6 or more drinks on one occasion in the past year? never    AUDIT-C Score: 3  Interpretation: Score 0-3 (male): Negative screen for alcohol misuse    Single Item Drug Screening:  How often have you used an illegal drug (including marijuana) or a prescription medication for non-medical reasons in the past year? less than monthly    Single Item Drug Screen Score: 1  Interpretation: POSITIVE screen for possible drug use disorder    Drug Abuse Screening Test (DAST-10):  1) Have you used drugs other than those required for medical reasons? No  2) Do you abuse more than one drug at a time? No  3) Are you always able to stop using drugs when you want to? Yes  4) Have you had "blackouts" or "flashbacks" as a result of drug use? No  5) Do you ever feel bad or guilty about your drug use? No  6) Does your spouse (or parents) ever complain about your involvement with drugs? No  7) Have you neglected your family because of your use of drugs? No  8) Have you engaged in illegal activities in order to obtain drugs? No  9) Have you ever experienced withdrawal symptoms (felt sick) when you stopped taking drugs? No  10) Have you had medical problems as a result of your drug use (e.g., memory loss, hepatitis, convulsions, bleeding, etc.)?  No    DAST-10 Score: 0  Interpretation: No problems reported    Brief Intervention  Alcohol & drug use screenings were reviewed. No concerns regarding substance use disorder identified. Other Counseling Topics:   Car/seat belt/driving safety, skin self-exam and sunscreen. No results found. Physical Exam:     /74   Pulse (!) 110   Ht 6' (1.829 m)   Wt 98.8 kg (217 lb 12.8 oz)   SpO2 98%   BMI 29.54 kg/m²     Physical Exam  Vitals reviewed. Constitutional:       General: He is not in acute distress. Appearance: Normal appearance. He is well-developed. HENT:      Head: Normocephalic and atraumatic. Right Ear: External ear normal.      Left Ear: External ear normal.      Nose: Nose normal.   Eyes:      General: No scleral icterus. Conjunctiva/sclera: Conjunctivae normal.   Neck:      Trachea: No tracheal deviation. Cardiovascular:      Rate and Rhythm: Normal rate and regular rhythm. Heart sounds: Normal heart sounds. No murmur heard. Pulmonary:      Effort: Pulmonary effort is normal. No respiratory distress. Breath sounds: Normal breath sounds. No wheezing or rales. Abdominal:      General: Bowel sounds are normal.      Palpations: Abdomen is soft. There is no mass. Tenderness: There is no abdominal tenderness. There is no guarding. Hernia: There is no hernia in the left inguinal area or right inguinal area. Genitourinary:     Testes: Normal.   Musculoskeletal:      Cervical back: Normal range of motion and neck supple. Right lower leg: No edema. Left lower leg: No edema. Lymphadenopathy:      Cervical: No cervical adenopathy. Skin:     Coloration: Skin is not jaundiced or pale. Neurological:      General: No focal deficit present. Mental Status: He is alert and oriented to person, place, and time. Psychiatric:         Mood and Affect: Mood normal.         Behavior: Behavior normal.         Thought Content:  Thought content normal.         Judgment: Judgment normal.          Shahnaz Bowden MD

## 2023-10-25 NOTE — ASSESSMENT & PLAN NOTE
Discussed preventative health, cancer screening, immunizations, and safety issues. Last colonoscopy September 2016 with recommendations to recheck in 10 years. Patient had Pneumovax 23 and Prevnar 20. Patient had the Shingrix vaccines. I recommend yearly flu shot.

## 2023-10-31 DIAGNOSIS — E78.2 MIXED HYPERLIPIDEMIA: ICD-10-CM

## 2023-10-31 RX ORDER — ATORVASTATIN CALCIUM 40 MG/1
40 TABLET, FILM COATED ORAL DAILY
Qty: 90 TABLET | Refills: 1 | Status: SHIPPED | OUTPATIENT
Start: 2023-10-31

## 2023-10-31 NOTE — TELEPHONE ENCOUNTER
Physical Therapy Discharge Summary    Kylie Aleman  MRN: [id number]   Hypomagnesemia   Patient Discharged from acute Physical Therapy on 17.  Please refer to prior PT noted date on 17 for functional status.     Assessment:   Patient was discharge unexpectedly.  Information required to complete and accurate discharge summary is unknown.  Refer to therapy initial evaluation and last progress note for initial and most recent functional status and goal achievement.  Recommendations made may be found in medical record.  GOALS:    Physical Therapy Goals     Not on file          Multidisciplinary Problems (Resolved)        Problem: Physical Therapy Goal    Goal Priority Disciplines Outcome Goal Variances Interventions   Physical Therapy Goal   (Resolved)     PT/OT, PT Outcome(s) achieved     Description:  Goals to be met by: 17     Patient will increase functional independence with mobility by performin. Supine to sit with Stand-by Assistance  2. Sit to stand transfer with Minimal Assistance with rolling walk  3. Bed to chair transfer assess when able  4. Gait assess when able  5. Lower extremity exercise program x30 reps per handout, with supervision                  Reasons for Discontinuation of Therapy Services  Transfer to alternate level of care.      Plan:  Patient Discharged to: home with PT recommendations not setup for patient prior to discharge.   Reason for call:   [x] Refill   [] Prior Auth  [] Other:     Office:   [x] PCP/Provider - Dr Galan Roles  [] Specialty/Provider -     Medication: atorvastatin    Dose/Frequency: 40mg / daily    Quantity: 90D    Pharmacy: [facility] on file    Does the patient have enough for 3 days?    [x] Yes   [] No - Send as HP to POD

## 2023-11-01 ENCOUNTER — APPOINTMENT (OUTPATIENT)
Dept: RADIOLOGY | Age: 68
End: 2023-11-01
Payer: MEDICARE

## 2023-11-01 ENCOUNTER — TELEPHONE (OUTPATIENT)
Age: 68
End: 2023-11-01

## 2023-11-01 DIAGNOSIS — R05.1 ACUTE COUGH: ICD-10-CM

## 2023-11-01 DIAGNOSIS — R05.1 ACUTE COUGH: Primary | ICD-10-CM

## 2023-11-01 PROCEDURE — 71046 X-RAY EXAM CHEST 2 VIEWS: CPT

## 2023-11-01 RX ORDER — AZITHROMYCIN 250 MG/1
TABLET, FILM COATED ORAL
Qty: 6 TABLET | Refills: 0 | Status: SHIPPED | OUTPATIENT
Start: 2023-11-01 | End: 2023-11-06

## 2023-11-01 NOTE — TELEPHONE ENCOUNTER
Patient called and states completed antibiotic and is not any better. Coughing a lot with yellow mucous, chest and ribs hurt from coughing so much. Has diarrhea and is taking immodium  3 a day and is not working. Would like thoughts on chest XR. Would like a call back at [phone]. Patient mentioned usually gets a Z chance when has these symptoms and helps.  Ag[place]in on [employer] in [facility]

## 2023-11-04 DIAGNOSIS — F41.9 ANXIETY: ICD-10-CM

## 2023-11-06 RX ORDER — ALPRAZOLAM 0.25 MG/1
TABLET ORAL
Qty: 30 TABLET | Refills: 1 | Status: SHIPPED | OUTPATIENT
Start: 2023-11-06

## 2023-11-08 DIAGNOSIS — I10 ESSENTIAL HYPERTENSION: ICD-10-CM

## 2023-11-08 RX ORDER — LISINOPRIL 20 MG/1
20 TABLET ORAL DAILY
Qty: 90 TABLET | Refills: 1 | Status: SHIPPED | OUTPATIENT
Start: 2023-11-08

## 2023-12-24 PROBLEM — R05.1 ACUTE COUGH: Status: RESOLVED | Noted: 2023-10-25 | Resolved: 2023-12-24

## 2024-01-17 DIAGNOSIS — F41.9 ANXIETY: ICD-10-CM

## 2024-01-18 RX ORDER — ALPRAZOLAM 0.25 MG/1
TABLET ORAL
Qty: 90 TABLET | Refills: 1 | Status: SHIPPED | OUTPATIENT
Start: 2024-01-18

## 2024-01-25 ENCOUNTER — OFFICE VISIT (OUTPATIENT)
Dept: VASCULAR SURGERY | Facility: CLINIC | Age: 69
End: 2024-01-25
Payer: MEDICARE

## 2024-01-25 VITALS
SYSTOLIC BLOOD PRESSURE: 142 MMHG | DIASTOLIC BLOOD PRESSURE: 90 MMHG | HEIGHT: 72 IN | WEIGHT: 218 LBS | HEART RATE: 78 BPM | BODY MASS INDEX: 29.53 KG/M2

## 2024-01-25 DIAGNOSIS — I65.23 CAROTID STENOSIS, ASYMPTOMATIC, BILATERAL: Primary | ICD-10-CM

## 2024-01-25 DIAGNOSIS — I73.9 PAD (PERIPHERAL ARTERY DISEASE) (HCC): ICD-10-CM

## 2024-01-25 PROCEDURE — 99214 OFFICE O/P EST MOD 30 MIN: CPT | Performed by: SURGERY

## 2024-01-25 NOTE — ASSESSMENT & PLAN NOTE
Denies any restenosis.  Right side greater than 70% peak systolic velocity 268 and end-diastolic velocity 77 with ratio 3.40.  As discussed with the patient, and EDV less than 125 is considered at most a moderate stenosis.  He needs to continue to work on tobacco cessation.  He is taking an aspirin and statin.  He is exercising via graduated measured ambulation.  6 month repeat cerebrovascular duplex study ordered.

## 2024-01-25 NOTE — PROGRESS NOTES
Assessment/Plan:    Carotid stenosis, asymptomatic, bilateral  Denies any restenosis.  Right side greater than 70% peak systolic velocity 268 and end-diastolic velocity 77 with ratio 3.40.  As discussed with the patient, and EDV less than 125 is considered at most a moderate stenosis.  He needs to continue to work on tobacco cessation.  He is taking an aspirin and statin.  He is exercising via graduated measured ambulation.  6 month repeat cerebrovascular duplex study ordered.    PAD (peripheral artery disease) (HCC)  Bilateral lower extremity claudication.  Continued to discuss tobacco cessation.  Ambulating and working through claudication symptoms.  Aspirin and statin therapy ongoing.  6 month lower extremity duplex study ordered.    Subjective:      Patient ID: Duc Duran is a 68 y.o. male.    Patient presents to review CV & KRISTEN. Denies s/s CVA. Patient c/o b/l calf claudication on inclines. Denies tissue loss or rest pain.    HPI  The patient is a pleasant 68-year-old male smoker.  He has a significant medical history for liver transplant due to malignancy.  He is adherent to his aspirin and statin therapy.  He ambulates regularly and experiences claudication however has had no rest pain and no tissue loss.  He does know about his carotid artery stenosis and is tracked every 6 months via surveillance duplex.  He denies any stroke symptoms to include numbness or weakness of bilateral upper or lower extremity.  He has never had any facial drooping or temporary blindness or any other stroke symptoms.  He has tried Chantix however has not been successful in liberating from his tobacco.  I will order nicotine replacement therapy in the event it is cheap enough for him to use.  He has found it previously to be cost prohibitive.    Review of Systems   Constitutional: Negative.    HENT: Negative.     Eyes: Negative.    Respiratory: Negative.     Cardiovascular: Negative.    Gastrointestinal: Negative.     Endocrine: Negative.    Genitourinary: Negative.    Musculoskeletal: Negative.    Skin: Negative.    Allergic/Immunologic: Negative.    Neurological: Negative.    Hematological: Negative.    Psychiatric/Behavioral: Negative.         Objective:  /90 (BP Location: Left arm, Patient Position: Sitting, Cuff Size: Standard)   Pulse 78   Ht 6' (1.829 m)   Wt 98.9 kg (218 lb)   BMI 29.57 kg/m²      Physical Exam  Constitutional:       Appearance: Normal appearance.   HENT:      Head: Normocephalic and atraumatic.      Nose: Nose normal. No rhinorrhea.   Eyes:      Extraocular Movements: Extraocular movements intact.      Pupils: Pupils are equal, round, and reactive to light.   Cardiovascular:      Rate and Rhythm: Normal rate and regular rhythm.      Pulses:           Dorsalis pedis pulses are 2+ on the right side and detected w/ Doppler on the left side.        Posterior tibial pulses are detected w/ Doppler on the left side.   Pulmonary:      Effort: Pulmonary effort is normal.      Breath sounds: No stridor.   Abdominal:      General: There is no distension.      Tenderness: There is no abdominal tenderness.   Musculoskeletal:         General: No tenderness. Normal range of motion.      Cervical back: Normal range of motion and neck supple.   Skin:     General: Skin is warm.      Capillary Refill: Capillary refill takes less than 2 seconds.      Coloration: Skin is not jaundiced.   Neurological:      General: No focal deficit present.      Mental Status: He is alert and oriented to person, place, and time.   Psychiatric:         Mood and Affect: Mood normal.         Behavior: Behavior normal.

## 2024-01-25 NOTE — LETTER
January 25, 2024     Davonte Robertson MD  [address] [facility] [address]    Patient: Duc Duran   YOB: 1955   Date of Visit: 1/25/2024       Dear Dr. Robertson:    Thank you for referring Duc Duran to me for evaluation. Below are my notes for this consultation.    If you have questions, please do not hesitate to call me. I look forward to following your patient along with you.         Sincerely,        Tra Mixon MD        CC: Harris CHANELLJackelyn Renee Mixon MD  1/25/2024 11:23 AM  Sign when Signing Visit  Assessment/Plan:    Carotid stenosis, asymptomatic, bilateral  Denies any restenosis.  Right side greater than 70% peak systolic velocity 268 and end-diastolic velocity 77 with ratio 3.40.  As discussed with the patient, and EDV less than 125 is considered at most a moderate stenosis.  He needs to continue to work on tobacco cessation.  He is taking an aspirin and statin.  He is exercising via graduated measured ambulation.  6 month repeat cerebrovascular duplex study ordered.    PAD (peripheral artery disease) (HCC)  Bilateral lower extremity claudication.  Continued to discuss tobacco cessation.  Ambulating and working through claudication symptoms.  Aspirin and statin therapy ongoing.  6 month lower extremity duplex study ordered.    Subjective:      Patient ID: Duc Duran is a 68 y.o. male.    Patient presents to review CV & KRISTEN. Denies s/s CVA. Patient c/o b/l calf claudication on inclines. Denies tissue loss or rest pain.    HPI  The patient is a pleasant 68-year-old male smoker.  He has a significant medical history for liver transplant due to malignancy.  He is adherent to his aspirin and statin therapy.  He ambulates regularly and experiences claudication however has had no rest pain and no tissue loss.  He does know about his carotid artery stenosis and is tracked every 6 months via surveillance duplex.  He denies any stroke symptoms to  include numbness or weakness of bilateral upper or lower extremity.  He has never had any facial drooping or temporary blindness or any other stroke symptoms.  He has tried Chantix however has not been successful in liberating from his tobacco.  I will order nicotine replacement therapy in the event it is cheap enough for him to use.  He has found it previously to be cost prohibitive.    Review of Systems   Constitutional: Negative.    HENT: Negative.     Eyes: Negative.    Respiratory: Negative.     Cardiovascular: Negative.    Gastrointestinal: Negative.    Endocrine: Negative.    Genitourinary: Negative.    Musculoskeletal: Negative.    Skin: Negative.    Allergic/Immunologic: Negative.    Neurological: Negative.    Hematological: Negative.    Psychiatric/Behavioral: Negative.         Objective:  /90 (BP Location: Left arm, Patient Position: Sitting, Cuff Size: Standard)   Pulse 78   Ht 6' (1.829 m)   Wt 98.9 kg (218 lb)   BMI 29.57 kg/m²      Physical Exam  Constitutional:       Appearance: Normal appearance.   HENT:      Head: Normocephalic and atraumatic.      Nose: Nose normal. No rhinorrhea.   Eyes:      Extraocular Movements: Extraocular movements intact.      Pupils: Pupils are equal, round, and reactive to light.   Cardiovascular:      Rate and Rhythm: Normal rate and regular rhythm.      Pulses:           Dorsalis pedis pulses are 2+ on the right side and detected w/ Doppler on the left side.        Posterior tibial pulses are detected w/ Doppler on the left side.   Pulmonary:      Effort: Pulmonary effort is normal.      Breath sounds: No stridor.   Abdominal:      General: There is no distension.      Tenderness: There is no abdominal tenderness.   Musculoskeletal:         General: No tenderness. Normal range of motion.      Cervical back: Normal range of motion and neck supple.   Skin:     General: Skin is warm.      Capillary Refill: Capillary refill takes less than 2 seconds.       Coloration: Skin is not jaundiced.   Neurological:      General: No focal deficit present.      Mental Status: He is alert and oriented to person, place, and time.   Psychiatric:         Mood and Affect: Mood normal.         Behavior: Behavior normal.

## 2024-01-25 NOTE — ASSESSMENT & PLAN NOTE
Bilateral lower extremity claudication.  Continued to discuss tobacco cessation.  Ambulating and working through claudication symptoms.  Aspirin and statin therapy ongoing.  6 month lower extremity duplex study ordered.

## 2024-01-25 NOTE — PATIENT INSTRUCTIONS
1. Carotid stenosis, asymptomatic, bilateral  Assessment & Plan:  Denies any restenosis.  Right side greater than 70% peak systolic velocity 268 and end-diastolic velocity 77 with ratio 3.40.  As discussed with the patient, and EDV less than 125 is considered at most a moderate stenosis.  He needs to continue to work on tobacco cessation.  He is taking an aspirin and statin.  He is exercising via graduated measured ambulation.  6 month repeat cerebrovascular duplex study ordered.    Orders:  -     VAS carotid complete study; Future; Expected date: 07/25/2024  -     VAS ARTERIAL DUPLEX- LOWER LIMB BILATERAL; Future; Expected date: 07/25/2024  -     nicotine (NICODERM CQ) 7 mg/24hr TD 24 hr patch; Place 1 patch on the skin over 24 hours every 24 hours    2. PAD (peripheral artery disease) ([facility])  Assessment & Plan:  Bilateral lower extremity claudication.  Continued to discuss tobacco cessation.  Ambulating and working through claudication symptoms.  Aspirin and statin therapy ongoing.  6 month lower extremity duplex study ordered.

## 2024-02-21 PROBLEM — Z00.00 MEDICARE ANNUAL WELLNESS VISIT, SUBSEQUENT: Status: RESOLVED | Noted: 2019-12-09 | Resolved: 2024-02-21

## 2024-02-23 DIAGNOSIS — K21.9 CHRONIC GASTROESOPHAGEAL REFLUX DISEASE: ICD-10-CM

## 2024-02-23 RX ORDER — OMEPRAZOLE 20 MG/1
CAPSULE, DELAYED RELEASE ORAL
Qty: 180 CAPSULE | Refills: 1 | Status: SHIPPED | OUTPATIENT
Start: 2024-02-23

## 2024-02-26 DIAGNOSIS — M54.16 LUMBAR RADICULOPATHY: ICD-10-CM

## 2024-02-26 RX ORDER — GABAPENTIN 300 MG/1
300 CAPSULE ORAL 3 TIMES DAILY
Qty: 90 CAPSULE | Refills: 5 | Status: CANCELLED | OUTPATIENT
Start: 2024-02-26

## 2024-02-26 NOTE — TELEPHONE ENCOUNTER
S/W pt and he needs Rf for his gabapentin  300 mg TID. He said he had s/w FQ during an injection and tried weaning off of it but after 2 wks had to go back on it.  The medication doesn't alleviate all of the pain but does ease it some. No s/e with it.   Pls send to [facility].  This was LP on 6/27/23 w/ 5 RF by MEMO Landis 6/27/23. Pt has no pending ovs, do you want an OVS scheduled for future refills?    No need to c/b once Rx sent, [facility] will text/call him.

## 2024-02-26 NOTE — TELEPHONE ENCOUNTER
Reason for call:   [] Refill   [] Prior Auth  [] Other:     Office:   [] PCP/Provider -   [x] Specialty/Provider - Sabiha Culp    Medication: Gabapentin    Dose/Frequency: 300 mg TID    Quantity: 90    Pharmacy: [facility] [place] [address]    Does the patient have enough for 3 days?   [x] Yes   [] No - Send as HP to POD

## 2024-02-27 DIAGNOSIS — M54.16 LUMBAR RADICULOPATHY: ICD-10-CM

## 2024-02-27 RX ORDER — GABAPENTIN 300 MG/1
300 CAPSULE ORAL 3 TIMES DAILY
Qty: 90 CAPSULE | Refills: 5 | Status: SHIPPED | OUTPATIENT
Start: 2024-02-27

## 2024-04-27 DIAGNOSIS — E78.2 MIXED HYPERLIPIDEMIA: ICD-10-CM

## 2024-04-29 RX ORDER — ATORVASTATIN CALCIUM 40 MG/1
40 TABLET, FILM COATED ORAL DAILY
Qty: 90 TABLET | Refills: 3 | Status: SHIPPED | OUTPATIENT
Start: 2024-04-29

## 2024-05-10 DIAGNOSIS — I10 ESSENTIAL HYPERTENSION: ICD-10-CM

## 2024-05-10 NOTE — TELEPHONE ENCOUNTER
Refill must be reviewed and completed by the office or provider. The refill is unable to be approved or denied by the medication management team.    Last seen 10.2022, no upcoming appointment - Please review to see if the refill is appropriate and order if appropriate

## 2024-05-11 RX ORDER — LISINOPRIL 20 MG/1
20 TABLET ORAL DAILY
Qty: 90 TABLET | Refills: 1 | Status: SHIPPED | OUTPATIENT
Start: 2024-05-11

## 2024-08-05 DIAGNOSIS — F41.9 ANXIETY: ICD-10-CM

## 2024-08-05 RX ORDER — ALPRAZOLAM 0.25 MG/1
TABLET ORAL
Qty: 90 TABLET | Refills: 1 | Status: SHIPPED | OUTPATIENT
Start: 2024-08-05

## 2024-08-07 ENCOUNTER — ESTABLISHED COMPREHENSIVE EXAM (OUTPATIENT)
Dept: URBAN - METROPOLITAN AREA CLINIC 6 | Facility: CLINIC | Age: 69
End: 2024-08-07

## 2024-08-07 DIAGNOSIS — Z96.1: ICD-10-CM

## 2024-08-07 DIAGNOSIS — H35.3131: ICD-10-CM

## 2024-08-07 PROCEDURE — 92134 CPTRZ OPH DX IMG PST SGM RTA: CPT

## 2024-08-07 PROCEDURE — 92014 COMPRE OPH EXAM EST PT 1/>: CPT

## 2024-08-07 ASSESSMENT — VISUAL ACUITY
OU_CC: 20/20-1
OU_SC: 20/400
OU_SC: 20/40
OS_SC: 20/70
OU_CC: J1
OS_CC: 20/20-2
OD_CC: 20/40-1
OD_SC: 20/40-1

## 2024-08-07 ASSESSMENT — TONOMETRY
OD_IOP_MMHG: 15
OS_IOP_MMHG: 15

## 2024-08-16 ENCOUNTER — HOSPITAL ENCOUNTER (OUTPATIENT)
Dept: NON INVASIVE DIAGNOSTICS | Facility: HOSPITAL | Age: 69
Discharge: HOME | End: 2024-08-16
Attending: SURGERY
Payer: MEDICARE

## 2024-08-16 DIAGNOSIS — I65.23 CAROTID STENOSIS, ASYMPTOMATIC, BILATERAL: ICD-10-CM

## 2024-08-16 PROCEDURE — 93925 LOWER EXTREMITY STUDY: CPT | Performed by: SURGERY

## 2024-08-16 PROCEDURE — 93880 EXTRACRANIAL BILAT STUDY: CPT | Performed by: SURGERY

## 2024-08-16 PROCEDURE — 93922 UPR/L XTREMITY ART 2 LEVELS: CPT | Performed by: SURGERY

## 2024-08-16 PROCEDURE — 93923 UPR/LXTR ART STDY 3+ LVLS: CPT

## 2024-08-16 PROCEDURE — 93880 EXTRACRANIAL BILAT STUDY: CPT

## 2024-08-16 PROCEDURE — 93925 LOWER EXTREMITY STUDY: CPT

## 2024-08-29 DIAGNOSIS — K21.9 CHRONIC GASTROESOPHAGEAL REFLUX DISEASE: ICD-10-CM

## 2024-09-13 DIAGNOSIS — M54.16 LUMBAR RADICULOPATHY: ICD-10-CM

## 2024-09-13 NOTE — TELEPHONE ENCOUNTER
Reason for call:   [x] Refill   [] Prior Auth  [] Other:     Office:   [x] Specialty/Provider - [facility] / [facility]    Medication: gabapentin    Dose/Frequency: 300mg tid    Quantity: 90    Pharmacy: [id number] - [place], [place] - [address]     Does the patient have enough for 3 days?   [] Yes   [x] No - Send as HP to POD

## 2024-09-17 RX ORDER — GABAPENTIN 300 MG/1
300 CAPSULE ORAL 3 TIMES DAILY
Qty: 270 CAPSULE | Refills: 3 | Status: SHIPPED | OUTPATIENT
Start: 2024-09-17

## 2024-09-17 NOTE — TELEPHONE ENCOUNTER
S/w Pt, Pt requesting refill of Gabapentin 300mg, Pt reports taking TID with relief and no negative s/e. Pt is currently out of medication. Please review and send to [facility] pharmacy in [place].

## 2024-10-10 ENCOUNTER — OFFICE VISIT (OUTPATIENT)
Dept: VASCULAR SURGERY | Facility: CLINIC | Age: 69
End: 2024-10-10
Payer: MEDICARE

## 2024-10-10 VITALS
BODY MASS INDEX: 29.39 KG/M2 | HEART RATE: 97 BPM | DIASTOLIC BLOOD PRESSURE: 86 MMHG | WEIGHT: 217 LBS | SYSTOLIC BLOOD PRESSURE: 142 MMHG | HEIGHT: 72 IN

## 2024-10-10 DIAGNOSIS — I65.23 CAROTID STENOSIS, ASYMPTOMATIC, BILATERAL: ICD-10-CM

## 2024-10-10 DIAGNOSIS — Z72.0 TOBACCO ABUSE: Primary | ICD-10-CM

## 2024-10-10 DIAGNOSIS — I73.9 PAD (PERIPHERAL ARTERY DISEASE) (HCC): ICD-10-CM

## 2024-10-10 DIAGNOSIS — I65.21 CAROTID ARTERY STENOSIS, ASYMPTOMATIC, RIGHT: ICD-10-CM

## 2024-10-10 PROCEDURE — 99214 OFFICE O/P EST MOD 30 MIN: CPT | Performed by: SURGERY

## 2024-10-10 NOTE — ASSESSMENT & PLAN NOTE
Right sided carotid artery stenosis has progressed to high-grade.  We will perform a CTA of the head and neck.  The patient will need a BMP as he has not had a recent evaluation of his kidney function.  We discussed his risks as 3 to 5% per data regarding asymptomatic carotid artery stenosis.  We will have a further discussion regarding surgical intervention, TCAR versus CEA upon his follow-up visit after imaging.

## 2024-10-10 NOTE — PATIENT INSTRUCTIONS
1. Tobacco abuse  Assessment & Plan:    Orders:    nicotine (NICODERM CQ) 7 mg/24hr TD 24 hr patch; Place 1 patch on the skin over 24 hours every 24 hours    Orders:  -     nicotine (NICODERM CQ) 7 mg/24hr TD 24 hr patch; Place 1 patch on the skin over 24 hours every 24 hours  2. Carotid artery stenosis, asymptomatic, right  -     CTA head and neck w wo contrast; Future; Expected date: 10/10/2024  -     Basic metabolic panel; Future  3. PAD (peripheral artery disease) ([facility])  Assessment & Plan:  Bilateral lower extremity claudication.  The patient is currently still smoking.  We have prescribed nicotine patches as his preferred method for smoking cessation.  He is currently at approximately 1/2-3/4 of a pack per day.  He is interested in quitting.  His aspirin and statin medications are dosed appropriately for his age for best medical management.         4. Carotid stenosis, asymptomatic, bilateral  Assessment & Plan:  Right sided carotid artery stenosis has progressed to high-grade.  We will perform a CTA of the head and neck.  The patient will need a BMP as he has not had a recent evaluation of his kidney function.  We discussed his risks as 3 to 5% per data regarding asymptomatic carotid artery stenosis.  We will have a further discussion regarding surgical intervention, TCAR versus CEA upon his follow-up visit after imaging.

## 2024-10-10 NOTE — ASSESSMENT & PLAN NOTE
Orders:    nicotine (NICODERM CQ) 7 mg/24hr TD 24 hr patch; Place 1 patch on the skin over 24 hours every 24 hours

## 2024-10-10 NOTE — LETTER
October 10, 2024     Davonte Robertson MD  [address] [facility] [address]    Patient: Duc Duran   YOB: 1955   Date of Visit: 10/10/2024       Dear Dr. Robertson:    Thank you for referring Duc Duran to me for evaluation. Below are my notes for this consultation.    If you have questions, please do not hesitate to call me. I look forward to following your patient along with you.         Sincerely,        Tra Mixon MD        CC: Harris Mixon MD  10/10/2024 12:00 PM  Sign when Signing Visit  Ambulatory Visit  Name: Duc Duran      : 1955      MRN: [id number]  Encounter Provider: Tra Mixon MD  Encounter Date: 10/10/2024   Enc[facility]    Assessment & Plan  Tobacco abuse    Orders:  •  nicotine (NICODERM CQ) 7 mg/24hr TD 24 hr patch; Place 1 patch on the skin over 24 hours every 24 hours    Carotid artery stenosis, asymptomatic, right    Orders:  •  CTA head and neck w wo contrast; Future  •  Basic metabolic panel; Future    PAD (peripheral artery disease) (HCC)  Bilateral lower extremity claudication.  The patient is currently still smoking.  We have prescribed nicotine patches as his preferred method for smoking cessation.  He is currently at approximately 1/2-3/4 of a pack per day.  He is interested in quitting.  His aspirin and statin medications are dosed appropriately for his age for best medical management.         Carotid stenosis, asymptomatic, bilateral  Right sided carotid artery stenosis has progressed to high-grade.  We will perform a CTA of the head and neck.  The patient will need a BMP as he has not had a recent evaluation of his kidney function.  We discussed his risks as 3 to 5% per data regarding asymptomatic carotid artery stenosis.  We will have a further discussion regarding surgical intervention, TCAR versus CEA upon his follow-up visit after  imaging.           History of Present Illness    Duc Duran is a 69 y.o. male who presents in follow-up to review his most recent carotid artery duplex and lower extremity duplex studies.  He has no current neurologic events to include lateralizing numbness or weakness, temporary blindness, speech disturbances or other neurologic deficits.  He does state that he has some claudication in both calves with walking.  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  He is interested in smoking cessation and we discussed at some length.  At her previous visit we did prescribe nicotine patches however he had difficulty filling them.  We confirmed his pharmacy and reordered them today.  He is currently smoking one half to three-quarter pack per day.  His medical regimen includes an aspirin and moderate intensity statin which is appropriate for his age greater than 65.    Patient presents to review CV/ KRISTEN. Denies s/s CVA. Patient reports b/l calf claudication, a coupe of blocks.       Review of Systems   Constitutional: Negative.    HENT: Negative.     Eyes: Negative.    Respiratory: Negative.     Cardiovascular: Negative.    Gastrointestinal: Negative.    Endocrine: Negative.    Genitourinary: Negative.    Musculoskeletal: Negative.    Skin: Negative.    Allergic/Immunologic: Negative.    Neurological: Negative.    Hematological: Negative.    Psychiatric/Behavioral: Negative.             Objective    /86 (BP Location: Left arm, Patient Position: Sitting, Cuff Size: Standard)   Pulse 97   Ht 6' (1.829 m)   Wt 98.4 kg (217 lb)   BMI 29.43 kg/m²     Physical Exam  Vitals and nursing note reviewed.   Constitutional:       General: He is not in acute distress.     Appearance: He is well-developed.   HENT:      Head: Normocephalic and atraumatic.   Eyes:      Conjunctiva/sclera: Conjunctivae normal.   Cardiovascular:      Rate and Rhythm: Normal rate  and regular rhythm.      Pulses:           Dorsalis pedis pulses are detected w/ Doppler on the right side and detected w/ Doppler on the left side.        Posterior tibial pulses are detected w/ Doppler on the right side and detected w/ Doppler on the left side.      Heart sounds: No murmur heard.  Pulmonary:      Effort: Pulmonary effort is normal. No respiratory distress.      Breath sounds: Normal breath sounds.   Abdominal:      Palpations: Abdomen is soft.      Tenderness: There is no abdominal tenderness.   Musculoskeletal:         General: No swelling.      Cervical back: Neck supple.   Skin:     General: Skin is warm and dry.      Capillary Refill: Capillary refill takes less than 2 seconds.   Neurological:      Mental Status: He is alert.   Psychiatric:         Mood and Affect: Mood normal.     Tobacco use is a significant patient-modifiable risk factor for this patient’s vascular disease with multiple vascular comorbidities, and a significant risk factor for failure of and complications from any endovascular or surgical interventions.    I explained to the patient the effects of smoking including peripheral artery disease, coronary artery disease, cerebrovascular disease as well as cancer and chronic obstructive pulmonary disease. I asked the patient to stop smoking immediately. It is never too late to quit, and many studies show significant health benefits as well as economical savings after smoking cessation. I offered to the patient nicotine replacement therapy as well as referral to the smoking cessation program and access to the quit line 7-838-CERBTMT or ambulatory referral to our network smoking cessation program.    Based on our conversation, this patient appears motivated to quit  And plans to use nicotine replacement to help quit    The patient did not set a quit date. I will continue to  follow up on this issue at our next scheduled visit.     I spent approximately 10 minutes on tobacco  cessation counseling with this patient.

## 2024-10-10 NOTE — PROGRESS NOTES
Ambulatory Visit  Name: Duc Duran      : 1955      MRN: [id number]  Encounter Provider: Tra Mixon MD  Encounter Date: 10/10/2024   Encounter department: [facility]    Assessment & Plan  Tobacco abuse    Orders:    nicotine (NICODERM CQ) 7 mg/24hr TD 24 hr patch; Place 1 patch on the skin over 24 hours every 24 hours    Carotid artery stenosis, asymptomatic, right    Orders:    CTA head and neck w wo contrast; Future    Basic metabolic panel; Future    PAD (peripheral artery disease) (HCC)  Bilateral lower extremity claudication.  The patient is currently still smoking.  We have prescribed nicotine patches as his preferred method for smoking cessation.  He is currently at approximately 1/2-3/4 of a pack per day.  He is interested in quitting.  His aspirin and statin medications are dosed appropriately for his age for best medical management.         Carotid stenosis, asymptomatic, bilateral  Right sided carotid artery stenosis has progressed to high-grade.  We will perform a CTA of the head and neck.  The patient will need a BMP as he has not had a recent evaluation of his kidney function.  We discussed his risks as 3 to 5% per data regarding asymptomatic carotid artery stenosis.  We will have a further discussion regarding surgical intervention, TCAR versus CEA upon his follow-up visit after imaging.           History of Present Illness     Duc Duran is a 69 y.o. male who presents in follow-up to review his most recent carotid artery duplex and lower extremity duplex studies.  He has no current neurologic events to include lateralizing numbness or weakness, temporary blindness, speech disturbances or other neurologic deficits.  He does state that he has some claudication in both calves with walking.  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  He is interested in smoking cessation and we  discussed at some length.  At her previous visit we did prescribe nicotine patches however he had difficulty filling them.  We confirmed his pharmacy and reordered them today.  He is currently smoking one half to three-quarter pack per day.  His medical regimen includes an aspirin and moderate intensity statin which is appropriate for his age greater than 65.    Patient presents to review CV/ KRISTEN. Denies s/s CVA. Patient reports b/l calf claudication, a coupe of blocks.       Review of Systems   Constitutional: Negative.    HENT: Negative.     Eyes: Negative.    Respiratory: Negative.     Cardiovascular: Negative.    Gastrointestinal: Negative.    Endocrine: Negative.    Genitourinary: Negative.    Musculoskeletal: Negative.    Skin: Negative.    Allergic/Immunologic: Negative.    Neurological: Negative.    Hematological: Negative.    Psychiatric/Behavioral: Negative.             Objective     /86 (BP Location: Left arm, Patient Position: Sitting, Cuff Size: Standard)   Pulse 97   Ht 6' (1.829 m)   Wt 98.4 kg (217 lb)   BMI 29.43 kg/m²     Physical Exam  Vitals and nursing note reviewed.   Constitutional:       General: He is not in acute distress.     Appearance: He is well-developed.   HENT:      Head: Normocephalic and atraumatic.   Eyes:      Conjunctiva/sclera: Conjunctivae normal.   Cardiovascular:      Rate and Rhythm: Normal rate and regular rhythm.      Pulses:           Dorsalis pedis pulses are detected w/ Doppler on the right side and detected w/ Doppler on the left side.        Posterior tibial pulses are detected w/ Doppler on the right side and detected w/ Doppler on the left side.      Heart sounds: No murmur heard.  Pulmonary:      Effort: Pulmonary effort is normal. No respiratory distress.      Breath sounds: Normal breath sounds.   Abdominal:      Palpations: Abdomen is soft.      Tenderness: There is no abdominal tenderness.   Musculoskeletal:         General: No swelling.       Cervical back: Neck supple.   Skin:     General: Skin is warm and dry.      Capillary Refill: Capillary refill takes less than 2 seconds.   Neurological:      Mental Status: He is alert.   Psychiatric:         Mood and Affect: Mood normal.     Tobacco use is a significant patient-modifiable risk factor for this patient’s vascular disease with multiple vascular comorbidities, and a significant risk factor for failure of and complications from any endovascular or surgical interventions.    I explained to the patient the effects of smoking including peripheral artery disease, coronary artery disease, cerebrovascular disease as well as cancer and chronic obstructive pulmonary disease. I asked the patient to stop smoking immediately. It is never too late to quit, and many studies show significant health benefits as well as economical savings after smoking cessation. I offered to the patient nicotine replacement therapy as well as referral to the smoking cessation program and access to the quit line 6-731-GYKSDFV or ambulatory referral to our network smoking cessation program.    Based on our conversation, this patient appears motivated to quit  And plans to use nicotine replacement to help quit    The patient did not set a quit date. I will continue to  follow up on this issue at our next scheduled visit.     I spent approximately 10 minutes on tobacco cessation counseling with this patient.

## 2024-10-10 NOTE — ASSESSMENT & PLAN NOTE
Bilateral lower extremity claudication.  The patient is currently still smoking.  We have prescribed nicotine patches as his preferred method for smoking cessation.  He is currently at approximately 1/2-3/4 of a pack per day.  He is interested in quitting.  His aspirin and statin medications are dosed appropriately for his age for best medical management.

## 2024-10-12 DIAGNOSIS — Z94.4 LIVER TRANSPLANT RECIPIENT (HCC): ICD-10-CM

## 2024-10-14 RX ORDER — TACROLIMUS 1 MG/1
CAPSULE ORAL
Qty: 540 CAPSULE | Refills: 5 | Status: SHIPPED | OUTPATIENT
Start: 2024-10-14

## 2024-11-03 DIAGNOSIS — I10 ESSENTIAL HYPERTENSION: ICD-10-CM

## 2024-11-04 RX ORDER — LISINOPRIL 20 MG/1
20 TABLET ORAL DAILY
Qty: 90 TABLET | Refills: 1 | Status: SHIPPED | OUTPATIENT
Start: 2024-11-04

## 2025-02-04 ENCOUNTER — OFFICE VISIT (OUTPATIENT)
Age: 70
End: 2025-02-04
Payer: MEDICARE

## 2025-02-04 VITALS
HEIGHT: 72 IN | DIASTOLIC BLOOD PRESSURE: 82 MMHG | HEART RATE: 74 BPM | WEIGHT: 204 LBS | BODY MASS INDEX: 27.63 KG/M2 | SYSTOLIC BLOOD PRESSURE: 122 MMHG

## 2025-02-04 DIAGNOSIS — Z12.11 SCREENING FOR COLON CANCER: ICD-10-CM

## 2025-02-04 DIAGNOSIS — R13.10 DYSPHAGIA, UNSPECIFIED TYPE: ICD-10-CM

## 2025-02-04 DIAGNOSIS — Z79.899 OTHER LONG TERM (CURRENT) DRUG THERAPY: ICD-10-CM

## 2025-02-04 DIAGNOSIS — K21.9 CHRONIC GASTROESOPHAGEAL REFLUX DISEASE: ICD-10-CM

## 2025-02-04 DIAGNOSIS — Z79.621 LONG TERM (CURRENT) USE OF CALCINEURIN INHIBITOR: ICD-10-CM

## 2025-02-04 DIAGNOSIS — Z94.4 HISTORY OF LIVER TRANSPLANT (HCC): Primary | ICD-10-CM

## 2025-02-04 PROCEDURE — 99204 OFFICE O/P NEW MOD 45 MIN: CPT | Performed by: INTERNAL MEDICINE

## 2025-02-04 PROCEDURE — G2211 COMPLEX E/M VISIT ADD ON: HCPCS | Performed by: INTERNAL MEDICINE

## 2025-02-04 RX ORDER — SODIUM CHLORIDE, SODIUM LACTATE, POTASSIUM CHLORIDE, CALCIUM CHLORIDE 600; 310; 30; 20 MG/100ML; MG/100ML; MG/100ML; MG/100ML
125 INJECTION, SOLUTION INTRAVENOUS CONTINUOUS
OUTPATIENT
Start: 2025-02-04

## 2025-02-04 NOTE — PROGRESS NOTES
Name: Duc Duran      : 1955      MRN: [id number]  Encounter Provider: Maynor Montez MD  Encounter Date: 2025   Encounter department: [facility] [address]  :  Assessment & Plan  History of liver transplant (HCC)   - 15. Previously following w/ [facility] until 2018. Recently seen by [facility] but otherwise tacro refills and lvls have been managed by his PCP   - repeat labs now   - he is still actively drinking. He was counseled on alcohol cessation, however he does have significant social pressures at home that contribute with his continued alcohol use.   - f/u in 6 months  Orders:    Protime-INR; Future    CBC and differential; Future    Comprehensive metabolic panel; Future    Tacrolimus level; Future    DXA bone density spine hip and pelvis; Future    Lipid Panel with Direct LDL reflex; Future    Vitamin D 25 hydroxy; Future    US abdomen complete; Future    Chronic gastroesophageal reflux disease   - EGD for Lee's screening. Can likely increase PPI afterwards for symptomatic ctrl       Long term (current) use of calcineurin inhibitor  Orders:    DXA bone density spine hip and pelvis; Future    Magnesium; Future    Phosphorus; Future    Other long term (current) drug therapy  Orders:    Vitamin D 25 hydroxy; Future    Screening for colon cancer   - last colon 2016: 1x HP. Overdue. Q5Y at most due to post-transplant status  Orders:    Colonoscopy; Future    Dysphagia, unspecified type   - to be evaluated on EGD. This may all be due to poor dentition and inability to chew certain foods well enough to be swallowed.   - he is to be fitted for dentures tomorrow. We will see if he has any improvement in his swallowing symptoms after getting updated dentition.  Orders:    EGD; Future        History of Present Illness   HPI  Duc Duran is a 69 y.o. male w/ a PMH of HTN, HLD, alc cirrhosis d/b HCC s/p OLT 13 who presents for consultation.    Per Care Everywhere, last  hep/GI appt 11/20/18. At that point he was on tac 3/3. Saw [facility] several years ago.    Follows with derm q6mo.  DEXA q2y.  Last colon 09/12/16: descending tics, 1x subcm sigmoid HP hot Bxed. He was told a 5y f/u.    Last EGD long time ago.    He reports feeling depressed for about 7 years. Fatigued, tired. He feels he's out of shape. Appetite poor. Poor dentition too. Racing thoughts, feels stressed. Poor diet. 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    10lb unintentional weight loss the past year.     4-5 drinks a week, usually dinner.     Reports feeling some dysphagia. Feels like food goes down clumsily. On omeprazole 20 bid but still feeling significant reflux. No prior EGD.    Review of Systems   Constitutional:  Negative for appetite change, chills, fatigue and fever.   Eyes:  Negative for photophobia.   Respiratory:  Negative for cough and shortness of breath.    Cardiovascular:  Negative for chest pain.   Gastrointestinal:  Negative for abdominal pain, constipation, diarrhea, nausea and vomiting.   Endocrine: Negative.    Genitourinary:  Negative for dysuria and frequency.   Musculoskeletal:  Negative for myalgias.   Skin:  Negative for pallor.   Neurological:  Negative for weakness.   Hematological: Negative.    Psychiatric/Behavioral: Negative.            Objective   Ht 6' (1.829 m)   Wt 92.5 kg (204 lb)   BMI 27.67 kg/m²      Physical Exam  Constitutional:       General: He is not in acute distress.     Appearance: Normal appearance.   HENT:      Head: Normocephalic and atraumatic.      Nose: Nose normal.      Mouth/Throat:      Mouth: Mucous membranes are moist.   Eyes:      General: No scleral icterus.     Extraocular Movements: Extraocular movements intact.      Conjunctiva/sclera: Conjunctivae normal.      Pupils: Pupils are equal, round, and reactive to light.   Cardiovascular:      Rate and Rhythm: Normal rate and regular rhythm.   Pulmonary:      Effort: Pulmonary effort is normal.   Abdominal:       General: Abdomen is flat. There is no distension.      Palpations: There is no mass.      Tenderness: There is no abdominal tenderness.      Comments: Prior surgical scars c/d/i   Musculoskeletal:         General: No swelling. Normal range of motion.   Skin:     General: Skin is warm and dry.      Capillary Refill: Capillary refill takes less than 2 seconds.   Neurological:      General: No focal deficit present.      Mental Status: He is alert.   Psychiatric:         Mood and Affect: Mood normal.

## 2025-02-04 NOTE — ASSESSMENT & PLAN NOTE
- 08/22/13. Previously following w/ [facility] until 2018. Recently seen by [facility] but otherwise tacro refills and lvls have been managed by his PCP   - repeat labs now   - he is still actively drinking. He was counseled on alcohol cessation, however he does have significant social pressures at home that contribute with his continued alcohol use.   - f/u in 6 months  Orders:    Protime-INR; Future    CBC and differential; Future    Comprehensive metabolic panel; Future    Tacrolimus level; Future    DXA bone density spine hip and pelvis; Future    Lipid Panel with Direct LDL reflex; Future    Vitamin D 25 hydroxy; Future    US abdomen complete; Future

## 2025-02-13 NOTE — PATIENT INSTRUCTIONS
Scheduled date of EGD/colonoscopy (as of today): 03/04/2025  Physician performing EGD/colonoscopy: Dr. Hernandez  Location of EGD/colonoscopy: [facility]  Desired bowel prep reviewed with patient: Yulia  Instructions reviewed with patient by: Amber  Clearances:  N/A    Patient to call to schedule U/S and Dexa

## 2025-02-18 ENCOUNTER — TELEPHONE (OUTPATIENT)
Dept: GASTROENTEROLOGY | Facility: AMBULARY SURGERY CENTER | Age: 70
End: 2025-02-18

## 2025-02-18 ENCOUNTER — ANESTHESIA (OUTPATIENT)
Dept: ANESTHESIOLOGY | Facility: HOSPITAL | Age: 70
End: 2025-02-18

## 2025-02-18 ENCOUNTER — ANESTHESIA EVENT (OUTPATIENT)
Dept: ANESTHESIOLOGY | Facility: HOSPITAL | Age: 70
End: 2025-02-18

## 2025-02-18 NOTE — TELEPHONE ENCOUNTER
--- Message -----   From: Lynn Weldon MD   Sent: 2/18/2025   1:46 PM EST   To: [facility] Reschedule Pool     Patient has high grade carotid stenosis pending possible surgical intervention. Needs to be moved to hospital setting.     I called and left a VM to call the office back to confirm the date and location change. I rescheduled him to 3/10/25 at [facility] with Dr Tucker

## 2025-02-28 DIAGNOSIS — K21.9 CHRONIC GASTROESOPHAGEAL REFLUX DISEASE: ICD-10-CM

## 2025-02-28 RX ORDER — OMEPRAZOLE 20 MG/1
20 CAPSULE, DELAYED RELEASE ORAL 2 TIMES DAILY
Qty: 180 CAPSULE | Refills: 3 | Status: SHIPPED | OUTPATIENT
Start: 2025-02-28

## 2025-03-04 DIAGNOSIS — F41.9 ANXIETY: ICD-10-CM

## 2025-03-04 RX ORDER — ALPRAZOLAM 0.25 MG
TABLET ORAL
Qty: 90 TABLET | Refills: 1 | Status: SHIPPED | OUTPATIENT
Start: 2025-03-04

## 2025-03-10 ENCOUNTER — HOSPITAL ENCOUNTER (OUTPATIENT)
Dept: GASTROENTEROLOGY | Facility: HOSPITAL | Age: 70
Setting detail: OUTPATIENT SURGERY
Discharge: HOME | End: 2025-03-10
Attending: INTERNAL MEDICINE
Payer: MEDICARE

## 2025-03-10 ENCOUNTER — ANESTHESIA (OUTPATIENT)
Dept: GASTROENTEROLOGY | Facility: HOSPITAL | Age: 70
End: 2025-03-10
Payer: MEDICARE

## 2025-03-10 ENCOUNTER — ANESTHESIA EVENT (OUTPATIENT)
Dept: GASTROENTEROLOGY | Facility: HOSPITAL | Age: 70
End: 2025-03-10
Payer: MEDICARE

## 2025-03-10 VITALS
WEIGHT: 199 LBS | DIASTOLIC BLOOD PRESSURE: 67 MMHG | OXYGEN SATURATION: 100 % | BODY MASS INDEX: 26.95 KG/M2 | TEMPERATURE: 97.2 F | RESPIRATION RATE: 18 BRPM | SYSTOLIC BLOOD PRESSURE: 159 MMHG | HEART RATE: 86 BPM | HEIGHT: 72 IN

## 2025-03-10 DIAGNOSIS — Z12.11 SCREENING FOR COLON CANCER: ICD-10-CM

## 2025-03-10 DIAGNOSIS — R13.10 DYSPHAGIA, UNSPECIFIED TYPE: ICD-10-CM

## 2025-03-10 PROCEDURE — 43239 EGD BIOPSY SINGLE/MULTIPLE: CPT | Performed by: INTERNAL MEDICINE

## 2025-03-10 PROCEDURE — 88305 TISSUE EXAM BY PATHOLOGIST: CPT | Performed by: STUDENT IN AN ORGANIZED HEALTH CARE EDUCATION/TRAINING PROGRAM

## 2025-03-10 PROCEDURE — 45380 COLONOSCOPY AND BIOPSY: CPT | Performed by: INTERNAL MEDICINE

## 2025-03-10 RX ORDER — PROPOFOL 10 MG/ML
INJECTION, EMULSION INTRAVENOUS CONTINUOUS PRN
Status: DISCONTINUED | OUTPATIENT
Start: 2025-03-10 | End: 2025-03-10

## 2025-03-10 RX ORDER — SODIUM CHLORIDE, SODIUM LACTATE, POTASSIUM CHLORIDE, CALCIUM CHLORIDE 600; 310; 30; 20 MG/100ML; MG/100ML; MG/100ML; MG/100ML
INJECTION, SOLUTION INTRAVENOUS CONTINUOUS PRN
Status: DISCONTINUED | OUTPATIENT
Start: 2025-03-10 | End: 2025-03-10

## 2025-03-10 RX ORDER — LIDOCAINE HYDROCHLORIDE 20 MG/ML
INJECTION, SOLUTION EPIDURAL; INFILTRATION; INTRACAUDAL; PERINEURAL AS NEEDED
Status: DISCONTINUED | OUTPATIENT
Start: 2025-03-10 | End: 2025-03-10

## 2025-03-10 RX ORDER — SIMETHICONE 40MG/0.6ML
SUSPENSION, DROPS(FINAL DOSAGE FORM)(ML) ORAL AS NEEDED
Status: COMPLETED | OUTPATIENT
Start: 2025-03-10 | End: 2025-03-10

## 2025-03-10 RX ADMIN — SODIUM CHLORIDE, SODIUM LACTATE, POTASSIUM CHLORIDE, AND CALCIUM CHLORIDE: .6; .31; .03; .02 INJECTION, SOLUTION INTRAVENOUS at 11:29

## 2025-03-10 RX ADMIN — LIDOCAINE HYDROCHLORIDE 100 MG: 20 INJECTION, SOLUTION EPIDURAL; INFILTRATION; INTRACAUDAL at 11:44

## 2025-03-10 RX ADMIN — PROPOFOL 100 MCG/KG/MIN: 10 INJECTION, EMULSION INTRAVENOUS at 11:44

## 2025-03-10 RX ADMIN — Medication 40 MG: at 11:59

## 2025-03-10 NOTE — ANESTHESIA POSTPROCEDURE EVALUATION
Post-Op Assessment Note    CV Status:  Stable  Pain Score: 0    Pain management: adequate       Mental Status:  Awake and alert   Hydration Status:  Stable   PONV Controlled:  None   Airway Patency:  Patent and adequate     Post Op Vitals Reviewed: Yes    No anethesia notable event occurred.    Staff: CRNA, Anesthesiologist       Last Filed PACU Vitals:  Vitals Value Taken Time   Temp     Pulse 69    /69    Resp 16    100% SaO2

## 2025-03-10 NOTE — ANESTHESIA PREPROCEDURE EVALUATION
Procedure:  COLONOSCOPY  EGD    Relevant Problems   CARDIO   (+) Carotid stenosis, asymptomatic, bilateral   (+) Essential hypertension   (+) Mixed hyperlipidemia   (+) PAD (peripheral artery disease) (HCC)      GI/HEPATIC   (+) Chronic gastroesophageal reflux disease   (+) Nonalcoholic liver disease, chronic      MUSCULOSKELETAL   (+) Cervical spondylosis   (+) Chronic low back pain      NEURO/PSYCH   (+) Chronic low back pain   (+) Claudication in peripheral vascular disease (HCC)        Physical Exam    Airway    Mallampati score: II  TM Distance: >3 FB  Neck ROM: full     Dental       Cardiovascular      Pulmonary      Other Findings        Anesthesia Plan  ASA Score- 3     Anesthesia Type- general with ASA Monitors.         Additional Monitors:     Airway Plan:     Comment: Hx OLT 2013 - stable no complications. .       Plan Factors-Exercise tolerance (METS): >4 METS.    Chart reviewed.                      Induction- intravenous.    Postoperative Plan-     Perioperative Resuscitation Plan - Level 1 - Full Code.       Informed Consent- Anesthetic plan and risks discussed with patient.  I personally reviewed this patient with the CRNA. Discussed and agreed on the Anesthesia Plan with the CRNA..      NPO Status:  Vitals Value Taken Time   Date of last liquid 03/10/25 03/10/25 1035   Time of last liquid 0600 03/10/25 1035   Date of last solid 03/08/25 03/10/25 1035   Time of last solid         NPO appropriate. Discussed benefits/risks of monitored anesthetic care and discussed providing a dynamic level of mild to deep sedation. Risks include awareness, airway obstruction, aspiration which may necessitate conversion to general anesthesia. All questions answered. Patient understands and wishes to proceed.    Anesthesia plan and consent discussed with Harris who expressed understanding and agreement. Risks/benefits and alternatives discussed with patient including possible PONV, sore throat, damage to teeth/lips/gums  and possibility of rare anesthetic and surgical emergencies.

## 2025-03-10 NOTE — H&P
History and Physical - [facility]  Duc Duran 69 y.o. male MRN: [id number]    HPI: Duc Duran is a 69 y.o. year old male who presents with dysphagia and screening colonoscopy      Review of Systems    Historical Information   Past Medical History:   Diagnosis Date    Alcoholism (HCC)     Arthritis     Bacterial peritonitis (HCC)     Bowel disease     Cancer (HCC)     liver    Cataract     Depression     Fracture     Gastroesophageal reflux     Hepatic disease     Hepatocellular carcinoma (HCC)     History of colon polyps     Hypertension     Liver cancer (HCC)     Liver disease     Stomach disease      Past Surgical History:   Procedure Laterality Date    APPENDECTOMY      BACK SURGERY      CATARACT EXTRACTION      EXPLORATORY LAPAROTOMY  09/2011    EYE SURGERY      LIVER TRANSPLANTATION  08/20/2013    PARACENTESIS      Abdominal Paracentesis(Therapeutic) with Imaging Guidance    WI COLONOSCOPY FLX DX W/COLLJ SPEC WHEN PFRMD N/A 9/12/2016    Procedure: COLONOSCOPY;  Surgeon: Carson Arevalo MD;  Location: [facility];  Service: General    ROTATOR CUFF REPAIR      x2     Social History   Social History     Substance and Sexual Activity   Alcohol Use Yes    Alcohol/week: 2.0 standard drinks of alcohol    Types: 1 Glasses of wine, 1 Standard drinks or equivalent per week     Social History     Substance and Sexual Activity   Drug Use No    Comment: Per [employer] use way back in college over 40 yrs ago     Social History     Tobacco Use   Smoking Status Every Day    Current packs/day: 0.25    Average packs/day: 0.3 packs/day for 46.7 years (11.7 ttl pk-yrs)    Types: Cigarettes    Start date: 6/11/1978   Smokeless Tobacco Never     Family History   Problem Relation Age of Onset    Coronary artery disease Mother         CABG    Prostate cancer Mother         Prostate Gland    Heart disease Mother         Cardiac Disorder    Vascular Disease Mother     Hypertension Mother         Benign     Diabetes Maternal Grandmother         Mellitus    Clotting disorder Maternal Grandfather         Embolism    Seizures Paternal Grandmother         Epilepsy    Other Paternal Grandfather         Accident    Liver cancer Family     Heart disease Family         Cardiac Disorder    Hypertension Family         Benign       Meds/Allergies     Not in a hospital admission.    Allergies   Allergen Reactions    Grapefruit Extract - Food Allergy Other (See Comments)     Due to interaction with medications    Macrolides And Ketolides      Annotation - 18Jan2018: The patient is on Antirejection medications and they will reduce the effective ness of the medications.       Objective     /84   Pulse 98   Temp (!) 97 °F (36.1 °C) (Temporal)   Resp 18   Ht 6' (1.829 m)   Wt 90.3 kg (199 lb)   SpO2 100%   BMI 26.99 kg/m²       PHYSICAL EXAM    Gen: NAD  CV: RRR  CHEST: Clear  ABD: soft, NT/ND  EXT: no edema  Neuro: AAO      ASSESSMENT/PLAN:  This is a 69 y.o. year old male here for dysphagia and screening colonoscopy    PLAN:   Procedure: egd/colonoscopy

## 2025-03-13 PROCEDURE — 88305 TISSUE EXAM BY PATHOLOGIST: CPT | Performed by: STUDENT IN AN ORGANIZED HEALTH CARE EDUCATION/TRAINING PROGRAM

## 2025-03-17 ENCOUNTER — RESULTS FOLLOW-UP (OUTPATIENT)
Age: 70
End: 2025-03-17

## 2025-03-19 NOTE — TELEPHONE ENCOUNTER
Relayed results to patient he does have a concern with his lack of appetite/ urge to eat for the past year, not moving around as much he's doing less exercising/ more driving and did have all his teeth removed. Slight nausea in the morning after waking up ( which takes zofran as needed for ) and has also lost about 15-20 lbs in the past year     He wanted to know if there's anything he should be doing in the meantime or something he can do to help with this?    Please advise thank you!

## 2025-04-22 ENCOUNTER — TELEPHONE (OUTPATIENT)
Dept: ADMINISTRATIVE | Facility: OTHER | Age: 70
End: 2025-04-22

## 2025-04-22 NOTE — TELEPHONE ENCOUNTER
Patient contacted to schedule Annual Wellness Visit.   A message was left for the patient to return the call.    Thank you.  Zoraida Mai MA  [facility]

## 2025-04-23 DIAGNOSIS — E78.2 MIXED HYPERLIPIDEMIA: ICD-10-CM

## 2025-04-23 RX ORDER — ATORVASTATIN CALCIUM 40 MG/1
40 TABLET, FILM COATED ORAL DAILY
Qty: 100 TABLET | Refills: 3 | Status: SHIPPED | OUTPATIENT
Start: 2025-04-23

## 2025-04-23 RX ORDER — ATORVASTATIN CALCIUM 40 MG/1
40 TABLET, FILM COATED ORAL DAILY
Qty: 90 TABLET | Refills: 3 | OUTPATIENT
Start: 2025-04-23

## 2025-04-23 NOTE — TELEPHONE ENCOUNTER
Spoke to patient he prefers to stay with you as his PCP, he had gone to  once and did not care to stay. He asked if you would send his medication. He did schedule his Annual in May.

## 2025-05-04 DIAGNOSIS — I10 ESSENTIAL HYPERTENSION: ICD-10-CM

## 2025-05-05 RX ORDER — LISINOPRIL 20 MG/1
20 TABLET ORAL DAILY
Qty: 90 TABLET | Refills: 1 | Status: SHIPPED | OUTPATIENT
Start: 2025-05-05

## 2025-05-08 ENCOUNTER — RA CDI HCC (OUTPATIENT)
Dept: OTHER | Facility: HOSPITAL | Age: 70
End: 2025-05-08

## 2025-05-15 ENCOUNTER — OFFICE VISIT (OUTPATIENT)
Dept: INTERNAL MEDICINE CLINIC | Facility: CLINIC | Age: 70
End: 2025-05-15
Payer: MEDICARE

## 2025-05-15 VITALS
SYSTOLIC BLOOD PRESSURE: 122 MMHG | TEMPERATURE: 97.1 F | HEART RATE: 97 BPM | OXYGEN SATURATION: 98 % | DIASTOLIC BLOOD PRESSURE: 68 MMHG | BODY MASS INDEX: 26.96 KG/M2 | WEIGHT: 198.8 LBS

## 2025-05-15 DIAGNOSIS — Z72.0 TOBACCO ABUSE: ICD-10-CM

## 2025-05-15 DIAGNOSIS — R11.0 NAUSEA: ICD-10-CM

## 2025-05-15 DIAGNOSIS — Z94.4 HISTORY OF LIVER TRANSPLANT (HCC): ICD-10-CM

## 2025-05-15 DIAGNOSIS — E78.2 MIXED HYPERLIPIDEMIA: Primary | ICD-10-CM

## 2025-05-15 DIAGNOSIS — Z00.00 MEDICARE ANNUAL WELLNESS VISIT, SUBSEQUENT: ICD-10-CM

## 2025-05-15 DIAGNOSIS — F17.210 SMOKING GREATER THAN 20 PACK YEARS: ICD-10-CM

## 2025-05-15 DIAGNOSIS — I10 ESSENTIAL HYPERTENSION: ICD-10-CM

## 2025-05-15 DIAGNOSIS — Z12.5 SCREENING FOR PROSTATE CANCER: ICD-10-CM

## 2025-05-15 PROCEDURE — 99214 OFFICE O/P EST MOD 30 MIN: CPT | Performed by: INTERNAL MEDICINE

## 2025-05-15 PROCEDURE — G0439 PPPS, SUBSEQ VISIT: HCPCS | Performed by: INTERNAL MEDICINE

## 2025-05-15 PROCEDURE — G2211 COMPLEX E/M VISIT ADD ON: HCPCS | Performed by: INTERNAL MEDICINE

## 2025-05-15 RX ORDER — ONDANSETRON 4 MG/1
4 TABLET, ORALLY DISINTEGRATING ORAL EVERY 8 HOURS PRN
Qty: 20 TABLET | Refills: 5 | Status: SHIPPED | OUTPATIENT
Start: 2025-05-15

## 2025-05-15 NOTE — ASSESSMENT & PLAN NOTE
Discussed preventative health, cancer screening, immunizations, and safety issues.  Patient had colonoscopy March 10, 2025 recommendations recheck again in 10 years.  Patient had the Shingrix vaccines.  Patient had Pneumovax 23 and Prevnar 20.  I recommend yearly flu shot.  Patient had AAA screening via other imaging.  I recommend some screening labs.

## 2025-05-15 NOTE — ASSESSMENT & PLAN NOTE
Continue atorvastatin, order in system to recheck lipid panel  Orders:  •  TSH, 3rd generation with Free T4 reflex; Future

## 2025-05-15 NOTE — PATIENT INSTRUCTIONS
Problem List Items Addressed This Visit          Cardiovascular and Mediastinum    Essential hypertension    Blood pressure is controlled, continue meds            Behavioral Health    Tobacco abuse    Cessation recommended            Other    Mixed hyperlipidemia    Continue atorvastatin, order in system to recheck lipid panel         Relevant Orders    TSH, 3rd generation with Free T4 reflex    Medicare annual wellness visit, subsequent - Primary    Discussed preventative health, cancer screening, immunizations, and safety issues.  Patient had colonoscopy March 10, 2025 recommendations recheck again in 10 years.  Patient had the Shingrix vaccines.  Patient had Pneumovax 23 and Prevnar 20.  I recommend yearly flu shot.  Patient had AAA screening via other imaging.  I recommend some screening labs.          Other Visit Diagnoses         Screening for prostate cancer        Relevant Orders    PSA, Total Screen      Smoking greater than 20 pack years        Relevant Orders    CT Lung Screening Program      Nausea        Relevant Medications    ondansetron (ZOFRAN-ODT) 4 mg disintegrating tablet            Medicare Preventive Visit Patient Instructions  Thank you for completing your Welcome to Medicare Visit or Medicare Annual Wellness Visit today. Your next wellness visit will be due in one year (5/16/2026).  The screening/preventive services that you may require over the next 5-10 years are detailed below. Some tests may not apply to you based off risk factors and/or age. Screening tests ordered at today's visit but not completed yet may show as past due. Also, please note that scanned in results may not display below.  Preventive Screenings:  Service Recommendations Previous Testing/Comments   Colorectal Cancer Screening  Colonoscopy    Fecal Occult Blood Test (FOBT)/Fecal Immunochemical Test (FIT)  Fecal DNA/Cologuard Test  Flexible Sigmoidoscopy Age: 45-75 years old   Colonoscopy: every 10 years (May be performed  more frequently if at higher risk)  OR  FOBT/FIT: every 1 year  OR  Cologuard: every 3 years  OR  Sigmoidoscopy: every 5 years  Screening may be recommended earlier than age 45 if at higher risk for colorectal cancer. Also, an individualized decision between you and your healthcare provider will decide whether screening between the ages of 76-85 would be appropriate. Colonoscopy: 03/10/2025  FOBT/FIT: Not on file  Cologuard: Not on file  Sigmoidoscopy: Not on file          Prostate Cancer Screening Individualized decision between patient and health care provider in men between ages of 55-69   Medicare will cover every 12 months beginning on the day after your 50th birthday PSA: 3.0 ng/mL           Hepatitis C Screening Once for adults born between 1945 and 1965  More frequently in patients at high risk for Hepatitis C Hep C Antibody: Not on file        Diabetes Screening 1-2 times per year if you're at risk for diabetes or have pre-diabetes Fasting glucose: 92 mg/dL (5/12/2021)  A1C: No results in last 5 years (No results in last 5 years)      Cholesterol Screening Once every 5 years if you don't have a lipid disorder. May order more often based on risk factors. Lipid panel: 10/26/2020         Other Preventive Screenings Covered by Medicare:  Abdominal Aortic Aneurysm (AAA) Screening: covered once if your at risk. You're considered to be at risk if you have a family history of AAA or a male between the age of 65-75 who smoking at least 100 cigarettes in your lifetime.  Lung Cancer Screening: covers low dose CT scan once per year if you meet all of the following conditions: (1) Age 55-77; (2) No signs or symptoms of lung cancer; (3) Current smoker or have quit smoking within the last 15 years; (4) You have a tobacco smoking history of at least 20 pack years (packs per day x number of years you smoked); (5) You get a written order from a healthcare provider.  Glaucoma Screening: covered annually if you're considered  high risk: (1) You have diabetes OR (2) Family history of glaucoma OR (3)  aged 50 and older OR (4)  American aged 65 and older  Osteoporosis Screening: covered every 2 years if you meet one of the following conditions: (1) Have a vertebral abnormality; (2) On glucocorticoid therapy for more than 3 months; (3) Have primary hyperparathyroidism; (4) On osteoporosis medications and need to assess response to drug therapy.  HIV Screening: covered annually if you're between the age of 15-65. Also covered annually if you are younger than 15 and older than 65 with risk factors for HIV infection. For pregnant patients, it is covered up to 3 times per pregnancy.    Immunizations:  Immunization Recommendations   Influenza Vaccine Annual influenza vaccination during flu season is recommended for all persons aged >= 6 months who do not have contraindications   Pneumococcal Vaccine   * Pneumococcal conjugate vaccine = PCV13 (Prevnar 13), PCV15 (Vaxneuvance), PCV20 (Prevnar 20)  * Pneumococcal polysaccharide vaccine = PPSV23 (Pneumovax) Adults 19-63 yo with certain risk factors or if 65+ yo  If never received any pneumonia vaccine: recommend Prevnar 20 (PCV20)  Give PCV20 if previously received 1 dose of PCV13 or PPSV23   Hepatitis B Vaccine 3 dose series if at intermediate or high risk (ex: diabetes, end stage renal disease, liver disease)   Respiratory syncytial virus (RSV) Vaccine - COVERED BY MEDICARE PART D  * RSVPreF3 (Arexvy) CDC recommends that adults 60 years of age and older may receive a single dose of RSV vaccine using shared clinical decision-making (SCDM)   Tetanus (Td) Vaccine - COST NOT COVERED BY MEDICARE PART B Following completion of primary series, a booster dose should be given every 10 years to maintain immunity against tetanus. Td may also be given as tetanus wound prophylaxis.   Tdap Vaccine - COST NOT COVERED BY MEDICARE PART B Recommended at least once for all adults. For pregnant  patients, recommended with each pregnancy.   Shingles Vaccine (Shingrix) - COST NOT COVERED BY MEDICARE PART B  2 shot series recommended in those 19 years and older who have or will have weakened immune systems or those 50 years and older     Health Maintenance Due:      Topic Date Due    Hepatitis C Screening  Never done    Colorectal Cancer Screening  03/08/2035     Immunizations Due:      Topic Date Due    Hepatitis A Vaccine (1 of 2 - Risk 2-dose series) Never done    Hepatitis B Vaccine (1 of 3 - Risk 3-dose series) Never done    COVID-19 Vaccine (6 - 2024-25 season) 09/01/2024     Advance Directives   What are advance directives?  Advance directives are legal documents that state your wishes and plans for medical care. These plans are made ahead of time in case you lose your ability to make decisions for yourself. Advance directives can apply to any medical decision, such as the treatments you want, and if you want to donate organs.   What are the types of advance directives?  There are many types of advance directives, and each state has rules about how to use them. You may choose a combination of any of the following:  Living will:  This is a written record of the treatment you want. You can also choose which treatments you do not want, which to limit, and which to stop at a certain time. This includes surgery, medicine, IV fluid, and tube feedings.   Durable power of  for healthcare (DPAHC):  This is a written record that states who you want to make healthcare choices for you when you are unable to make them for yourself. This person, called a proxy, is usually a family member or a friend. You may choose more than 1 proxy.  Do not resuscitate (DNR) order:  A DNR order is used in case your heart stops beating or you stop breathing. It is a request not to have certain forms of treatment, such as CPR. A DNR order may be included in other types of advance directives.  Medical directive:  This covers  the care that you want if you are in a coma, near death, or unable to make decisions for yourself. You can list the treatments you want for each condition. Treatment may include pain medicine, surgery, blood transfusions, dialysis, IV or tube feedings, and a ventilator (breathing machine).  Values history:  This document has questions about your views, beliefs, and how you feel and think about life. This information can help others choose the care that you would choose.  Why are advance directives important?  An advance directive helps you control your care. Although spoken wishes may be used, it is better to have your wishes written down. Spoken wishes can be misunderstood, or not followed. Treatments may be given even if you do not want them. An advance directive may make it easier for your family to make difficult choices about your care.   Cigarette Smoking and Your Health   Risks to your health if you smoke:  Nicotine and other chemicals found in tobacco damage every cell in your body. Even if you are a light smoker, you have an increased risk for cancer, heart disease, and lung disease. If you are pregnant or have diabetes, smoking increases your risk for complications.   Benefits to your health if you stop smoking:   You decrease respiratory symptoms such as coughing, wheezing, and shortness of breath.   You reduce your risk for cancers of the lung, mouth, throat, kidney, bladder, pancreas, stomach, and cervix. If you already have cancer, you increase the benefits of chemotherapy. You also reduce your risk for cancer returning or a second cancer from developing.   You reduce your risk for heart disease, blood clots, heart attack, and stroke.   You reduce your risk for lung infections, and diseases such as pneumonia, asthma, chronic bronchitis, and emphysema.  Your circulation improves. More oxygen can be delivered to your body. If you have diabetes, you lower your risk for complications, such as kidney,  artery, and eye diseases. You also lower your risk for nerve damage. Nerve damage can lead to amputations, poor vision, and blindness.  You improve your body's ability to heal and to fight infections.  For more information and support to stop smoking:   [email or web address]  Phone: [phone]  Web Address: [email or web address]  Weight Management   Why it is important to manage your weight:  Being overweight increases your risk of health conditions such as heart disease, high blood pressure, type 2 diabetes, and certain types of cancer. It can also increase your risk for osteoarthritis, sleep apnea, and other respiratory problems. Aim for a slow, steady weight loss. Even a small amount of weight loss can lower your risk of health problems.  How to lose weight safely:  A safe and healthy way to lose weight is to eat fewer calories and get regular exercise. You can lose up about 1 pound a week by decreasing the number of calories you eat by 500 calories each day.   Healthy meal plan for weight management:  A healthy meal plan includes a variety of foods, contains fewer calories, and helps you stay healthy. A healthy meal plan includes the following:  Eat whole-grain foods more often.  A healthy meal plan should contain fiber. Fiber is the part of grains, fruits, and vegetables that is not broken down by your body. Whole-grain foods are healthy and provide extra fiber in your diet. Some examples of whole-grain foods are whole-wheat breads and pastas, oatmeal, brown rice, and bulgur.  Eat a variety of vegetables every day.  Include dark, leafy greens such as spinach, kale, cheyenne greens, and mustard greens. Eat yellow and orange vegetables such as carrots, sweet potatoes, and winter squash.   Eat a variety of fruits every day.  Choose fresh or canned fruit (canned in its own juice or light syrup) instead of juice. Fruit juice has very little or no fiber.  Eat low-fat dairy foods.  Drink fat-free (skim) milk or 1% milk. Eat  fat-free yogurt and low-fat cottage cheese. Try low-fat cheeses such as mozzarella and other reduced-fat cheeses.  Choose meat and other protein foods that are low in fat.  Choose beans or other legumes such as split peas or lentils. Choose fish, skinless poultry (chicken or turkey), or lean cuts of red meat (beef or pork). Before you cook meat or poultry, cut off any visible fat.   Use less fat and oil.  Try baking foods instead of frying them. Add less fat, such as margarine, sour cream, regular salad dressing and mayonnaise to foods. Eat fewer high-fat foods. Some examples of high-fat foods include french fries, doughnuts, ice cream, and cakes.  Eat fewer sweets.  Limit foods and drinks that are high in sugar. This includes candy, cookies, regular soda, and sweetened drinks.  Exercise:  Exercise at least 30 minutes per day on most days of the week. Some examples of exercise include walking, biking, dancing, and swimming. You can also fit in more physical activity by taking the stairs instead of the elevator or parking farther away from stores. Ask your healthcare provider about the best exercise plan for you.    © Copyright [employer] 2018 Information is for End User's use only and may not be sold, redistributed or otherwise used for commercial purposes. All illustrations and images included in CareNotes® are the copyrighted property of [employer] Inc. or [employer]

## 2025-05-15 NOTE — PROGRESS NOTES
Name: Duc Duran      : 1955      MRN: [id number]  Encounter Provider: Davonte Robertson MD  Encounter Date: 5/15/2025   Encounter department: [facility]  :  Assessment & Plan  Medicare annual wellness visit, subsequent  Discussed preventative health, cancer screening, immunizations, and safety issues.  Patient had colonoscopy March 10, 2025 recommendations recheck again in 10 years.  Patient had the Shingrix vaccines.  Patient had Pneumovax 23 and Prevnar 20.  I recommend yearly flu shot.  Patient had AAA screening via other imaging.  I recommend some screening labs.       Screening for prostate cancer    Orders:  •  PSA, Total Screen; Future    Mixed hyperlipidemia  Continue atorvastatin, order in system to recheck lipid panel  Orders:  •  TSH, 3rd generation with Free T4 reflex; Future    Essential hypertension  Blood pressure is controlled, continue meds       Tobacco abuse  Cessation recommended       Smoking greater than 20 pack years    Orders:  •  CT Lung Screening Program; Future    Nausea    Orders:  •  ondansetron (ZOFRAN-ODT) 4 mg disintegrating tablet; Take 1 tablet (4 mg total) by mouth every 8 (eight) hours as needed for nausea or vomiting    History of liver transplant (HCC)  Follow-up with transplant team         Depression Screening and Follow-up Plan: Patient was screened for depression during today's encounter. They screened negative with a PHQ-2 score of 2.      Tobacco Cessation Counseling: Tobacco cessation counseling was provided. The patient is sincerely urged to quit consumption of tobacco. He is not ready to quit tobacco.       Preventive health issues were discussed with patient, and age appropriate screening tests were ordered as noted in patient's After Visit Summary. Personalized health advice and appropriate referrals for health education or preventive services given if needed, as noted in patient's After Visit Summary.    History of Present Illness      Patient here for annual wellness visit and regular follow-up       Patient Care Team:  Davonte Robertson MD as PCP - General  MD Davonte Queen MD Farooq Qureshi, MD Matthew Puc, MD Mark Edward Schadt, MD as Endoscopist  Davian Kline MD (Vascular Surgery)    Review of Systems   Constitutional:  Negative for chills, fatigue and fever.   HENT:  Negative for congestion, nosebleeds, postnasal drip, sore throat and trouble swallowing.    Eyes:  Negative for pain.   Respiratory:  Negative for cough, chest tightness, shortness of breath and wheezing.    Cardiovascular:  Negative for chest pain, palpitations and leg swelling.   Gastrointestinal:  Negative for abdominal pain, constipation, diarrhea (but some loos stool), nausea and vomiting.   Endocrine: Negative for polydipsia and polyuria.   Genitourinary:  Negative for dysuria, flank pain and hematuria.   Musculoskeletal:  Negative for arthralgias.   Skin:  Negative for rash.   Neurological:  Negative for dizziness, tremors, light-headedness and headaches.   Hematological:  Does not bruise/bleed easily.   Psychiatric/Behavioral:  Negative for confusion and dysphoric mood. The patient is not nervous/anxious.      Medical History Reviewed by provider this encounter:  Tobacco  Allergies  Meds  Problems  Med Hx  Surg Hx  Fam Hx       Annual Wellness Visit Questionnaire   Duc is here for his Subsequent Wellness visit.     Health Risk Assessment:   Patient rates overall health as fair. Patient feels that their physical health rating is slightly worse. Patient is satisfied with their life. Eyesight was rated as same. Hearing was rated as same. Patient feels that their emotional and mental health rating is same. Patients states they are never, rarely angry. Patient states they are sometimes unusually tired/fatigued. Pain experienced in the last 7 days has been some. Patient's pain rating has been 3/10. Patient states that he has experienced  weight loss or gain in last 6 months. Diminished appetite    Depression Screening:   PHQ-2 Score: 2      Fall Risk Screening:   In the past year, patient has experienced: no history of falling in past year      Home Safety:  Patient does not have trouble with stairs inside or outside of their home. Patient has working smoke alarms and has no working carbon monoxide detector. Home safety hazards include: none.     Nutrition:   Current diet is Regular.     Medications:   Patient is not currently taking any over-the-counter supplements. Patient is able to manage medications.     Activities of Daily Living (ADLs)/Instrumental Activities of Daily Living (IADLs):   Walk and transfer into and out of bed and chair?: Yes  Dress and groom yourself?: Yes    Bathe or shower yourself?: Yes    Feed yourself? Yes  Do your laundry/housekeeping?: Yes  Manage your money, pay your bills and track your expenses?: Yes  Make your own meals?: Yes    Do your own shopping?: Yes    Previous Hospitalizations:   Any hospitalizations or ED visits within the last 12 months?: No      Advance Care Planning:   Living will: Yes    Durable POA for healthcare: Yes    Advanced directive: Yes    Advanced directive counseling given: Yes      Cognitive Screening:   Provider or family/friend/caregiver concerned regarding cognition?: No    Preventive Screenings      Cardiovascular Screening:    General: Screening Not Indicated, History Lipid Disorder and Risks and Benefits Discussed    Due for: Lipid Panel      Diabetes Screening:     General: Risks and Benefits Discussed    Due for: Blood Glucose      Colorectal Cancer Screening:     General: Screening Current      Prostate Cancer Screening:    General: Risks and Benefits Discussed    Due for: PSA      Osteoporosis Screening:    General: Risks and Benefits Discussed      Abdominal Aortic Aneurysm (AAA) Screening:    Risk factors include: age between 65-76 yo and tobacco use        General: Risks and  Benefits Discussed and Screening Current      Lung Cancer Screening:     General: Risks and Benefits Discussed    Due for: Low Dose CT (LDCT)      Hepatitis C Screening:    General: Risks and Benefits Discussed and Screening Current    Immunizations:  - Immunizations due: Hepatitis A and Hepatitis B    Screening, Brief Intervention, and Referral to Treatment (SBIRT)     Screening  Typical number of drinks in a day: 1  Typical number of drinks in a week: 5  Interpretation: Low risk drinking behavior.    AUDIT-C Screenin) How often did you have a drink containing alcohol in the past year? 2 to 3 times a week  2) How many drinks did you have on a typical day when you were drinking in the past year? 1 to 2  3) How often did you have 6 or more drinks on one occasion in the past year? never    AUDIT-C Score: 3  Interpretation: Score 0-3 (male): Negative screen for alcohol misuse    Single Item Drug Screening:  How often have you used an illegal drug (including marijuana) or a prescription medication for non-medical reasons in the past year? never    Single Item Drug Screen Score: 0  Interpretation: Negative screen for possible drug use disorder    Brief Intervention  Alcohol & drug use screenings were reviewed. No concerns regarding substance use disorder identified.     Other Counseling Topics:   Car/seat belt/driving safety, skin self-exam and sunscreen.     Social Drivers of Health     Financial Resource Strain: Medium Risk (10/24/2023)    Overall Financial Resource Strain (CARDIA)    • Difficulty of Paying Living Expenses: Somewhat hard   Food Insecurity: No Food Insecurity (2025)    Nursing - Inadequate Food Risk Classification    • Worried About Running Out of Food in the Last Year: Never true    • Ran Out of Food in the Last Year: Never true   Transportation Needs: No Transportation Needs (2025)    PRAPARE - Transportation    • Lack of Transportation (Medical): No    • Lack of Transportation  (Non-Medical): No   Housing Stability: Low Risk  (5/12/2025)    Housing Stability Vital Sign    • Unable to Pay for Housing in the Last Year: No    • Number of Times Moved in the Last Year: 0    • Homeless in the Last Year: No   Utilities: Not At Risk (5/12/2025)    [facility] Utilities    • Threatened with loss of utilities: No     No results found.    Objective   /68 (BP Location: Left arm, Patient Position: Sitting, Cuff Size: Standard)   Pulse 97   Temp (!) 97.1 °F (36.2 °C) (Tympanic)   Wt 90.2 kg (198 lb 12.8 oz)   SpO2 98%   BMI 26.96 kg/m²     Physical Exam  Vitals reviewed.   Constitutional:       General: He is not in acute distress.     Appearance: Normal appearance. He is well-developed.   HENT:      Head: Normocephalic and atraumatic.      Right Ear: External ear normal.      Left Ear: External ear normal.      Nose: Nose normal.     Eyes:      General: No scleral icterus.     Conjunctiva/sclera: Conjunctivae normal.     Neck:      Trachea: No tracheal deviation.     Cardiovascular:      Rate and Rhythm: Normal rate and regular rhythm.      Heart sounds: Normal heart sounds. No murmur heard.  Pulmonary:      Effort: Pulmonary effort is normal. No respiratory distress.      Breath sounds: Normal breath sounds. No wheezing or rales.   Abdominal:      General: Bowel sounds are normal.      Palpations: Abdomen is soft. There is no mass.      Tenderness: There is no abdominal tenderness. There is no guarding.      Hernia: There is no hernia in the left inguinal area or right inguinal area.   Genitourinary:     Testes: Normal.     Musculoskeletal:      Cervical back: Normal range of motion and neck supple.      Right lower leg: No edema.      Left lower leg: No edema.   Lymphadenopathy:      Cervical: No cervical adenopathy.     Skin:     Coloration: Skin is not jaundiced or pale.     Neurological:      Mental Status: He is alert and oriented to person, place, and time.     Psychiatric:         Mood and  Affect: Mood normal.         Behavior: Behavior normal.         Thought Content: Thought content normal.         Judgment: Judgment normal.

## 2025-05-28 ENCOUNTER — HOSPITAL ENCOUNTER (OUTPATIENT)
Dept: ULTRASOUND IMAGING | Facility: HOSPITAL | Age: 70
Discharge: HOME | End: 2025-05-28
Payer: MEDICARE

## 2025-05-28 DIAGNOSIS — Z94.4 HISTORY OF LIVER TRANSPLANT (HCC): ICD-10-CM

## 2025-05-28 PROCEDURE — 76700 US EXAM ABDOM COMPLETE: CPT

## 2025-06-05 ENCOUNTER — RESULTS FOLLOW-UP (OUTPATIENT)
Dept: GASTROENTEROLOGY | Facility: CLINIC | Age: 70
End: 2025-06-05

## 2025-06-14 PROBLEM — Z00.00 MEDICARE ANNUAL WELLNESS VISIT, SUBSEQUENT: Status: RESOLVED | Noted: 2019-12-09 | Resolved: 2025-06-14

## 2025-06-23 ENCOUNTER — HOSPITAL ENCOUNTER (OUTPATIENT)
Dept: RADIOLOGY | Age: 70
Discharge: HOME | End: 2025-06-23
Attending: INTERNAL MEDICINE
Payer: MEDICARE

## 2025-06-23 DIAGNOSIS — F17.210 SMOKING GREATER THAN 20 PACK YEARS: ICD-10-CM

## 2025-06-23 PROCEDURE — 71271 CT THORAX LUNG CANCER SCR C-: CPT

## 2025-06-29 DIAGNOSIS — F17.210 SMOKING GREATER THAN 20 PACK YEARS: Primary | ICD-10-CM

## 2025-07-25 ENCOUNTER — APPOINTMENT (OUTPATIENT)
Dept: LAB | Age: 70
End: 2025-07-25
Attending: INTERNAL MEDICINE
Payer: MEDICARE

## 2025-07-25 DIAGNOSIS — R97.20 ELEVATED PSA: Primary | ICD-10-CM

## 2025-07-25 DIAGNOSIS — I65.21 CAROTID ARTERY STENOSIS, ASYMPTOMATIC, RIGHT: ICD-10-CM

## 2025-07-25 DIAGNOSIS — E78.2 MIXED HYPERLIPIDEMIA: ICD-10-CM

## 2025-07-25 DIAGNOSIS — Z12.5 SCREENING FOR PROSTATE CANCER: ICD-10-CM

## 2025-07-25 LAB
ANION GAP SERPL CALCULATED.3IONS-SCNC: 10 MMOL/L (ref 4–13)
BUN SERPL-MCNC: 11 MG/DL (ref 5–25)
CALCIUM SERPL-MCNC: 9.1 MG/DL (ref 8.4–10.2)
CHLORIDE SERPL-SCNC: 96 MMOL/L (ref 96–108)
CO2 SERPL-SCNC: 23 MMOL/L (ref 21–32)
CREAT SERPL-MCNC: 1.4 MG/DL (ref 0.6–1.3)
GFR SERPL CREATININE-BSD FRML MDRD: 51 ML/MIN/1.73SQ M
GLUCOSE P FAST SERPL-MCNC: 112 MG/DL (ref 65–99)
POTASSIUM SERPL-SCNC: 4 MMOL/L (ref 3.5–5.3)
PSA SERPL-MCNC: 5.53 NG/ML (ref 0–4)
SODIUM SERPL-SCNC: 129 MMOL/L (ref 135–147)
TSH SERPL DL<=0.05 MIU/L-ACNC: 1.1 UIU/ML (ref 0.45–4.5)

## 2025-07-25 PROCEDURE — 36415 COLL VENOUS BLD VENIPUNCTURE: CPT

## 2025-07-25 PROCEDURE — 80048 BASIC METABOLIC PNL TOTAL CA: CPT

## 2025-07-25 PROCEDURE — 84443 ASSAY THYROID STIM HORMONE: CPT

## 2025-07-25 PROCEDURE — G0103 PSA SCREENING: HCPCS

## 2025-07-28 ENCOUNTER — TELEPHONE (OUTPATIENT)
Age: 70
End: 2025-07-28

## 2025-08-13 ENCOUNTER — ESTABLISHED COMPREHENSIVE EXAM (OUTPATIENT)
Dept: URBAN - METROPOLITAN AREA CLINIC 6 | Facility: CLINIC | Age: 70
End: 2025-08-13

## 2025-08-13 DIAGNOSIS — H53.031: ICD-10-CM

## 2025-08-13 DIAGNOSIS — H43.23: ICD-10-CM

## 2025-08-13 DIAGNOSIS — H35.3131: ICD-10-CM

## 2025-08-13 PROCEDURE — 92015 DETERMINE REFRACTIVE STATE: CPT

## 2025-08-13 PROCEDURE — 92134 CPTRZ OPH DX IMG PST SGM RTA: CPT

## 2025-08-13 PROCEDURE — 92014 COMPRE OPH EXAM EST PT 1/>: CPT

## 2025-08-13 ASSESSMENT — VISUAL ACUITY
OD_CC: J2
OU_CC: J1
OS_CC: 20/25-1
OD_PH: 20/40
OS_CC: J1+

## 2025-08-13 ASSESSMENT — TONOMETRY
OD_IOP_MMHG: 16
OS_IOP_MMHG: 16